# Patient Record
Sex: MALE | Race: BLACK OR AFRICAN AMERICAN | Employment: OTHER | URBAN - METROPOLITAN AREA
[De-identification: names, ages, dates, MRNs, and addresses within clinical notes are randomized per-mention and may not be internally consistent; named-entity substitution may affect disease eponyms.]

---

## 2017-01-25 ENCOUNTER — LAB ONLY (OUTPATIENT)
Dept: INTERNAL MEDICINE CLINIC | Age: 57
End: 2017-01-25

## 2017-01-25 DIAGNOSIS — R73.03 PREDIABETES: ICD-10-CM

## 2017-01-25 DIAGNOSIS — E78.5 DYSLIPIDEMIA: ICD-10-CM

## 2017-01-26 LAB
ALBUMIN SERPL-MCNC: 4.3 G/DL (ref 3.5–5.5)
ALBUMIN/GLOB SERPL: 1.7 {RATIO} (ref 1.1–2.5)
ALP SERPL-CCNC: 67 IU/L (ref 39–117)
ALT SERPL-CCNC: 22 IU/L (ref 0–44)
AST SERPL-CCNC: 20 IU/L (ref 0–40)
BILIRUB SERPL-MCNC: 0.3 MG/DL (ref 0–1.2)
BUN SERPL-MCNC: 17 MG/DL (ref 6–24)
BUN/CREAT SERPL: 16 (ref 9–20)
CALCIUM SERPL-MCNC: 9.4 MG/DL (ref 8.7–10.2)
CHLORIDE SERPL-SCNC: 102 MMOL/L (ref 96–106)
CHOLEST SERPL-MCNC: 241 MG/DL (ref 100–199)
CO2 SERPL-SCNC: 23 MMOL/L (ref 18–29)
CREAT SERPL-MCNC: 1.07 MG/DL (ref 0.76–1.27)
GLOBULIN SER CALC-MCNC: 2.6 G/DL (ref 1.5–4.5)
GLUCOSE SERPL-MCNC: 109 MG/DL (ref 65–99)
HDLC SERPL-MCNC: 55 MG/DL
LDLC SERPL CALC-MCNC: 169 MG/DL (ref 0–99)
POTASSIUM SERPL-SCNC: 5.2 MMOL/L (ref 3.5–5.2)
PROT SERPL-MCNC: 6.9 G/DL (ref 6–8.5)
SODIUM SERPL-SCNC: 139 MMOL/L (ref 134–144)
TRIGL SERPL-MCNC: 84 MG/DL (ref 0–149)
VLDLC SERPL CALC-MCNC: 17 MG/DL (ref 5–40)

## 2017-01-27 ENCOUNTER — OFFICE VISIT (OUTPATIENT)
Dept: INTERNAL MEDICINE CLINIC | Age: 57
End: 2017-01-27

## 2017-01-27 VITALS
DIASTOLIC BLOOD PRESSURE: 67 MMHG | HEIGHT: 69 IN | OXYGEN SATURATION: 96 % | HEART RATE: 84 BPM | BODY MASS INDEX: 22.87 KG/M2 | TEMPERATURE: 97.7 F | SYSTOLIC BLOOD PRESSURE: 113 MMHG | WEIGHT: 154.4 LBS

## 2017-01-27 DIAGNOSIS — Z00.00 PHYSICAL EXAM, ANNUAL: Primary | ICD-10-CM

## 2017-01-27 DIAGNOSIS — G89.29 CHRONIC LEFT SHOULDER PAIN: ICD-10-CM

## 2017-01-27 DIAGNOSIS — F41.9 ANXIETY: ICD-10-CM

## 2017-01-27 DIAGNOSIS — M25.512 CHRONIC LEFT SHOULDER PAIN: ICD-10-CM

## 2017-01-27 DIAGNOSIS — I10 ESSENTIAL HYPERTENSION: ICD-10-CM

## 2017-01-27 DIAGNOSIS — E78.00 PURE HYPERCHOLESTEROLEMIA: ICD-10-CM

## 2017-01-27 DIAGNOSIS — R73.01 IFG (IMPAIRED FASTING GLUCOSE): ICD-10-CM

## 2017-01-27 DIAGNOSIS — Z23 ENCOUNTER FOR IMMUNIZATION: ICD-10-CM

## 2017-01-27 RX ORDER — ALBUTEROL SULFATE 90 UG/1
AEROSOL, METERED RESPIRATORY (INHALATION)
Qty: 1 INHALER | Refills: 11 | Status: SHIPPED | OUTPATIENT
Start: 2017-01-27 | End: 2017-08-04 | Stop reason: SDUPTHER

## 2017-01-27 RX ORDER — LISINOPRIL 10 MG/1
10 TABLET ORAL DAILY
Qty: 30 TAB | Refills: 6 | Status: SHIPPED | OUTPATIENT
Start: 2017-01-27 | End: 2017-08-09 | Stop reason: SDUPTHER

## 2017-01-27 RX ORDER — CYCLOBENZAPRINE HCL 10 MG
10 TABLET ORAL
Qty: 20 TAB | Refills: 0 | Status: SHIPPED | OUTPATIENT
Start: 2017-01-27 | End: 2017-07-17

## 2017-01-27 RX ORDER — PRAVASTATIN SODIUM 20 MG/1
20 TABLET ORAL
Qty: 30 TAB | Refills: 6 | Status: SHIPPED | OUTPATIENT
Start: 2017-01-27 | End: 2017-07-17

## 2017-01-27 NOTE — MR AVS SNAPSHOT
Visit Information Date & Time Provider Department Dept. Phone Encounter #  
 1/27/2017  2:45 PM Marlene Tsai, [address] [place] [address],[address] [phone] [id number] Follow-up Instructions Return in about 6 months (around 7/27/2017). Upcoming Health Maintenance Date Due Pneumococcal 19-64 Highest Risk (1 of 3 - PCV13) 4/13/1978 DTaP/Tdap/Td series (1 - Tdap) 7/10/2010 INFLUENZA AGE 9 TO ADULT 8/1/2016 COLONOSCOPY 10/1/2022 Allergies as of 1/27/2017  Review Complete On: 1/27/2017 By: Marlene Tsai MD  
 No Known Allergies Current Immunizations  Reviewed on 7/27/2016 Name Date Influenza Vaccine PF 10/29/2014 TD Vaccine 7/9/2010  4:00 PM, 7/9/2003 Not reviewed this visit You Were Diagnosed With   
  
 Codes Comments Physical exam, annual    -  Primary ICD-10-CM: Z00.00 ICD-9-CM: V70.0 Essential hypertension     ICD-10-CM: I10 
ICD-9-CM: 401.9 Anxiety     ICD-10-CM: F41.9 ICD-9-CM: 300.00 Pure hypercholesterolemia     ICD-10-CM: E78.00 ICD-9-CM: 272.0 Chronic left shoulder pain     ICD-10-CM: M25.512, G89.29 ICD-9-CM: 719.41, 338.29 IFG (impaired fasting glucose)     ICD-10-CM: R73.01 
ICD-9-CM: 790.21 Vitals BP Pulse Temp Height(growth percentile) Weight(growth percentile) SpO2  
 113/67 (BP 1 Location: Left arm, BP Patient Position: Sitting) 84 97.7 °F (36.5 °C) (Oral) 5' 8.5\" (1.74 m) 154 lb 6.4 oz (70 kg) 96% BMI Smoking Status 23.13 kg/m2 Former Smoker BMI and BSA Data Body Mass Index Body Surface Area  
 23.13 kg/m 2 1.84 m 2 Preferred Pharmacy Pharmacy Name Phone [phone] [id number] [phone] Your Updated Medication List  
  
   
This list is accurate as of: 1/27/17  3:23 PM.  Always use your most recent med list.  
  
  
  
  
 albuterol 90 mcg/actuation inhaler Commonly known as:  VENTOLIN HFA INHALE 1 PUFF EVERY 6 HOURS AS NEEDED FOR WHEEZING  
  
 citalopram 20 mg tablet Commonly known as:  Lajuanda Gearing Take 1 Tab by mouth daily. cyclobenzaprine 10 mg tablet Commonly known as:  FLEXERIL Take 1 Tab by mouth nightly. fluticasone 50 mcg/actuation nasal spray Commonly known as:  [place] Barb 2 Sprays by Both Nostrils route daily. HYDROcodone-acetaminophen 7.5-325 mg per tablet Commonly known as:  NORCO  
1-2 tabs PO H5veocg PRN Pain  
  
 ibuprofen 800 mg tablet Commonly known as:  MOTRIN Take 1 Tab by mouth every eight (8) hours as needed for Pain. lisinopril 10 mg tablet Commonly known as:  Mandie Yaima Take 1 Tab by mouth daily. pravastatin 20 mg tablet Commonly known as:  PRAVACHOL Take 1 Tab by mouth nightly. Prescriptions Sent to Pharmacy Refills  
 pravastatin (PRAVACHOL) 20 mg tablet 6 Sig: Take 1 Tab by mouth nightly. Class: Normal  
 Pharmacy: [facility] [address] Ph #: [phone] Route: Oral  
 lisinopril (PRINIVIL, ZESTRIL) 10 mg tablet 6 Sig: Take 1 Tab by mouth daily. Class: Normal  
 Pharmacy: [facility] [address] Ph #: [phone] Route: Oral  
 albuterol (VENTOLIN HFA) 90 mcg/actuation inhaler 11 Sig: INHALE 1 PUFF EVERY 6 HOURS AS NEEDED FOR WHEEZING Class: Normal  
 Pharmacy: [facility] [address] Ph #: [phone]  
 cyclobenzaprine (FLEXERIL) 10 mg tablet 0 Sig: Take 1 Tab by mouth nightly. Class: Normal  
 Pharmacy: [facility] [address] Ph #: [phone] Route: Oral  
  
We Performed the Following REFERRAL TO ORTHOPEDICS [GVG016 Custom] Comments: Please evaluate patient for shoulder pain Follow-up Instructions Return in about 6 months (around 7/27/2017). To-Do List   
 01/30/2017 Lab:  HEMOGLOBIN A1C WITH EAG   
  
 01/30/2017 Lab:  LIPID PANEL Referral Information Referral ID Referred By Referred To  
  
 [id number] DENNIS, [address] [facility] [address] Phone: [phone] Visits Status Start Date End Date 1 New Request 1/27/17 1/27/18 If your referral has a status of pending review or denied, additional information will be sent to support the outcome of this decision. Patient Instructions Lab fasting in 6 weeks Schedule eye exam 
 
See dr Vince Crigler Introducing [facility]! Allyssa Urbina introduces [employer] patient portal. Now you can access parts of your medical record, email your doctor's office, and request medication refills online. 1. In your internet browser, go to [email or web address] 2. Click on the First Time User? Click Here link in the Sign In box. You will see the New Member Sign Up page. 3. Enter your [employer] Access Code exactly as it appears below. You will not need to use this code after youve completed the sign-up process. If you do not sign up before the expiration date, you must request a new code. · [employer] Access Code: [email or web address] Expires: 4/27/2017  3:22 PM 
 
4. Enter the last four digits of your Social Security Number (xxxx) and Date of Birth (mm/dd/yyyy) as indicated and click Submit. You will be taken to the next sign-up page. 5. Create a Evinot ID. This will be your [employer] login ID and cannot be changed, so think of one that is secure and easy to remember. 6. Create a [employer] password. You can change your password at any time. 7. Enter your Password Reset Question and Answer. This can be used at a later time if you forget your password. 8. Enter your e-mail address. You will receive e-mail notification when new information is available in [address] 9. Click Sign Up. You can now view and download portions of your medical record. 10. Click the Download Summary menu link to download a portable copy of your medical information. If you have questions, please visit the Frequently Asked Questions section of the [employer] website. Remember, [employer] is NOT to be used for urgent needs. For medical emergencies, dial 911. Now available from your iPhone and Android! Please provide this summary of care documentation to your next provider. Your primary care clinician is listed as Itzel Hart. If you have any questions after today's visit, please call [phone].

## 2017-01-27 NOTE — PROGRESS NOTES
[place] per Dr. Anjali Corcoran, flu vaccine given after obtaining the consent form. 0.5ml injected in the R deltoid muscle intramuscularly. Administered by Corine Hobson LPN. VIS given. 1/27/17.
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
## 2017-02-22 ENCOUNTER — LAB ONLY (OUTPATIENT)
Dept: INTERNAL MEDICINE CLINIC | Age: 57
End: 2017-02-22

## 2017-02-22 DIAGNOSIS — E78.00 PURE HYPERCHOLESTEROLEMIA: ICD-10-CM

## 2017-02-22 DIAGNOSIS — R73.01 IFG (IMPAIRED FASTING GLUCOSE): ICD-10-CM

## 2017-02-22 RX ORDER — CITALOPRAM 20 MG/1
20 TABLET, FILM COATED ORAL DAILY
Qty: 30 TAB | Refills: 6 | Status: SHIPPED | OUTPATIENT
Start: 2017-02-22 | End: 2017-08-24 | Stop reason: SDUPTHER

## 2017-02-23 LAB
CHOLEST SERPL-MCNC: 213 MG/DL (ref 100–199)
EST. AVERAGE GLUCOSE BLD GHB EST-MCNC: 117 MG/DL
HBA1C MFR BLD: 5.7 % (ref 4.8–5.6)
HDLC SERPL-MCNC: 67 MG/DL
LDLC SERPL CALC-MCNC: 135 MG/DL (ref 0–99)
TRIGL SERPL-MCNC: 55 MG/DL (ref 0–149)
VLDLC SERPL CALC-MCNC: 11 MG/DL (ref 5–40)

## 2017-02-23 RX ORDER — PRAVASTATIN SODIUM 40 MG/1
40 TABLET ORAL
Qty: 30 TAB | Refills: 5 | Status: SHIPPED | OUTPATIENT
Start: 2017-02-23 | End: 2017-07-28 | Stop reason: SDUPTHER

## 2017-02-23 NOTE — PROGRESS NOTES
Spoke to [employer] ([employer]). Angelica informed per Dr. Aury Hart to have pt increase pravastatin to 40mg from 20mg as lipids above goal-ordered. Angelica informed to have pt have fasting labs prior to NOV-ordered and mailed. Angelica verbalized understanding of information discussed w/ no further questions at this time.

## 2017-07-17 ENCOUNTER — HOSPITAL ENCOUNTER (EMERGENCY)
Age: 57
Discharge: HOME | End: 2017-07-17
Attending: EMERGENCY MEDICINE
Payer: COMMERCIAL

## 2017-07-17 ENCOUNTER — APPOINTMENT (OUTPATIENT)
Dept: GENERAL RADIOLOGY | Age: 57
End: 2017-07-17
Attending: EMERGENCY MEDICINE
Payer: COMMERCIAL

## 2017-07-17 VITALS
HEIGHT: 68 IN | TEMPERATURE: 97.6 F | HEART RATE: 97 BPM | SYSTOLIC BLOOD PRESSURE: 153 MMHG | DIASTOLIC BLOOD PRESSURE: 57 MMHG | WEIGHT: 153.44 LBS | OXYGEN SATURATION: 95 % | RESPIRATION RATE: 18 BRPM | BODY MASS INDEX: 23.26 KG/M2

## 2017-07-17 DIAGNOSIS — J45.21 MILD INTERMITTENT ASTHMA WITH ACUTE EXACERBATION: Primary | ICD-10-CM

## 2017-07-17 PROCEDURE — 99284 EMERGENCY DEPT VISIT MOD MDM: CPT

## 2017-07-17 PROCEDURE — 74011000250 HC RX REV CODE- 250

## 2017-07-17 PROCEDURE — 74011636637 HC RX REV CODE- 636/637: Performed by: EMERGENCY MEDICINE

## 2017-07-17 PROCEDURE — 71020 XR CHEST PA LAT: CPT

## 2017-07-17 PROCEDURE — 94640 AIRWAY INHALATION TREATMENT: CPT

## 2017-07-17 PROCEDURE — 74011000250 HC RX REV CODE- 250: Performed by: EMERGENCY MEDICINE

## 2017-07-17 PROCEDURE — 77030029684 HC NEB SM VOL KT MONA -A

## 2017-07-17 RX ORDER — PREDNISONE 20 MG/1
60 TABLET ORAL
Status: DISCONTINUED | OUTPATIENT
Start: 2017-07-17 | End: 2017-07-17

## 2017-07-17 RX ORDER — IPRATROPIUM BROMIDE AND ALBUTEROL SULFATE 2.5; .5 MG/3ML; MG/3ML
3 SOLUTION RESPIRATORY (INHALATION)
Status: COMPLETED | OUTPATIENT
Start: 2017-07-17 | End: 2017-07-17

## 2017-07-17 RX ORDER — IPRATROPIUM BROMIDE AND ALBUTEROL SULFATE 2.5; .5 MG/3ML; MG/3ML
SOLUTION RESPIRATORY (INHALATION)
Status: COMPLETED
Start: 2017-07-17 | End: 2017-07-17

## 2017-07-17 RX ORDER — PREDNISONE 20 MG/1
60 TABLET ORAL
Status: COMPLETED | OUTPATIENT
Start: 2017-07-17 | End: 2017-07-17

## 2017-07-17 RX ORDER — PREDNISONE 20 MG/1
TABLET ORAL
Status: DISCONTINUED
Start: 2017-07-17 | End: 2017-07-17 | Stop reason: HOSPADM

## 2017-07-17 RX ORDER — PREDNISONE 20 MG/1
40 TABLET ORAL DAILY
Qty: 8 TAB | Refills: 0 | Status: SHIPPED | OUTPATIENT
Start: 2017-07-17 | End: 2017-07-21

## 2017-07-17 RX ADMIN — IPRATROPIUM BROMIDE AND ALBUTEROL SULFATE 3 ML: 2.5; .5 SOLUTION RESPIRATORY (INHALATION) at 01:43

## 2017-07-17 RX ADMIN — IPRATROPIUM BROMIDE AND ALBUTEROL SULFATE 3 ML: .5; 3 SOLUTION RESPIRATORY (INHALATION) at 01:13

## 2017-07-17 RX ADMIN — PREDNISONE 60 MG: 20 TABLET ORAL at 01:12

## 2017-07-17 RX ADMIN — IPRATROPIUM BROMIDE AND ALBUTEROL SULFATE 3 ML: .5; 3 SOLUTION RESPIRATORY (INHALATION) at 01:43

## 2017-07-17 RX ADMIN — IPRATROPIUM BROMIDE AND ALBUTEROL SULFATE 3 ML: .5; 3 SOLUTION RESPIRATORY (INHALATION) at 01:28

## 2017-07-17 NOTE — LETTER
[facility] EMERGENCY DEPT 
[address] [place] [address] [address] [id number] [phone] Work/School Note Date: 7/17/2017 To Whom It May concern: 
 
Jenny Fisher was seen and treated today in the emergency room by the following provider(s): 
Attending Provider: Kofi Kowalski MD 
Resident: Jaja Rasmussen MD. Jenny Fisher may return to work on 7/18/2017. Sincerely, Jaja Rasmussen MD
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
## 2017-07-17 NOTE — DISCHARGE INSTRUCTIONS

## 2017-07-26 ENCOUNTER — LAB ONLY (OUTPATIENT)
Dept: INTERNAL MEDICINE CLINIC | Age: 57
End: 2017-07-26

## 2017-07-26 DIAGNOSIS — R73.01 IFG (IMPAIRED FASTING GLUCOSE): ICD-10-CM

## 2017-07-26 DIAGNOSIS — E78.00 PURE HYPERCHOLESTEROLEMIA: ICD-10-CM

## 2017-07-27 LAB
ALBUMIN SERPL-MCNC: 4.1 G/DL (ref 3.5–5.5)
ALBUMIN/GLOB SERPL: 1.6 {RATIO} (ref 1.2–2.2)
ALP SERPL-CCNC: 62 IU/L (ref 39–117)
ALT SERPL-CCNC: 33 IU/L (ref 0–44)
AST SERPL-CCNC: 31 IU/L (ref 0–40)
BILIRUB SERPL-MCNC: 0.3 MG/DL (ref 0–1.2)
BUN SERPL-MCNC: 14 MG/DL (ref 6–24)
BUN/CREAT SERPL: 14 (ref 9–20)
CALCIUM SERPL-MCNC: 9.4 MG/DL (ref 8.7–10.2)
CHLORIDE SERPL-SCNC: 98 MMOL/L (ref 96–106)
CHOLEST SERPL-MCNC: 220 MG/DL (ref 100–199)
CO2 SERPL-SCNC: 24 MMOL/L (ref 18–29)
CREAT SERPL-MCNC: 1.03 MG/DL (ref 0.76–1.27)
EST. AVERAGE GLUCOSE BLD GHB EST-MCNC: 114 MG/DL
GLOBULIN SER CALC-MCNC: 2.6 G/DL (ref 1.5–4.5)
GLUCOSE SERPL-MCNC: 103 MG/DL (ref 65–99)
HBA1C MFR BLD: 5.6 % (ref 4.8–5.6)
HDLC SERPL-MCNC: 58 MG/DL
LDLC SERPL CALC-MCNC: 143 MG/DL (ref 0–99)
POTASSIUM SERPL-SCNC: 5.2 MMOL/L (ref 3.5–5.2)
PROT SERPL-MCNC: 6.7 G/DL (ref 6–8.5)
SODIUM SERPL-SCNC: 136 MMOL/L (ref 134–144)
TRIGL SERPL-MCNC: 93 MG/DL (ref 0–149)
VLDLC SERPL CALC-MCNC: 19 MG/DL (ref 5–40)

## 2017-07-28 ENCOUNTER — OFFICE VISIT (OUTPATIENT)
Dept: INTERNAL MEDICINE CLINIC | Age: 57
End: 2017-07-28

## 2017-07-28 ENCOUNTER — HOSPITAL ENCOUNTER (OUTPATIENT)
Dept: ULTRASOUND IMAGING | Age: 57
Discharge: HOME | End: 2017-07-28
Attending: INTERNAL MEDICINE
Payer: COMMERCIAL

## 2017-07-28 VITALS
SYSTOLIC BLOOD PRESSURE: 113 MMHG | RESPIRATION RATE: 16 BRPM | BODY MASS INDEX: 23.49 KG/M2 | HEIGHT: 68 IN | DIASTOLIC BLOOD PRESSURE: 65 MMHG | HEART RATE: 82 BPM | WEIGHT: 155 LBS | OXYGEN SATURATION: 98 % | TEMPERATURE: 98 F

## 2017-07-28 DIAGNOSIS — E78.00 PURE HYPERCHOLESTEROLEMIA: ICD-10-CM

## 2017-07-28 DIAGNOSIS — I10 ESSENTIAL HYPERTENSION: Primary | ICD-10-CM

## 2017-07-28 DIAGNOSIS — J40 BRONCHITIS: ICD-10-CM

## 2017-07-28 DIAGNOSIS — F41.9 ANXIETY: ICD-10-CM

## 2017-07-28 DIAGNOSIS — R22.1 NECK FULLNESS: ICD-10-CM

## 2017-07-28 DIAGNOSIS — C61 PROSTATE CANCER (HCC): ICD-10-CM

## 2017-07-28 PROCEDURE — 76536 US EXAM OF HEAD AND NECK: CPT

## 2017-07-28 RX ORDER — PRAVASTATIN SODIUM 40 MG/1
40 TABLET ORAL
Qty: 30 TAB | Refills: 6 | Status: SHIPPED | OUTPATIENT
Start: 2017-07-28 | End: 2018-07-15 | Stop reason: ALTCHOICE

## 2017-07-28 RX ORDER — FLUTICASONE PROPIONATE 50 MCG
2 SPRAY, SUSPENSION (ML) NASAL AS NEEDED
COMMUNITY
End: 2020-05-26

## 2017-07-28 NOTE — PROGRESS NOTES
HISTORY OF PRESENT ILLNESS  Román Arcos is a 61 y.o. male. HPI   Last here 1/27/17.  Pt is here to f/u on chronic conditions    BP today is great- 113/65  BP at home running around   Continues lisinopril 10mg daily     Pt was in ED for asthma attack 7/17/17  Reviewed notes per ED 7/17/17  He was drinking juice and starting choking and getting sob, had wheezing and chest pain  He was given prednisone for treatment and breathing treatments per ED  Pt has albuterol at home but has not been using this recently  Pt uses this about once per month on average  No further sx or flares     Continues celexa 20mg daily for anxiety, works well, happy with dose     Reviewed last labs 7/17  Fasting BS elevated but a1c normal,  but improved  Ordered labs to complete prior to follow up     Wt is stable since last visit  His weight is within normal ranges    Continues pravachol 20mg for cholesterol- not taking daily however   Last visit, he had reported missing frequent doses and I had advised working on compliance  LDL still elevated at 143 on most recent labs in July, improved but still above goal  He is still not taking this compliantly however   He has been taking fish oil instead since he bought this and he didn't want to waste this   Discussed he can stop the fish oil and focus on taking his pravachol daily   Reordered this for him to today and advised to restart this  Recall could not tolerate lipitor     Reviewed last notes from Dr. Tonio Osullivan ([facility]) 3/06/17  ED following radical prostatectomy, will try NSAIDs before prescriptions, gave recommendations  He follows Dr. Kendrick Sam (uro) annually in January for the history of prostate cancer     Discussed US neck to ensure normal anatomical asymmetry cause of prominence on L neck    PREVENTIVE:    Colonoscopy: 8/17/11, Dr. Chrissy Kelly, repeat 10 years  PSA: 8/11, 10/14, 5/16 undetectable, urology follows  Tdap: 7/09/2010  Pneumovax: not yet needed  [id number]: not yet needed  Zostavax: not yet needed  Flu shot: 1/27/2017  A1c: 7/17 5.6-nl  Eye exam: due, will schedule, reminded him  Hep C screen: 7/14/15 negative   Lipids: 7/17       Patient Active Problem List    Diagnosis Date Noted    Prostate cancer ([facility] [address] 01/13/2015    GERD (gastroesophageal reflux disease) 09/07/2011    HTN (hypertension) 09/07/2011    Dizziness and giddiness 06/08/2011    Anxiety 03/29/2011     Current Outpatient Prescriptions   Medication Sig Dispense Refill    pravastatin (PRAVACHOL) 40 mg tablet Take 1 Tab by mouth nightly. 30 Tab 5    lisinopril (PRINIVIL, ZESTRIL) 10 mg tablet Take 1 Tab by mouth daily. 30 Tab 6    albuterol (VENTOLIN HFA) 90 mcg/actuation inhaler INHALE 1 PUFF EVERY 6 HOURS AS NEEDED FOR WHEEZING 1 Inhaler 11     Past Surgical History:   Procedure Laterality Date    ABDOMEN SURGERY PROC UNLISTED Right 1980    inguinal    HX PROSTATECTOMY  8/2015      Lab Results  Component Value Date/Time   WBC 6.0 07/27/2016 09:03 AM   HGB 12.7 07/27/2016 09:03 AM   HCT 38.0 07/27/2016 09:03 AM   PLATELET 478 30/64/0125 09:03 AM   MCV 89 07/27/2016 09:03 AM     Lab Results  Component Value Date/Time   Cholesterol, total 220 07/26/2017 08:06 AM   HDL Cholesterol 58 07/26/2017 08:06 AM   LDL, calculated 143 07/26/2017 08:06 AM   Triglyceride 93 07/26/2017 08:06 AM     Lab Results  Component Value Date/Time   GFR est non-AA 80 07/26/2017 08:06 AM   GFR est AA 92 07/26/2017 08:06 AM   Creatinine 1.03 07/26/2017 08:06 AM   BUN 14 07/26/2017 08:06 AM   Sodium 136 07/26/2017 08:06 AM   Potassium 5.2 07/26/2017 08:06 AM   Chloride 98 07/26/2017 08:06 AM   CO2 24 07/26/2017 08:06 AM          Review of Systems   Respiratory: Negative for shortness of breath. Cardiovascular: Negative for chest pain. Physical Exam   Constitutional: He is oriented to person, place, and time. He appears well-developed and well-nourished. No distress. HENT:   Head: Normocephalic and atraumatic. Mouth/Throat: Oropharynx is clear and moist. No oropharyngeal exudate. Eyes: Conjunctivae and EOM are normal. Right eye exhibits no discharge. Left eye exhibits no discharge. Neck: Normal range of motion. Neck supple. Cardiovascular: Normal rate, regular rhythm, normal heart sounds and intact distal pulses. Exam reveals no gallop and no friction rub. No murmur heard. Pulmonary/Chest: Effort normal and breath sounds normal. No respiratory distress. He has no wheezes. He has no rales. He exhibits no tenderness. Musculoskeletal: Normal range of motion. He exhibits no edema, tenderness or deformity. Mehdi Herrera, more prominent neck muscle on L side   Lymphadenopathy:     He has no cervical adenopathy. Neurological: He is alert and oriented to person, place, and time. He has normal reflexes. Coordination normal.   Skin: Skin is warm and dry. No rash noted. He is not diaphoretic. No erythema. No pallor. Psychiatric: He has a normal mood and affect. His behavior is normal.       ASSESSMENT and PLAN    ICD-10-CM ICD-9-CM    1. Essential hypertension    Well controlled on lisinopril, no change. I10 401.9    2. Pure hypercholesterolemia    Not taking his pravachol, counseled him on this, he will start. E78.00 272.0 pravastatin (PRAVACHOL) 40 mg tablet   3. Anxiety    Well controlled on celexa 20mg daily    F41.9 300.00    4. Prostate cancer ([facility] [address]    In remission, UTD with Dr. Lilia Parsons 185    5. Bronchitis    Resolved after prednisone, has albuterol for prn use. J40 490    6. Neck fullness    May just be sternocleidomastoid muscle, however, need to r/o any underlying adenopathy. US ordered. R22.1 784.2 US HEAD NECK SOFT TISSUE          Written by Cm Suazo, as dictated by Isaiah Almeida MD.    Current diagnosis and concerns discussed with pt at length.  Understands risks and benefits or current treatment plan and medications and accepts the treatment and medication with any possible risks.   Pt asks appropriate questions which were answered.   Pt instructed to call with any concerns or problems. This note will not be viewable in [address]

## 2017-07-28 NOTE — MR AVS SNAPSHOT
Visit Information Date & Time Provider Department Dept. Phone Encounter #  
 7/28/2017  2:45 PM Belkis Ko, [address] [place] [address],[address] [phone] [id number] Follow-up Instructions Return in about 6 months (around 1/28/2018). Upcoming Health Maintenance Date Due Pneumococcal 19-64 Highest Risk (1 of 3 - PCV13) 4/13/1978 DTaP/Tdap/Td series (1 - Tdap) 7/10/2010 INFLUENZA AGE 9 TO ADULT 8/1/2017 COLONOSCOPY 10/1/2022 Allergies as of 7/28/2017  Review Complete On: 7/28/2017 By: Christian Gallardo LPN No Known Allergies Current Immunizations  Reviewed on 7/27/2016 Name Date Influenza Vaccine (Quad) PF 1/27/2017 Influenza Vaccine PF 10/29/2014 TD Vaccine 7/9/2010  4:00 PM, 7/9/2003 Not reviewed this visit You Were Diagnosed With   
  
 Codes Comments Essential hypertension    -  Primary ICD-10-CM: I10 
ICD-9-CM: 401.9 Pure hypercholesterolemia     ICD-10-CM: E78.00 ICD-9-CM: 272.0 Anxiety     ICD-10-CM: F41.9 ICD-9-CM: 300.00 Prostate cancer [facility])     ICD-10-CM: N66 ICD-9-CM: 853 Bronchitis     ICD-10-CM: J40 ICD-9-CM: 668 Neck fullness     ICD-10-CM: R22.1 ICD-9-CM: 932. 2 Vitals BP Pulse Temp Resp Height(growth percentile) Weight(growth percentile) 113/65 (BP 1 Location: Left arm, BP Patient Position: Sitting) 82 98 °F (36.7 °C) (Oral) 16 5' 8\" (1.727 m) 155 lb (70.3 kg) SpO2 BMI Smoking Status 98% 23.57 kg/m2 Former Smoker Vitals History BMI and BSA Data Body Mass Index Body Surface Area  
 23.57 kg/m 2 1.84 m 2 Preferred Pharmacy Pharmacy Name Phone [facility] [phone] [id number] [phone] Your Updated Medication List  
  
   
This list is accurate as of: 7/28/17  3:08 PM.  Always use your most recent med list.  
  
  
  
  
 albuterol 90 mcg/actuation inhaler Commonly known as:  VENTOLIN HFA INHALE 1 PUFF EVERY 6 HOURS AS NEEDED FOR WHEEZING  
  
 FLONASE 50 mcg/actuation nasal spray Generic drug:  fluticasone 2 Sprays by Both Nostrils route daily. lisinopril 10 mg tablet Commonly known as:  Karn Desanctis Take 1 Tab by mouth daily. pravastatin 40 mg tablet Commonly known as:  PRAVACHOL Take 1 Tab by mouth nightly. Prescriptions Sent to Pharmacy Refills  
 pravastatin (PRAVACHOL) 40 mg tablet 6 Sig: Take 1 Tab by mouth nightly. Class: Normal  
 Pharmacy: [facility] [address] #: [phone] Route: Oral  
  
Follow-up Instructions Return in about 6 months (around 1/28/2018). To-Do List   
 07/28/2017 Imaging:  US HEAD NECK SOFT TISSUE Patient Instructions Restart pravachol every night Eye exam complete Introducing [facility]! Maria Luisa Bernard introduces [employer] patient portal. Now you can access parts of your medical record, email your doctor's office, and request medication refills online. 1. In your internet browser, go to [email or web address] 2. Click on the First Time User? Click Here link in the Sign In box. You will see the New Member Sign Up page. 3. Enter your [employer] Access Code exactly as it appears below. You will not need to use this code after youve completed the sign-up process. If you do not sign up before the expiration date, you must request a new code. · [employer] Access Code: [email or web address] Expires: 10/15/2017  1:09 AM 
 
4. Enter the last four digits of your Social Security Number (xxxx) and Date of Birth (mm/dd/yyyy) as indicated and click Submit. You will be taken to the next sign-up page. 5. Create a [employer] ID. This will be your [employer] login ID and cannot be changed, so think of one that is secure and easy to remember. 6. Create a obiwon password. You can change your password at any time. 7. Enter your Password Reset Question and Answer. This can be used at a later time if you forget your password. 8. Enter your e-mail address. You will receive e-mail notification when new information is available in [address] 9. Click Sign Up. You can now view and download portions of your medical record. 10. Click the Download Summary menu link to download a portable copy of your medical information. If you have questions, please visit the Frequently Asked Questions section of the obiwon website. Remember, obiwon is NOT to be used for urgent needs. For medical emergencies, dial 911. Now available from your iPhone and Android! Please provide this summary of care documentation to your next provider. Your primary care clinician is listed as Laney Escobedo. If you have any questions after today's visit, please call [phone].

## 2017-08-04 NOTE — TELEPHONE ENCOUNTER
Katie Garcia is at the pharmacy trying to refill the inhaler and it is too early. Pt is going out of town and will need this. Insurance won't fill early. The only way they can is if Dr. Miriam Landry rewrites the directions and then they would fill, but would need this yet today. This is on the Albuterol inhaler.   Please call pharmacy and Miss Katie Garcia  #[phone]

## 2017-08-07 RX ORDER — ALBUTEROL SULFATE 90 UG/1
2 AEROSOL, METERED RESPIRATORY (INHALATION)
Qty: 1 INHALER | Refills: 11 | Status: SHIPPED | OUTPATIENT
Start: 2017-08-07 | End: 2018-07-30 | Stop reason: SDUPTHER

## 2017-08-09 RX ORDER — LISINOPRIL 10 MG/1
TABLET ORAL
Qty: 30 TAB | Refills: 0 | Status: SHIPPED | OUTPATIENT
Start: 2017-08-09 | End: 2017-08-24 | Stop reason: SDUPTHER

## 2017-08-24 RX ORDER — CITALOPRAM 20 MG/1
TABLET, FILM COATED ORAL
Qty: 30 TAB | Refills: 0 | Status: SHIPPED | COMMUNITY
Start: 2017-08-24 | End: 2017-10-04 | Stop reason: SDUPTHER

## 2017-08-24 RX ORDER — LISINOPRIL 10 MG/1
TABLET ORAL
Qty: 30 TAB | Refills: 0 | Status: SHIPPED | COMMUNITY
Start: 2017-08-24 | End: 2017-10-04 | Stop reason: SDUPTHER

## 2017-09-25 ENCOUNTER — OFFICE VISIT (OUTPATIENT)
Dept: INTERNAL MEDICINE CLINIC | Age: 57
End: 2017-09-25

## 2017-09-25 VITALS
OXYGEN SATURATION: 96 % | HEART RATE: 72 BPM | DIASTOLIC BLOOD PRESSURE: 79 MMHG | BODY MASS INDEX: 23.34 KG/M2 | RESPIRATION RATE: 16 BRPM | SYSTOLIC BLOOD PRESSURE: 125 MMHG | TEMPERATURE: 98.2 F | HEIGHT: 68 IN | WEIGHT: 154 LBS

## 2017-09-25 DIAGNOSIS — M79.601 RIGHT ARM PAIN: ICD-10-CM

## 2017-09-25 DIAGNOSIS — Z23 ENCOUNTER FOR IMMUNIZATION: ICD-10-CM

## 2017-09-25 DIAGNOSIS — M54.12 CERVICAL RADICULAR PAIN: ICD-10-CM

## 2017-09-25 DIAGNOSIS — M43.6 TORTICOLLIS, ACUTE: Primary | ICD-10-CM

## 2017-09-25 DIAGNOSIS — I10 ESSENTIAL HYPERTENSION: ICD-10-CM

## 2017-09-25 RX ORDER — METHYLPREDNISOLONE 4 MG/1
TABLET ORAL
Qty: 1 DOSE PACK | Refills: 0 | Status: SHIPPED | OUTPATIENT
Start: 2017-09-25 | End: 2017-10-17

## 2017-09-25 RX ORDER — CYCLOBENZAPRINE HCL 10 MG
10 TABLET ORAL
Qty: 30 TAB | Refills: 0 | Status: SHIPPED | OUTPATIENT
Start: 2017-09-25 | End: 2017-09-27 | Stop reason: ALTCHOICE

## 2017-09-25 RX ORDER — IBUPROFEN 800 MG/1
800 TABLET ORAL
Qty: 30 TAB | Refills: 0 | Status: SHIPPED | OUTPATIENT
Start: 2017-09-25 | End: 2018-01-30

## 2017-09-25 NOTE — MR AVS SNAPSHOT
Visit Information Date & Time Provider Department Dept. Phone Encounter #  
 9/25/2017 11:00 AM Tiny Borjas, [address] [facility] [phone] [id number] Your Appointments 1/30/2018  2:45 PM  
ROUTINE CARE with Tiny Borjas, [facility]) Appt Note: 6 month follow up  
 [address] [address]  
[address] [place] [address] Upcoming Health Maintenance Date Due Pneumococcal 19-64 Highest Risk (1 of 3 - PCV13) 4/13/1978 DTaP/Tdap/Td series (1 - Tdap) 7/10/2010 INFLUENZA AGE 9 TO ADULT 8/1/2017 COLONOSCOPY 10/1/2022 Allergies as of 9/25/2017  Review Complete On: 7/28/2017 By: Tiny Borjas MD  
 No Known Allergies Current Immunizations  Reviewed on 7/27/2016 Name Date Influenza Vaccine (Quad) PF 1/27/2017 Influenza Vaccine PF 10/29/2014 TD Vaccine 7/9/2010  4:00 PM, 7/9/2003 Not reviewed this visit You Were Diagnosed With   
  
 Codes Comments Torticollis, acute    -  Primary ICD-10-CM: M43.6 ICD-9-CM: 723.5 Essential hypertension     ICD-10-CM: I10 
ICD-9-CM: 401.9 Right arm pain     ICD-10-CM: J62.594 ICD-9-CM: 729.5 Cervical radicular pain     ICD-10-CM: M54.12 
ICD-9-CM: 723.4 Vitals BP Pulse Temp Resp Height(growth percentile) Weight(growth percentile) 125/79 (BP 1 Location: Left arm, BP Patient Position: Sitting) 72 98.2 °F (36.8 °C) (Oral) 16 5' 8\" (1.727 m) 154 lb (69.9 kg) SpO2 BMI Smoking Status 96% 23.42 kg/m2 Former Smoker BMI and BSA Data Body Mass Index Body Surface Area  
 23.42 kg/m 2 1.83 m 2 Preferred Pharmacy Pharmacy Name Phone [facility] [phone] [facility] 892 [phone] Your Updated Medication List  
  
   
 This list is accurate as of: 9/25/17 11:55 AM.  Always use your most recent med list.  
  
  
  
  
 albuterol 90 mcg/actuation inhaler Commonly known as:  VENTOLIN HFA Take 2 Puffs by inhalation every six (6) hours as needed for Wheezing. citalopram 20 mg tablet Commonly known as:  CELEXA  
TAKE 1 TABLET BY MOUTH DAILY  
  
 cyclobenzaprine 10 mg tablet Commonly known as:  FLEXERIL Take 1 Tab by mouth three (3) times daily as needed for Muscle Spasm(s). FLONASE 50 mcg/actuation nasal spray Generic drug:  fluticasone 2 Sprays by Both Nostrils route daily. ibuprofen 800 mg tablet Commonly known as:  MOTRIN Take 1 Tab by mouth every eight (8) hours as needed for Pain. lisinopril 10 mg tablet Commonly known as:  PRINIVIL, ZESTRIL  
TAKE 1 TABLET BY MOUTH DAILY  
  
 methylPREDNISolone 4 mg tablet Commonly known as:  Camella Cunas Per pack  
  
 pravastatin 40 mg tablet Commonly known as:  PRAVACHOL Take 1 Tab by mouth nightly. Prescriptions Sent to Pharmacy Refills  
 cyclobenzaprine (FLEXERIL) 10 mg tablet 0 Sig: Take 1 Tab by mouth three (3) times daily as needed for Muscle Spasm(s). Class: Normal  
 Pharmacy: [facility] [address] [place] [address] Ph #: [phone] Route: Oral  
 methylPREDNISolone (MEDROL DOSEPACK) 4 mg tablet 0 Sig: Per pack Class: Normal  
 Pharmacy: [facility] [address] [place] [address] Ph #: [phone]  
 ibuprofen (MOTRIN) 800 mg tablet 0 Sig: Take 1 Tab by mouth every eight (8) hours as needed for Pain. Class: Normal  
 Pharmacy: [facility] [address] [place] [address] Ph #: [phone] Route: Oral  
  
Introducing [facility]!    
 Pablo Rogers introduces [employer] patient portal. Now you can access parts of your medical record, email your doctor's office, and request medication refills online. 1. In your internet browser, go to [email or web address] 2. Click on the First Time User? Click Here link in the Sign In box. You will see the New Member Sign Up page. 3. Enter your [employer] Access Code exactly as it appears below. You will not need to use this code after youve completed the sign-up process. If you do not sign up before the expiration date, you must request a new code. · [employer] Access Code: [email or web address] Expires: 10/15/2017  1:09 AM 
 
4. Enter the last four digits of your Social Security Number (xxxx) and Date of Birth (mm/dd/yyyy) as indicated and click Submit. You will be taken to the next sign-up page. 5. Create a [employer] ID. This will be your [employer] login ID and cannot be changed, so think of one that is secure and easy to remember. 6. Create a [employer] password. You can change your password at any time. 7. Enter your Password Reset Question and Answer. This can be used at a later time if you forget your password. 8. Enter your e-mail address. You will receive e-mail notification when new information is available in [address] 9. Click Sign Up. You can now view and download portions of your medical record. 10. Click the Download Summary menu link to download a portable copy of your medical information. If you have questions, please visit the Frequently Asked Questions section of the [employer] website. Remember, [employer] is NOT to be used for urgent needs. For medical emergencies, dial 911. Now available from your iPhone and Android! Please provide this summary of care documentation to your next provider. Your primary care clinician is listed as Jessica Rojas. If you have any questions after today's visit, please call [phone].

## 2017-09-25 NOTE — PROGRESS NOTES
[place] per Dr. Leonel Olmstead, flu vaccine given after obtaining the consent form. 0.5ml injected in the L deltoid muscle intramuscularly. Administered by Haley Singh LPN. VIS given.

## 2017-09-25 NOTE — LETTER
NOTIFICATION RETURN TO WORK / SCHOOL 
 
9/25/2017 11:52 AM 
 
Mr. Daisha Vazquez [facility] [address] [facility] [address] [address] To Whom It May Concern: 
 
Daisha Vazquez is currently under the care of [facility]. He will return to work/school on: 9/27/17 If there are questions or concerns please have the patient contact our office.  
 
 
 
Sincerely, 
 
 
Anne Alvarez MD

## 2017-09-25 NOTE — PROGRESS NOTES
HISTORY OF PRESENT ILLNESS  Regine Laurent is a 61 y.o. male. HPI   Last here 7/28/17. Pt is here for acute care. 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 Parris Givens, repeat 10 years  PSA: 8/11, 10/14, 5/16 undetectable, urology follows  Tdap: 7/09/2010  Pneumovax: not yet needed  Petra Riveras: not yet needed  Zostavax: not yet needed  Flu shot: 9/25/17  A1c: 7/17 5.6-nl  Eye exam: due, will schedule, reminded him  Hep C screen: 7/14/15 negative   Lipids: 7/17     Patient Active Problem List    Diagnosis Date Noted    Pure hypercholesterolemia 07/28/2017    Prostate cancer ([facility] [address] 01/13/2015    GERD (gastroesophageal reflux disease) 09/07/2011    HTN (hypertension) 09/07/2011    Dizziness and giddiness 06/08/2011    Anxiety 03/29/2011     Current Outpatient Prescriptions   Medication Sig Dispense Refill    lisinopril (PRINIVIL, ZESTRIL) 10 mg tablet TAKE 1 TABLET BY MOUTH DAILY 30 Tab 0    citalopram (CELEXA) 20 mg tablet TAKE 1 TABLET BY MOUTH DAILY 30 Tab 0    albuterol (VENTOLIN HFA) 90 mcg/actuation inhaler Take 2 Puffs by inhalation every six (6) hours as needed for Wheezing.  1 Inhaler 11    fluticasone (FLONASE) 50 mcg/actuation nasal spray 2 Sprays by Both Nostrils route daily.  pravastatin (PRAVACHOL) 40 mg tablet Take 1 Tab by mouth nightly. 30 Tab 6     Past Surgical History:   Procedure Laterality Date    ABDOMEN SURGERY PROC UNLISTED Right 1980    inguinal    HX PROSTATECTOMY  8/2015      Lab Results  Component Value Date/Time   WBC 6.0 07/27/2016 09:03 AM   HGB 12.7 07/27/2016 09:03 AM   HCT 38.0 07/27/2016 09:03 AM   PLATELET 515 94/55/9019 09:03 AM   MCV 89 07/27/2016 09:03 AM     Lab Results  Component Value Date/Time   Cholesterol, total 220 07/26/2017 08:06 AM   HDL Cholesterol 58 07/26/2017 08:06 AM   LDL, calculated 143 07/26/2017 08:06 AM   Triglyceride 93 07/26/2017 08:06 AM     Lab Results  Component Value Date/Time   GFR est non-AA 80 07/26/2017 08:06 AM   GFR est AA 92 07/26/2017 08:06 AM   Creatinine 1.03 07/26/2017 08:06 AM   BUN 14 07/26/2017 08:06 AM   Sodium 136 07/26/2017 08:06 AM   Potassium 5.2 07/26/2017 08:06 AM   Chloride 98 07/26/2017 08:06 AM   CO2 24 07/26/2017 08:06 AM          Review of Systems   Respiratory: Negative for shortness of breath. Cardiovascular: Negative for chest pain. Musculoskeletal: Positive for myalgias and neck pain. Physical Exam   Constitutional: He is oriented to person, place, and time. He appears well-developed and well-nourished. No distress. HENT:   Head: Normocephalic and atraumatic. Eyes: Conjunctivae and EOM are normal. Right eye exhibits no discharge. Left eye exhibits no discharge. Neck: Normal range of motion. Neck supple. No thyromegaly present. Cardiovascular: Normal rate, regular rhythm and normal heart sounds. Exam reveals no gallop and no friction rub. No murmur heard. Pulmonary/Chest: Effort normal and breath sounds normal. No respiratory distress. He has no wheezes. He has no rales. He exhibits no tenderness. Musculoskeletal: Normal range of motion. He exhibits no edema, tenderness or deformity.    Full ROM BLUE  5/5 strength BLUE Lymphadenopathy:     He has no cervical adenopathy. Neurological: He is alert and oriented to person, place, and time. He has normal reflexes. He exhibits normal muscle tone. Coordination normal.   Skin: Skin is warm and dry. No rash noted. He is not diaphoretic. No erythema. No pallor. Psychiatric: He has a normal mood and affect. His behavior is normal.       ASSESSMENT and PLAN    ICD-10-CM ICD-9-CM    1. Torticollis, acute    Will tx with medrol dosepak, flexeril and motrin prn, provided exercises to complete at home, no exercises needed for now   M43.6 723.5    2. Essential hypertension      Controlled on lisinopril   I10 401.9    3. Right arm pain    See above   M79.601 729.5    4. Cervical radicular pain    See above M54.12 723.4         Scribed by Madhuri Farr, as dictated by Dr. Jessica Rojas. Current diagnosis and concerns discussed with pt at length. Pt understands risks and benefits or current treatment plan and medications, and accepts the treatment and medication with any possible risks. Pt asks appropriate questions, which were answered. Pt was instructed to call with any concerns or problems. This note will not be viewable in [address]

## 2017-09-27 DIAGNOSIS — M25.511 RIGHT SHOULDER PAIN, UNSPECIFIED CHRONICITY: Primary | ICD-10-CM

## 2017-09-27 DIAGNOSIS — M25.511 ACUTE PAIN OF RIGHT SHOULDER: Primary | ICD-10-CM

## 2017-09-27 RX ORDER — TIZANIDINE 2 MG/1
2 TABLET ORAL 3 TIMES DAILY
Qty: 30 TAB | Refills: 0 | Status: SHIPPED | OUTPATIENT
Start: 2017-09-27 | End: 2018-01-30

## 2017-09-28 ENCOUNTER — TELEPHONE (OUTPATIENT)
Dept: INTERNAL MEDICINE CLINIC | Age: 57
End: 2017-09-28

## 2017-09-28 NOTE — TELEPHONE ENCOUNTER
Spoke to [address] (HIPAA). Ata Borges informed pt has arthritis in neck/shoulder. Ata Borges informed that Dr. Stephanie Kaur thinks it is more muscular related. Ata Borges given info to Ortho for pt in case he needs for further eval  Ata Borges verbalized understanding of information discussed w/ no further questions at this time.

## 2017-10-05 RX ORDER — CITALOPRAM 20 MG/1
TABLET, FILM COATED ORAL
Qty: 30 TAB | Refills: 3 | Status: SHIPPED | OUTPATIENT
Start: 2017-10-05 | End: 2018-01-30 | Stop reason: SDUPTHER

## 2017-10-05 RX ORDER — LISINOPRIL 10 MG/1
TABLET ORAL
Qty: 30 TAB | Refills: 3 | Status: SHIPPED | OUTPATIENT
Start: 2017-10-05 | End: 2018-01-30 | Stop reason: SDUPTHER

## 2017-10-16 ENCOUNTER — TELEPHONE (OUTPATIENT)
Dept: INTERNAL MEDICINE CLINIC | Age: 57
End: 2017-10-16

## 2017-10-16 NOTE — TELEPHONE ENCOUNTER
Called, spoke to pt. Two pt identifiers confirmed. Pt informed per Dr. Jan Heller preop visit needed. surgery 10/30/17. Pt offered and accepted appt for 10/17/17 0900. Pt verbalized understanding of information discussed w/ no further questions at this time.

## 2017-10-17 ENCOUNTER — OFFICE VISIT (OUTPATIENT)
Dept: INTERNAL MEDICINE CLINIC | Age: 57
End: 2017-10-17

## 2017-10-17 VITALS
OXYGEN SATURATION: 95 % | TEMPERATURE: 97.8 F | HEART RATE: 93 BPM | DIASTOLIC BLOOD PRESSURE: 87 MMHG | HEIGHT: 68 IN | SYSTOLIC BLOOD PRESSURE: 128 MMHG | RESPIRATION RATE: 16 BRPM

## 2017-10-17 DIAGNOSIS — Z01.818 PREOP EXAM FOR INTERNAL MEDICINE: Primary | ICD-10-CM

## 2017-10-17 DIAGNOSIS — M54.12 CERVICAL RADICULOPATHY: ICD-10-CM

## 2017-10-17 DIAGNOSIS — I10 ESSENTIAL HYPERTENSION: ICD-10-CM

## 2017-10-17 DIAGNOSIS — F41.9 ANXIETY: ICD-10-CM

## 2017-10-17 RX ORDER — OXYCODONE AND ACETAMINOPHEN 5; 325 MG/1; MG/1
1 TABLET ORAL
Qty: 20 TAB | Refills: 0 | Status: SHIPPED | OUTPATIENT
Start: 2017-10-17 | End: 2017-10-31

## 2017-10-17 RX ORDER — GABAPENTIN 300 MG/1
300 CAPSULE ORAL 3 TIMES DAILY
COMMUNITY
End: 2018-01-30

## 2017-10-17 NOTE — MR AVS SNAPSHOT
Visit Information Date & Time Provider Department Dept. Phone Encounter #  
 10/17/2017  9:00 AM Marcos Shipley, [address] [phone] [id number] Follow-up Instructions Return if symptoms worsen or fail to improve, for as scheduled in january . Your Appointments 1/30/2018  2:45 PM  
ROUTINE CARE with Marcos Shipley, [address] [facility] Appt Note: 6 month follow up  
 [facility] [address] [address]  
[address] [facility] [address] [place] [address] Upcoming Health Maintenance Date Due Pneumococcal 19-64 Highest Risk (1 of 3 - PCV13) 4/13/1978 DTaP/Tdap/Td series (1 - Tdap) 7/10/2010 COLONOSCOPY 10/1/2022 Allergies as of 10/17/2017  Review Complete On: 10/17/2017 By: Marcos Shipley MD  
 No Known Allergies Current Immunizations  Reviewed on 7/27/2016 Name Date Influenza Vaccine (Quad) PF 9/25/2017, 1/27/2017 Influenza Vaccine PF 10/29/2014 TD Vaccine 7/9/2010  4:00 PM, 7/9/2003 Not reviewed this visit You Were Diagnosed With   
  
 Codes Comments Preop exam for internal medicine    -  Primary ICD-10-CM: F13.158 ICD-9-CM: V72.83 Essential hypertension     ICD-10-CM: I10 
ICD-9-CM: 401.9 Cervical radiculopathy     ICD-10-CM: M54.12 
ICD-9-CM: 723.4 Anxiety     ICD-10-CM: F41.9 ICD-9-CM: 300.00 Vitals Smoking Status Former Smoker Preferred Pharmacy Pharmacy Name Phone [facility] [address] [place] [id number] [phone] Your Updated Medication List  
  
   
This list is accurate as of: 10/17/17  9:15 AM.  Always use your most recent med list.  
  
  
  
  
 albuterol 90 mcg/actuation inhaler Commonly known as:  VENTOLIN HFA Take 2 Puffs by inhalation every six (6) hours as needed for Wheezing. citalopram 20 mg tablet Commonly known as:  CELEXA  
TAKE 1 TABLET BY MOUTH DAILY  
  
 FLONASE 50 mcg/actuation nasal spray Generic drug:  fluticasone 2 Sprays by Both Nostrils route daily. gabapentin 300 mg capsule Commonly known as:  NEURONTIN Take 300 mg by mouth three (3) times daily. ibuprofen 800 mg tablet Commonly known as:  MOTRIN Take 1 Tab by mouth every eight (8) hours as needed for Pain. lisinopril 10 mg tablet Commonly known as:  PRINIVIL, ZESTRIL  
TAKE 1 TABLET BY MOUTH DAILY  
  
 oxyCODONE-acetaminophen 5-325 mg per tablet Commonly known as:  PERCOCET Take 1 Tab by mouth every eight (8) hours as needed for Pain. Max Daily Amount: 3 Tabs. pravastatin 40 mg tablet Commonly known as:  PRAVACHOL Take 1 Tab by mouth nightly. tiZANidine 2 mg tablet Commonly known as:  Joseph Ford Take 1 Tab by mouth three (3) times daily. Prescriptions Printed Refills  
 oxyCODONE-acetaminophen (PERCOCET) 5-325 mg per tablet 0 Sig: Take 1 Tab by mouth every eight (8) hours as needed for Pain. Max Daily Amount: 3 Tabs. Class: Print Route: Oral  
  
We Performed the Following AMB POC EKG ROUTINE W/ 12 LEADS, INTER & REP [86698 CPT(R)] CBC W/O DIFF [42138 CPT(R)] METABOLIC PANEL, BASIC [35400 CPT(R)] PROTHROMBIN TIME + INR [02881 CPT(R)] Follow-up Instructions Return if symptoms worsen or fail to improve, for as scheduled in january . To-Do List   
 10/18/2017 2:00 PM  
  Appointment with [facility] PAT ROOM P[address] at [place] [address] ([phone]) hospitals & HEALTH SERVICES! [facility] introduces [employer] patient portal. Now you can access parts of your medical record, email your doctor's office, and request medication refills online. 1. In your internet browser, go to [email or web address] 2. Click on the First Time User? Click Here link in the Sign In box.  You will see the New Member Sign Up page. 3. Enter your [employer] Access Code exactly as it appears below. You will not need to use this code after youve completed the sign-up process. If you do not sign up before the expiration date, you must request a new code. · [employer] Access Code: [email or web address] Expires: 1/15/2018  9:15 AM 
 
4. Enter the last four digits of your Social Security Number (xxxx) and Date of Birth (mm/dd/yyyy) as indicated and click Submit. You will be taken to the next sign-up page. 5. Create a [employer] ID. This will be your [employer] login ID and cannot be changed, so think of one that is secure and easy to remember. 6. Create a [employer] password. You can change your password at any time. 7. Enter your Password Reset Question and Answer. This can be used at a later time if you forget your password. 8. Enter your e-mail address. You will receive e-mail notification when new information is available in [address] 9. Click Sign Up. You can now view and download portions of your medical record. 10. Click the Download Summary menu link to download a portable copy of your medical information. If you have questions, please visit the Frequently Asked Questions section of the [employer] website. Remember, [employer] is NOT to be used for urgent needs. For medical emergencies, dial 911. Now available from your iPhone and Android! Please provide this summary of care documentation to your next provider. Your primary care clinician is listed as Thomas Luna. If you have any questions after today's visit, please call [phone].

## 2017-10-17 NOTE — PROGRESS NOTES
HISTORY OF PRESENT ILLNESS  Angie Green is a 61 y.o. male. HPI   Last here 9/25/17. Pt is here for pre-op care. Last visit, pt c/o R neck, shoulder and arm pain  Pt has had a pinched nerve in his L shoulder in the past  I provided him with a medrol dosepack, flexeril and motrin with no improvement to sx  Reviewed C spine XR R shoulder 9/27/17: Degenerative findings. Reversal of cervical lordosis centered at C5-6.   However, his pain was not improving  Pt went to Dr. Estephania Mendoza (ortho) for further evaluation    Pt was provided with gabapentin 300mg once daily-TID with no improvement to sx  Pt has a C5-7 anterior cervical discectomy with fusion with Dr. Estephania Mendoza (ortho) pending for 10/30/17  Per pt, he has a pinched nerve and no complications with anesthesia  Pt states that he still cannot sleep at night d/t the pain  Pt would like some oxycodone  Provided him with a limited, non-refillable supply of percocet today  Of note, pt is on short-term disability   Pt has been off from work since 9/29/17  Pt would like me to fax notes to his work  Will fax my notes to his work    Pt denies cp, sob, palpitations, claudication, PND, and new swelling in legs  Pt has orthopnea (he sleeps propped up d/t neck pain)  Pt can sleep laying flat  Pt can walk up a set of stairs  Pt can walk around the mall   Pt can vacuum and do laundry    Functional mets >>4  Will get EKG today  Ordered stress test    BP is controlled today  Continues on lisinopril 10mg daily     Wt is stable since last visit   His weight is within normal ranges  Discussed diet and weight loss    Ordered labs to complete prior to next visit     Continues celexa 20mg daily for anxiety, works well, happy with dose      Pt follows with Dr. Osmani Dave (uro) for h/o prostate cancer, annually  Last visit was 1/17     Continues on pravachol 20mg daily for cholesterol   Recall could not tolerate lipitor      Pt has albuterol to use prn for wheezing/breathing  He primarily needs to use this in the winter time   Pt has not needed this recently      PREVENTIVE:    Colonoscopy: 8/17/11, Dr. Lindsey Acevedo, repeat 10 years  PSA: 8/11, 10/14, 5/16 undetectable, urology follows  Tdap: 7/09/2010  Pneumovax: not yet needed  Harmeet Nelly: not yet needed  Zostavax: not yet needed  Flu shot: 9/25/17  A1c: 7/17 5.6  Eye exam: due, will schedule, reminded him  Hep C screen: 7/14/15 negative   Lipids: 7/17   EKG: 10/17/17 new ST flattenings     Patient Active Problem List    Diagnosis Date Noted    Pure hypercholesterolemia 07/28/2017    Prostate cancer ([facility] [address] 01/13/2015    GERD (gastroesophageal reflux disease) 09/07/2011    HTN (hypertension) 09/07/2011    Dizziness and giddiness 06/08/2011    Anxiety 03/29/2011     Current Outpatient Prescriptions   Medication Sig Dispense Refill    lisinopril (PRINIVIL, ZESTRIL) 10 mg tablet TAKE 1 TABLET BY MOUTH DAILY 30 Tab 3    citalopram (CELEXA) 20 mg tablet TAKE 1 TABLET BY MOUTH DAILY 30 Tab 3    tiZANidine (ZANAFLEX) 2 mg tablet Take 1 Tab by mouth three (3) times daily. 30 Tab 0    methylPREDNISolone (MEDROL DOSEPACK) 4 mg tablet Per pack 1 Dose Pack 0    ibuprofen (MOTRIN) 800 mg tablet Take 1 Tab by mouth every eight (8) hours as needed for Pain. 30 Tab 0    albuterol (VENTOLIN HFA) 90 mcg/actuation inhaler Take 2 Puffs by inhalation every six (6) hours as needed for Wheezing. 1 Inhaler 11    fluticasone (FLONASE) 50 mcg/actuation nasal spray 2 Sprays by Both Nostrils route daily.  pravastatin (PRAVACHOL) 40 mg tablet Take 1 Tab by mouth nightly.  30 Tab 6     Past Surgical History:   Procedure Laterality Date    ABDOMEN SURGERY PROC UNLISTED Right 1980    inguinal    HX PROSTATECTOMY  8/2015      Lab Results  Component Value Date/Time   WBC 6.0 07/27/2016 09:03 AM   HGB 12.7 07/27/2016 09:03 AM   HCT 38.0 07/27/2016 09:03 AM   PLATELET 093 33/06/8552 09:03 AM   MCV 89 07/27/2016 09:03 AM     Lab Results  Component Value Date/Time   Cholesterol, total 220 07/26/2017 08:06 AM   HDL Cholesterol 58 07/26/2017 08:06 AM   LDL, calculated 143 07/26/2017 08:06 AM   Triglyceride 93 07/26/2017 08:06 AM     Lab Results  Component Value Date/Time   GFR est non-AA 80 07/26/2017 08:06 AM   GFR est AA 92 07/26/2017 08:06 AM   Creatinine 1.03 07/26/2017 08:06 AM   BUN 14 07/26/2017 08:06 AM   Sodium 136 07/26/2017 08:06 AM   Potassium 5.2 07/26/2017 08:06 AM   Chloride 98 07/26/2017 08:06 AM   CO2 24 07/26/2017 08:06 AM          Review of Systems   Respiratory: Negative for shortness of breath and wheezing. Cardiovascular: Negative for chest pain, palpitations, orthopnea, claudication, leg swelling and PND. Musculoskeletal: Positive for joint pain and neck pain. Physical Exam   Constitutional: He is oriented to person, place, and time. He appears well-developed and well-nourished. No distress. HENT:   Head: Normocephalic and atraumatic. Right Ear: External ear normal.   Left Ear: External ear normal.   Mouth/Throat: Oropharynx is clear and moist. No oropharyngeal exudate. Eyes: Conjunctivae and EOM are normal. Pupils are equal, round, and reactive to light. Right eye exhibits no discharge. Left eye exhibits no discharge. No scleral icterus. Neck: Normal range of motion. Neck supple. No thyromegaly present. Cardiovascular: Normal rate, regular rhythm, normal heart sounds and intact distal pulses. Exam reveals no gallop and no friction rub. No murmur heard. Pulmonary/Chest: Effort normal and breath sounds normal. No respiratory distress. He has no wheezes. He has no rales. He exhibits no tenderness. Abdominal: Soft. He exhibits no distension and no mass. There is no tenderness. There is no rebound and no guarding. Musculoskeletal: Normal range of motion. He exhibits no edema, tenderness (No TTP over R arm) or deformity.    Limited ROM of the RUE  5/5 strength BLUE, minimal decrease with RUE d/t pain   Lymphadenopathy:     He has no cervical adenopathy. Neurological: He is alert and oriented to person, place, and time. He has normal reflexes. He exhibits normal muscle tone. Coordination normal.   Skin: Skin is warm and dry. No rash noted. He is not diaphoretic. No erythema. No pallor. Psychiatric: He has a normal mood and affect. His behavior is normal.       ASSESSMENT and PLAN    ICD-10-CM ICD-9-CM    1. Preop exam for internal medicine    Pt is low risk for an intermediate risk surgery with good functional mets, however his EKG has new ST flattenings that were not present in the old EKG, thus he will need a stress test prior to having his surgery, stress test has been ordered Z01.818 V72.83 AMB POC EKG ROUTINE W/ 12 LEADS, INTER & REP      CBC W/O DIFF      METABOLIC PANEL, BASIC      PROTHROMBIN TIME + INR   2. Essential hypertension    BP controlled on lisinopril 10mg daily   I10 401.9    3. Cervical radiculopathy    Pt with severe pain, has surgery planned for the end of the month, pain has not improved with muscle relaxers or steroids, will give a one time 7 day non refillable supply of percocet to use prn, pt expresses his undersnatding   M54.12 723.4    4. Anxiety    Improved on celexa, continue  F41.9 300.00         Scribed by [address], as dictated by Dr. Jazmine West. Current diagnosis and concerns discussed with pt at length. Pt understands risks and benefits or current treatment plan and medications, and accepts the treatment and medication with any possible risks. Pt asks appropriate questions, which were answered. Pt was instructed to call with any concerns or problems. This note will not be viewable in [address]

## 2017-10-18 ENCOUNTER — TELEPHONE (OUTPATIENT)
Dept: INTERNAL MEDICINE CLINIC | Age: 57
End: 2017-10-18

## 2017-10-18 ENCOUNTER — HOSPITAL ENCOUNTER (OUTPATIENT)
Dept: PREADMISSION TESTING | Age: 57
Discharge: HOME | End: 2017-10-18
Payer: COMMERCIAL

## 2017-10-18 VITALS
DIASTOLIC BLOOD PRESSURE: 60 MMHG | SYSTOLIC BLOOD PRESSURE: 100 MMHG | HEIGHT: 69 IN | RESPIRATION RATE: 20 BRPM | WEIGHT: 159.61 LBS | OXYGEN SATURATION: 97 % | BODY MASS INDEX: 23.64 KG/M2 | HEART RATE: 91 BPM | TEMPERATURE: 97.6 F

## 2017-10-18 DIAGNOSIS — G47.30 SLEEP APNEA, UNSPECIFIED TYPE: Primary | ICD-10-CM

## 2017-10-18 LAB
25(OH)D3 SERPL-MCNC: 32.4 NG/ML (ref 30–100)
ABO + RH BLD: NORMAL
ALBUMIN SERPL-MCNC: 3.7 G/DL (ref 3.5–5)
ALBUMIN/GLOB SERPL: 0.9 {RATIO} (ref 1.1–2.2)
ALP SERPL-CCNC: 99 U/L (ref 45–117)
ALT SERPL-CCNC: 75 U/L (ref 12–78)
ANION GAP SERPL CALC-SCNC: 6 MMOL/L (ref 5–15)
APPEARANCE UR: CLEAR
AST SERPL-CCNC: 21 U/L (ref 15–37)
BACTERIA URNS QL MICRO: NEGATIVE /HPF
BILIRUB SERPL-MCNC: 0.5 MG/DL (ref 0.2–1)
BILIRUB UR QL: NEGATIVE
BLOOD GROUP ANTIBODIES SERPL: NORMAL
BUN SERPL-MCNC: 13 MG/DL (ref 6–24)
BUN SERPL-MCNC: 17 MG/DL (ref 6–20)
BUN/CREAT SERPL: 13 (ref 9–20)
BUN/CREAT SERPL: 16 (ref 12–20)
CALCIUM SERPL-MCNC: 8.9 MG/DL (ref 8.5–10.1)
CALCIUM SERPL-MCNC: 9.7 MG/DL (ref 8.7–10.2)
CHLORIDE SERPL-SCNC: 100 MMOL/L (ref 97–108)
CHLORIDE SERPL-SCNC: 97 MMOL/L (ref 96–106)
CO2 SERPL-SCNC: 25 MMOL/L (ref 21–32)
CO2 SERPL-SCNC: 27 MMOL/L (ref 18–29)
COLOR UR: NORMAL
CREAT SERPL-MCNC: 1.04 MG/DL (ref 0.76–1.27)
CREAT SERPL-MCNC: 1.04 MG/DL (ref 0.7–1.3)
EPITH CASTS URNS QL MICRO: NORMAL /LPF
ERYTHROCYTE [DISTWIDTH] IN BLOOD BY AUTOMATED COUNT: 13.2 % (ref 12.3–15.4)
EST. AVERAGE GLUCOSE BLD GHB EST-MCNC: 120 MG/DL
GLOBULIN SER CALC-MCNC: 4 G/DL (ref 2–4)
GLUCOSE SERPL-MCNC: 102 MG/DL (ref 65–99)
GLUCOSE SERPL-MCNC: 92 MG/DL (ref 65–100)
GLUCOSE UR STRIP.AUTO-MCNC: NEGATIVE MG/DL
HBA1C MFR BLD: 5.8 % (ref 4.2–6.3)
HCT VFR BLD AUTO: 41 % (ref 37.5–51)
HGB BLD-MCNC: 13.4 G/DL (ref 12.6–17.7)
HGB UR QL STRIP: NEGATIVE
HYALINE CASTS URNS QL MICRO: NORMAL /LPF (ref 0–5)
INR PPP: 0.9 (ref 0.8–1.2)
INR PPP: 1 (ref 0.9–1.1)
KETONES UR QL STRIP.AUTO: NEGATIVE MG/DL
LEUKOCYTE ESTERASE UR QL STRIP.AUTO: NEGATIVE
MCH RBC QN AUTO: 29.5 PG (ref 26.6–33)
MCHC RBC AUTO-ENTMCNC: 32.7 G/DL (ref 31.5–35.7)
MCV RBC AUTO: 90 FL (ref 79–97)
NITRITE UR QL STRIP.AUTO: NEGATIVE
PH UR STRIP: 6.5 [PH] (ref 5–8)
PLATELET # BLD AUTO: 436 X10E3/UL (ref 150–379)
POTASSIUM SERPL-SCNC: 4.4 MMOL/L (ref 3.5–5.1)
POTASSIUM SERPL-SCNC: 5.1 MMOL/L (ref 3.5–5.2)
PREALB SERPL-MCNC: 38.2 MG/DL (ref 20–40)
PROT SERPL-MCNC: 7.7 G/DL (ref 6.4–8.2)
PROT UR STRIP-MCNC: NEGATIVE MG/DL
PROTHROMBIN TIME: 10.2 SEC (ref 9–11.1)
PROTHROMBIN TIME: 9.7 SEC (ref 9.1–12)
RBC # BLD AUTO: 4.54 X10E6/UL (ref 4.14–5.8)
RBC #/AREA URNS HPF: NORMAL /HPF (ref 0–5)
SODIUM SERPL-SCNC: 131 MMOL/L (ref 136–145)
SODIUM SERPL-SCNC: 138 MMOL/L (ref 134–144)
SP GR UR REFRACTOMETRY: 1.01 (ref 1–1.03)
SPECIMEN EXP DATE BLD: NORMAL
UA: UC IF INDICATED,UAUC: NORMAL
UROBILINOGEN UR QL STRIP.AUTO: 0.2 EU/DL (ref 0.2–1)
WBC # BLD AUTO: 10.1 X10E3/UL (ref 3.4–10.8)
WBC URNS QL MICRO: NORMAL /HPF (ref 0–4)

## 2017-10-18 PROCEDURE — 83036 HEMOGLOBIN GLYCOSYLATED A1C: CPT | Performed by: ORTHOPAEDIC SURGERY

## 2017-10-18 PROCEDURE — 84134 ASSAY OF PREALBUMIN: CPT | Performed by: ORTHOPAEDIC SURGERY

## 2017-10-18 PROCEDURE — 85610 PROTHROMBIN TIME: CPT | Performed by: ORTHOPAEDIC SURGERY

## 2017-10-18 PROCEDURE — 80053 COMPREHEN METABOLIC PANEL: CPT | Performed by: ORTHOPAEDIC SURGERY

## 2017-10-18 PROCEDURE — 82306 VITAMIN D 25 HYDROXY: CPT | Performed by: ORTHOPAEDIC SURGERY

## 2017-10-18 PROCEDURE — 36415 COLL VENOUS BLD VENIPUNCTURE: CPT | Performed by: ORTHOPAEDIC SURGERY

## 2017-10-18 PROCEDURE — 86900 BLOOD TYPING SEROLOGIC ABO: CPT | Performed by: ORTHOPAEDIC SURGERY

## 2017-10-18 PROCEDURE — 81001 URINALYSIS AUTO W/SCOPE: CPT | Performed by: ORTHOPAEDIC SURGERY

## 2017-10-18 RX ORDER — CEFAZOLIN SODIUM 1 G/3ML
2 INJECTION, POWDER, FOR SOLUTION INTRAMUSCULAR; INTRAVENOUS ONCE
Status: CANCELLED | OUTPATIENT
Start: 2017-10-30 | End: 2017-10-30

## 2017-10-18 NOTE — TELEPHONE ENCOUNTER
Mitchell Castellon NP [facility] pre adm states pt scored a 5 on sleep apnea and after pt is all done with surgery and recovery she suggest that he get a sleep apnea eval.  Thanks.

## 2017-10-18 NOTE — ADVANCED PRACTICE NURSE
JOSE score of 5 in PAT assessment. Pt admits to snoring but denies ever having been advised that he has pauses in breathing while sleeping. He denies ever having been referred for or recommended for a sleep apnea study. He denies prior complications from anesthesia. Has decreased cervical ROM. Denies partials, dentures or loose teeth. Pt was evaluated by his PCP,Alisia Weaver 10/17/17 and determined to need nuclear stress test which is scheduled for 10/24/17. PC to Shane Mccabe at Dr. Tam Ovalles office who will advise Dr. Jaja Weaver of recommendation for sleep apnea evaluation.

## 2017-10-18 NOTE — PERIOP NOTES
[facility]  Preoperative Instructions        Surgery Date 10/30/17          Time of Arrival 1pm    1. On the day of your surgery, please report to the Surgical Services Registration Desk and sign in at your designated time. The Surgery Center is located to the right of the Emergency Room. 2. You must have someone with you to drive you home. You should not drive a car for 24 hours following surgery. Please make arrangements for a friend or family member to stay with you for the first 24 hours after your surgery. 3. Do not have anything to eat or drink (including water, gum, mints, coffee, juice) after midnight 10/29/17?? Russ Barnett ? This may not apply to medications prescribed by your physician. ?(Please note below the special instructions with medications to take the morning of your procedure.)    4. We recommend you do not drink any alcoholic beverages for 24 hours before and after your surgery. 5. Contact your surgeons office for instructions on the following medications: non-steroidal anti-inflammatory drugs (i.e. Advil, Aleve), vitamins, and supplements. (Some surgeons will want you to stop these medications prior to surgery and others may allow you to take them)  **If you are currently taking Plavix, Coumadin, Aspirin and/or other blood-thinning agents, contact your surgeon for instructions. ** Your surgeon will partner with the physician prescribing these medications to determine if it is safe to stop or if you need to continue taking. Please do not stop taking these medications without instructions from your surgeon    6. Wear comfortable clothes. Wear glasses instead of contacts. Do not bring any money or jewelry. Please bring picture ID, insurance card, and any prearranged co-payment or hospital payment. Do not wear make-up, particularly mascara the morning of your surgery. Do not wear nail polish, particularly if you are having foot /hand surgery.   Wear your hair loose or down, no ponytails, buns, bimal pins or clips. All body piercings must be removed. Please shower with antibacterial soap for three consecutive days before and on the morning of surgery, but do not apply any lotions, powders or deodorants after the shower on the day of surgery. Please use a fresh towels after each shower. Please sleep in clean clothes and change bed linens the night before surgery. Please do not shave for 48 hours prior to surgery. Shaving of the face is acceptable. 7. You should understand that if you do not follow these instructions your surgery may be cancelled. If your physical condition changes (I.e. fever, cold or flu) please contact your surgeon as soon as possible. 8. It is important that you be on time. If a situation occurs where you may be late, please call [phone] (OR Holding Area). 9. If you have any questions and or problems, please call [phone] (Pre-admission Testing). 10. Your surgery time may be subject to change. You will receive a phone call the evening prior if your time changes. 11.  If having outpatient surgery, you must have someone to drive you here, stay with you during the duration of your stay, and to drive you home at time of discharge. 12.   In an effort to improve the efficiency, privacy, and safety for all of our Pre-op patients visitors are not allowed in the Holding area. Once you arrive and are registered your family/visitors will be asked to remain in the waiting room. The Pre-op staff will get you from the Surgical Waiting Area and will explain to you and your family/visitors that the Pre-op phase is beginning. The staff will answer any questions and provide instructions for tracking of the patient, by use of the existing tracking number and color-coded status board in the waiting room.   At this time the staff will also ask for your designated spokesperson information in the event that the physician or staff need to provide an update or obtain any pertinent information. The designated spokesperson will be notified if the physician needs to speak to family during the pre-operative phase. If at any time your family/visitors has questions or concerns they may approach the volunteer desk in the waiting area for assistance. Special Instructions:Bring and use albuterol inhaler morning of surgery. May use flonase nasal spray morning of surgery. MEDICATIONS TO TAKE THE MORNING OF SURGERY WITH A SIP OF WATER:celexa,gabapentin,zanaflex,percocet if needed. I understand a pre-operative phone call will be made to verify my surgery time. In the event that I am not available, I give permission for a message to be left on my answering service and/or with another person? {yes [phone]         ___________________      __________   _________    (Signature of Patient)             (Witness)                (Date and Time)Preventing Infections Before - and After - Your Surgery  IMPORTANT INSTRUCTIONS    Please read and follow these instructions carefully. Every Night for Three (3) nights before your surgery:  1. Shower with an antibacterial soap, such as Dial, or the soap provided at your preassessment appointment. A shower is better than a bath for cleaning your skin. 2. If needed, ask someone to help you reach all areas of your body. Dont forget to clean your belly button with every shower. The night before your surgery:  1. On the night before your surgery, shower with an antibacterial soap, such as Dial, or the soap provided at your preassessment appointment. 2. With one packet of Hibiclens in hand, turn water off.  3. Apply Hibiclens antiseptic skin cleanser with a clean, freshly washed washcloth. ? Gently apply to your body from chin to toes (except the genital area) and especially the area(s) where your incision(s) will be. ? Leave Hibiclens on your skin for at least 20 seconds.     CAUTION: If needed, Hibiclens may be used to clean the folds of skin of the legs (such as in the area of the groin) and on your buttocks and hips. However, do not use Hibiclens above the neck or in the genital area (your bottom) or put inside any area of your body. 4. Turn the water back on and rinse. 5. Dry gently with a clean, freshly washed towel. 6. After your shower, do not use any powder, deodorant, perfumes or lotion. 7. Use clean, freshly washed towels and washcloths every time you shower. 8. Wear clean, freshly washed pajamas to bed the night before surgery. 9. Sleep on clean, freshly washed sheets. 10. Do not allow pets to sleep in your bed with you. The Morning of your surgery:  1. Shower again thoroughly with an antibacterial soap, such as Dial or the soap provided at your preassessment appointment. If needed, ask someone for help to reach all areas of your body. Dont forget to clean your belly button! Rinse. 2. Dry gently with a clean, freshly washed towel. 3. After your shower, do not use any powder, deodorant, perfumes or lotion prior to surgery. 4. Put on clean, freshly washed clothing. Tips to help prevent infections after your surgery:  1. Protect your surgical wound from germs:  ? Hand washing is the most important thing you and your caregivers can do to prevent infections. ? Keep your bandage clean and dry! ? Do not touch your surgical wound. 2. Use clean, freshly washed towels and washcloths every time you shower; do not share bath linens with others. 3. Until your surgical wound is healed, wear clothing and sleep on bed linens each day that are clean and freshly washed. 4. Do not allow pets to sleep in your bed with you or touch your surgical wound. 5. Do not smoke - smoking delays wound healing. This may be a good time to stop smoking. 6. If you have diabetes, it is important for you to manage your blood sugar levels properly before your surgery as well as after your surgery.  Poorly managed blood sugar levels slow down wound healing and prevent you from healing completely.

## 2017-10-19 LAB
BACTERIA SPEC CULT: NORMAL
BACTERIA SPEC CULT: NORMAL
SERVICE CMNT-IMP: NORMAL

## 2017-10-20 ENCOUNTER — TELEPHONE (OUTPATIENT)
Dept: INTERNAL MEDICINE CLINIC | Age: 57
End: 2017-10-20

## 2017-10-20 NOTE — TELEPHONE ENCOUNTER
Left vm for [employer] that per Dr. Bharati Mejia, sodium does not need to be repeated at this moment. Callback prn.

## 2017-10-20 NOTE — TELEPHONE ENCOUNTER
[address] [facility] NP ED [facility] pre admission states that on 10-18-17 pt's sodium was low 131 but when he was here on 10-17 it was 138. She needs to know if you want to draw again prior to surgery?   Please call Shai Carrillo

## 2017-10-20 NOTE — ADVANCED PRACTICE NURSE
PC from [place], Dr. Tasneem Perez nurse who states Dr. Lenny Nash is aware of pt's sodium level. No need for additional intervention at this time. Pt advised to drink electrolyte solution (gatorade/powerade) and will recheck the DOS. Pt agreeable.

## 2017-10-23 ENCOUNTER — TELEPHONE (OUTPATIENT)
Dept: INTERNAL MEDICINE CLINIC | Age: 57
End: 2017-10-23

## 2017-10-23 NOTE — TELEPHONE ENCOUNTER
#[phone] Matthew Kat states there needs to be a peer/peer done for order for tomorrow    #[phone]   Case #[id number]    Call Salvador Dasilva when this has been done. Thanks.

## 2017-10-23 NOTE — TELEPHONE ENCOUNTER
Griselda Simmer, MD Fredirick Solon, LPN        Caller: Unspecified (Today, 10:16 AM)                     New ST depression V4-6

## 2017-10-23 NOTE — TELEPHONE ENCOUNTER
Riccardo Torres MD   You 4 hours ago (11:28 AM)                   preop for surgery     New EKG changes     Needs stress test prior to surgery (Routing comment)

## 2017-10-27 ENCOUNTER — HOSPITAL ENCOUNTER (OUTPATIENT)
Dept: NUCLEAR MEDICINE | Age: 57
Discharge: HOME | End: 2017-10-27
Attending: INTERNAL MEDICINE
Payer: COMMERCIAL

## 2017-10-27 ENCOUNTER — HOSPITAL ENCOUNTER (OUTPATIENT)
Dept: NON INVASIVE DIAGNOSTICS | Age: 57
Discharge: HOME | End: 2017-10-27
Attending: INTERNAL MEDICINE
Payer: COMMERCIAL

## 2017-10-27 DIAGNOSIS — Z01.818 PREOP EXAM FOR INTERNAL MEDICINE: ICD-10-CM

## 2017-10-27 LAB
ATTENDING PHYSICIAN, CST07: NORMAL
DIAGNOSIS, 93000: NORMAL
DUKE TM SCORE RESULT, CST14: NORMAL
DUKE TREADMILL SCORE, CST13: 5456
ECG INTERP BEFORE EX, CST11: NORMAL
ECG INTERP DURING EX, CST12: NORMAL
FUNCTIONAL CAPACITY, CST17: NORMAL
KNOWN CARDIAC CONDITION, CST08: NORMAL
MAX. DIASTOLIC BP, CST04: 80 MMHG
MAX. HEART RATE, CST05: 151 BPM
MAX. SYSTOLIC BP, CST03: 125 MMHG
OVERALL BP RESPONSE TO EXERCISE, CST16: NORMAL
OVERALL HR RESPONSE TO EXERCISE, CST15: NORMAL
PEAK EX METS, CST10: 9.3 METS
PROTOCOL NAME, CST01: NORMAL
TEST INDICATION, CST09: NORMAL

## 2017-10-27 PROCEDURE — 78452 HT MUSCLE IMAGE SPECT MULT: CPT

## 2017-10-27 PROCEDURE — 93017 CV STRESS TEST TRACING ONLY: CPT

## 2017-10-30 ENCOUNTER — APPOINTMENT (OUTPATIENT)
Dept: GENERAL RADIOLOGY | Age: 57
End: 2017-10-30
Attending: ORTHOPAEDIC SURGERY
Payer: COMMERCIAL

## 2017-10-30 ENCOUNTER — ANESTHESIA (OUTPATIENT)
Dept: SURGERY | Age: 57
End: 2017-10-30
Payer: COMMERCIAL

## 2017-10-30 ENCOUNTER — ANESTHESIA EVENT (OUTPATIENT)
Dept: SURGERY | Age: 57
End: 2017-10-30
Payer: COMMERCIAL

## 2017-10-30 ENCOUNTER — HOSPITAL ENCOUNTER (OUTPATIENT)
Age: 57
Setting detail: OBSERVATION
Discharge: HOME HEALTH | End: 2017-10-31
Attending: ORTHOPAEDIC SURGERY | Admitting: ORTHOPAEDIC SURGERY
Payer: COMMERCIAL

## 2017-10-30 PROBLEM — M48.02 CERVICAL STENOSIS OF SPINAL CANAL: Status: ACTIVE | Noted: 2017-10-30

## 2017-10-30 LAB
ANION GAP BLD CALC-SCNC: 16 MMOL/L (ref 5–15)
BUN BLD-MCNC: 19 MG/DL (ref 9–20)
CA-I BLD-MCNC: 1.3 MMOL/L (ref 1.12–1.32)
CHLORIDE BLD-SCNC: 103 MMOL/L (ref 98–107)
CO2 BLD-SCNC: 23 MMOL/L (ref 21–32)
CREAT BLD-MCNC: 1.1 MG/DL (ref 0.6–1.3)
GLUCOSE BLD-MCNC: 94 MG/DL (ref 65–100)
HCT VFR BLD CALC: 44 % (ref 36.6–50.3)
HGB BLD-MCNC: 15 GM/DL (ref 12.1–17)
POTASSIUM BLD-SCNC: 4.4 MMOL/L (ref 3.5–5.1)
SERVICE CMNT-IMP: ABNORMAL
SODIUM BLD-SCNC: 137 MMOL/L (ref 136–145)

## 2017-10-30 PROCEDURE — 77030034479 HC ADH SKN CLSR PRINEO J&J -B: Performed by: ORTHOPAEDIC SURGERY

## 2017-10-30 PROCEDURE — 76010000162 HC OR TIME 1.5 TO 2 HR INTENSV-TIER 1: Performed by: ORTHOPAEDIC SURGERY

## 2017-10-30 PROCEDURE — 99218 HC RM OBSERVATION: CPT

## 2017-10-30 PROCEDURE — 74011000250 HC RX REV CODE- 250: Performed by: ORTHOPAEDIC SURGERY

## 2017-10-30 PROCEDURE — 74011250636 HC RX REV CODE- 250/636: Performed by: ANESTHESIOLOGY

## 2017-10-30 PROCEDURE — 76001 XR FLUOROSCOPY OVER 60 MINUTES: CPT

## 2017-10-30 PROCEDURE — 77030013079 HC BLNKT BAIR HGGR 3M -A: Performed by: ANESTHESIOLOGY

## 2017-10-30 PROCEDURE — 72040 X-RAY EXAM NECK SPINE 2-3 VW: CPT

## 2017-10-30 PROCEDURE — 77030029099 HC BN WAX SSPC -A: Performed by: ORTHOPAEDIC SURGERY

## 2017-10-30 PROCEDURE — 74011250636 HC RX REV CODE- 250/636

## 2017-10-30 PROCEDURE — 77030033138 HC SUT PGA STRATFX J&J -B: Performed by: ORTHOPAEDIC SURGERY

## 2017-10-30 PROCEDURE — 77030032490 HC SLV COMPR SCD KNE COVD -B: Performed by: ORTHOPAEDIC SURGERY

## 2017-10-30 PROCEDURE — 77030018846 HC SOL IRR STRL H20 ICUM -A: Performed by: ORTHOPAEDIC SURGERY

## 2017-10-30 PROCEDURE — 77030026438 HC STYL ET INTUB CARD -A: Performed by: ANESTHESIOLOGY

## 2017-10-30 PROCEDURE — C1713 ANCHOR/SCREW BN/BN,TIS/BN: HCPCS | Performed by: ORTHOPAEDIC SURGERY

## 2017-10-30 PROCEDURE — 77030014650 HC SEAL MTRX FLOSEL BAXT -C: Performed by: ORTHOPAEDIC SURGERY

## 2017-10-30 PROCEDURE — 74011250636 HC RX REV CODE- 250/636: Performed by: ORTHOPAEDIC SURGERY

## 2017-10-30 PROCEDURE — 77030003028 HC SUT VCRL J&J -A: Performed by: ORTHOPAEDIC SURGERY

## 2017-10-30 PROCEDURE — 74011000272 HC RX REV CODE- 272: Performed by: ORTHOPAEDIC SURGERY

## 2017-10-30 PROCEDURE — 74011250637 HC RX REV CODE- 250/637: Performed by: ORTHOPAEDIC SURGERY

## 2017-10-30 PROCEDURE — 77030011267 HC ELECTRD BLD COVD -A: Performed by: ORTHOPAEDIC SURGERY

## 2017-10-30 PROCEDURE — 76210000006 HC OR PH I REC 0.5 TO 1 HR: Performed by: ORTHOPAEDIC SURGERY

## 2017-10-30 PROCEDURE — 77030004391 HC BUR FLUT MEDT -C: Performed by: ORTHOPAEDIC SURGERY

## 2017-10-30 PROCEDURE — 77030008467 HC STPLR SKN COVD -B: Performed by: ORTHOPAEDIC SURGERY

## 2017-10-30 PROCEDURE — 77030003029 HC SUT VCRL J&J -B: Performed by: ORTHOPAEDIC SURGERY

## 2017-10-30 PROCEDURE — 77030009868 HC PIN DISTR CASPR AESC -B: Performed by: ORTHOPAEDIC SURGERY

## 2017-10-30 PROCEDURE — 77030008684 HC TU ET CUF COVD -B: Performed by: ANESTHESIOLOGY

## 2017-10-30 PROCEDURE — 74011000250 HC RX REV CODE- 250

## 2017-10-30 PROCEDURE — 77030018836 HC SOL IRR NACL ICUM -A: Performed by: ORTHOPAEDIC SURGERY

## 2017-10-30 PROCEDURE — 77030020268 HC MISC GENERAL SUPPLY: Performed by: ORTHOPAEDIC SURGERY

## 2017-10-30 PROCEDURE — 77030002996 HC SUT SLK J&J -A: Performed by: ORTHOPAEDIC SURGERY

## 2017-10-30 PROCEDURE — 77030012414: Performed by: ORTHOPAEDIC SURGERY

## 2017-10-30 PROCEDURE — 77030014647 HC SEAL FBRN TISSL BAXT -D: Performed by: ORTHOPAEDIC SURGERY

## 2017-10-30 PROCEDURE — 76060000034 HC ANESTHESIA 1.5 TO 2 HR: Performed by: ORTHOPAEDIC SURGERY

## 2017-10-30 PROCEDURE — 80047 BASIC METABLC PNL IONIZED CA: CPT

## 2017-10-30 PROCEDURE — 77030012961 HC IRR KT CYSTO/TUR ICUM -A: Performed by: ORTHOPAEDIC SURGERY

## 2017-10-30 PROCEDURE — 77030018719 HC DRSG PTCH ANTIMIC J&J -A: Performed by: ORTHOPAEDIC SURGERY

## 2017-10-30 PROCEDURE — 77030034475 HC MISC IMPL SPN: Performed by: ORTHOPAEDIC SURGERY

## 2017-10-30 PROCEDURE — 77010033678 HC OXYGEN DAILY

## 2017-10-30 DEVICE — IMPLANTABLE DEVICE: Type: IMPLANTABLE DEVICE | Site: SPINE CERVICAL | Status: FUNCTIONAL

## 2017-10-30 DEVICE — GRAFT BNE SUB SM CANC FRZN MORSELIZED W/ VIABLE CELL: Type: IMPLANTABLE DEVICE | Site: SPINE CERVICAL | Status: FUNCTIONAL

## 2017-10-30 DEVICE — SCREW SPNL L16MM DIA4MM ANT CERV TI ST CONSTRN SKYLINE: Type: IMPLANTABLE DEVICE | Site: SPINE CERVICAL | Status: FUNCTIONAL

## 2017-10-30 RX ORDER — TRAMADOL HYDROCHLORIDE 50 MG/1
50-100 TABLET ORAL
Status: DISCONTINUED | OUTPATIENT
Start: 2017-10-30 | End: 2017-10-31 | Stop reason: HOSPADM

## 2017-10-30 RX ORDER — ONDANSETRON 2 MG/ML
4 INJECTION INTRAMUSCULAR; INTRAVENOUS
Status: DISCONTINUED | OUTPATIENT
Start: 2017-10-30 | End: 2017-10-31 | Stop reason: HOSPADM

## 2017-10-30 RX ORDER — AMOXICILLIN 250 MG
1 CAPSULE ORAL 2 TIMES DAILY
Status: DISCONTINUED | OUTPATIENT
Start: 2017-10-30 | End: 2017-10-31 | Stop reason: HOSPADM

## 2017-10-30 RX ORDER — CITALOPRAM 20 MG/1
20 TABLET, FILM COATED ORAL DAILY
Status: DISCONTINUED | OUTPATIENT
Start: 2017-10-31 | End: 2017-10-31 | Stop reason: HOSPADM

## 2017-10-30 RX ORDER — NEOSTIGMINE METHYLSULFATE 1 MG/ML
INJECTION INTRAVENOUS AS NEEDED
Status: DISCONTINUED | OUTPATIENT
Start: 2017-10-30 | End: 2017-10-30 | Stop reason: HOSPADM

## 2017-10-30 RX ORDER — ALBUTEROL SULFATE 90 UG/1
2 AEROSOL, METERED RESPIRATORY (INHALATION)
Status: DISCONTINUED | OUTPATIENT
Start: 2017-10-30 | End: 2017-10-31 | Stop reason: HOSPADM

## 2017-10-30 RX ORDER — FENTANYL CITRATE 50 UG/ML
25 INJECTION, SOLUTION INTRAMUSCULAR; INTRAVENOUS
Status: DISCONTINUED | OUTPATIENT
Start: 2017-10-30 | End: 2017-10-30 | Stop reason: HOSPADM

## 2017-10-30 RX ORDER — GABAPENTIN 300 MG/1
300 CAPSULE ORAL 3 TIMES DAILY
Status: DISCONTINUED | OUTPATIENT
Start: 2017-10-30 | End: 2017-10-31 | Stop reason: HOSPADM

## 2017-10-30 RX ORDER — SODIUM CHLORIDE, SODIUM LACTATE, POTASSIUM CHLORIDE, CALCIUM CHLORIDE 600; 310; 30; 20 MG/100ML; MG/100ML; MG/100ML; MG/100ML
25 INJECTION, SOLUTION INTRAVENOUS CONTINUOUS
Status: DISCONTINUED | OUTPATIENT
Start: 2017-10-30 | End: 2017-10-30 | Stop reason: HOSPADM

## 2017-10-30 RX ORDER — HYDROMORPHONE HYDROCHLORIDE 1 MG/ML
1 INJECTION, SOLUTION INTRAMUSCULAR; INTRAVENOUS; SUBCUTANEOUS
Status: DISCONTINUED | OUTPATIENT
Start: 2017-10-30 | End: 2017-10-31 | Stop reason: HOSPADM

## 2017-10-30 RX ORDER — HYDROMORPHONE HYDROCHLORIDE 1 MG/ML
.2-.5 INJECTION, SOLUTION INTRAMUSCULAR; INTRAVENOUS; SUBCUTANEOUS
Status: DISCONTINUED | OUTPATIENT
Start: 2017-10-30 | End: 2017-10-30 | Stop reason: HOSPADM

## 2017-10-30 RX ORDER — SUCCINYLCHOLINE CHLORIDE 20 MG/ML
INJECTION INTRAMUSCULAR; INTRAVENOUS AS NEEDED
Status: DISCONTINUED | OUTPATIENT
Start: 2017-10-30 | End: 2017-10-30 | Stop reason: HOSPADM

## 2017-10-30 RX ORDER — MIDAZOLAM HYDROCHLORIDE 1 MG/ML
INJECTION, SOLUTION INTRAMUSCULAR; INTRAVENOUS AS NEEDED
Status: DISCONTINUED | OUTPATIENT
Start: 2017-10-30 | End: 2017-10-30 | Stop reason: HOSPADM

## 2017-10-30 RX ORDER — DIPHENHYDRAMINE HYDROCHLORIDE 50 MG/ML
12.5 INJECTION, SOLUTION INTRAMUSCULAR; INTRAVENOUS AS NEEDED
Status: DISCONTINUED | OUTPATIENT
Start: 2017-10-30 | End: 2017-10-30 | Stop reason: HOSPADM

## 2017-10-30 RX ORDER — DIAZEPAM 5 MG/1
5 TABLET ORAL
Status: DISCONTINUED | OUTPATIENT
Start: 2017-10-30 | End: 2017-10-31 | Stop reason: HOSPADM

## 2017-10-30 RX ORDER — ONDANSETRON 2 MG/ML
INJECTION INTRAMUSCULAR; INTRAVENOUS AS NEEDED
Status: DISCONTINUED | OUTPATIENT
Start: 2017-10-30 | End: 2017-10-30 | Stop reason: HOSPADM

## 2017-10-30 RX ORDER — DEXAMETHASONE SODIUM PHOSPHATE 4 MG/ML
10 INJECTION, SOLUTION INTRA-ARTICULAR; INTRALESIONAL; INTRAMUSCULAR; INTRAVENOUS; SOFT TISSUE ONCE
Status: DISCONTINUED | OUTPATIENT
Start: 2017-10-31 | End: 2017-10-31 | Stop reason: HOSPADM

## 2017-10-30 RX ORDER — SODIUM CHLORIDE 0.9 % (FLUSH) 0.9 %
5-10 SYRINGE (ML) INJECTION AS NEEDED
Status: DISCONTINUED | OUTPATIENT
Start: 2017-10-30 | End: 2017-10-31 | Stop reason: HOSPADM

## 2017-10-30 RX ORDER — GLYCOPYRROLATE 0.2 MG/ML
INJECTION INTRAMUSCULAR; INTRAVENOUS AS NEEDED
Status: DISCONTINUED | OUTPATIENT
Start: 2017-10-30 | End: 2017-10-30 | Stop reason: HOSPADM

## 2017-10-30 RX ORDER — HYDROMORPHONE HYDROCHLORIDE 2 MG/ML
INJECTION, SOLUTION INTRAMUSCULAR; INTRAVENOUS; SUBCUTANEOUS AS NEEDED
Status: DISCONTINUED | OUTPATIENT
Start: 2017-10-30 | End: 2017-10-30 | Stop reason: HOSPADM

## 2017-10-30 RX ORDER — GABAPENTIN 100 MG/1
100 CAPSULE ORAL 3 TIMES DAILY
Status: DISCONTINUED | OUTPATIENT
Start: 2017-10-30 | End: 2017-10-30

## 2017-10-30 RX ORDER — CEFAZOLIN SODIUM IN 0.9 % NACL 2 G/100 ML
2 PLASTIC BAG, INJECTION (ML) INTRAVENOUS
Status: DISCONTINUED | OUTPATIENT
Start: 2017-10-30 | End: 2017-10-30 | Stop reason: HOSPADM

## 2017-10-30 RX ORDER — SODIUM CHLORIDE 0.9 % (FLUSH) 0.9 %
5-10 SYRINGE (ML) INJECTION EVERY 8 HOURS
Status: DISCONTINUED | OUTPATIENT
Start: 2017-10-31 | End: 2017-10-31 | Stop reason: HOSPADM

## 2017-10-30 RX ORDER — PHENYLEPHRINE HCL IN 0.9% NACL 0.4MG/10ML
SYRINGE (ML) INTRAVENOUS AS NEEDED
Status: DISCONTINUED | OUTPATIENT
Start: 2017-10-30 | End: 2017-10-30 | Stop reason: HOSPADM

## 2017-10-30 RX ORDER — CEFAZOLIN SODIUM IN 0.9 % NACL 2 G/100 ML
PLASTIC BAG, INJECTION (ML) INTRAVENOUS AS NEEDED
Status: DISCONTINUED | OUTPATIENT
Start: 2017-10-30 | End: 2017-10-30 | Stop reason: HOSPADM

## 2017-10-30 RX ORDER — LIDOCAINE HYDROCHLORIDE 20 MG/ML
INJECTION, SOLUTION EPIDURAL; INFILTRATION; INTRACAUDAL; PERINEURAL AS NEEDED
Status: DISCONTINUED | OUTPATIENT
Start: 2017-10-30 | End: 2017-10-30 | Stop reason: HOSPADM

## 2017-10-30 RX ORDER — LISINOPRIL 5 MG/1
10 TABLET ORAL DAILY
Status: DISCONTINUED | OUTPATIENT
Start: 2017-10-31 | End: 2017-10-31 | Stop reason: HOSPADM

## 2017-10-30 RX ORDER — OXYCODONE HYDROCHLORIDE 5 MG/1
10 TABLET ORAL
Status: DISCONTINUED | OUTPATIENT
Start: 2017-10-30 | End: 2017-10-31 | Stop reason: HOSPADM

## 2017-10-30 RX ORDER — FENTANYL CITRATE 50 UG/ML
50 INJECTION, SOLUTION INTRAMUSCULAR; INTRAVENOUS AS NEEDED
Status: DISCONTINUED | OUTPATIENT
Start: 2017-10-30 | End: 2017-10-30 | Stop reason: HOSPADM

## 2017-10-30 RX ORDER — ACETAMINOPHEN 500 MG
1000 TABLET ORAL ONCE
Status: COMPLETED | OUTPATIENT
Start: 2017-10-30 | End: 2017-10-30

## 2017-10-30 RX ORDER — FENTANYL CITRATE 50 UG/ML
INJECTION, SOLUTION INTRAMUSCULAR; INTRAVENOUS AS NEEDED
Status: DISCONTINUED | OUTPATIENT
Start: 2017-10-30 | End: 2017-10-30 | Stop reason: HOSPADM

## 2017-10-30 RX ORDER — NALOXONE HYDROCHLORIDE 0.4 MG/ML
0.4 INJECTION, SOLUTION INTRAMUSCULAR; INTRAVENOUS; SUBCUTANEOUS AS NEEDED
Status: DISCONTINUED | OUTPATIENT
Start: 2017-10-30 | End: 2017-10-31 | Stop reason: HOSPADM

## 2017-10-30 RX ORDER — ONDANSETRON 2 MG/ML
4 INJECTION INTRAMUSCULAR; INTRAVENOUS AS NEEDED
Status: DISCONTINUED | OUTPATIENT
Start: 2017-10-30 | End: 2017-10-30 | Stop reason: HOSPADM

## 2017-10-30 RX ORDER — MIDAZOLAM HYDROCHLORIDE 1 MG/ML
1 INJECTION, SOLUTION INTRAMUSCULAR; INTRAVENOUS AS NEEDED
Status: DISCONTINUED | OUTPATIENT
Start: 2017-10-30 | End: 2017-10-30 | Stop reason: HOSPADM

## 2017-10-30 RX ORDER — SODIUM CHLORIDE 9 MG/ML
125 INJECTION, SOLUTION INTRAVENOUS CONTINUOUS
Status: DISCONTINUED | OUTPATIENT
Start: 2017-10-30 | End: 2017-10-31 | Stop reason: HOSPADM

## 2017-10-30 RX ORDER — CEFAZOLIN SODIUM IN 0.9 % NACL 2 G/100 ML
2 PLASTIC BAG, INJECTION (ML) INTRAVENOUS EVERY 8 HOURS
Status: COMPLETED | OUTPATIENT
Start: 2017-10-30 | End: 2017-10-31

## 2017-10-30 RX ORDER — FLUTICASONE PROPIONATE 50 MCG
2 SPRAY, SUSPENSION (ML) NASAL DAILY
Status: DISCONTINUED | OUTPATIENT
Start: 2017-10-31 | End: 2017-10-31 | Stop reason: HOSPADM

## 2017-10-30 RX ORDER — ROCURONIUM BROMIDE 10 MG/ML
INJECTION, SOLUTION INTRAVENOUS AS NEEDED
Status: DISCONTINUED | OUTPATIENT
Start: 2017-10-30 | End: 2017-10-30 | Stop reason: HOSPADM

## 2017-10-30 RX ORDER — PROPOFOL 10 MG/ML
INJECTION, EMULSION INTRAVENOUS AS NEEDED
Status: DISCONTINUED | OUTPATIENT
Start: 2017-10-30 | End: 2017-10-30 | Stop reason: HOSPADM

## 2017-10-30 RX ORDER — FAMOTIDINE 20 MG/1
20 TABLET, FILM COATED ORAL 2 TIMES DAILY
Status: DISCONTINUED | OUTPATIENT
Start: 2017-10-30 | End: 2017-10-31 | Stop reason: HOSPADM

## 2017-10-30 RX ORDER — LIDOCAINE HYDROCHLORIDE 10 MG/ML
0.1 INJECTION, SOLUTION EPIDURAL; INFILTRATION; INTRACAUDAL; PERINEURAL AS NEEDED
Status: DISCONTINUED | OUTPATIENT
Start: 2017-10-30 | End: 2017-10-30 | Stop reason: HOSPADM

## 2017-10-30 RX ORDER — PREGABALIN 75 MG/1
150 CAPSULE ORAL ONCE
Status: COMPLETED | OUTPATIENT
Start: 2017-10-30 | End: 2017-10-30

## 2017-10-30 RX ORDER — POLYETHYLENE GLYCOL 3350 17 G/17G
17 POWDER, FOR SOLUTION ORAL DAILY
Status: DISCONTINUED | OUTPATIENT
Start: 2017-10-31 | End: 2017-10-31 | Stop reason: HOSPADM

## 2017-10-30 RX ORDER — HYDROXYZINE HYDROCHLORIDE 10 MG/1
10 TABLET, FILM COATED ORAL
Status: DISCONTINUED | OUTPATIENT
Start: 2017-10-30 | End: 2017-10-31 | Stop reason: HOSPADM

## 2017-10-30 RX ORDER — FACIAL-BODY WIPES
10 EACH TOPICAL DAILY PRN
Status: DISCONTINUED | OUTPATIENT
Start: 2017-11-01 | End: 2017-10-31 | Stop reason: HOSPADM

## 2017-10-30 RX ORDER — TIZANIDINE 2 MG/1
2 TABLET ORAL 3 TIMES DAILY
Status: DISCONTINUED | OUTPATIENT
Start: 2017-10-30 | End: 2017-10-31 | Stop reason: HOSPADM

## 2017-10-30 RX ORDER — DEXAMETHASONE SODIUM PHOSPHATE 4 MG/ML
INJECTION, SOLUTION INTRA-ARTICULAR; INTRALESIONAL; INTRAMUSCULAR; INTRAVENOUS; SOFT TISSUE AS NEEDED
Status: DISCONTINUED | OUTPATIENT
Start: 2017-10-30 | End: 2017-10-30 | Stop reason: HOSPADM

## 2017-10-30 RX ORDER — PRAVASTATIN SODIUM 40 MG/1
40 TABLET ORAL
Status: DISCONTINUED | OUTPATIENT
Start: 2017-10-30 | End: 2017-10-31 | Stop reason: HOSPADM

## 2017-10-30 RX ORDER — ACETAMINOPHEN 500 MG
1000 TABLET ORAL EVERY 6 HOURS
Status: DISCONTINUED | OUTPATIENT
Start: 2017-10-30 | End: 2017-10-31 | Stop reason: HOSPADM

## 2017-10-30 RX ORDER — OXYCODONE HYDROCHLORIDE 5 MG/1
5 TABLET ORAL
Status: DISCONTINUED | OUTPATIENT
Start: 2017-10-30 | End: 2017-10-31 | Stop reason: HOSPADM

## 2017-10-30 RX ADMIN — Medication 80 MCG: at 16:08

## 2017-10-30 RX ADMIN — PROPOFOL 200 MG: 10 INJECTION, EMULSION INTRAVENOUS at 15:28

## 2017-10-30 RX ADMIN — Medication 40 MCG: at 15:58

## 2017-10-30 RX ADMIN — Medication 80 MCG: at 16:15

## 2017-10-30 RX ADMIN — ACETAMINOPHEN 1000 MG: 500 TABLET ORAL at 13:54

## 2017-10-30 RX ADMIN — Medication 40 MCG: at 15:47

## 2017-10-30 RX ADMIN — LIDOCAINE HYDROCHLORIDE 60 MG: 20 INJECTION, SOLUTION EPIDURAL; INFILTRATION; INTRACAUDAL; PERINEURAL at 15:28

## 2017-10-30 RX ADMIN — NEOSTIGMINE METHYLSULFATE 2 MG: 1 INJECTION INTRAVENOUS at 17:09

## 2017-10-30 RX ADMIN — FENTANYL CITRATE 25 MCG: 50 INJECTION, SOLUTION INTRAMUSCULAR; INTRAVENOUS at 17:49

## 2017-10-30 RX ADMIN — SODIUM CHLORIDE, SODIUM LACTATE, POTASSIUM CHLORIDE, AND CALCIUM CHLORIDE: 600; 310; 30; 20 INJECTION, SOLUTION INTRAVENOUS at 15:04

## 2017-10-30 RX ADMIN — SODIUM CHLORIDE, SODIUM LACTATE, POTASSIUM CHLORIDE, AND CALCIUM CHLORIDE 25 ML/HR: 600; 310; 30; 20 INJECTION, SOLUTION INTRAVENOUS at 14:12

## 2017-10-30 RX ADMIN — Medication 80 MCG: at 16:01

## 2017-10-30 RX ADMIN — PRAVASTATIN SODIUM 40 MG: 40 TABLET ORAL at 21:49

## 2017-10-30 RX ADMIN — Medication 80 MCG: at 15:41

## 2017-10-30 RX ADMIN — DEXAMETHASONE SODIUM PHOSPHATE 4 MG: 4 INJECTION, SOLUTION INTRA-ARTICULAR; INTRALESIONAL; INTRAMUSCULAR; INTRAVENOUS; SOFT TISSUE at 15:47

## 2017-10-30 RX ADMIN — Medication 2 G: at 15:32

## 2017-10-30 RX ADMIN — SUCCINYLCHOLINE CHLORIDE 140 MG: 20 INJECTION INTRAMUSCULAR; INTRAVENOUS at 15:28

## 2017-10-30 RX ADMIN — FAMOTIDINE 20 MG: 20 TABLET, FILM COATED ORAL at 20:23

## 2017-10-30 RX ADMIN — Medication 80 MCG: at 15:37

## 2017-10-30 RX ADMIN — HYDROMORPHONE HYDROCHLORIDE 0.4 MG: 2 INJECTION, SOLUTION INTRAMUSCULAR; INTRAVENOUS; SUBCUTANEOUS at 17:26

## 2017-10-30 RX ADMIN — Medication 120 MCG: at 16:27

## 2017-10-30 RX ADMIN — DOCUSATE SODIUM AND SENNOSIDES 1 TABLET: 8.6; 5 TABLET, FILM COATED ORAL at 20:23

## 2017-10-30 RX ADMIN — ROCURONIUM BROMIDE 5 MG: 10 INJECTION, SOLUTION INTRAVENOUS at 15:28

## 2017-10-30 RX ADMIN — ROCURONIUM BROMIDE 45 MG: 10 INJECTION, SOLUTION INTRAVENOUS at 15:38

## 2017-10-30 RX ADMIN — OXYCODONE HYDROCHLORIDE 10 MG: 5 TABLET ORAL at 23:14

## 2017-10-30 RX ADMIN — FENTANYL CITRATE 25 MCG: 50 INJECTION, SOLUTION INTRAMUSCULAR; INTRAVENOUS at 17:44

## 2017-10-30 RX ADMIN — SODIUM CHLORIDE, SODIUM LACTATE, POTASSIUM CHLORIDE, AND CALCIUM CHLORIDE: 600; 310; 30; 20 INJECTION, SOLUTION INTRAVENOUS at 17:06

## 2017-10-30 RX ADMIN — TIZANIDINE 2 MG: 2 TABLET ORAL at 23:14

## 2017-10-30 RX ADMIN — CEFAZOLIN 2 G: 10 INJECTION, POWDER, FOR SOLUTION INTRAVENOUS; PARENTERAL at 22:54

## 2017-10-30 RX ADMIN — FENTANYL CITRATE 100 MCG: 50 INJECTION, SOLUTION INTRAMUSCULAR; INTRAVENOUS at 15:28

## 2017-10-30 RX ADMIN — ONDANSETRON 4 MG: 2 INJECTION INTRAMUSCULAR; INTRAVENOUS at 16:58

## 2017-10-30 RX ADMIN — GLYCOPYRROLATE 0.2 MG: 0.2 INJECTION INTRAMUSCULAR; INTRAVENOUS at 17:09

## 2017-10-30 RX ADMIN — Medication 80 MCG: at 15:50

## 2017-10-30 RX ADMIN — SODIUM CHLORIDE 125 ML/HR: 900 INJECTION, SOLUTION INTRAVENOUS at 17:39

## 2017-10-30 RX ADMIN — MIDAZOLAM HYDROCHLORIDE 2 MG: 1 INJECTION, SOLUTION INTRAMUSCULAR; INTRAVENOUS at 15:18

## 2017-10-30 RX ADMIN — ACETAMINOPHEN 1000 MG: 500 TABLET ORAL at 23:58

## 2017-10-30 RX ADMIN — PREGABALIN 150 MG: 75 CAPSULE ORAL at 13:54

## 2017-10-30 RX ADMIN — OXYCODONE HYDROCHLORIDE 5 MG: 5 TABLET ORAL at 20:23

## 2017-10-30 RX ADMIN — Medication 80 MCG: at 16:11

## 2017-10-30 RX ADMIN — Medication 80 MCG: at 15:33

## 2017-10-30 NOTE — PERIOP NOTES
TRANSFER - OUT REPORT:    Verbal report given to Ezio Amado RN (name) on Kenzie Hermosillo  being transferred to [facility] (unit) for routine post - op       Report consisted of patients Situation, Background, Assessment and   Recommendations(SBAR). Information from the following report(s) SBAR, Kardex, OR Summary, Procedure Summary, Intake/Output and MAR was reviewed with the receiving nurse. Lines:   Peripheral IV 10/30/17 Left Forearm (Active)   Site Assessment Clean, dry, & intact 10/30/2017  5:22 PM   Phlebitis Assessment 0 10/30/2017  5:22 PM   Infiltration Assessment 0 10/30/2017  5:22 PM   Dressing Status Clean, dry, & intact 10/30/2017  5:22 PM   Dressing Type Tape;Transparent 10/30/2017  5:22 PM   Hub Color/Line Status Pink; Infusing 10/30/2017  5:22 PM   Action Taken Blood drawn 10/30/2017  2:02 PM   Alcohol Cap Used Yes 10/30/2017  2:02 PM       Peripheral IV 10/30/17 Right Hand (Active)   Site Assessment Clean, dry, & intact 10/30/2017  5:22 PM   Phlebitis Assessment 0 10/30/2017  5:22 PM   Infiltration Assessment 0 10/30/2017  5:22 PM   Dressing Status Clean, dry, & intact 10/30/2017  5:22 PM   Dressing Type Tape;Transparent 10/30/2017  5:22 PM   Hub Color/Line Status Pink; Infusing 10/30/2017  5:22 PM        Opportunity for questions and clarification was provided.       Patient transported with:   O2 @ 2 liters  Tech

## 2017-10-30 NOTE — H&P
Progress Notes      []Hide copied text  []Eladio for attribution information   Subjective:      Patient ID: Dacia Goodrich is a 61 y.o. male.     Chief Complaint: Pain of the Neck        HPI:  Dacia Goodrich is a 61 y.o. male with complaints of neck pain radiating down the right upper extremity. He is following up after having a new cervical spine MRI. It is rated 9 out of 10 on the VAS. He has had constant pain and he can't sleep at night time. He has been holding his right arm up with his left hand because letting it dangle down hurts. Pain radiates down to the right index, middle and ring fingers and the pain is worse in the ring finger. He has been taking flexeril and gabapentin and he also has been taking diclofenac since he finished his medrol dosepak.             Patient Active Problem List     Diagnosis Date Noted    Kyphosis of cervical region 10/11/2017    Foraminal stenosis of cervical region 10/11/2017    Spinal stenosis in cervical region 10/11/2017    Displacement of cervical intervertebral disc without myelopathy 10/04/2017    Cervical radiculopathy 10/04/2017    Cervical spondylosis without myelopathy 10/04/2017    Cervicalgia 10/04/2017            Current Outpatient Prescriptions:     amitriptyline (ELAVIL) 25 MG tablet, Take 1 tablet (25 mg total) by mouth nightly., Disp: 30 tablet, Rfl: 11    citalopram (CeleXA) 20 MG tablet, TK 1 T PO D, Disp: , Rfl: 3    cyclobenzaprine (FLEXERIL) 10 MG tablet, Take 1 tablet (10 mg total) by mouth 3 (three) times a day as needed for muscle spasms. , Disp: 90 tablet, Rfl: 11    dexamethasone (DECADRON) 4 MG tablet, Take 1 tablet (4 mg total) by mouth 2 (two) times a day with meals. , Disp: 14 tablet, Rfl: 0    diclofenac (VOLTAREN) 75 MG EC tablet, Take 1 tablet (75 mg total) by mouth 2 (two) times a day., Disp: 60 tablet, Rfl: 2    DULoxetine (CYMBALTA) 30 MG capsule, Take 1 capsule (30 mg total) by mouth daily. , Disp: 30 capsule, Rfl: 11   gabapentin (NEURONTIN) 300 MG capsule, Take 2 capsules (600 mg total) by mouth 3 (three) times a day., Disp: 180 capsule, Rfl: 11    lisinopril (PRINIVIL,ZESTRIL) 10 MG tablet, TK 1 T PO D, Disp: , Rfl: 0    pravastatin (PRAVACHOL) 40 MG tablet, TK 1 T PO Q NIGHT, Disp: , Rfl: 6    VENTOLIN  (90 BASE) MCG/ACT inhaler, INL 2 PFS PO Q 6 H PRF WHZ, Disp: , Rfl: 11     No Known Allergies     ROS:   No new bowel or bladder incontinence. No fever. No saddle anesthesia.          Objective: There were no vitals filed for this visit.     Body mass index is 24.27 kg/(m^2). , a BMI over 30 is considered obese and a BMI over 40 has been associated with a higher risk of surgical complications.     Constitutional: No acute distress. Well nourished. HEENT: Normocephalic. Respiratory:  No labored breathing. Cardiovascular:  No marked cyanosis. Skin:  No marked skin ulcers/lesions on bilateral upper or lower extremities. Psychiatric: Alert and oriented x3. Inspection: No gross deformity of bilateral upper or lower extremities. Musculoskeletal/Neurological:   Gait/Balance:  - Normal.  No instability tandem walking.   Neck:  - No tenderness to palpation   - Full range of motion  Right upper extremity:  - No tenderness to palpation  - Full range of motion  - Strength:  - 5 out of 5 to deltoid  - 5 out of 5 to biceps  - 5 out of 5 to wrist extensors  - 3 out of 5 to triceps  - 5 out of 5 to finger flexors  - 5 out of 5 to intrinsics  Left upper extremity:  - No tenderness to palpation  - Full range of motion  - Strength:  - 5 out of 5 to deltoid  - 5 out of 5 to biceps  - 5 out of 5 to wrist extensors  - 5 out of 5 to triceps  - 5 out of 5 to finger flexors  - 5 out of 5 to intrinsics  Sensation:  - Intact to light touch  Reflexes:  - +2 biceps   - +2 brachioradialis  - +2 triceps  Negative Miguel A's bilaterally.             Radiographs:        X-ray Cervical Spine 2 Or 3 Views (15994)     Result Date: 10/4/2017  Flexion, Extension. Notes  Indication(s): Pain. Impression: Degenerative changes worse at C5-C6 with kyphotic alignment centered at that level. No fracture, dislocation or instability.      Mri Cervical Spine Without Contrast (33099)     Result Date: 10/10/2017  INDICATION:  NECK PAIN, RT SHOULDER PAIN, SPONDYLOSIS. COMPARISON: None TECHNIQUE: MR imaging of the cervical spine was performed using the following sequences: sagittal T1, T2, STIR;  axial T1, T2. CONTRAST:  None. FINDINGS: There is normal alignment of the cervical spine. There is cervical kyphosis in the region between C4 and C7. Vertebral body heights are maintained. Marrow signal is normal.    The craniocervical junction is intact. The course, caliber, and signal intensity of the spinal cord are normal.  The paraspinal soft tissues are within normal limits. C2-C3:  No herniation or stenosis. C3-C4:  Mild central disc protrusion with loss of the anterior CSF space and minimal indentation of the ventral cord. No foraminal stenosis. Widely patent dorsal CSF space. C4-C5:  No herniation or stenosis. Mild bulging disc. C5-C6:  Mild posterior spurring and mild bilateral uncovertebral joint hypertrophy. Loss of the anterior CSF space related to the kyphosis, but no stenosis. Mild bilateral foraminal narrowing. C6-C7:  Mild posterior disc bulge. Mild bilateral uncovertebral joint hypertrophy, right greater than left. No central spinal stenosis. Moderate right and mild left foraminal stenosis. C7-T1:  No herniation or stenosis. IMPRESSION: Mild central C3-4 disc herniation. Mild bulging disc at C4-5. Degenerative changes with mild bilateral foraminal narrowing at C5-6 and with moderate right and mild left foraminal narrowing at C6-7. Ghazala Perry I independently reviewed the above study(ies) and agree with the findings and additionally note a kyphotic alignment of the cervical spine not mentioned in the report. Assessment:      1. Cervicalgia    2. Cervical spondylosis without myelopathy    3. Cervical radiculopathy    4. Displacement of cervical intervertebral disc without myelopathy    5. Kyphosis of cervical region, unspecified kyphosis type    6. Foraminal stenosis of cervical region    7. Spinal stenosis in cervical region            Plan:      I reviewed MRI findings with Mr. Riley Moreland today and offered to schedule him for right C7 epidural steroid injection. I also offered a C5-C7 ACDF. He wants to go ahead and schedule surgery. In the mean time we set him up for ESIs with Dr. Nupur Vilchis to make him more comfortable. Follow up with me two weeks after the NUBIA. If he is still having triceps weakness and severe pain he will move forward with surgery, but he improves we can cancel surgery.   I prescribed Cymbalta and amitriptyline and he will continue taking gabapentin and diclofenac.      I have discussed the procedure in detail with the patient and mentioned complications, including but not limited to: death, permanent disability, heart attack, stroke, lung injury or infection, blindness, ileus, bladder or bowel problems, ureter injury, bleeding, nerve injury (including numbness, pain and weakness), paralysis (which may be permanent), failure to heal, failure to fuse bone together in fusion procedures, failure to relief symptoms, failure to relief pain, increased pain, need for further surgeries, failure or breakage or hardware, malpositioning of hardware, need to fuse or operate on additional levels determined either during or after surgery, destabilization of the spine (which may require fusion or later surgery), infections (which may or may not require additional surgery), dural tears (tears of the sac holding in nerves and spinal fluid), meningitis, voice changes, vocal cord injury, hoarseness, blood clots, pulmonary embolus, Alfred syndrome, recurrent disc herniation, diaphragm paralysis, and anesthetic complications. Comorbidities such as obesity, smoking, rheumatoid arthritis, chronic steroid use and diabetes increase these risks. The patient understands and wants to proceed.                Orders Placed This Encounter    DULoxetine (CYMBALTA) 30 MG capsule    amitriptyline (ELAVIL) 25 MG tablet      Return for Follow up 2 weeks after injection.

## 2017-10-30 NOTE — BRIEF OP NOTE
BRIEF OPERATIVE NOTE    Date of Procedure: 10/30/2017   Preoperative Diagnosis: CERVACALGIA, RADICULOPATHY, STENOSIS  Postoperative Diagnosis: CERVACALGIA, RADICULOPATHY, STENOSIS    Procedure(s):  C5-7 ANTERIOR CERVICAL DISCECTOMY WITH FUSION  Surgeon(s) and Role:     * Maria Brito MD - Primary         Assistant Staff:  Physician Assistant: Carlos A Wright [place]    Surgical Staff:  Circ-1: Lexii Jackson RN  Circ-Intern: Leia Laboy  Physician Assistant: Carlos A Wright, [place]  Radiology Technician: Raoul Noble; RT Aisha  Scrub Tech-1: Saida Mishra  Event Time In   Incision Start 1606   Incision Close 1713     Anesthesia: General   Estimated Blood Loss: 25cc  Specimens: * No specimens in log *   Findings: stenosis   Complications: none  Implants:   Implant Name Type Inv.  Item Serial No.  Lot No. LRB No. Used Action   GRAFT BNE ELITE JULIO SM --  - [id number]  GRAFT BNE ELITE JULIO SM --  [id number] MUSCULOSKELETAL TRANS 1910 N/A 1 Implanted   GRAFT BNE ELITE JULIO SM --  - [id number]  GRAFT BNE ELITE JULIO SM --  [id number] MUSCULOSKELETAL TRANS [id number] N/A 1 Implanted   Flat cervical spacer   [id number]  [id number] N/A 1 Implanted   Flat cervical Spacer   [id number]  [id number] N/A 1 Implanted   PLATE CERV ANTR 2 LVL 28MM TI -- SKYLINE - SNA  PLATE CERV ANTR 2 LVL 28MM TI -- SKYLINE NA JNJ DEPUY SPINE NA N/A 1 Implanted   SCR CERV ANTR FA ST 16MM TI -- SKYLINE - SNA   SCR CERV ANTR FA ST 16MM TI -- SKYLINE NA JNJ DEPUY SPINE NA N/A 6 Implanted

## 2017-10-30 NOTE — IP AVS SNAPSHOT
[address] [facility]. 
[phone] Patient: Yolanda Seals MRN: [id number] MDC:[birth year] About your hospitalization You were admitted on:  October 30, 2017 You last received care in the:  [facility] ORTHOPEDICS You were discharged on:  October 31, 2017 Why you were hospitalized Your primary diagnosis was:  Not on File Your diagnoses also included:  Cervical Stenosis Of Spinal Canal  
  
Things You Need To Do (next 8 weeks) Follow up with Malika Serna MD in 2 week(s) Phone:  [phone] Where:  [address] [facility] Follow up with Naz Treviño MD  
  
Phone:  [phone] Where:  [address] [address] Discharge Orders None A check gonzalo indicates which time of day the medication should be taken. My Medications STOP taking these medications   
 oxyCODONE-acetaminophen 5-325 mg per tablet Commonly known as:  PERCOCET TAKE these medications as instructed Instructions Each Dose to Equal  
 Morning Noon Evening Bedtime  
 acetaminophen 500 mg tablet Commonly known as:  TYLENOL Your last dose was: Your next dose is: Take 2 Tabs by mouth every six (6) hours for 14 days. 1000 mg  
    
   
   
   
  
 albuterol 90 mcg/actuation inhaler Commonly known as:  VENTOLIN HFA Your last dose was: Your next dose is: Take 2 Puffs by inhalation every six (6) hours as needed for Wheezing. 2 Puff  
    
   
   
   
  
 citalopram 20 mg tablet Commonly known as:  Lajuanda Gearing Your last dose was: Your next dose is: TAKE 1 TABLET BY MOUTH DAILY  
     
   
   
   
  
 FLONASE 50 mcg/actuation nasal spray Generic drug:  fluticasone Your last dose was: Your next dose is: 2 Sprays by Both Nostrils route daily. 2 Donald gabapentin 300 mg capsule Commonly known as:  NEURONTIN Your last dose was: Your next dose is: Take 300 mg by mouth three (3) times daily. 300 mg  
    
   
   
   
  
 ibuprofen 800 mg tablet Commonly known as:  MOTRIN Your last dose was: Your next dose is: Take 1 Tab by mouth every eight (8) hours as needed for Pain. 800 mg  
    
   
   
   
  
 lisinopril 10 mg tablet Commonly known as:  Kathy Velez Your last dose was: Your next dose is: TAKE 1 TABLET BY MOUTH DAILY  
     
   
   
   
  
 oxyCODONE IR 5 mg immediate release tablet Commonly known as:  Antonino Hopkins Your last dose was: Your next dose is: Take 1-2 Tabs by mouth every three (3) hours as needed. Max Daily Amount: 80 mg.  
 5-10 mg  
    
   
   
   
  
 polyethylene glycol 17 gram packet Commonly known as:  Leona Mueller Your last dose was: Your next dose is: Take 1 Packet by mouth daily as needed (constipation) for up to 15 days. 17 g  
    
   
   
   
  
 pravastatin 40 mg tablet Commonly known as:  PRAVACHOL Your last dose was: Your next dose is: Take 1 Tab by mouth nightly. 40 mg  
    
   
   
   
  
 senna-docusate 8.6-50 mg per tablet Commonly known as:  Dg Egan Your last dose was: Your next dose is: Take 1 Tab by mouth daily. 1 Tab  
    
   
   
   
  
 tiZANidine 2 mg tablet Commonly known as:  Erlinda Crews Your last dose was: Your next dose is: Take 1 Tab by mouth three (3) times daily. 2 mg Where to Get Your Medications Information on where to get these meds will be given to you by the nurse or doctor. ! Ask your nurse or doctor about these medications  
  acetaminophen 500 mg tablet  
 oxyCODONE IR 5 mg immediate release tablet polyethylene glycol 17 gram packet  
 senna-docusate 8.6-50 mg per tablet Discharge Instructions [address] [facility] Inessa Patel Discharge Instruction Sheet: Anterior Cervical Fusion Catracho IsrraelAntonia Reddy Pain control: 
Typically, we will prescribe a narcotic usually 1-2 tabs every four hours is sufficient for the pain. Most patients need this only for the first few weeks. You should discontinue this as the pain decreases. You should not drive while taking any narcotic pain medications. Constipation Pain medicines and anesthesia can be constipating-this can be prevented by gentle physical activity and drinking plenty of fluid. It should be treated with over-the-counter medications such as Miralax or suppositories, and/or Fleets enema. You should have a bowel movement at least every other day following surgery. Incision care Keep this area clean and dry. Do not remove the dressing. DO NOT take a tub bath or go swimming until cleared by your doctor. DO NOT apply lotions, oils, or creams to incision. Cover the wound with an impermiable dressing to shower for then next 5 days, then no cover is needed. Wear cervical collar for comfort. If staples are in place, they should be removed about 14-20 days after surgery. To increase and promote healing: 
? Stop Smoking (or at least cut back on smoking). ? Eat a well-balanced diet (high in protein and vitamin C) ? If your appetite is poor, consider nutritional supplements like Ensure, Glucerna, or [place] Instant Breakfast. 
? If you are diabetic, controlling you blood sugars is very important to prevent infection and promote wound healing. Nutrition: ? If you were on a supplement such as Ensure or Glucerna) while in the hospital, please continue using them with each meal for the next 30 days.  
? Eat a well-balanced diet - High in protein, high in vitamins and minerals, especially vitamin C and zinc.  
 
Restrictions: 
Limited bending at waist 
Lift no more than 10 pounds Warning signs :  
Please call your physician IMMEDIATELY at [phone] if you have: ? If you have throat soreness that worsens ? If you have difficulty swallowing. ? If you have any difficulty breathing ? Bleeding from incision that is constant. ? Change in mental status (unusual behavior or confusion) ? If your incision develops redness or swelling 
? Change in wound drainage (increase in amount, color, or foul odor) ? [place] over 101.5 degrees Fahrenheit  
? Headache that is not relieved with pain medication ? Tenderness or redness in the calf of your leg Emergency: CALL 911 if you have: ? Any Difficulty Breathing or Shortness of breath ? Difficulty Swallowing ? Chest pain ? Localized chest pain when coughing or taking a deep breath [place] Follow-up Please call Dr. Anupama Duffy office for a follow up appointment in 2 weeks at [phone]. You can return to work when cleared by a physician. During normal business hours you may reach Dr. Serina Wing' team directly at [phone] if you have concerns or questions. [employer] Announcement We are excited to announce that we are making your provider's discharge notes available to you in [employer]. You will see these notes when they are completed and signed by the physician that discharged you from your recent hospital stay. If you have any questions or concerns about any information you see in [employer], please call the Health Information Department where you were seen or reach out to your Primary Care Provider for more information about your plan of care. Introducing [facility]! [employer] introduces [employer] patient portal. Now you can access parts of your medical record, email your doctor's office, and request medication refills online.    
 
1. In your internet browser, go to [email or web address] 2. Click on the First Time User? Click Here link in the Sign In box. You will see the New Member Sign Up page. 3. Enter your Zuffle Access Code exactly as it appears below. You will not need to use this code after youve completed the sign-up process. If you do not sign up before the expiration date, you must request a new code. · Zuffle Access Code: [email or web address] Expires: 1/15/2018  9:15 AM 
 
4. Enter the last four digits of your Social Security Number (xxxx) and Date of Birth (mm/dd/yyyy) as indicated and click Submit. You will be taken to the next sign-up page. 5. Create a [employer] ID. This will be your Zuffle login ID and cannot be changed, so think of one that is secure and easy to remember. 6. Create a Zuffle password. You can change your password at any time. 7. Enter your Password Reset Question and Answer. This can be used at a later time if you forget your password. 8. Enter your e-mail address. You will receive e-mail notification when new information is available in [address] 9. Click Sign Up. You can now view and download portions of your medical record. 10. Click the Download Summary menu link to download a portable copy of your medical information. If you have questions, please visit the Frequently Asked Questions section of the [employer] website. Remember, [employer] is NOT to be used for urgent needs. For medical emergencies, dial 911. Now available from your iPhone and Android! Providers Seen During Your Hospitalization Provider Specialty Primary office phone Molly Morris MD Orthopedic Surgery [phone] Your Primary Care Physician (PCP) Primary Care Physician Office Phone Office Fax Geovani Mathew [phone] You are allergic to the following No active allergies Recent Documentation Height Weight BMI Smoking Status 1.74 m 69.4 kg 22.93 kg/m2 Former Smoker Emergency Contacts Name Discharge Info Relation Home Work Mobile [address] DISCHARGE CAREGIVER [3] Spouse [3]   [phone] Patient Belongings The following personal items are in your possession at time of discharge: 
  Dental Appliances: None  Visual Aid: Glasses, With patient      Home Medications: None   Jewelry: None  Clothing: Other (comment) (street clothes and hsoes)    Other Valuables: None  Personal Items Sent to Safe: declined Please provide this summary of care documentation to your next provider. Signatures-by signing, you are acknowledging that this After Visit Summary has been reviewed with you and you have received a copy. Patient Signature:  ____________________________________________________________ Date:  ____________________________________________________________  
  
[facility] Provider Signature:  ____________________________________________________________ Date:  ____________________________________________________________

## 2017-10-30 NOTE — PERIOP NOTES
Handoff Report from Operating Room to PACU    Report received from Yaa Gill RN and Jung Smith CRNA regarding Denice Kos. Surgeon(s):  Guanako Baca MD  And Procedure(s) (LRB):  C5-7 ANTERIOR CERVICAL DISCECTOMY WITH FUSION (N/A)  confirmed   with allergies, drains and dressings discussed. Anesthesia type, drugs, patient history, complications, estimated blood loss, vital signs, intake and output, and last pain medication, lines, reversal medications and temperature were reviewed.

## 2017-10-30 NOTE — ANESTHESIA POSTPROCEDURE EVALUATION
Post-Anesthesia Evaluation and Assessment    Patient: Denice Milton MRN: [id number]  SSN: [id number]    YOB: 1960  Age: 61 y.o. Sex: male       Cardiovascular Function/Vital Signs  Visit Vitals    /70 (BP 1 Location: Right arm, BP Patient Position: At rest)    Pulse 74    Temp 36.4 °C (97.5 °F)    Resp 17    Ht 5' 8.5\" (1.74 m)    Wt 69.4 kg (153 lb)    SpO2 100%    BMI 22.93 kg/m2       Patient is status post general anesthesia for Procedure(s):  C5-7 ANTERIOR CERVICAL DISCECTOMY WITH FUSION. Nausea/Vomiting: None    Postoperative hydration reviewed and adequate. Pain:  Pain Scale 1: Numeric (0 - 10) (10/30/17 1800)  Pain Intensity 1: 4 (10/30/17 1800)   Managed    Neurological Status:   Neuro (WDL): Exceptions to WDL (10/30/17 1722)  Neuro  Neurologic State: Alert;Drowsy; Eyes open spontaneously (10/30/17 1722)  Orientation Level: Oriented to person;Oriented to place;Oriented to situation (10/30/17 1722)  Cognition: Follows commands (10/30/17 1722)  Speech: Clear (10/30/17 1722)  LUE Motor Response: Purposeful;Spontaneous  (10/30/17 1722)  LLE Motor Response: Purposeful;Spontaneous  (10/30/17 1722)  RUE Motor Response: Purposeful;Spontaneous  (10/30/17 1722)  RLE Motor Response: Purposeful;Spontaneous  (10/30/17 1722)   At baseline    Mental Status and Level of Consciousness: Arousable    Pulmonary Status:   O2 Device: Nasal cannula (10/30/17 1800)   Adequate oxygenation and airway patent    Complications related to anesthesia: None    Post-anesthesia assessment completed.  No concerns    Signed By: Janell Robertson MD     October 30, 2017

## 2017-10-30 NOTE — PROGRESS NOTES
TRANSFER - IN REPORT:    Verbal report received from Eddie RN(name) on Verle Grammes  being received from [facility]) for routine post - op      Report consisted of patients Situation, Background, Assessment and   Recommendations(SBAR). Information from the following report(s) OR Summary, Procedure Summary, Intake/Output and MAR was reviewed with the receiving nurse. Opportunity for questions and clarification was provided. Assessment completed upon patients arrival to unit and care assumed.

## 2017-10-30 NOTE — ANESTHESIA PREPROCEDURE EVALUATION
Anesthetic History   No history of anesthetic complications            Review of Systems / Medical History  Patient summary reviewed, nursing notes reviewed and pertinent labs reviewed    Pulmonary            Asthma : well controlled       Neuro/Psych         Psychiatric history     Cardiovascular    Hypertension              Exercise tolerance: >4 METS     GI/Hepatic/Renal     GERD: well controlled           Endo/Other        Arthritis     Other Findings            Physical Exam    Airway  Mallampati: II  TM Distance: 4 - 6 cm  Neck ROM: normal range of motion   Mouth opening: Normal     Cardiovascular  Regular rate and rhythm,  S1 and S2 normal,  no murmur, click, rub, or gallop             Dental  No notable dental hx       Pulmonary  Breath sounds clear to auscultation               Abdominal  GI exam deferred       Other Findings            Anesthetic Plan    ASA: 2  Anesthesia type: general    Monitoring Plan: BIS      Induction: Intravenous  Anesthetic plan and risks discussed with: Patient

## 2017-10-31 ENCOUNTER — APPOINTMENT (OUTPATIENT)
Dept: GENERAL RADIOLOGY | Age: 57
End: 2017-10-31
Attending: ORTHOPAEDIC SURGERY
Payer: COMMERCIAL

## 2017-10-31 VITALS
SYSTOLIC BLOOD PRESSURE: 127 MMHG | HEIGHT: 69 IN | DIASTOLIC BLOOD PRESSURE: 71 MMHG | RESPIRATION RATE: 16 BRPM | HEART RATE: 85 BPM | WEIGHT: 153 LBS | BODY MASS INDEX: 22.66 KG/M2 | TEMPERATURE: 97.5 F | OXYGEN SATURATION: 95 %

## 2017-10-31 LAB
GLUCOSE BLD STRIP.AUTO-MCNC: 111 MG/DL (ref 65–100)
SERVICE CMNT-IMP: ABNORMAL

## 2017-10-31 PROCEDURE — G8980 MOBILITY D/C STATUS: HCPCS | Performed by: PHYSICAL THERAPIST

## 2017-10-31 PROCEDURE — 72040 X-RAY EXAM NECK SPINE 2-3 VW: CPT

## 2017-10-31 PROCEDURE — 97535 SELF CARE MNGMENT TRAINING: CPT | Performed by: OCCUPATIONAL THERAPIST

## 2017-10-31 PROCEDURE — G8978 MOBILITY CURRENT STATUS: HCPCS | Performed by: PHYSICAL THERAPIST

## 2017-10-31 PROCEDURE — 97116 GAIT TRAINING THERAPY: CPT | Performed by: PHYSICAL THERAPIST

## 2017-10-31 PROCEDURE — 97165 OT EVAL LOW COMPLEX 30 MIN: CPT | Performed by: OCCUPATIONAL THERAPIST

## 2017-10-31 PROCEDURE — 77010033678 HC OXYGEN DAILY

## 2017-10-31 PROCEDURE — 74011250637 HC RX REV CODE- 250/637: Performed by: ORTHOPAEDIC SURGERY

## 2017-10-31 PROCEDURE — 74011250636 HC RX REV CODE- 250/636: Performed by: ORTHOPAEDIC SURGERY

## 2017-10-31 PROCEDURE — 97161 PT EVAL LOW COMPLEX 20 MIN: CPT | Performed by: PHYSICAL THERAPIST

## 2017-10-31 PROCEDURE — G8979 MOBILITY GOAL STATUS: HCPCS | Performed by: PHYSICAL THERAPIST

## 2017-10-31 PROCEDURE — 99218 HC RM OBSERVATION: CPT

## 2017-10-31 PROCEDURE — 82962 GLUCOSE BLOOD TEST: CPT

## 2017-10-31 RX ORDER — POLYETHYLENE GLYCOL 3350 17 G/17G
17 POWDER, FOR SOLUTION ORAL
Qty: 15 PACKET | Refills: 0 | Status: SHIPPED | OUTPATIENT
Start: 2017-10-31 | End: 2017-11-15

## 2017-10-31 RX ORDER — AMOXICILLIN 250 MG
1 CAPSULE ORAL DAILY
Qty: 30 TAB | Refills: 0 | Status: SHIPPED | OUTPATIENT
Start: 2017-10-31 | End: 2019-01-28

## 2017-10-31 RX ORDER — OXYCODONE HYDROCHLORIDE 5 MG/1
5-10 TABLET ORAL
Qty: 80 TAB | Refills: 0 | Status: SHIPPED | OUTPATIENT
Start: 2017-10-31 | End: 2018-01-30

## 2017-10-31 RX ORDER — ACETAMINOPHEN 500 MG
1000 TABLET ORAL EVERY 6 HOURS
Qty: 112 TAB | Refills: 0 | Status: SHIPPED | OUTPATIENT
Start: 2017-10-31 | End: 2017-11-14

## 2017-10-31 RX ADMIN — POLYETHYLENE GLYCOL 3350 17 G: 17 POWDER, FOR SOLUTION ORAL at 09:07

## 2017-10-31 RX ADMIN — FAMOTIDINE 20 MG: 20 TABLET, FILM COATED ORAL at 09:08

## 2017-10-31 RX ADMIN — ACETAMINOPHEN 1000 MG: 500 TABLET ORAL at 05:12

## 2017-10-31 RX ADMIN — GABAPENTIN 300 MG: 300 CAPSULE ORAL at 09:08

## 2017-10-31 RX ADMIN — OXYCODONE HYDROCHLORIDE 10 MG: 5 TABLET ORAL at 02:15

## 2017-10-31 RX ADMIN — SODIUM CHLORIDE 125 ML/HR: 900 INJECTION, SOLUTION INTRAVENOUS at 00:03

## 2017-10-31 RX ADMIN — Medication 10 ML: at 05:14

## 2017-10-31 RX ADMIN — LISINOPRIL 10 MG: 5 TABLET ORAL at 09:08

## 2017-10-31 RX ADMIN — ACETAMINOPHEN 1000 MG: 500 TABLET ORAL at 12:26

## 2017-10-31 RX ADMIN — FLUTICASONE PROPIONATE 2 SPRAY: 50 SPRAY, METERED NASAL at 09:10

## 2017-10-31 RX ADMIN — OXYCODONE HYDROCHLORIDE 10 MG: 5 TABLET ORAL at 05:12

## 2017-10-31 RX ADMIN — Medication 1 LOZENGE: at 00:03

## 2017-10-31 RX ADMIN — OXYCODONE HYDROCHLORIDE 10 MG: 5 TABLET ORAL at 09:08

## 2017-10-31 RX ADMIN — CEFAZOLIN 2 G: 10 INJECTION, POWDER, FOR SOLUTION INTRAVENOUS; PARENTERAL at 06:17

## 2017-10-31 RX ADMIN — TIZANIDINE 2 MG: 2 TABLET ORAL at 09:09

## 2017-10-31 RX ADMIN — CITALOPRAM HYDROBROMIDE 20 MG: 20 TABLET ORAL at 09:08

## 2017-10-31 RX ADMIN — DOCUSATE SODIUM AND SENNOSIDES 1 TABLET: 8.6; 5 TABLET, FILM COATED ORAL at 09:08

## 2017-10-31 RX ADMIN — OXYCODONE HYDROCHLORIDE 10 MG: 5 TABLET ORAL at 12:26

## 2017-10-31 NOTE — PROGRESS NOTES
Bedside and Verbal shift change report given to ERIC Mendez (oncoming nurse) by Jacqlyn Boas (offgoing nurse). Report included the following information SBAR, Kardex, OR Summary, Procedure Summary, Intake/Output, MAR, Recent Results and Med Rec Status.

## 2017-10-31 NOTE — PROGRESS NOTES
Occupational Therapy  Orders received and medical record reviewed. Pt participated in ADL evaluation. Education provided on care of neck brace and don/doff in front of a mirror, neck precautions during self care/adls, fall prevention and home safety. Pt's wife was present--they both verbalized understanding of all education this date. Full OT note to follow. Pt is ready for discharge. Thank you for the consult.

## 2017-10-31 NOTE — DISCHARGE INSTRUCTIONS
[address] [facility]    Discharge Instruction Sheet: Anterior Cervical Fusion    DR. Nigel Newman    Pain control:  Typically, we will prescribe a narcotic usually 1-2 tabs every four hours is sufficient for the pain. Most patients need this only for the first few weeks. You should discontinue this as the pain decreases. You should not drive while taking any narcotic pain medications. Constipation  Pain medicines and anesthesia can be constipating-this can be prevented by gentle physical activity and drinking plenty of fluid. It should be treated with over-the-counter medications such as Miralax or suppositories, and/or Fleets enema. You should have a bowel movement at least every other day following surgery. Incision care   Keep this area clean and dry. Do not remove the dressing. DO NOT take a tub bath or go swimming until cleared by your doctor. DO NOT apply lotions, oils, or creams to incision. Cover the wound with an impermiable dressing to shower for then next 5 days, then no cover is needed. Wear cervical collar for comfort. If staples are in place, they should be removed about 14-20 days after surgery. To increase and promote healing:   Stop Smoking (or at least cut back on smoking).  Eat a well-balanced diet (high in protein and vitamin C)   If your appetite is poor, consider nutritional supplements like Ensure, Glucerna, or Alto Instant Breakfast.   If you are diabetic, controlling you blood sugars is very important to prevent infection and promote wound healing. Nutrition:   If you were on a supplement such as Ensure or Glucerna) while in the hospital, please continue using them with each meal for the next 30 days.    Eat a well-balanced diet - High in protein, high in vitamins and minerals, especially vitamin C and zinc.     Restrictions:  Limited bending at waist  Lift no more than 10 pounds    Warning signs :   Please call your physician IMMEDIATELY at [phone] if you have:   If you have throat soreness that worsens   If you have difficulty swallowing.  If you have any difficulty breathing   Bleeding from incision that is constant.  Change in mental status (unusual behavior or confusion)   If your incision develops redness or swelling   Change in wound drainage (increase in amount, color, or foul odor)   Perry over 101.5 degrees Fahrenheit    Headache that is not relieved with pain medication   Tenderness or redness in the calf of your leg    Emergency: CALL 911 if you have:   Any Difficulty Breathing or Shortness of breath   Difficulty Swallowing   Chest pain   Localized chest pain when coughing or taking a deep breath    Liz     Follow-up  Please call Dr. Lee Ann Mills office for a follow up appointment in 2 weeks at [phone]. You can return to work when cleared by a physician. During normal business hours you may reach Dr. Daryl Vila' team directly at [phone] if you have concerns or questions.

## 2017-10-31 NOTE — PROGRESS NOTES
physical Therapy EVALUATION/DISCHARGE  Patient: Rosaline Smith (20 y.o. male)  Date: 10/31/2017  Primary Diagnosis: CERVACALGIA, RADICULOPATHY, STENOSIS  Cervical stenosis of spinal canal  Procedure(s) (LRB):  C5-7 ANTERIOR CERVICAL DISCECTOMY WITH FUSION (N/A) 1 Day Post-Op   Precautions: spinal     ASSESSMENT :  Based on the objective data described below, the patient presents with overall independence with functional mobility. Gait is steady without use of any AD and patient is safe on the stairs. Patient educated on spinal precautions and indicates good understanding. No further PT needs identified at this time. Patient is ready for discharge from PT standpoint. PLAN :  Discharge Recommendations: None  Further Equipment Recommendations for Discharge: none     SUBJECTIVE:   Patient stated Thanks for all your help. I will remember those precautions.     OBJECTIVE DATA SUMMARY:   HISTORY:    Past Medical History:   Diagnosis Date    Anxiety disorder     Arthritis     Asthma     allergies environmental    Cancer ([facility] [address]     prostate CA    HTN (hypertension) 9/7/2011    Other and unspecified symptoms and signs involving general sensations and perceptions     construction accident 2014    Other ill-defined conditions(799.89)     anxiety    Other ill-defined conditions(799.89)     seasonal allergies    Psychiatric disorder     anxiety     Past Surgical History:   Procedure Laterality Date    ABDOMEN SURGERY PROC UNLISTED Right 1980    inguinal    HX PROSTATECTOMY  8/2015     Prior Level of Function/Home Situation: patient reports complete independence with all mobility and ADLs; works full-time as a cement trunk . He recently has had trouble with the stick shift secondary to R UE weakness.       Home Situation  Home Environment: Private residence  # Steps to Enter: 8  Rails to Enter: Yes  Hand Rails : Bilateral  One/Two Story Residence: One story  Living Alone: No  Support Systems: Spouse/Significant Other/Partner  Patient Expects to be Discharged to[de-identified] Private residence  Current DME Used/Available at Home: Chrissie Duet, straight  Tub or Shower Type: Shower    EXAMINATION/PRESENTATION/DECISION MAKING:   Critical Behavior:  Neurologic State: Alert  Orientation Level: Oriented X4  Cognition: Follows commands     Hearing: Auditory  Auditory Impairment: None    Range Of Motion:  AROM: Within functional limits (except R UE is generally decreased)                       Strength:    Strength: Within functional limits                    Tone & Sensation:   Tone: Normal              Sensation: Intact               Coordination:  Coordination: Within functional limits  Vision:      Functional Mobility:  Bed Mobility:  Rolling: Supervision (VC for log rolling)  Supine to Sit: Supervision     Scooting: Independent  Transfers:  Sit to Stand: Independent  Stand to Sit: Independent                       Balance:   Sitting: Intact  Standing: Intact  Ambulation/Gait Training:  Distance (ft): 300 Feet (ft)  Assistive Device: Gait belt  Ambulation - Level of Assistance: Independent        Gait Abnormalities: Altered arm swing (mildly guarded)              Speed/Mariela: Pace decreased (<100 feet/min)      Gait is slow but steady with no LOB noted but mildly decreased arm swing and mildly guarded        Stairs:  Number of Stairs Trained: 8  Stairs - Level of Assistance: Supervision   Rail Use: Both         Functional Measure:    Elder Mobility Scale    18/20         EMS and G-code impairment scale:  Percentage of impairment CH  0% CI  1-19% CJ  20-39% CK  40-59% CL  60-79% CM  80-99% CN  100%   EMS Score 0-20 20 17-19 13-16 9-12 5-8 1-4 0      Scores under 10 - generally these patients are dependent in mobility maneuvers; require help with  basic ADL, such as transfers, toileting and dressing.     Scores between 10 - 13 - generally these patients are borderline in terms of safe mobility and  independence in ADL i.e. they require some help with some mobility maneuvers. Scores over 14 - Generally these patients are able to perform mobility maneuvers alone and safely  and are independent in basic ADL. G codes: In compliance with CMSs Claims Based Outcome Reporting, the following G-code set was chosen for this patient based on their primary functional limitation being treated: The outcome measure chosen to determine the severity of the functional limitation was the Elderly Mobility scale with a score of 18/20 which was correlated with the impairment scale. ? Mobility - Walking and Moving Around:     - CURRENT STATUS: CI - 1%-19% impaired, limited or restricted    - GOAL STATUS: CI - 1%-19% impaired, limited or restricted    - D/C STATUS:  CI - 1%-19% impaired, limited or restricted              Pain:Pain Scale 1: Numeric (0 - 10)  Pain Intensity 1: 5  Pain Location 1: Neck  Activity Tolerance:   VSS  Please refer to the flowsheet for vital signs taken during this treatment. After treatment:   [x]   Patient left in no apparent distress sitting up in chair  []   Patient left in no apparent distress in bed  [x]   Call bell left within reach  [x]   Nursing notified  []   Caregiver present  []   Bed alarm activated    COMMUNICATION/EDUCATION:   Communication/Collaboration:  [x]   Fall prevention education was provided and the patient/caregiver indicated understanding. [x]   Patient/family have participated as able and agree with findings and recommendations. []   Patient is unable to participate in plan of care at this time.   Findings and recommendations were discussed with: Occupational Therapist, Registered Nurse and     Thank you for this referral.  April Thomas, PT   Time Calculation: 30 mins

## 2017-10-31 NOTE — PROGRESS NOTES
[facility]TH[facility] POST OP SPINE PROGRESS NOTE    2017  Admit Date: 10/30/2017  Admit Diagnosis: CERVACALGIA, RADICULOPATHY, STENOSIS  Cervical stenosis of spinal canal  Procedure: Procedure(s):  C5-7 ANTERIOR CERVICAL DISCECTOMY WITH FUSION  Post Op day: 1 Day Post-Op    Subjective:     Rojas Porras is a patient who is s/p C5-7 ACDF, RUE symptoms improved. tolerating po and able to void. .     Review of Systems: Pertinent items are noted in HPI. Objective:     PT/OT:   Distance Ambulated:           Time Ambulated (min):        Ambulation Response:        Assistive Device:              Assistive Device: Fall prevention device, Walker (comment)    Vital Signs:    Blood pressure 129/80, pulse 82, temperature 97.5 °F (36.4 °C), resp. rate 16, height 5' 8.5\" (1.74 m), weight 69.4 kg (153 lb), SpO2 98 %. Temp (24hrs), Av.8 °F (36.6 °C), Min:97.4 °F (36.3 °C), Max:98.3 °F (36.8 °C)         10/29 1901 - 10/31 0700  In: 2757.5 [P.O.:720; I.V.:7.5]  Out:  [Urine:2000; Drains:10]    LAB:    No results for input(s): HGB, HGBEXT, WBC, PLT, PLTEXT, HGBEXT, PLTEXT in the last 72 hours. Wound/Drain Assessment:  Drain:      Dressing:     Physical Exam:  Neurological: no deficit  Incision clean, dry, and intact  5/5 BUE    Assessment:      Patient Active Problem List   Diagnosis Code    Anxiety F41.9    Dizziness and giddiness R42    GERD (gastroesophageal reflux disease) K21.9    HTN (hypertension) I10    Prostate cancer ([facility] [address] C61    Pure hypercholesterolemia E78.00    Cervical stenosis of spinal canal M48.02       Plan:     Continue PT/OT/Rehab  Discontinue: IV  Consult: PT  and OT    Discharge To: home.  today       Signed By: LLOYD Reza

## 2017-10-31 NOTE — OP NOTES
[facility] [address]   OP NOTE       Name:  Joseph Dinero   MR#:  [id number]   :  1960   Account #:  [de-identified]    Surgery Date:  10/30/2017   Date of Adm:  10/30/2017       DATE OF OPERATION: 10/30/2017. PREOPERATIVE DIAGNOSES:   1. Cervical spinal stenosis, C5 through C7.   2. Cervical radiculopathy. 3. Cervicalgia. POSTOPERATIVE DIAGNOSES:   1. Cervical spinal stenosis, C5 through C7.   2. Cervical radiculopathy. 3. Cervicalgia. PROCEDURES PERFORMED:   1. C5-C6 anterior cervical diskectomy and fusion. 2. C6-C7 anterior cervical diskectomy and fusion. 3. N4-J1 application of interbody biomechanical device. 4. G8-C6 application of interbody biomechanical device. 5. Use of operative microscope. 6. Use of allograft bone for spine fusion. 7. C5 through C7 anterior instrumentation. SURGEON: Jose Francisco Biggs MD.    FIRST ASSISTANT: Juanis Lockwood PA-C.     ESTIMATED BLOOD LOSS: 25 mL. COMPLICATIONS: None. SPECIMENS TO PATHOLOGY: None. ANESTHESIA: General.    DRAINS: X1.    IMPLANTS: The [employer] [place] plate and the [employer]   FortiCore interbody biomechanical device. INDICATIONS FOR THE PROCEDURE: This patient is a very   pleasant 71-year-old gentleman with cervical spinal stenosis resulting   in cervical radiculopathy and cervicalgia that has failed to improve with   nonoperative treatment. At this point, he would like to proceed with   surgical intervention. INFORMED CONSENT: I have given him warnings about the possible   complications, including but not limited to, pain, scar, bleeding,   infection, nonunion, damage to surrounding structures, death,   paralysis, blindness, and stroke. He understands and wants to   proceed.     DESCRIPTION OF PROCEDURE: After informed consent was   obtained and the operative site was properly marked, the patient was   moved back to the operating room and underwent general   endotracheal anesthesia. He was positioned supine on the operating   table using the Frances Haver table flat top. His arms were well-padded and   tucked at the side. The knees were gently bent with pillows. Fluoroscopy was used to gonzalo the level of the incision. I then   proceeded to prep and drape in the usual manner. Timeout was   obtained verifying that this was the correct patient, the correct surgery,   and the correct site, as well as that he had received IV antibiotics   within 30 minutes of the incision. I then proceeded to perform a standard anterior approach to the   cervical spine, exposing the area between C5 and C7. Once that area   was exposed and hemostasis was obtained, we then proceeded to   elevate the longus colli muscle on both right and left sides, protecting   the sympathetic chain and exposing the uncinate processes. Once that   was completed, I was able to place the [address] retractors in   position and I was able to place [place] pins, 1 at C5 and 1 at C6. Gentle axial distraction was applied and I was able then to bring in the   operative microscope. A 15 blade was used to perform a box annulotomy and the annulus   was removed with a pituitary. The disk was then removed with a   pituitary and a curette until we reached the PLL. At this point, I used   the [place] Tim to remove the anterior osteophytes, posterior   osteophytes and make the end plates coplaner. Once that was   completed, I was able to size the interbody space for a spacer. With   the appropriate spacer selected. While the patient was being packed   with [place] allograft bone, I was able to remove the PLL and any   herniated disk material as well as remove the foraminal stenosis by   removing the osteophytes. Once that area was fully decompressed the spacer was inserted in the   appropriate location.  With the appropriate spacer selected and in   position, I was able then to remove the [place] pins, moving down to   the next level and repeated the procedure in the exact same manner at   C6-C7. With both levels fully decompressed and fused, I then size for   my plate. With the appropriate plate selected, I drilled 14 mm screws   bilaterally at C5, C6 and C7 and I was able to final tighten them and   lock them with the final locking device. The wound was then irrigated with 3 L of normal saline. Once that was   in place, I proceeded to place a deep drain, close the platysma with 2-  0 Vicryl, the subcutaneous with 3-0 Vicryl, and the skin with a 3-0   running Monocryl and Dermabond. Sterile dressing was applied. The   patient was then awakened and transferred to the PACU in stable   condition. POSTOPERATIVE PLAN: The patient is going to remain here   overnight. We are going to give him SCD's and ZACH hose for DVT   prophylaxis and Ancef for infection prophylaxis.         MD VINH Levin / Angelica Dominguez   D:  10/30/2017   17:32   T:  10/31/2017   06:40   Job #:  [id number]

## 2017-10-31 NOTE — PROGRESS NOTES
Problem: Self Care Deficits Care Plan (Adult)  Goal: *Acute Goals and Plan of Care (Insert Text)  Occupational Therapy EVALUATION/discharge  Patient: Patti Aburto (52 y.o. male)  Date: 10/31/2017  Primary Diagnosis: CERVACALGIA, RADICULOPATHY, STENOSIS  Cervical stenosis of spinal canal  Procedure(s) (LRB):  C5-7 ANTERIOR CERVICAL DISCECTOMY WITH FUSION (N/A) 1 Day Post-Op   Precautions: fall, neck precautions       ASSESSMENT:   Based on the objective data described below, the patient presents with neck precautions and pain following C5-C7 ACDF POD 1, and improved RUE functioning and sensation. Pt was educated in wearing and caring for his neck brace, neck precautions during adls, home safety and fall prevention. Pt 's wife was present and verbalized understanding. She will be home with pt while he is recovering. Pt is anticipating discharge home this date; no OT is needed at this time. Further skilled acute occupational therapy is not indicated at this time. Discharge Recommendations: None  Further Equipment Recommendations for Discharge: reacher was suggested      SUBJECTIVE:   Patient stated Joyce oliveira for that.   (neck precautions during adls)    OBJECTIVE DATA SUMMARY:   HISTORY:   Past Medical History:   Diagnosis Date    Anxiety disorder     Arthritis     Asthma     allergies environmental    Cancer ([facility] [address]     prostate CA    HTN (hypertension) 9/7/2011    Other and unspecified symptoms and signs involving general sensations and perceptions     construction accident 2014    Other ill-defined conditions(799.89)     anxiety    Other ill-defined conditions(799.89)     seasonal allergies    Psychiatric disorder     anxiety     Past Surgical History:   Procedure Laterality Date    ABDOMEN SURGERY PROC UNLISTED Right 1980    inguinal    HX PROSTATECTOMY  8/2015       Prior Level of Function/Home Situation: Pt reports independence in adls and mobility.   He lives with his wife who will be home with him during the day. Pt works as a . Pt has been having trouble with his RUE prior to his surgery. Expanded or extensive additional review of patient history: arthritis anxiety prostate CA  Home Situation  Home Environment: Private residence  # Steps to Enter: 8  Rails to Enter: Yes  Hand Rails : Bilateral  One/Two Story Residence: One story  Living Alone: No  Support Systems: Spouse/Significant Other/Partner  Patient Expects to be Discharged to[de-identified] Private residence  Current DME Used/Available at Home: Cane, straight  Tub or Shower Type: Shower  []  Right hand dominant   [x]  Left hand dominant    EXAMINATION OF PERFORMANCE DEFICITS:  Cognitive/Behavioral Status:  Neurologic State: Alert  Orientation Level: Oriented X4  Cognition: Follows commands             Skin: generally intact, incision site intact    Edema: none observed    Hearing: Auditory  Auditory Impairment: None    Vision/Perceptual:       Within functional limits                              Range of Motion:  BUEs:    AROM: Within functional limits (except R UE is generally decreased)                         Strength:  BUEs: decreased overall, RUE> LUE, not formally mm tested due to neck surgery. Strength: Within functional limits                Coordination:  Coordination: Within functional limits            Tone & Sensation:    Tone: Normal  Sensation: Intact                      Balance:  Sitting: Intact  Standing: Intact    Functional Mobility and Transfers for ADLs:  Bed Mobility:  Rolling: Supervision (VC for log rolling)  Supine to Sit: Supervision  Scooting: Independent    Transfers:  Sit to Stand: Independent  Stand to Sit: Independent    ADL Assessment:      grooming: minimal assistance due to use of brace and precautions  Bathing: encouraged seated crossed leg technique  Dressing:  Upper body: set up.  Encouraged t shirt under neck brace to protect skin  Lower body:  Encouraged sitting and crossed leg technique  Toilet transfer: independent  Toileting : independent                                      ADL Intervention and task modifications:     Pt educated on fall prevention, home safety, adaptive techniques for adls and encouraged using a reacher during adls to avoid bending. Educated on use of neck brace and care instructions - pt able to doff brace with supervision. Educated on neck precautions during pt's adl routine. Therapeutic Exercise:  Encouraged gentle ROM and no lifting during adls. Functional Measure:  Barthel Index:  Barthel Index:    Bathin  Bladder: 10  Bowels: 10  Groomin  Dressin  Feeding: 10  Mobility: 10  Stairs: 0  Toilet Use: 10  Transfer (Bed to Chair and Back): 15  Total: 70       Barthel and G-code impairment scale:  Percentage of impairment CH  0% CI  1-19% CJ  20-39% CK  40-59% CL  60-79% CM  80-99% CN  100%   Barthel Score 0-100 100 99-80 79-60 59-40 20-39 1-19   0   Barthel Score 0-20 20 17-19 13-16 9-12 5-8 1-4 0      The Barthel ADL Index: Guidelines  1. The index should be used as a record of what a patient does, not as a record of what a patient could do. 2. The main aim is to establish degree of independence from any help, physical or verbal, however minor and for whatever reason. 3. The need for supervision renders the patient not independent. 4. A patient's performance should be established using the best available evidence. Asking the patient, friends/relatives and nurses are the usual sources, but direct observation and common sense are also important. However direct testing is not needed. 5. Usually the patient's performance over the preceding 24-48 hours is important, but occasionally longer periods will be relevant. 6. Middle categories imply that the patient supplies over 50 per cent of the effort. 7. Use of aids to be independent is allowed. Mounika Arias., Barthel, D.W. (5155). Functional evaluation: the Barthel Index.  [place] [facility] Med J ([id number]. Lianna Ace morenita [place], J.J.M.F, Jonathan Corral., Alfreda Berry., Thor, [address] (1999). Measuring the change indisability after inpatient rehabilitation; comparison of the responsiveness of the Barthel Index and Functional [place] Measure. Journal of Neurology, Neurosurgery, and Psychiatry, 66(4), 410-143. TRENA Mae, MICHAEL Roper, & Be Solorio M.A. (2004.) Assessment of post-stroke quality of life in cost-effectiveness studies: The usefulness of the Barthel Index and the EuroQoL-5D. Quality of Life Research, 13, 569-56         G codes: In compliance with CMSs Claims Based Outcome Reporting, the following G-code set was chosen for this patient based on their primary functional limitation being treated: The outcome measure chosen to determine the severity of the functional limitation was the Barthel Index with a score of 70/100 which was correlated with the impairment scale. ? Self Care:     - CURRENT STATUS: CJ - 20%-39% impaired, limited or restricted    - GOAL STATUS: CJ - 20%-39% impaired, limited or restricted    - D/C STATUS:  CJ - 20%-39% impaired, limited or restricted     Occupational Therapy Evaluation Charge Determination   History Examination Decision-Making   LOW Complexity : Brief history review  MEDIUM Complexity : 3-5 performance deficits relating to physical, cognitive , or psychosocial skils that result in activity limitations and / or participation restrictions MEDIUM Complexity : Patient may present with comorbidities that affect occupational performnce. Miniml to moderate modification of tasks or assistance (eg, physical or verbal ) with assesment(s) is necessary to enable patient to complete evaluation       Based on the above components, the patient evaluation is determined to be of the following complexity level: LOW   Pain:  Pain Scale 1: Numeric (0 - 10)  Pain Intensity 1: Pt did not rate pain during tx session.   Pain Location 1: Neck  Pain Orientation 1: Anterior  Pain Description 1: Aching  Pain Intervention(s) 1: Medication (see MAR)  Activity Tolerance:   Good-no complaints  After treatment:   [x]  Patient left in no apparent distress sitting up in chair  []  Patient left in no apparent distress in bed  [x]  Call bell left within reach  [x]  Nursing notified  [x]  Caregiver present  []  Bed alarm activated    COMMUNICATION/EDUCATION:   Communication/Collaboration:  [x]      Home safety education was provided and the patient/caregiver indicated understanding. [x]      Patient/family have participated as able and agree with findings and recommendations. []      Patient is unable to participate in plan of care at this time.   Findings and recommendations were discussed with: Physical Therapist and Registered Nurse    Hao Lobato OTR/L  Time Calculation: 23 mins

## 2017-10-31 NOTE — PROGRESS NOTES
Bedside and Verbal shift change report given to [address]yler Steward (oncoming nurse) by Patricia Gandhi RN (offgoing nurse). Report included the following information SBAR, Kardex, OR Summary, Procedure Summary, Intake/Output and MAR.

## 2017-10-31 NOTE — PROGRESS NOTES
Problem: Falls - Risk of  Goal: *Absence of Falls  Document Juan Fall Risk and appropriate interventions in the flowsheet.    Outcome: Progressing Towards Goal  Fall Risk Interventions:  Mobility Interventions: OT consult for ADLs, Patient to call before getting OOB, PT Consult for mobility concerns         Medication Interventions: Patient to call before getting OOB    Elimination Interventions: Call light in reach, Patient to call for help with toileting needs

## 2017-10-31 NOTE — DISCHARGE SUMMARY
Ortho Discharge Summary    Patient ID:  Yolanda Seals  [id number]  male  61 y.o.  1960    Admit date: 10/30/2017    Discharge date: 10/31/2017    Admitting Physician: Malika Serna MD     Consulting Physician(s):   Treatment Team: Attending Provider: Malika Serna MD; Utilization Review: Eileen Ayers RN    Date of Surgery:   10/30/2017     Preoperative Diagnosis:  CERVACALGIA, RADICULOPATHY, STENOSIS    Postoperative Diagnosis:   CERVACALGIA, RADICULOPATHY, STENOSIS    Procedure(s):     C5-7 ANTERIOR CERVICAL DISCECTOMY WITH FUSION     Anesthesia Type:   General     Surgeon: Malika Serna MD                            HPI:  Pt is a 61 y.o. male who has a history of CERVACALGIA, RADICULOPATHY, STENOSIS  with pain and limitations of activities of daily living who presents at this time for a C5-6 ACDF following the failure of conservative management. PMH:   Past Medical History:   Diagnosis Date    Anxiety disorder     Arthritis     Asthma     allergies environmental    Cancer ([facility] [address]     prostate CA    HTN (hypertension) 2011    Other and unspecified symptoms and signs involving general sensations and perceptions     construction accident     Other ill-defined conditions(799.89)     anxiety    Other ill-defined conditions(799.89)     seasonal allergies    Psychiatric disorder     anxiety       Body mass index is 22.93 kg/(m^2). : A BMI > 30 is classified as obesity and > 40 is classified as morbid obesity. Medications upon admission :   Prior to Admission Medications   Prescriptions Last Dose Informant Patient Reported? Taking? albuterol (VENTOLIN HFA) 90 mcg/actuation inhaler 10/30/2017 at 1100  No Yes   Sig: Take 2 Puffs by inhalation every six (6) hours as needed for Wheezing.    citalopram (CELEXA) 20 mg tablet 10/30/2017 at 0700  No Yes   Sig: TAKE 1 TABLET BY MOUTH DAILY   fluticasone (FLONASE) 50 mcg/actuation nasal spray 10/30/2017 at 0700  Yes Yes   Si Sprays by Both Nostrils route daily. gabapentin (NEURONTIN) 300 mg capsule Not Taking at Unknown time  Yes No   Sig: Take 300 mg by mouth three (3) times daily. ibuprofen (MOTRIN) 800 mg tablet Unknown at Unknown time  No No   Sig: Take 1 Tab by mouth every eight (8) hours as needed for Pain. lisinopril (PRINIVIL, ZESTRIL) 10 mg tablet 10/30/2017 at 0700  No Yes   Sig: TAKE 1 TABLET BY MOUTH DAILY   oxyCODONE-acetaminophen (PERCOCET) 5-325 mg per tablet Not Taking at Unknown time  No No   Sig: Take 1 Tab by mouth every eight (8) hours as needed for Pain. Max Daily Amount: 3 Tabs. pravastatin (PRAVACHOL) 40 mg tablet Not Taking at Unknown time  No No   Sig: Take 1 Tab by mouth nightly. tiZANidine (ZANAFLEX) 2 mg tablet Not Taking at Unknown time  No No   Sig: Take 1 Tab by mouth three (3) times daily. Facility-Administered Medications: None        Allergies:  No Known Allergies     Hospital Course: The patient underwent surgery. Complications:  None; patient tolerated the procedure well. Was taken to the PACU in stable condition and then transferred to the ortho floor. Perioperative Antibiotics:  Ancef     Postoperative Pain Management:  Oxycodone      Postoperative transfusions:    Number of units banked PRBCs =   none     Post Op complications: none    Hemoglobin at discharge:    Lab Results   Component Value Date/Time    HGB 13.4 10/17/2017 09:49 AM    INR 1.0 10/18/2017 02:22 PM       Dressing remained clean, dry and intact. No significant erythema or swelling. Neurovascular exam found to be within normal limits. Wound appears to be healing without any evidence of infection. Pt had a HVAC drain that was removed on POD# 1. Physical Therapy started on the day following surgery and participated in bed mobility, transfers and ambulation. Discharged to: Home.     Condition on Discharge:   stable    Discharge instructions:  - Take pain medications as prescribed  - Resume pre hospital diet      - Discharge activity: activity as tolerated  - Ambulate with Walker;    - Wound Care Keep wound clean and dry. See discharge instruction sheet.  - Staples to be removed 10 days after surgery            -DISCHARGE MEDICATION LIST     Current Discharge Medication List      START taking these medications    Details   acetaminophen (TYLENOL) 500 mg tablet Take 2 Tabs by mouth every six (6) hours for 14 days. Qty: 112 Tab, Refills: 0      oxyCODONE IR (ROXICODONE) 5 mg immediate release tablet Take 1-2 Tabs by mouth every three (3) hours as needed. Max Daily Amount: 80 mg.  Qty: 80 Tab, Refills: 0      polyethylene glycol (MIRALAX) 17 gram packet Take 1 Packet by mouth daily as needed (constipation) for up to 15 days. Qty: 15 Packet, Refills: 0      senna-docusate (PERICOLACE) 8.6-50 mg per tablet Take 1 Tab by mouth daily. Qty: 30 Tab, Refills: 0         CONTINUE these medications which have NOT CHANGED    Details   lisinopril (PRINIVIL, ZESTRIL) 10 mg tablet TAKE 1 TABLET BY MOUTH DAILY  Qty: 30 Tab, Refills: 3      citalopram (CELEXA) 20 mg tablet TAKE 1 TABLET BY MOUTH DAILY  Qty: 30 Tab, Refills: 3      albuterol (VENTOLIN HFA) 90 mcg/actuation inhaler Take 2 Puffs by inhalation every six (6) hours as needed for Wheezing. Qty: 1 Inhaler, Refills: 11      fluticasone (FLONASE) 50 mcg/actuation nasal spray 2 Sprays by Both Nostrils route daily. gabapentin (NEURONTIN) 300 mg capsule Take 300 mg by mouth three (3) times daily. tiZANidine (ZANAFLEX) 2 mg tablet Take 1 Tab by mouth three (3) times daily. Qty: 30 Tab, Refills: 0      ibuprofen (MOTRIN) 800 mg tablet Take 1 Tab by mouth every eight (8) hours as needed for Pain. Qty: 30 Tab, Refills: 0      pravastatin (PRAVACHOL) 40 mg tablet Take 1 Tab by mouth nightly.   Qty: 30 Tab, Refills: 6    Associated Diagnoses: Pure hypercholesterolemia         STOP taking these medications       oxyCODONE-acetaminophen (PERCOCET) 5-325 mg per tablet Comments: Reason for Stopping:            per medical continuation form      -Follow up in office in 2 weeks      Signed:  Corby Chamberlain.  Rhoad Mosqueda, MSN, ACNP, ONP-C  Orthopaedic Nurse Practitioner    10/31/2017  10:02 AM

## 2017-11-08 ENCOUNTER — DOCUMENTATION ONLY (OUTPATIENT)
Dept: INTERNAL MEDICINE CLINIC | Age: 57
End: 2017-11-08

## 2017-11-08 NOTE — PROGRESS NOTES
Office notes and imaging studies faxed to [employer] for pt's short term disability at [phone] w/ confirmation received. Correspondence forwarded to [facility] states to fax any further correspondence to Dr. Marcelo Montero.

## 2017-12-22 ENCOUNTER — TELEPHONE (OUTPATIENT)
Dept: INTERNAL MEDICINE CLINIC | Age: 57
End: 2017-12-22

## 2017-12-22 NOTE — TELEPHONE ENCOUNTER
Pt is returning call from [place] and would like to provide his telephone.      Best contact #: [phone]       Message received & copied from [facility]

## 2017-12-26 NOTE — TELEPHONE ENCOUNTER
Called, spoke to pt. Two pt identifiers confirmed. Pt informed fax received from insurance company regarding short term disability. Pt states Ortho is completing the disability for pt. No further assistance required.

## 2017-12-26 NOTE — TELEPHONE ENCOUNTER
Pt stated he keeps missing the call from [facility](reason is unknown).  Best contact number [phone]       Message received & copied from [facility] after closing on 12/22/17

## 2018-01-30 ENCOUNTER — OFFICE VISIT (OUTPATIENT)
Dept: INTERNAL MEDICINE CLINIC | Age: 58
End: 2018-01-30

## 2018-01-30 VITALS
RESPIRATION RATE: 16 BRPM | BODY MASS INDEX: 23.7 KG/M2 | OXYGEN SATURATION: 97 % | HEIGHT: 69 IN | WEIGHT: 160 LBS | HEART RATE: 81 BPM | TEMPERATURE: 97.9 F | DIASTOLIC BLOOD PRESSURE: 68 MMHG | SYSTOLIC BLOOD PRESSURE: 110 MMHG

## 2018-01-30 DIAGNOSIS — Z00.00 PHYSICAL EXAM, ANNUAL: Primary | ICD-10-CM

## 2018-01-30 DIAGNOSIS — K21.9 GASTROESOPHAGEAL REFLUX DISEASE WITHOUT ESOPHAGITIS: ICD-10-CM

## 2018-01-30 DIAGNOSIS — C61 PROSTATE CANCER (HCC): ICD-10-CM

## 2018-01-30 DIAGNOSIS — I10 ESSENTIAL HYPERTENSION: ICD-10-CM

## 2018-01-30 DIAGNOSIS — E78.00 PURE HYPERCHOLESTEROLEMIA: ICD-10-CM

## 2018-01-30 DIAGNOSIS — F41.9 ANXIETY: ICD-10-CM

## 2018-01-30 RX ORDER — LISINOPRIL 10 MG/1
TABLET ORAL
Qty: 30 TAB | Refills: 3 | Status: SHIPPED | OUTPATIENT
Start: 2018-01-30 | End: 2018-01-30 | Stop reason: SDUPTHER

## 2018-01-30 RX ORDER — LISINOPRIL 10 MG/1
TABLET ORAL
Qty: 30 TAB | Refills: 6 | Status: SHIPPED | OUTPATIENT
Start: 2018-01-30 | End: 2018-07-30 | Stop reason: SDUPTHER

## 2018-01-30 RX ORDER — CITALOPRAM 20 MG/1
TABLET, FILM COATED ORAL
Qty: 30 TAB | Refills: 3 | Status: SHIPPED | OUTPATIENT
Start: 2018-01-30 | End: 2018-01-30 | Stop reason: SDUPTHER

## 2018-01-30 RX ORDER — RANITIDINE 150 MG/1
150 TABLET, FILM COATED ORAL
Qty: 30 TAB | Refills: 6 | Status: SHIPPED | OUTPATIENT
Start: 2018-01-30 | End: 2018-07-30 | Stop reason: ALTCHOICE

## 2018-01-30 RX ORDER — CITALOPRAM 20 MG/1
TABLET, FILM COATED ORAL
Qty: 30 TAB | Refills: 6 | Status: SHIPPED | OUTPATIENT
Start: 2018-01-30 | End: 2018-07-30 | Stop reason: SDUPTHER

## 2018-01-30 NOTE — MR AVS SNAPSHOT
[address] [facility] 
[phone] Patient: Jessica Araya MRN:  [id number] Visit Information Date & Time Provider Department Dept. Phone Encounter #  
 1/30/2018  2:45 PM Paxton Cabral, [facility] [id number] Follow-up Instructions Return in about 6 months (around 7/30/2018). Your Appointments 1/30/2018  2:45 PM  
ROUTINE CARE with Paxton Cabral, [address]) Appt Note: 6 month follow up  
 [facility] [address] [id number]  
[address] [place] [address] Upcoming Health Maintenance Date Due Pneumococcal 19-64 Highest Risk (1 of 3 - PCV13) 4/13/1978 DTaP/Tdap/Td series (1 - Tdap) 7/10/2010 COLONOSCOPY 10/1/2022 Allergies as of 1/30/2018  Review Complete On: 1/30/2018 By: Paxton Cabral MD  
 No Known Allergies Current Immunizations  Reviewed on 7/27/2016 Name Date Influenza Vaccine (Quad) PF 9/25/2017, 1/27/2017 Influenza Vaccine PF 10/29/2014 TD Vaccine 7/9/2010  4:00 PM, 7/9/2003 Not reviewed this visit You Were Diagnosed With   
  
 Codes Comments Physical exam, annual    -  Primary ICD-10-CM: Z00.00 ICD-9-CM: V70.0 Essential hypertension     ICD-10-CM: I10 
ICD-9-CM: 401.9 Anxiety     ICD-10-CM: F41.9 ICD-9-CM: 300.00 Pure hypercholesterolemia     ICD-10-CM: E78.00 ICD-9-CM: 272.0 Prostate cancer [facility])     ICD-10-CM: I60 ICD-9-CM: 323 Gastroesophageal reflux disease without esophagitis     ICD-10-CM: K21.9 ICD-9-CM: 530.81 Vitals BP Pulse Temp Resp Height(growth percentile) Weight(growth percentile) 110/68 (BP 1 Location: Left arm, BP Patient Position: Sitting) 81 97.9 °F (36.6 °C) (Oral) 16 5' 8.5\" (1.74 m) 160 lb (72.6 kg) SpO2 BMI Smoking Status 97% 23.97 kg/m2 Former Smoker Vitals History BMI and BSA Data Body Mass Index Body Surface Area  
 23.97 kg/m 2 1.87 m 2 Preferred Pharmacy Pharmacy Name Phone [facility] [phone] Your Updated Medication List  
  
   
This list is accurate as of: 1/30/18  9:34 AM.  Always use your most recent med list.  
  
  
  
  
 albuterol 90 mcg/actuation inhaler Commonly known as:  VENTOLIN HFA Take 2 Puffs by inhalation every six (6) hours as needed for Wheezing. citalopram 20 mg tablet Commonly known as:  CELEXA  
TAKE 1 TABLET BY MOUTH DAILY  
  
 FLONASE 50 mcg/actuation nasal spray Generic drug:  fluticasone 2 Sprays by Both Nostrils route daily. lisinopril 10 mg tablet Commonly known as:  PRINIVIL, ZESTRIL  
TAKE 1 TABLET BY MOUTH DAILY pravastatin 40 mg tablet Commonly known as:  PRAVACHOL Take 1 Tab by mouth nightly. raNITIdine 150 mg tablet Commonly known as:  ZANTAC Take 1 Tab by mouth nightly. senna-docusate 8.6-50 mg per tablet Commonly known as:  Quita Serbian Take 1 Tab by mouth daily. Prescriptions Sent to Pharmacy Refills  
 lisinopril (PRINIVIL, ZESTRIL) 10 mg tablet 6 Sig: TAKE 1 TABLET BY MOUTH DAILY Class: Normal  
 Pharmacy: [facility] [address] Ph #: [phone]  
 citalopram (CELEXA) 20 mg tablet 6 Sig: TAKE 1 TABLET BY MOUTH DAILY Class: Normal  
 Pharmacy: [facility] [address] Ph #: [phone]  
 raNITIdine (ZANTAC) 150 mg tablet 6 Sig: Take 1 Tab by mouth nightly. Class: Normal  
 Pharmacy: [facility] [place], [place] - [address] [place] [address] Ph #: [phone] Route: Oral  
  
We Performed the Following CBC W/O DIFF [18378 CPT(R)] HEMOGLOBIN A1C WITH EAG [44662 CPT(R)] LIPID PANEL [45342 CPT(R)] METABOLIC PANEL, COMPREHENSIVE [70205 CPT(R)] TSH 3RD GENERATION [48897 CPT(R)] Follow-up Instructions Return in about 6 months (around 7/30/2018). Introducing [facility] & HEALTH SERVICES! [facility] introduces [employer] patient portal. Now you can access parts of your medical record, email your doctor's office, and request medication refills online. 1. In your internet browser, go to [email or web address] 2. Click on the First Time User? Click Here link in the Sign In box. You will see the New Member Sign Up page. 3. Enter your [employer] Access Code exactly as it appears below. You will not need to use this code after youve completed the sign-up process. If you do not sign up before the expiration date, you must request a new code. · [employer] Access Code: [email or web address] Expires: 4/30/2018  9:34 AM 
 
4. Enter the last four digits of your Social Security Number (xxxx) and Date of Birth (mm/dd/yyyy) as indicated and click Submit. You will be taken to the next sign-up page. 5. Create a [employer] ID. This will be your [employer] login ID and cannot be changed, so think of one that is secure and easy to remember. 6. Create a [employer] password. You can change your password at any time. 7. Enter your Password Reset Question and Answer. This can be used at a later time if you forget your password. 8. Enter your e-mail address. You will receive e-mail notification when new information is available in [address] 9. Click Sign Up. You can now view and download portions of your medical record. 10. Click the Download Summary menu link to download a portable copy of your medical information. If you have questions, please visit the Frequently Asked Questions section of the [employer] website.  Remember, [employer] is NOT to be used for urgent needs. For medical emergencies, dial 911. Now available from your iPhone and Android! Please provide this summary of care documentation to your next provider. Your primary care clinician is listed as Sterling Siu. If you have any questions after today's visit, please call [phone].

## 2018-01-31 LAB
ALBUMIN SERPL-MCNC: 4.6 G/DL (ref 3.5–5.5)
ALBUMIN/GLOB SERPL: 1.6 {RATIO} (ref 1.2–2.2)
ALP SERPL-CCNC: 89 IU/L (ref 39–117)
ALT SERPL-CCNC: 18 IU/L (ref 0–44)
AST SERPL-CCNC: 17 IU/L (ref 0–40)
BILIRUB SERPL-MCNC: 0.3 MG/DL (ref 0–1.2)
BUN SERPL-MCNC: 17 MG/DL (ref 6–24)
BUN/CREAT SERPL: 14 (ref 9–20)
CALCIUM SERPL-MCNC: 10 MG/DL (ref 8.7–10.2)
CHLORIDE SERPL-SCNC: 99 MMOL/L (ref 96–106)
CHOLEST SERPL-MCNC: 250 MG/DL (ref 100–199)
CO2 SERPL-SCNC: 25 MMOL/L (ref 18–29)
CREAT SERPL-MCNC: 1.25 MG/DL (ref 0.76–1.27)
ERYTHROCYTE [DISTWIDTH] IN BLOOD BY AUTOMATED COUNT: 13.2 % (ref 12.3–15.4)
EST. AVERAGE GLUCOSE BLD GHB EST-MCNC: 108 MG/DL
GFR SERPLBLD CREATININE-BSD FMLA CKD-EPI: 63 ML/MIN/1.73
GFR SERPLBLD CREATININE-BSD FMLA CKD-EPI: 73 ML/MIN/1.73
GLOBULIN SER CALC-MCNC: 2.8 G/DL (ref 1.5–4.5)
GLUCOSE SERPL-MCNC: 103 MG/DL (ref 65–99)
HBA1C MFR BLD: 5.4 % (ref 4.8–5.6)
HCT VFR BLD AUTO: 40.1 % (ref 37.5–51)
HDLC SERPL-MCNC: 52 MG/DL
HGB BLD-MCNC: 13.2 G/DL (ref 13–17.7)
LDLC SERPL CALC-MCNC: 175 MG/DL (ref 0–99)
MCH RBC QN AUTO: 29.8 PG (ref 26.6–33)
MCHC RBC AUTO-ENTMCNC: 32.9 G/DL (ref 31.5–35.7)
MCV RBC AUTO: 91 FL (ref 79–97)
PLATELET # BLD AUTO: 320 X10E3/UL (ref 150–379)
POTASSIUM SERPL-SCNC: 4.9 MMOL/L (ref 3.5–5.2)
PROT SERPL-MCNC: 7.4 G/DL (ref 6–8.5)
RBC # BLD AUTO: 4.43 X10E6/UL (ref 4.14–5.8)
SODIUM SERPL-SCNC: 140 MMOL/L (ref 134–144)
TRIGL SERPL-MCNC: 116 MG/DL (ref 0–149)
TSH SERPL DL<=0.005 MIU/L-ACNC: 1.73 UIU/ML (ref 0.45–4.5)
VLDLC SERPL CALC-MCNC: 23 MG/DL (ref 5–40)
WBC # BLD AUTO: 5.7 X10E3/UL (ref 3.4–10.8)

## 2018-01-31 NOTE — PROGRESS NOTES
Cholesterol worse    Suspect non compliance with pravachol    Lets change to crestor 10mg daily which is more effective in any case at this time    Repeat lipids in 6 weeks

## 2018-02-01 RX ORDER — ROSUVASTATIN CALCIUM 10 MG/1
10 TABLET, COATED ORAL
Qty: 30 TAB | Refills: 3 | Status: SHIPPED | OUTPATIENT
Start: 2018-02-01 | End: 2018-06-17 | Stop reason: SDUPTHER

## 2018-02-01 NOTE — PROGRESS NOTES
Spoke to [employer] (HIPAA). Vipul Demarco informed per Dr. Mil Jo pt's lipids worse. Vipul Dmearco informed per Dr. Mil Jo that she would like to change crestor from pravastatin-ordered. Vipul Demarco informed to repeat fasting labs in 6wks. Ordered and mailed to pt. Pt verbalized understanding of information discussed w/ no further questions at this time.

## 2018-03-30 ENCOUNTER — APPOINTMENT (OUTPATIENT)
Dept: INTERNAL MEDICINE CLINIC | Age: 58
End: 2018-03-30

## 2018-03-30 DIAGNOSIS — E78.00 PURE HYPERCHOLESTEROLEMIA: ICD-10-CM

## 2018-03-31 LAB
CHOLEST SERPL-MCNC: 152 MG/DL (ref 100–199)
HDLC SERPL-MCNC: 51 MG/DL
LDLC SERPL CALC-MCNC: 90 MG/DL (ref 0–99)
TRIGL SERPL-MCNC: 57 MG/DL (ref 0–149)
VLDLC SERPL CALC-MCNC: 11 MG/DL (ref 5–40)

## 2018-04-24 ENCOUNTER — TELEPHONE (OUTPATIENT)
Dept: INTERNAL MEDICINE CLINIC | Age: 58
End: 2018-04-24

## 2018-04-24 ENCOUNTER — DOCUMENTATION ONLY (OUTPATIENT)
Dept: INTERNAL MEDICINE CLINIC | Age: 58
End: 2018-04-24

## 2018-04-24 NOTE — PROGRESS NOTES
Biometric form completed, signed, and faxed to Sera Salazar at [phone] w/ confirmation received. Copy given to pt, copy placed in scanning.

## 2018-04-24 NOTE — TELEPHONE ENCOUNTER
#[phone] pt states his employer faxed a form for Dr. Annamaria Hilario to fill out. Did you get this? Was it faxed back? They say they haven't received yet per pt. Pt asking for a call back to let him know. You may leave a detailed vm if needed. Thanks.

## 2018-04-24 NOTE — TELEPHONE ENCOUNTER
Called, spoke to pt. Two pt identifiers confirmed. Pt informed that no forms were received. Pt states that a biometric form was mailed in 4/10/18. Pt states that he can bring in the form this afternoon. Pt advised to ask for LPN RR to hand over forms. Pt verbalized understanding of information discussed w/ no further questions at this time.

## 2018-06-18 RX ORDER — ROSUVASTATIN CALCIUM 10 MG/1
TABLET, COATED ORAL
Qty: 30 TAB | Refills: 0 | Status: SHIPPED | OUTPATIENT
Start: 2018-06-18 | End: 2018-07-14 | Stop reason: SDUPTHER

## 2018-07-15 RX ORDER — ROSUVASTATIN CALCIUM 10 MG/1
TABLET, COATED ORAL
Qty: 30 TAB | Refills: 0 | Status: SHIPPED | OUTPATIENT
Start: 2018-07-15 | End: 2018-07-30 | Stop reason: SDUPTHER

## 2018-07-30 ENCOUNTER — OFFICE VISIT (OUTPATIENT)
Dept: INTERNAL MEDICINE CLINIC | Age: 58
End: 2018-07-30

## 2018-07-30 VITALS
DIASTOLIC BLOOD PRESSURE: 62 MMHG | BODY MASS INDEX: 22.36 KG/M2 | TEMPERATURE: 98.1 F | SYSTOLIC BLOOD PRESSURE: 105 MMHG | OXYGEN SATURATION: 97 % | RESPIRATION RATE: 16 BRPM | HEART RATE: 78 BPM | WEIGHT: 151 LBS | HEIGHT: 69 IN

## 2018-07-30 DIAGNOSIS — K21.9 GASTROESOPHAGEAL REFLUX DISEASE WITHOUT ESOPHAGITIS: ICD-10-CM

## 2018-07-30 DIAGNOSIS — I10 ESSENTIAL HYPERTENSION: ICD-10-CM

## 2018-07-30 DIAGNOSIS — M48.02 CERVICAL STENOSIS OF SPINAL CANAL: ICD-10-CM

## 2018-07-30 DIAGNOSIS — J45.20 MILD INTERMITTENT ASTHMA WITHOUT COMPLICATION: ICD-10-CM

## 2018-07-30 DIAGNOSIS — E78.00 PURE HYPERCHOLESTEROLEMIA: Primary | ICD-10-CM

## 2018-07-30 DIAGNOSIS — C61 PROSTATE CANCER (HCC): ICD-10-CM

## 2018-07-30 DIAGNOSIS — F41.9 ANXIETY: ICD-10-CM

## 2018-07-30 RX ORDER — LISINOPRIL 10 MG/1
TABLET ORAL
Qty: 30 TAB | Refills: 6 | Status: SHIPPED | OUTPATIENT
Start: 2018-07-30 | End: 2019-01-28 | Stop reason: SDUPTHER

## 2018-07-30 RX ORDER — ROSUVASTATIN CALCIUM 10 MG/1
10 TABLET, COATED ORAL
Qty: 30 TAB | Refills: 6 | Status: SHIPPED | OUTPATIENT
Start: 2018-07-30 | End: 2019-01-28 | Stop reason: SDUPTHER

## 2018-07-30 RX ORDER — PANTOPRAZOLE SODIUM 20 MG/1
20 TABLET, DELAYED RELEASE ORAL DAILY
Qty: 30 TAB | Refills: 3 | Status: SHIPPED | OUTPATIENT
Start: 2018-07-30 | End: 2019-01-28

## 2018-07-30 RX ORDER — CITALOPRAM 20 MG/1
TABLET, FILM COATED ORAL
Qty: 30 TAB | Refills: 6 | Status: SHIPPED | OUTPATIENT
Start: 2018-07-30 | End: 2019-01-28 | Stop reason: SDUPTHER

## 2018-07-30 RX ORDER — ALBUTEROL SULFATE 90 UG/1
2 AEROSOL, METERED RESPIRATORY (INHALATION)
Qty: 1 INHALER | Refills: 11 | Status: SHIPPED | OUTPATIENT
Start: 2018-07-30 | End: 2019-12-17

## 2018-07-30 NOTE — MR AVS SNAPSHOT
[address] 
[phone] Patient: Binta Tobar MRN:  [id number] Visit Information Date & Time Provider Department Dept. Phone Encounter #  
 7/30/2018  2:45 PM Jazmine West, Jones [address] [place] [address],[address] [phone] [id number] Follow-up Instructions Return in about 6 months (around 1/30/2019). Upcoming Health Maintenance Date Due Pneumococcal 19-64 Highest Risk (1 of 3 - PCV13) 4/13/1978 DTaP/Tdap/Td series (1 - Tdap) 7/10/2010 Influenza Age 5 to Adult 8/1/2018 COLONOSCOPY 10/1/2022 Allergies as of 7/30/2018  Review Complete On: 7/30/2018 By: Jazmine West MD  
 No Known Allergies Current Immunizations  Reviewed on 7/27/2016 Name Date Influenza Vaccine (Quad) PF 9/25/2017, 1/27/2017 Influenza Vaccine PF 10/29/2014 TD Vaccine 7/9/2010  4:00 PM, 7/9/2003 Not reviewed this visit You Were Diagnosed With   
  
 Codes Comments Pure hypercholesterolemia    -  Primary ICD-10-CM: E78.00 ICD-9-CM: 272.0 Essential hypertension     ICD-10-CM: I10 
ICD-9-CM: 401.9 Anxiety     ICD-10-CM: F41.9 ICD-9-CM: 300.00 Mild intermittent asthma without complication     Z-47-VG: J45.20 ICD-9-CM: 493.90 Prostate cancer [facility])     ICD-10-CM: K50 ICD-9-CM: 176 Cervical stenosis of spinal canal     ICD-10-CM: M48.02 
ICD-9-CM: 723.0 Gastroesophageal reflux disease without esophagitis     ICD-10-CM: K21.9 ICD-9-CM: 530.81 Vitals BP Pulse Temp Resp Height(growth percentile) Weight(growth percentile) 105/62 (BP 1 Location: Left arm, BP Patient Position: Sitting) 78 98.1 °F (36.7 °C) (Oral) 16 5' 8.5\" (1.74 m) 151 lb (68.5 kg) SpO2 BMI Smoking Status 97% 22.63 kg/m2 Former Smoker Vitals History BMI and BSA Data  Body Mass Index Body Surface Area  
 22.63 kg/m 2 1.82 m 2  
  
  
 Preferred Pharmacy Pharmacy Name Phone [facility] [facility] Harriet Conde [phone] Your Updated Medication List  
  
   
This list is accurate as of 18  2:50 PM.  Always use your most recent med list.  
  
  
  
  
 albuterol 90 mcg/actuation inhaler Commonly known as:  VENTOLIN HFA Take 2 Puffs by inhalation every six (6) hours as needed for Wheezing. citalopram 20 mg tablet Commonly known as:  CELEXA  
TAKE 1 TABLET BY MOUTH DAILY  
  
 FLONASE 50 mcg/actuation nasal spray Generic drug:  fluticasone 2 Sprays by Both Nostrils route daily. lisinopril 10 mg tablet Commonly known as:  PRINIVIL, ZESTRIL  
TAKE 1 TABLET BY MOUTH DAILY pantoprazole 20 mg tablet Commonly known as:  PROTONIX Take 1 Tab by mouth daily. rosuvastatin 10 mg tablet Commonly known as:  CRESTOR Take 1 Tab by mouth nightly. senna-docusate 8.6-50 mg per tablet Commonly known as:  Dalton Jubilee Take 1 Tab by mouth daily. varicella-zoster recombinant (PF) 50 mcg/0.5 mL Susr injection Commonly known as:  SHINGRIX (PF)  
0.5 mL by IntraMUSCular route once for 1 dose. Prescriptions Printed Refills  
 varicella-zoster recombinant, PF, (SHINGRIX, PF,) 50 mcg/0.5 mL susr injection 1 Si.5 mL by IntraMUSCular route once for 1 dose. Class: Print Route: IntraMUSCular Prescriptions Sent to Pharmacy Refills  
 rosuvastatin (CRESTOR) 10 mg tablet 6 Sig: Take 1 Tab by mouth nightly. Class: Normal  
 Pharmacy: [facility] [address] [place] [address] Ph #: [phone] Route: Oral  
 lisinopril (PRINIVIL, ZESTRIL) 10 mg tablet 6 Sig: TAKE 1 TABLET BY MOUTH DAILY Class: Normal  
 Pharmacy: [facility] [address] [place] [address] Ph #: [phone] citalopram (CELEXA) 20 mg tablet 6 Sig: TAKE 1 TABLET BY MOUTH DAILY Class: Normal  
 Pharmacy: [facility] [address] Ph #: [phone]  
 albuterol (VENTOLIN HFA) 90 mcg/actuation inhaler 11 Sig: Take 2 Puffs by inhalation every six (6) hours as needed for Wheezing. Class: Normal  
 Pharmacy: [facility] [address] Ph #: [phone] Route: Inhalation  
 pantoprazole (PROTONIX) 20 mg tablet 3 Sig: Take 1 Tab by mouth daily. Class: Normal  
 Pharmacy: [facility] [address] Ph #: [phone] Route: Oral  
  
We Performed the Following HEMOGLOBIN A1C WITH EAG [47697 CPT(R)] METABOLIC PANEL, COMPREHENSIVE [59161 CPT(R)] Follow-up Instructions Return in about 6 months (around 1/30/2019). Patient Instructions   
protonix for heart burn. Introducing [facility]! [employer] introduces Viewster patient portal. Now you can access parts of your medical record, email your doctor's office, and request medication refills online. 1. In your internet browser, go to [email or web address] 2. Click on the First Time User? Click Here link in the Sign In box. You will see the New Member Sign Up page. 3. Enter your Viewster Access Code exactly as it appears below. You will not need to use this code after youve completed the sign-up process. If you do not sign up before the expiration date, you must request a new code. · Viewster Access Code: [email or web address] Expires: 10/28/2018  2:50 PM 
 
4. Enter the last four digits of your Social Security Number (xxxx) and Date of Birth (mm/dd/yyyy) as indicated and click Submit. You will be taken to the next sign-up page. 5. Create a Viewster ID.  This will be your Viewster login ID and cannot be changed, so think of one that is secure and easy to remember. 6. Create a 800APP password. You can change your password at any time. 7. Enter your Password Reset Question and Answer. This can be used at a later time if you forget your password. 8. Enter your e-mail address. You will receive e-mail notification when new information is available in [address] 9. Click Sign Up. You can now view and download portions of your medical record. 10. Click the Download Summary menu link to download a portable copy of your medical information. If you have questions, please visit the Frequently Asked Questions section of the [employer] website. Remember, 800APP is NOT to be used for urgent needs. For medical emergencies, dial 911. Now available from your iPhone and Android! Please provide this summary of care documentation to your next provider. Your primary care clinician is listed as Cinda Gee. If you have any questions after today's visit, please call [phone].

## 2018-07-30 NOTE — PROGRESS NOTES
HISTORY OF PRESENT ILLNESS  Arianna Dobson is a 60 y.o. male. HPI  Last here 1/30/18. Pt is here for routine care.     BP is 106/62  SBPs around 113 at home  Continues on lisinopril 10mg daily   No home readings to reviewed     Wt is stable since last visit   His weight is within normal ranges  He states that his appetite has improved     Reviewed labs 1/18 and 3/18  Will get labs today       Lov, pt had a C5-7 anterior cervical discectomy with fusion with Dr. Pablo Armenta (ortho) on 10/30/17  Pt had a f/u visit with this physician on 1/15/18  Pt completed PT  Pt has been discharged from his care     Pt follows with Dr. Trey Daniel ([facility]) for h/o prostate cancer, annually  Last visit was 1/18    Lov, pt c/o intermittent burning in epigastric region - recurrent issue  Pt has been using a [facility] brand acid control, which helps significantly  However, he would like a prescription med  Ordered protonix      Continues celexa 20mg daily for anxiety, which works well, happy with dose   Not feeling anxious or sad     Pt is now on crestor 10mg rather than pravachol, changed this over the phone  Tolerating well, taking routinely no issue  Recall could not tolerate lipitor      Pt has albuterol to use prn for wheezing/breathing  Pt works around dust at his job, which causes him to use his inhaler    ACP not on file. SDM is his wife.   Provided information today.      PREVENTIVE:    Colonoscopy: 8/17/11, Dr. Tian Mancia, repeat 10 years  PSA: 8/11, 10/14, 5/16 undetectable, 1/18, uro follows  Tdap: 7/09/2010  Pneumovax: not yet needed  Tahira Pachecoter: not yet needed  Shingrix: ordered 07/30/18   Flu shot: 9/25/17  A1c: 7/17 5.6, 10/17 5.8, 1/18 5.4  Eye exam: due, will schedule, reminded him again  Hep C screen: 7/14/15, negative   Lipids: 3/18 LDL 90  EKG: 10/17/17, new ST flattenings     Patient Active Problem List    Diagnosis Date Noted    Cervical stenosis of spinal canal 10/30/2017    Pure hypercholesterolemia 07/28/2017    Prostate cancer ([facility] [address] 01/13/2015    GERD (gastroesophageal reflux disease) 09/07/2011    HTN (hypertension) 09/07/2011    Dizziness and giddiness 06/08/2011    Anxiety 03/29/2011     Current Outpatient Prescriptions   Medication Sig Dispense Refill    rosuvastatin (CRESTOR) 10 mg tablet TAKE 1 TABLET BY MOUTH EVERY NIGHT 30 Tab 0    lisinopril (PRINIVIL, ZESTRIL) 10 mg tablet TAKE 1 TABLET BY MOUTH DAILY 30 Tab 6    citalopram (CELEXA) 20 mg tablet TAKE 1 TABLET BY MOUTH DAILY 30 Tab 6    raNITIdine (ZANTAC) 150 mg tablet Take 1 Tab by mouth nightly. 30 Tab 6    senna-docusate (PERICOLACE) 8.6-50 mg per tablet Take 1 Tab by mouth daily. 30 Tab 0    albuterol (VENTOLIN HFA) 90 mcg/actuation inhaler Take 2 Puffs by inhalation every six (6) hours as needed for Wheezing. 1 Inhaler 11    fluticasone (FLONASE) 50 mcg/actuation nasal spray 2 Sprays by Both Nostrils route daily.        Past Surgical History:   Procedure Laterality Date    ABDOMEN SURGERY PROC UNLISTED Right 1980    inguinal    HX PROSTATECTOMY  8/2015      Lab Results  Component Value Date/Time   WBC 5.7 01/30/2018 09:46 AM   HGB 13.2 01/30/2018 09:46 AM   Hemoglobin (POC) 15.0 10/30/2017 02:13 PM   HCT 40.1 01/30/2018 09:46 AM   Hematocrit (POC) 44 10/30/2017 02:13 PM   PLATELET 106 01/02/2239 09:46 AM   MCV 91 01/30/2018 09:46 AM     Lab Results  Component Value Date/Time   Cholesterol, total 152 03/30/2018 08:02 AM   HDL Cholesterol 51 03/30/2018 08:02 AM   LDL, calculated 90 03/30/2018 08:02 AM   Triglyceride 57 03/30/2018 08:02 AM     Lab Results  Component Value Date/Time   GFR est non-AA 63 01/30/2018 09:46 AM   GFRNA, POC >60 10/30/2017 02:13 PM   GFR est AA 73 01/30/2018 09:46 AM   GFRAA, POC >60 10/30/2017 02:13 PM   Creatinine 1.25 01/30/2018 09:46 AM   Creatinine (POC) 1.1 10/30/2017 02:13 PM   BUN 17 01/30/2018 09:46 AM   BUN (POC) 19 10/30/2017 02:13 PM   Sodium 140 01/30/2018 09:46 AM   Sodium (POC) 137 10/30/2017 02:13 PM   Potassium 4.9 01/30/2018 09:46 AM   Potassium (POC) 4.4 10/30/2017 02:13 PM   Chloride 99 01/30/2018 09:46 AM   Chloride (POC) 103 10/30/2017 02:13 PM   CO2 25 01/30/2018 09:46 AM        Review of Systems   Respiratory: Negative for shortness of breath. Cardiovascular: Negative for chest pain. Physical Exam   Constitutional: He is oriented to person, place, and time. He appears well-developed and well-nourished. No distress. HENT:   Head: Normocephalic and atraumatic. Mouth/Throat: Oropharynx is clear and moist. No oropharyngeal exudate. Eyes: Conjunctivae and EOM are normal. Right eye exhibits no discharge. Left eye exhibits no discharge. Neck: Normal range of motion. Neck supple. Cardiovascular: Normal rate, regular rhythm, normal heart sounds and intact distal pulses. Exam reveals no gallop and no friction rub. No murmur heard. Pulmonary/Chest: Effort normal and breath sounds normal. No respiratory distress. He has no wheezes. He has no rales. He exhibits no tenderness. Musculoskeletal: Normal range of motion. He exhibits no edema, tenderness or deformity. Lymphadenopathy:     He has no cervical adenopathy. Neurological: He is alert and oriented to person, place, and time. He has normal reflexes. Coordination normal.   Skin: Skin is warm and dry. No rash noted. He is not diaphoretic. No erythema. No pallor. Psychiatric: He has a normal mood and affect. His behavior is normal.       ASSESSMENT and PLAN    ICD-10-CM ICD-9-CM    1. Pure hypercholesterolemia    Now on crestor 10mg, tolerating well, repeat lipids at goal   M10.89 649.1 METABOLIC PANEL, COMPREHENSIVE      HEMOGLOBIN A1C WITH EAG   2. Essential hypertension    Well controlled on lisinopril 10mg   N39 730.2 METABOLIC PANEL, COMPREHENSIVE      HEMOGLOBIN A1C WITH EAG   3. Anxiety    Doing well with celexa, happy with dose, continue no change   J00.3 182.42 METABOLIC PANEL, COMPREHENSIVE      HEMOGLOBIN A1C WITH EAG   4.  Mild intermittent asthma without complication    No recent flares, uses albuterol infrequently   X21.37 592.35 METABOLIC PANEL, COMPREHENSIVE      HEMOGLOBIN A1C WITH EAG   5. Prostate cancer ([facility] [address]    Remains in remission, follows with Dr Smitha Thakkar, will see him next in 1/19   J99 429 METABOLIC PANEL, COMPREHENSIVE      HEMOGLOBIN A1C WITH EAG   6. Cervical stenosis of spinal canal    S/p surg with Dr Sol Pires, much improved, has been discharged from his care   X66.62 261.7 METABOLIC PANEL, COMPREHENSIVE      HEMOGLOBIN A1C WITH EAG   7. Gastroesophageal reflux disease without esophagitis    Using OTC PPI, would like prescription PPI, ordered protonix, overall sx improved   S04.4 612.60 METABOLIC PANEL, COMPREHENSIVE      HEMOGLOBIN A1C WITH EAG   Depression screen reviewed and negative. Scribed by Donaldo Ramos [facility], as dictated by Dr. Pete Mccauley. Current diagnosis and concerns discussed with pt at length. Pt understands risks and benefits or current treatment plan and medications, and accepts the treatment and medication with any possible risks. Pt asks appropriate questions, which were answered. Pt was instructed to call with any concerns or problems. This note will not be viewable in [address]

## 2018-07-31 LAB
ALBUMIN SERPL-MCNC: 4.3 G/DL (ref 3.5–5.5)
ALBUMIN/GLOB SERPL: 1.7 {RATIO} (ref 1.2–2.2)
ALP SERPL-CCNC: 83 IU/L (ref 39–117)
ALT SERPL-CCNC: 23 IU/L (ref 0–44)
AST SERPL-CCNC: 23 IU/L (ref 0–40)
BILIRUB SERPL-MCNC: 0.6 MG/DL (ref 0–1.2)
BUN SERPL-MCNC: 11 MG/DL (ref 6–24)
BUN/CREAT SERPL: 11 (ref 9–20)
CALCIUM SERPL-MCNC: 10.1 MG/DL (ref 8.7–10.2)
CHLORIDE SERPL-SCNC: 101 MMOL/L (ref 96–106)
CO2 SERPL-SCNC: 20 MMOL/L (ref 20–29)
CREAT SERPL-MCNC: 0.97 MG/DL (ref 0.76–1.27)
EST. AVERAGE GLUCOSE BLD GHB EST-MCNC: 114 MG/DL
GLOBULIN SER CALC-MCNC: 2.6 G/DL (ref 1.5–4.5)
GLUCOSE SERPL-MCNC: 93 MG/DL (ref 65–99)
HBA1C MFR BLD: 5.6 % (ref 4.8–5.6)
POTASSIUM SERPL-SCNC: 4.9 MMOL/L (ref 3.5–5.2)
PROT SERPL-MCNC: 6.9 G/DL (ref 6–8.5)
SODIUM SERPL-SCNC: 140 MMOL/L (ref 134–144)

## 2018-08-02 ENCOUNTER — TELEPHONE (OUTPATIENT)
Dept: INTERNAL MEDICINE CLINIC | Age: 58
End: 2018-08-02

## 2018-08-02 NOTE — TELEPHONE ENCOUNTER
At pt request, VM left that physician form was faxed with confirmation received. Copy of form also mailed out to pt.

## 2018-08-13 RX ORDER — ALBUTEROL SULFATE 90 UG/1
AEROSOL, METERED RESPIRATORY (INHALATION)
Qty: 1 INHALER | Refills: 6 | Status: SHIPPED | OUTPATIENT
Start: 2018-08-13 | End: 2019-09-18 | Stop reason: SDUPTHER

## 2018-08-13 RX ORDER — ALBUTEROL SULFATE 90 UG/1
AEROSOL, METERED RESPIRATORY (INHALATION)
Qty: 1 INHALER | Refills: 4 | Status: SHIPPED | OUTPATIENT
Start: 2018-08-13 | End: 2019-01-28 | Stop reason: SDUPTHER

## 2018-09-23 RX ORDER — CITALOPRAM 20 MG/1
TABLET, FILM COATED ORAL
Qty: 30 TAB | Refills: 0 | Status: SHIPPED | OUTPATIENT
Start: 2018-09-23 | End: 2019-01-28

## 2019-01-28 ENCOUNTER — OFFICE VISIT (OUTPATIENT)
Dept: INTERNAL MEDICINE CLINIC | Age: 59
End: 2019-01-28

## 2019-01-28 VITALS
TEMPERATURE: 97.4 F | DIASTOLIC BLOOD PRESSURE: 69 MMHG | RESPIRATION RATE: 16 BRPM | OXYGEN SATURATION: 98 % | HEART RATE: 77 BPM | SYSTOLIC BLOOD PRESSURE: 131 MMHG | WEIGHT: 156 LBS | BODY MASS INDEX: 23.11 KG/M2 | HEIGHT: 69 IN

## 2019-01-28 DIAGNOSIS — I10 ESSENTIAL HYPERTENSION: ICD-10-CM

## 2019-01-28 DIAGNOSIS — Z00.00 PHYSICAL EXAM, ANNUAL: Primary | ICD-10-CM

## 2019-01-28 DIAGNOSIS — Z23 ENCOUNTER FOR IMMUNIZATION: ICD-10-CM

## 2019-01-28 DIAGNOSIS — F41.9 ANXIETY: ICD-10-CM

## 2019-01-28 DIAGNOSIS — E78.00 PURE HYPERCHOLESTEROLEMIA: ICD-10-CM

## 2019-01-28 DIAGNOSIS — C61 PROSTATE CANCER (HCC): ICD-10-CM

## 2019-01-28 RX ORDER — ALBUTEROL SULFATE 90 UG/1
2 AEROSOL, METERED RESPIRATORY (INHALATION)
Qty: 1 INHALER | Refills: 4 | Status: SHIPPED | OUTPATIENT
Start: 2019-01-28 | End: 2019-09-05 | Stop reason: SDUPTHER

## 2019-01-28 RX ORDER — CITALOPRAM 20 MG/1
TABLET, FILM COATED ORAL
Qty: 30 TAB | Refills: 6 | Status: SHIPPED | OUTPATIENT
Start: 2019-01-28 | End: 2019-09-30 | Stop reason: SDUPTHER

## 2019-01-28 RX ORDER — ROSUVASTATIN CALCIUM 10 MG/1
10 TABLET, COATED ORAL
Qty: 30 TAB | Refills: 6 | Status: SHIPPED | OUTPATIENT
Start: 2019-01-28 | End: 2019-09-30 | Stop reason: SDUPTHER

## 2019-01-28 RX ORDER — LISINOPRIL 10 MG/1
TABLET ORAL
Qty: 30 TAB | Refills: 6 | Status: SHIPPED | OUTPATIENT
Start: 2019-01-28 | End: 2019-09-30 | Stop reason: SDUPTHER

## 2019-01-28 NOTE — PROGRESS NOTES
HISTORY OF PRESENT ILLNESS  Jenny Fisher is a 60 y.o. male. HPI  Last here 7/30/18. Pt is here for routine care.     BP is 131/69  Reports BP was 122/80s at visit with urologist  Continues on lisinopril 10mg daily   No home readings to reviewed     Wt today is 156 lbs --up 5 lbs x lov  Pt states he has been eating a fair amount of candy  His weight is within normal ranges     Reviewed labs 7/18  Will get labs today  Pt is not fasting      Pt had a C5-7 anterior cervical discectomy with fusion with Dr. Tristan Heller (ortho) on 10/30/17  Last visit was 1/15/18  Pt completed PT  Pt has been discharged from his care     Pt follows with Dr. Eddy Mckee ([facility]) for h/o prostate cancer, annually  Last visit was 1/19, will get notes for review    Lov, ordered protonix  Pt states that he is doing well and not having sx  Discussed he does not need to take protonix if he is not having sx      Continues celexa 20mg daily for anxiety, which works well, happy with dose   Not feeling anxious or sad     Pt is now on crestor 10mg rather than pravachol, changed this over the phone  Tolerating well, taking routinely no issue  Recall could not tolerate lipitor      Pt has albuterol to use prn for wheezing/breathing  Pt works around dust at his job, which causes him to use his inhaler     ACP not on file. SDM is his wife.   Provided information in the past.      PREVENTIVE:    Colonoscopy: 8/17/11, Dr. Megha Ashby, repeat 10 years  PSA: 8/11, 10/14, 5/16 undetectable, 1/18, 1/19, uro follows  Tdap: 7/09/2010  Pneumovax: not yet needed  Irving Sarwat: not yet needed  Shingrix: ordered 07/30/18   Flu shot: 01/28/19   A1c: 7/17 5.6, 10/17 5.8, 1/18 5.4, 7/18 5.6  Eye exam: Dr Kirsten Melgoza, 9/11/18  Hep C screen: 7/14/15, negative   Lipids: 3/18 LDL 90  EKG: 10/17/17, new ST flattenings     Patient Active Problem List    Diagnosis Date Noted    Cervical stenosis of spinal canal 10/30/2017    Pure hypercholesterolemia 07/28/2017    Prostate cancer ([facility] [address] 01/13/2015    GERD (gastroesophageal reflux disease) 09/07/2011    HTN (hypertension) 09/07/2011    Dizziness and giddiness 06/08/2011    Anxiety 03/29/2011     Current Outpatient Medications   Medication Sig Dispense Refill    citalopram (CELEXA) 20 mg tablet TAKE 1 TABLET BY MOUTH DAILY 30 Tab 0    VENTOLIN HFA 90 mcg/actuation inhaler INHALE 2 PUFFS BY MOUTH EVERY 6 HOURS AS NEEDED FOR WHEEZING 1 Inhaler 4    VENTOLIN HFA 90 mcg/actuation inhaler INHALE 2 PUFFS BY MOUTH EVERY 6 HOURS AS NEEDED FOR WHEEZING 1 Inhaler 6    rosuvastatin (CRESTOR) 10 mg tablet Take 1 Tab by mouth nightly. 30 Tab 6    lisinopril (PRINIVIL, ZESTRIL) 10 mg tablet TAKE 1 TABLET BY MOUTH DAILY 30 Tab 6    citalopram (CELEXA) 20 mg tablet TAKE 1 TABLET BY MOUTH DAILY 30 Tab 6    albuterol (VENTOLIN HFA) 90 mcg/actuation inhaler Take 2 Puffs by inhalation every six (6) hours as needed for Wheezing. 1 Inhaler 11    pantoprazole (PROTONIX) 20 mg tablet Take 1 Tab by mouth daily. 30 Tab 3    senna-docusate (PERICOLACE) 8.6-50 mg per tablet Take 1 Tab by mouth daily. 30 Tab 0    fluticasone (FLONASE) 50 mcg/actuation nasal spray 2 Sprays by Both Nostrils route daily.        Past Surgical History:   Procedure Laterality Date    ABDOMEN SURGERY PROC UNLISTED Right 1980    inguinal    HX PROSTATECTOMY  8/2015      Lab Results   Component Value Date/Time    WBC 5.7 01/30/2018 09:46 AM    HGB 13.2 01/30/2018 09:46 AM    Hemoglobin (POC) 15.0 10/30/2017 02:13 PM    HCT 40.1 01/30/2018 09:46 AM    Hematocrit (POC) 44 10/30/2017 02:13 PM    PLATELET 791 29/53/7647 09:46 AM    MCV 91 01/30/2018 09:46 AM     Lab Results   Component Value Date/Time    Cholesterol, total 152 03/30/2018 08:02 AM    HDL Cholesterol 51 03/30/2018 08:02 AM    LDL, calculated 90 03/30/2018 08:02 AM    Triglyceride 57 03/30/2018 08:02 AM     Lab Results   Component Value Date/Time    GFR est non-AA 85 07/30/2018 03:07 PM    GFRNA, POC >60 10/30/2017 02:13 PM GFR est AA 98 07/30/2018 03:07 PM    GFRAA, POC >60 10/30/2017 02:13 PM    Creatinine 0.97 07/30/2018 03:07 PM    Creatinine (POC) 1.1 10/30/2017 02:13 PM    BUN 11 07/30/2018 03:07 PM    BUN (POC) 19 10/30/2017 02:13 PM    Sodium 140 07/30/2018 03:07 PM    Sodium (POC) 137 10/30/2017 02:13 PM    Potassium 4.9 07/30/2018 03:07 PM    Potassium (POC) 4.4 10/30/2017 02:13 PM    Chloride 101 07/30/2018 03:07 PM    Chloride (POC) 103 10/30/2017 02:13 PM    CO2 20 07/30/2018 03:07 PM        Review of Systems   Respiratory: Negative for shortness of breath. Cardiovascular: Negative for chest pain. Physical Exam   Constitutional: He is oriented to person, place, and time. He appears well-developed and well-nourished. No distress. HENT:   Head: Normocephalic and atraumatic. Mouth/Throat: Oropharynx is clear and moist. No oropharyngeal exudate. Eyes: Conjunctivae and EOM are normal. Pupils are equal, round, and reactive to light. Right eye exhibits no discharge. Left eye exhibits no discharge. No scleral icterus. Neck: Normal range of motion. Neck supple. No carotid bruits    Cardiovascular: Normal rate, regular rhythm, normal heart sounds and intact distal pulses. Exam reveals no gallop and no friction rub. No murmur heard. Pulmonary/Chest: Effort normal and breath sounds normal. No respiratory distress. He has no wheezes. He has no rales. He exhibits no tenderness. Abdominal: Soft. He exhibits no distension and no mass. There is no tenderness. There is no rebound and no guarding. Musculoskeletal: Normal range of motion. He exhibits no edema, tenderness or deformity. Lymphadenopathy:     He has no cervical adenopathy. Neurological: He is alert and oriented to person, place, and time. He has normal reflexes. Coordination normal.   Skin: Skin is warm and dry. No rash noted. He is not diaphoretic. No erythema. No pallor. Psychiatric: He has a normal mood and affect.  His behavior is normal. ASSESSMENT and PLAN    ICD-10-CM ICD-9-CM    1. Physical exam, annual    Nl wt, flu shot today, labs ordered, shingrix given to him previously, can get at local pharmacy, eye exam UTD, tdap UTD, PSA just completed this month with uro, will get notes for review   Z00.00 V70.0 LIPID PANEL      METABOLIC PANEL, COMPREHENSIVE      CBC W/O DIFF      TSH 3RD GENERATION      HEMOGLOBIN A1C WITH EAG   2. Prostate cancer ([facility] [address]    Remains in remission, UTD with Dr Egan Cull 185 LIPID PANEL      METABOLIC PANEL, COMPREHENSIVE      CBC W/O DIFF      TSH 3RD GENERATION      HEMOGLOBIN A1C WITH EAG   3. Essential hypertension    Well controlled on lisinopril 10mg daily, continue   I10 401.9 LIPID PANEL      METABOLIC PANEL, COMPREHENSIVE      CBC W/O DIFF      TSH 3RD GENERATION      HEMOGLOBIN A1C WITH EAG   4. Anxiety    Controlled with celexa, no change to dose   F41.9 300.00 LIPID PANEL      METABOLIC PANEL, COMPREHENSIVE      CBC W/O DIFF      TSH 3RD GENERATION      HEMOGLOBIN A1C WITH EAG   5. Pure hypercholesterolemia    At goal on crestor, repeat labs, of note pt is not completely fasting today   E78.00 272.0 LIPID PANEL      METABOLIC PANEL, COMPREHENSIVE      CBC W/O DIFF      TSH 3RD GENERATION      HEMOGLOBIN A1C WITH EAG        Scribed by Tawanna Mason of [address] [place] [address] [address], as dictated by Dr. Katya Huff. Current diagnosis and concerns discussed with pt at length. Pt understands risks and benefits or current treatment plan and medications, and accepts the treatment and medication with any possible risks. Pt asks appropriate questions, which were answered. Pt was instructed to call with any concerns or problems. I have reviewed the note documented by the scribe. The services provided are my own.   The documentation is accurate

## 2019-01-29 LAB
ALBUMIN SERPL-MCNC: 4.4 G/DL (ref 3.5–5.5)
ALBUMIN/GLOB SERPL: 1.7 {RATIO} (ref 1.2–2.2)
ALP SERPL-CCNC: 74 IU/L (ref 39–117)
ALT SERPL-CCNC: 21 IU/L (ref 0–44)
AST SERPL-CCNC: 22 IU/L (ref 0–40)
BILIRUB SERPL-MCNC: 0.4 MG/DL (ref 0–1.2)
BUN SERPL-MCNC: 13 MG/DL (ref 6–24)
BUN/CREAT SERPL: 12 (ref 9–20)
CALCIUM SERPL-MCNC: 9.2 MG/DL (ref 8.7–10.2)
CHLORIDE SERPL-SCNC: 102 MMOL/L (ref 96–106)
CHOLEST SERPL-MCNC: 157 MG/DL (ref 100–199)
CO2 SERPL-SCNC: 22 MMOL/L (ref 20–29)
CREAT SERPL-MCNC: 1.1 MG/DL (ref 0.76–1.27)
ERYTHROCYTE [DISTWIDTH] IN BLOOD BY AUTOMATED COUNT: 12.8 % (ref 12.3–15.4)
EST. AVERAGE GLUCOSE BLD GHB EST-MCNC: 111 MG/DL
GLOBULIN SER CALC-MCNC: 2.6 G/DL (ref 1.5–4.5)
GLUCOSE SERPL-MCNC: 74 MG/DL (ref 65–99)
HBA1C MFR BLD: 5.5 % (ref 4.8–5.6)
HCT VFR BLD AUTO: 39.1 % (ref 37.5–51)
HDLC SERPL-MCNC: 49 MG/DL
HGB BLD-MCNC: 13.2 G/DL (ref 13–17.7)
LDLC SERPL CALC-MCNC: 89 MG/DL (ref 0–99)
MCH RBC QN AUTO: 29.7 PG (ref 26.6–33)
MCHC RBC AUTO-ENTMCNC: 33.8 G/DL (ref 31.5–35.7)
MCV RBC AUTO: 88 FL (ref 79–97)
PLATELET # BLD AUTO: 264 X10E3/UL (ref 150–379)
POTASSIUM SERPL-SCNC: 4.6 MMOL/L (ref 3.5–5.2)
PROT SERPL-MCNC: 7 G/DL (ref 6–8.5)
RBC # BLD AUTO: 4.44 X10E6/UL (ref 4.14–5.8)
SODIUM SERPL-SCNC: 140 MMOL/L (ref 134–144)
TRIGL SERPL-MCNC: 96 MG/DL (ref 0–149)
TSH SERPL DL<=0.005 MIU/L-ACNC: 2.19 UIU/ML (ref 0.45–4.5)
VLDLC SERPL CALC-MCNC: 19 MG/DL (ref 5–40)
WBC # BLD AUTO: 6.9 X10E3/UL (ref 3.4–10.8)

## 2019-03-19 ENCOUNTER — DOCUMENTATION ONLY (OUTPATIENT)
Dept: INTERNAL MEDICINE CLINIC | Age: 59
End: 2019-03-19

## 2019-03-19 ENCOUNTER — OFFICE VISIT (OUTPATIENT)
Dept: INTERNAL MEDICINE CLINIC | Age: 59
End: 2019-03-19

## 2019-03-19 VITALS
OXYGEN SATURATION: 96 % | BODY MASS INDEX: 23.25 KG/M2 | RESPIRATION RATE: 16 BRPM | TEMPERATURE: 97.9 F | WEIGHT: 157 LBS | DIASTOLIC BLOOD PRESSURE: 71 MMHG | HEART RATE: 74 BPM | SYSTOLIC BLOOD PRESSURE: 134 MMHG | HEIGHT: 69 IN

## 2019-03-19 DIAGNOSIS — I10 ESSENTIAL HYPERTENSION: ICD-10-CM

## 2019-03-19 DIAGNOSIS — J01.00 ACUTE NON-RECURRENT MAXILLARY SINUSITIS: Primary | ICD-10-CM

## 2019-03-19 RX ORDER — PREDNISONE 20 MG/1
TABLET ORAL
Qty: 6 TAB | Refills: 0 | Status: SHIPPED | OUTPATIENT
Start: 2019-03-19 | End: 2019-04-23

## 2019-03-19 NOTE — PROGRESS NOTES
Biometric form completed, signed, and [facility] [phone] w/ confirmation received. Original placed in scanning. Copy mailed to pt.

## 2019-03-19 NOTE — PROGRESS NOTES
HISTORY OF PRESENT ILLNESS  Will Aliya is a 60 y.o. male. HPI  Last here 1/28/19. Pt is here for acute care. Pt c/o being ill x last week  He has had congestion, head pressure, sore throat, sinus pressure, hoarseness  He has also had some chills, wheezing  Denies N/V, body aches, ear pain, fever  Pt had a cough last week but this has improved  He did not go to work today because he did not feel well  Pt took some aleve and has been using flonase for his sx  He also has used his albuterol  Will give prednisone  Advised continuing with flonase     Patient Active Problem List    Diagnosis Date Noted    Cervical stenosis of spinal canal 10/30/2017    Pure hypercholesterolemia 07/28/2017    Prostate cancer ([facility] [address] 01/13/2015    GERD (gastroesophageal reflux disease) 09/07/2011    HTN (hypertension) 09/07/2011    Dizziness and giddiness 06/08/2011    Anxiety 03/29/2011     Current Outpatient Medications   Medication Sig Dispense Refill    albuterol (VENTOLIN HFA) 90 mcg/actuation inhaler Take 2 Puffs by inhalation every six (6) hours as needed for Wheezing. 1 Inhaler 4    citalopram (CELEXA) 20 mg tablet TAKE 1 TABLET BY MOUTH DAILY 30 Tab 6    lisinopril (PRINIVIL, ZESTRIL) 10 mg tablet TAKE 1 TABLET BY MOUTH DAILY 30 Tab 6    rosuvastatin (CRESTOR) 10 mg tablet Take 1 Tab by mouth nightly. 30 Tab 6    VENTOLIN HFA 90 mcg/actuation inhaler INHALE 2 PUFFS BY MOUTH EVERY 6 HOURS AS NEEDED FOR WHEEZING 1 Inhaler 6    albuterol (VENTOLIN HFA) 90 mcg/actuation inhaler Take 2 Puffs by inhalation every six (6) hours as needed for Wheezing. 1 Inhaler 11    fluticasone (FLONASE) 50 mcg/actuation nasal spray 2 Sprays by Both Nostrils route daily.        Past Surgical History:   Procedure Laterality Date    ABDOMEN SURGERY PROC UNLISTED Right 1980    inguinal    HX PROSTATECTOMY  8/2015      Lab Results   Component Value Date/Time    WBC 6.9 01/28/2019 03:17 PM    HGB 13.2 01/28/2019 03:17 PM    Hemoglobin (POC) 15.0 10/30/2017 02:13 PM    HCT 39.1 01/28/2019 03:17 PM    Hematocrit (POC) 44 10/30/2017 02:13 PM    PLATELET 551 92/67/6727 03:17 PM    MCV 88 01/28/2019 03:17 PM     Lab Results   Component Value Date/Time    Cholesterol, total 157 01/28/2019 03:17 PM    HDL Cholesterol 49 01/28/2019 03:17 PM    LDL, calculated 89 01/28/2019 03:17 PM    Triglyceride 96 01/28/2019 03:17 PM     Lab Results   Component Value Date/Time    GFR est non-AA 73 01/28/2019 03:17 PM    GFRNA, POC >60 10/30/2017 02:13 PM    GFR est AA 84 01/28/2019 03:17 PM    GFRAA, POC >60 10/30/2017 02:13 PM    Creatinine 1.10 01/28/2019 03:17 PM    Creatinine (POC) 1.1 10/30/2017 02:13 PM    BUN 13 01/28/2019 03:17 PM    BUN (POC) 19 10/30/2017 02:13 PM    Sodium 140 01/28/2019 03:17 PM    Sodium (POC) 137 10/30/2017 02:13 PM    Potassium 4.6 01/28/2019 03:17 PM    Potassium (POC) 4.4 10/30/2017 02:13 PM    Chloride 102 01/28/2019 03:17 PM    Chloride (POC) 103 10/30/2017 02:13 PM    CO2 22 01/28/2019 03:17 PM        Review of Systems   Constitutional: Positive for chills. Negative for fever. HENT: Positive for congestion, sinus pain and sore throat. Negative for ear pain. Respiratory: Positive for wheezing. Negative for shortness of breath. Cardiovascular: Negative for chest pain. Gastrointestinal: Negative for nausea and vomiting. Musculoskeletal: Negative for myalgias. Physical Exam   Constitutional: He is oriented to person, place, and time. He appears well-developed and well-nourished. No distress. HENT:   Head: Normocephalic and atraumatic. Right Ear: External ear normal.   Left Ear: External ear normal.   Mouth/Throat: Oropharynx is clear and moist. No oropharyngeal exudate. Mild erythema to BL ears  Mild erythema to throat   Eyes: Conjunctivae and EOM are normal. Right eye exhibits no discharge. Left eye exhibits no discharge. Neck: Normal range of motion. Neck supple.    Cardiovascular: Normal rate, regular rhythm and normal heart sounds. Exam reveals no gallop and no friction rub. No murmur heard. Pulmonary/Chest: Effort normal and breath sounds normal. No respiratory distress. He has no wheezes. He has no rales. He exhibits no tenderness. Musculoskeletal: Normal range of motion. He exhibits no edema, tenderness or deformity. Lymphadenopathy:     He has no cervical adenopathy. Neurological: He is alert and oriented to person, place, and time. He has normal reflexes. Coordination normal.   Skin: Skin is warm and dry. No rash noted. He is not diaphoretic. No erythema. No pallor. Psychiatric: He has a normal mood and affect. His behavior is normal.       ASSESSMENT and PLAN    ICD-10-CM ICD-9-CM    1. Acute non-recurrent maxillary sinusitis    No signs of bacterial infection, will give short prednisone taper, no abx, zyrtec and flonase OTC   J01.00 461.0    2. Essential hypertension    Well controlled   I10 401.9       Depression screen reviewed and negative. Scribed by Mccoy Burkitt of [address] [place] [address] [address], as dictated by Dr. Felicitas Ontiveros. Current diagnosis and concerns discussed with pt at length. Pt understands risks and benefits or current treatment plan and medications, and accepts the treatment and medication with any possible risks. Pt asks appropriate questions, which were answered. Pt was instructed to call with any concerns or problems. I have reviewed the note documented by the scribe. The services provided are my own.   The documentation is accurate

## 2019-04-09 ENCOUNTER — OFFICE VISIT (OUTPATIENT)
Dept: INTERNAL MEDICINE CLINIC | Age: 59
End: 2019-04-09

## 2019-04-09 VITALS
WEIGHT: 157 LBS | DIASTOLIC BLOOD PRESSURE: 69 MMHG | SYSTOLIC BLOOD PRESSURE: 122 MMHG | RESPIRATION RATE: 16 BRPM | TEMPERATURE: 97.9 F | OXYGEN SATURATION: 97 % | HEART RATE: 85 BPM | BODY MASS INDEX: 23.25 KG/M2 | HEIGHT: 69 IN

## 2019-04-09 DIAGNOSIS — M25.521 RIGHT ELBOW PAIN: Primary | ICD-10-CM

## 2019-04-09 DIAGNOSIS — I10 ESSENTIAL HYPERTENSION: ICD-10-CM

## 2019-04-09 DIAGNOSIS — K64.9 HEMORRHOIDS, UNSPECIFIED HEMORRHOID TYPE: ICD-10-CM

## 2019-04-09 RX ORDER — IBUPROFEN 800 MG/1
800 TABLET ORAL
Qty: 30 TAB | Refills: 0 | Status: SHIPPED | OUTPATIENT
Start: 2019-04-09 | End: 2019-09-05

## 2019-04-09 RX ORDER — HYDROCORTISONE 25 MG/G
CREAM TOPICAL 4 TIMES DAILY
Qty: 30 G | Refills: 0 | Status: SHIPPED | OUTPATIENT
Start: 2019-04-09 | End: 2019-06-21 | Stop reason: SDUPTHER

## 2019-04-09 NOTE — PROGRESS NOTES
HISTORY OF PRESENT ILLNESS  Inessa Patel is a 60 y.o. male. HPI  Last here 3/19/19. Pt is here for acute care. the patient has R elbow pain for 1 week  No injury   Not getting worse,   hurts when still or when he moves it  Constant ache  Goes down his arms  Put some vicks vapor rub on it . He is left handed  Does  concrete a lot at work, but starts another job tomorrow with no lifting. Will give motrin  Discussed resting     Also states hemorrhoids starting to itch again  Prep H helped it  Did not resolve sx  Will give anusol  Advised using sitz baths     Denies constipation recently but some straining  No blood in the stool   Has daily BM  Advised miralax to help     bp up a little today   Took his lisinopril this am   Had DOT recently which was normal bp   Not sure why    Patient Active Problem List    Diagnosis Date Noted    Cervical stenosis of spinal canal 10/30/2017    Pure hypercholesterolemia 07/28/2017    Prostate cancer ([facility] [address] 01/13/2015    GERD (gastroesophageal reflux disease) 09/07/2011    HTN (hypertension) 09/07/2011    Dizziness and giddiness 06/08/2011    Anxiety 03/29/2011     Current Outpatient Medications   Medication Sig Dispense Refill    ibuprofen (MOTRIN) 800 mg tablet Take 1 Tab by mouth every eight (8) hours as needed for Pain. 30 Tab 0    hydrocortisone (ANUSOL-HC) 2.5 % rectal cream Insert  into rectum four (4) times daily. 30 g 0    predniSONE (DELTASONE) 20 mg tablet , 40mg po daily for 2 days, 20mg po daily for 2days then stop 6 Tab 0    albuterol (VENTOLIN HFA) 90 mcg/actuation inhaler Take 2 Puffs by inhalation every six (6) hours as needed for Wheezing. 1 Inhaler 4    citalopram (CELEXA) 20 mg tablet TAKE 1 TABLET BY MOUTH DAILY 30 Tab 6    lisinopril (PRINIVIL, ZESTRIL) 10 mg tablet TAKE 1 TABLET BY MOUTH DAILY 30 Tab 6    rosuvastatin (CRESTOR) 10 mg tablet Take 1 Tab by mouth nightly.  30 Tab 6    VENTOLIN HFA 90 mcg/actuation inhaler INHALE 2 PUFFS BY MOUTH EVERY 6 HOURS AS NEEDED FOR WHEEZING 1 Inhaler 6    albuterol (VENTOLIN HFA) 90 mcg/actuation inhaler Take 2 Puffs by inhalation every six (6) hours as needed for Wheezing. 1 Inhaler 11    fluticasone (FLONASE) 50 mcg/actuation nasal spray 2 Sprays by Both Nostrils route daily. Past Surgical History:   Procedure Laterality Date    ABDOMEN SURGERY PROC UNLISTED Right 1980    inguinal    HX PROSTATECTOMY  8/2015      Lab Results   Component Value Date/Time    WBC 6.9 01/28/2019 03:17 PM    HGB 13.2 01/28/2019 03:17 PM    Hemoglobin (POC) 15.0 10/30/2017 02:13 PM    HCT 39.1 01/28/2019 03:17 PM    Hematocrit (POC) 44 10/30/2017 02:13 PM    PLATELET 143 90/27/5061 03:17 PM    MCV 88 01/28/2019 03:17 PM     Lab Results   Component Value Date/Time    Cholesterol, total 157 01/28/2019 03:17 PM    HDL Cholesterol 49 01/28/2019 03:17 PM    LDL, calculated 89 01/28/2019 03:17 PM    Triglyceride 96 01/28/2019 03:17 PM     Lab Results   Component Value Date/Time    GFR est non-AA 73 01/28/2019 03:17 PM    GFRNA, POC >60 10/30/2017 02:13 PM    GFR est AA 84 01/28/2019 03:17 PM    GFRAA, POC >60 10/30/2017 02:13 PM    Creatinine 1.10 01/28/2019 03:17 PM    Creatinine (POC) 1.1 10/30/2017 02:13 PM    BUN 13 01/28/2019 03:17 PM    BUN (POC) 19 10/30/2017 02:13 PM    Sodium 140 01/28/2019 03:17 PM    Sodium (POC) 137 10/30/2017 02:13 PM    Potassium 4.6 01/28/2019 03:17 PM    Potassium (POC) 4.4 10/30/2017 02:13 PM    Chloride 102 01/28/2019 03:17 PM    Chloride (POC) 103 10/30/2017 02:13 PM    CO2 22 01/28/2019 03:17 PM        Review of Systems   Respiratory: Negative for shortness of breath. Cardiovascular: Negative for chest pain. Gastrointestinal: Negative for constipation. Musculoskeletal: Positive for joint pain. Physical Exam   Constitutional: He is oriented to person, place, and time. He appears well-developed and well-nourished. No distress. HENT:   Head: Normocephalic and atraumatic. Eyes: Conjunctivae and EOM are normal. Right eye exhibits no discharge. Left eye exhibits no discharge. Neck: Normal range of motion. Neck supple. Cardiovascular: Normal rate, regular rhythm and normal heart sounds. Exam reveals no gallop and no friction rub. No murmur heard. Pulmonary/Chest: Effort normal and breath sounds normal. No respiratory distress. He has no wheezes. He has no rales. He exhibits no tenderness. Musculoskeletal: Normal range of motion. He exhibits no edema, tenderness or deformity. R elbow nl ROM  NO SWELLING  NO ERYTHEMA   Lymphadenopathy:     He has no cervical adenopathy. Neurological: He is alert and oriented to person, place, and time. He has normal reflexes. Coordination normal.   Skin: Skin is warm and dry. No rash noted. He is not diaphoretic. No erythema. No pallor. Psychiatric: He has a normal mood and affect. His behavior is normal.       ASSESSMENT and PLAN    ICD-10-CM ICD-9-CM    1. Right elbow pain    Likely tennis elbow, will check plain film, no heavy lifting for 2 weeks, rest, ice, elevation, motrin TID   M25.521 719.42 XR ELBOW RT MIN 3 V   2. Essential hypertension    Initially elevated, repeat improved, just took his BP meds right before coming in this AM, no change to dose, previously well controlled   I10 401.9    3. Hemorrhoids, unspecified hemorrhoid type    anusol cream prn and sitz baths, COLO UTD   K64.9 455.6      Depression screen reviewed and negative. Scribed by Jessa Weaver of [facility], as dictated by Dr. Karl Barreto. Current diagnosis and concerns discussed with pt at length. Pt understands risks and benefits or current treatment plan and medications, and accepts the treatment and medication with any possible risks. Pt asks appropriate questions, which were answered. Pt was instructed to call with any concerns or problems. I have reviewed the note documented by the scribe. The services provided are my own.   The documentation is accurate

## 2019-04-09 NOTE — PATIENT INSTRUCTIONS
SITZ BATHES OVER THE COUNTER    MOTRIN 3 TIMES PER DAY FOR 1 WEEK    NO LIFTING FOR 2 WEEKS    XRAY TODAY

## 2019-04-09 NOTE — PROGRESS NOTES
the patient has R elow pain for 1 week No injury Not getting worse,  
hurts when still or when he moves it Constant ache Goes down his arms Put some vicks vapor rub on it . He is left handed Does  concrete a lot at work, but starts another job tomorrow with no lifting. Also states hemorrhoids starting to itch again Prep H helped it Did not resolve sx Denies constipation recently but some straining No blood in the stool Has daily BM 
 
bp up a little today Took his lisinopril this am  
Had DOT recently which was normal bp Not sure why

## 2019-04-09 NOTE — LETTER
Sekou King is currently under the care of [facility] with Dr. Jose Jin. The patient should not lift more than 10-15 pounds for 2 weeks. Any questions please call our office at [phone].

## 2019-04-23 ENCOUNTER — HOSPITAL ENCOUNTER (EMERGENCY)
Age: 59
Discharge: HOME | End: 2019-04-23
Attending: EMERGENCY MEDICINE
Payer: COMMERCIAL

## 2019-04-23 VITALS
HEIGHT: 68 IN | DIASTOLIC BLOOD PRESSURE: 68 MMHG | RESPIRATION RATE: 18 BRPM | OXYGEN SATURATION: 97 % | SYSTOLIC BLOOD PRESSURE: 141 MMHG | BODY MASS INDEX: 23.26 KG/M2 | WEIGHT: 153.44 LBS | TEMPERATURE: 98 F | HEART RATE: 80 BPM

## 2019-04-23 DIAGNOSIS — M77.11 LATERAL EPICONDYLITIS OF RIGHT ELBOW: Primary | ICD-10-CM

## 2019-04-23 PROCEDURE — 99282 EMERGENCY DEPT VISIT SF MDM: CPT

## 2019-04-23 RX ORDER — NAPROXEN 500 MG/1
500 TABLET ORAL
Qty: 20 TAB | Refills: 0 | Status: SHIPPED | OUTPATIENT
Start: 2019-04-23 | End: 2019-09-05

## 2019-04-23 RX ORDER — PREDNISONE 50 MG/1
50 TABLET ORAL DAILY
Qty: 3 TAB | Refills: 0 | Status: SHIPPED | OUTPATIENT
Start: 2019-04-23 | End: 2019-04-26

## 2019-04-23 NOTE — DISCHARGE INSTRUCTIONS
Patient Education        Tennis Elbow: Exercises  Your Care Instructions  Here are some examples of typical rehabilitation exercises for your condition. Start each exercise slowly. Ease off the exercise if you start to have pain. Your doctor or physical therapist will tell you when you can start these exercises and which ones will work best for you. How to do the exercises  Wrist flexor stretch    1. Extend your arm in front of you with your palm up. 2. Bend your wrist, pointing your hand toward the floor. 3. With your other hand, gently bend your wrist farther until you feel a mild to moderate stretch in your forearm. 4. Hold for at least 15 to 30 seconds. Repeat 2 to 4 times. Wrist extensor stretch    1. Repeat steps 1 to 4 of the stretch above but begin with your extended hand palm down. Ball or sock squeeze    1. Hold a tennis ball (or a rolled-up sock) in your hand. 2. Make a fist around the ball (or sock) and squeeze. 3. Hold for about 6 seconds, and then relax for up to 10 seconds. 4. Repeat 8 to 12 times. 5. Switch the ball (or sock) to your other hand and do 8 to 12 times. Wrist deviation    1. Sit so that your arm is supported but your hand hangs off the edge of a flat surface, such as a table. 2. Hold your hand out like you are shaking hands with someone. 3. Move your hand up and down. 4. Repeat this motion 8 to 12 times. 5. Switch arms. 6. Try to do this exercise twice with each hand. Wrist curls    1. Place your forearm on a table with your hand hanging over the edge of the table, palm up. 2. Place a 1- to 2-pound weight in your hand. This may be a dumbbell, a can of food, or a filled water bottle. 3. Slowly raise and lower the weight while keeping your forearm on the table and your palm facing up. 4. Repeat this motion 8 to 12 times. 5. Switch arms, and do steps 1 through 4.  6. Repeat with your hand facing down toward the floor. Switch arms. Biceps curls    1.  Sit leaning forward with your legs slightly spread and your left hand on your left thigh. 2. Place your right elbow on your right thigh, and hold the weight with your forearm horizontal.  3. Slowly curl the weight up and toward your chest.  4. Repeat this motion 8 to 12 times. 5. Switch arms, and do steps 1 through 4. Follow-up care is a key part of your treatment and safety. Be sure to make and go to all appointments, and call your doctor if you are having problems. It's also a good idea to know your test results and keep a list of the medicines you take. Where can you learn more? Go to http://nhung-carly.info/. Enter J164 in the search box to learn more about \"Tennis Elbow: Exercises. \"  Current as of: September 20, 2018  Content Version: 11.9  © 8292-5780 [employer]. Care instructions adapted under license by [employer] (which disclaims liability or warranty for this information). If you have questions about a medical condition or this instruction, always ask your healthcare professional. [address] any warranty or liability for your use of this information.
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
## 2019-06-17 ENCOUNTER — TELEPHONE (OUTPATIENT)
Dept: INTERNAL MEDICINE CLINIC | Age: 59
End: 2019-06-17

## 2019-06-17 NOTE — TELEPHONE ENCOUNTER
----- Message from Argelia Yepez sent at 6/17/2019  9:22 AM EDT -----  Regarding: Dr. Scott Gonzalez  Patient is experiencing hemorrhoid flare. Is requesting an earlier appointment than the already scheduled appointment on 6/30/19 at 2:45pm. Would like an appointment for Wednesday or Thursday of this week. Best contact number is [phone] and speak with patient's wife to schedule.        Copy/paste envera

## 2019-06-17 NOTE — TELEPHONE ENCOUNTER
Called, spoke to [employer] (HIPAA). Two pt identifiers confirmed. Sarah Members offered and accepted appt for 06/21/19 8084. Sarah Members verbalized understanding of information discussed w/ no further questions at this time.

## 2019-06-21 ENCOUNTER — OFFICE VISIT (OUTPATIENT)
Dept: INTERNAL MEDICINE CLINIC | Age: 59
End: 2019-06-21

## 2019-06-21 VITALS
HEART RATE: 74 BPM | HEIGHT: 68 IN | WEIGHT: 155 LBS | BODY MASS INDEX: 23.49 KG/M2 | DIASTOLIC BLOOD PRESSURE: 69 MMHG | OXYGEN SATURATION: 97 % | SYSTOLIC BLOOD PRESSURE: 136 MMHG | TEMPERATURE: 97.9 F | RESPIRATION RATE: 16 BRPM

## 2019-06-21 DIAGNOSIS — E78.00 PURE HYPERCHOLESTEROLEMIA: ICD-10-CM

## 2019-06-21 DIAGNOSIS — K21.9 GASTROESOPHAGEAL REFLUX DISEASE WITHOUT ESOPHAGITIS: ICD-10-CM

## 2019-06-21 DIAGNOSIS — K59.00 CONSTIPATION, UNSPECIFIED CONSTIPATION TYPE: Primary | ICD-10-CM

## 2019-06-21 DIAGNOSIS — I10 ESSENTIAL HYPERTENSION: ICD-10-CM

## 2019-06-21 DIAGNOSIS — F41.9 ANXIETY: ICD-10-CM

## 2019-06-21 DIAGNOSIS — K59.00 CONSTIPATION, UNSPECIFIED CONSTIPATION TYPE: ICD-10-CM

## 2019-06-21 DIAGNOSIS — K64.9 HEMORRHOIDS, UNSPECIFIED HEMORRHOID TYPE: Primary | ICD-10-CM

## 2019-06-21 RX ORDER — HYDROCORTISONE 25 MG/G
CREAM TOPICAL 4 TIMES DAILY
Qty: 30 G | Refills: 0 | Status: SHIPPED | OUTPATIENT
Start: 2019-06-21 | End: 2019-09-05

## 2019-06-21 RX ORDER — PANTOPRAZOLE SODIUM 20 MG/1
20 TABLET, DELAYED RELEASE ORAL
Qty: 30 TAB | Refills: 1 | Status: SHIPPED | OUTPATIENT
Start: 2019-06-21 | End: 2020-04-28

## 2019-06-21 NOTE — PATIENT INSTRUCTIONS
Office Policies    Phone calls/patient messages:            Please allow up to 24 hours for someone in the office to contact you about your call or message. Be mindful your provider may be out of the office or your message may require further review. We encourage you to use [employer] for your messages as this is a faster, more efficient way to communicate with our office                         Medication Refills:            Prescription medications require 48-72 business hours to process. We encourage you to use [employer] for your refills. For controlled medications: Please allow 72 business hours to process. Certain medications may require you to  a written prescription at our office. NO narcotic/controlled medications will be prescribed after 4pm Monday through Friday or on weekends              Form/Paperwork Completion:            Please note a $25 fee may incur for all paperwork for completed by our providers. We ask that you allow 7-10 business days. Pre-payment is due prior to picking up/faxing the completed form. You may also download your forms to [employer] to have your doctor print off.       START MIRALAX DAILY AS NEEDED TO PREVENT CONSTIPATION     CANCEL July APPOINTMENT

## 2019-06-21 NOTE — PROGRESS NOTES
HISTORY OF PRESENT ILLNESS  Liz Singletary is a 60 y.o. male. HPI  Last here 4/9/19. Pt is here for acute/routine care.     Lov in 4/19 pt c/o hemorrhoids  At that time gave anusol cream and told him to do sitz baths  Pt c/o this again today, states that it has been itching again x last week  Denies anything protruding, thinks he itched a abrasion   Pt states that the anusol cream helped some but did not resolve the problem of itch   Will refill anusol cream - discussed not using more than 5 days in a row  Provided info for colorectal surg if the hemorrhoids continue to bother him  Pt wonders about the cause of hemorrhoids, discussed this is likely related to constipation    He has had a mild abd pain x 2-3 days  It is helpful if he has a BM  Denies N/V  Pt has not taken anything for his sx  He has had some mild constipation   Advised using miralax to assist with this  Pt sometimes gets heartburn, but off and on  Ordered XR abd     BP is 136/69  Continues on lisinopril 10mg daily   No home readings to reviewed     Wt today is 155 lbs --stable x lov  His weight is within normal ranges     Reviewed labs 1/19      Pt had a C5-7 anterior cervical discectomy with fusion with Dr. Daryl Vila (ortho) on 10/30/17  Last visit was 1/15/18  Pt completed PT  Pt has been discharged from his care     Pt follows with Dr. Peter Lucas ([facility]) for h/o prostate cancer, annually  Last visit was 1/19     Previously, ordered protonix - however he has not been taking this  Pt gets mild heartburn sx off and on  Reordered protonix, discussed he can take this prn      Continues celexa 20mg daily for anxiety, which works well, happy with dose 6/19  Not feeling anxious or sad     Continues on crestor 10mg for cholesterol  Tolerating well, taking routinely no issue at goal last check   Recall could not tolerate lipitor      Pt has albuterol to use prn for wheezing/breathing  Pt works around dust at his job, which causes him to use his inhaler     ACP not on file. SDM is his wife. Provided information in the past.      PREVENTIVE:    Colonoscopy: 8/17/11, Dr. Jose L Bone, repeat 10 years  PSA: 8/11, 10/14, 5/16 undetectable, 1/18, 1/19, uro follows  Tdap: 7/09/2010  Pneumovax: not yet needed  Clint Caryquito: not yet needed  Shingrix: ordered 07/30/18, reordered 06/21/19   Flu shot: 01/28/19   A1c: 7/17 5.6, 10/17 5.8, 1/18 5.4, 7/18 5.6, 1/19 5.5  Eye exam: Dr Michael Zamora, 9/11/18  Hep C screen: 7/14/15, negative   Lipids: 1/19 LDL 89  EKG: 10/17/17, new ST flattenings     Patient Active Problem List    Diagnosis Date Noted    Cervical stenosis of spinal canal 10/30/2017    Pure hypercholesterolemia 07/28/2017    Prostate cancer ([facility] [address] 01/13/2015    GERD (gastroesophageal reflux disease) 09/07/2011    HTN (hypertension) 09/07/2011    Dizziness and giddiness 06/08/2011    Anxiety 03/29/2011     Current Outpatient Medications   Medication Sig Dispense Refill    naproxen (NAPROSYN) 500 mg tablet Take 1 Tab by mouth every twelve (12) hours as needed for Pain. 20 Tab 0    albuterol (VENTOLIN HFA) 90 mcg/actuation inhaler Take 2 Puffs by inhalation every six (6) hours as needed for Wheezing. 1 Inhaler 4    citalopram (CELEXA) 20 mg tablet TAKE 1 TABLET BY MOUTH DAILY 30 Tab 6    lisinopril (PRINIVIL, ZESTRIL) 10 mg tablet TAKE 1 TABLET BY MOUTH DAILY 30 Tab 6    rosuvastatin (CRESTOR) 10 mg tablet Take 1 Tab by mouth nightly. 30 Tab 6    VENTOLIN HFA 90 mcg/actuation inhaler INHALE 2 PUFFS BY MOUTH EVERY 6 HOURS AS NEEDED FOR WHEEZING 1 Inhaler 6    albuterol (VENTOLIN HFA) 90 mcg/actuation inhaler Take 2 Puffs by inhalation every six (6) hours as needed for Wheezing. 1 Inhaler 11    fluticasone (FLONASE) 50 mcg/actuation nasal spray 2 Sprays by Both Nostrils route daily.  ibuprofen (MOTRIN) 800 mg tablet Take 1 Tab by mouth every eight (8) hours as needed for Pain.  30 Tab 0    hydrocortisone (ANUSOL-HC) 2.5 % rectal cream Insert  into rectum four (4) times daily. 30 g 0     Past Surgical History:   Procedure Laterality Date    ABDOMEN SURGERY PROC UNLISTED Right 1980    inguinal    HX PROSTATECTOMY  8/2015      Lab Results   Component Value Date/Time    WBC 6.9 01/28/2019 03:17 PM    HGB 13.2 01/28/2019 03:17 PM    Hemoglobin (POC) 15.0 10/30/2017 02:13 PM    HCT 39.1 01/28/2019 03:17 PM    Hematocrit (POC) 44 10/30/2017 02:13 PM    PLATELET 934 69/63/5844 03:17 PM    MCV 88 01/28/2019 03:17 PM     Lab Results   Component Value Date/Time    Cholesterol, total 157 01/28/2019 03:17 PM    HDL Cholesterol 49 01/28/2019 03:17 PM    LDL, calculated 89 01/28/2019 03:17 PM    Triglyceride 96 01/28/2019 03:17 PM     Lab Results   Component Value Date/Time    GFR est non-AA 73 01/28/2019 03:17 PM    GFRNA, POC >60 10/30/2017 02:13 PM    GFR est AA 84 01/28/2019 03:17 PM    GFRAA, POC >60 10/30/2017 02:13 PM    Creatinine 1.10 01/28/2019 03:17 PM    Creatinine (POC) 1.1 10/30/2017 02:13 PM    BUN 13 01/28/2019 03:17 PM    BUN (POC) 19 10/30/2017 02:13 PM    Sodium 140 01/28/2019 03:17 PM    Sodium (POC) 137 10/30/2017 02:13 PM    Potassium 4.6 01/28/2019 03:17 PM    Potassium (POC) 4.4 10/30/2017 02:13 PM    Chloride 102 01/28/2019 03:17 PM    Chloride (POC) 103 10/30/2017 02:13 PM    CO2 22 01/28/2019 03:17 PM        Review of Systems   Respiratory: Negative for shortness of breath. Cardiovascular: Negative for chest pain. Gastrointestinal: Positive for abdominal pain and constipation. Negative for nausea and vomiting. Skin: Positive for itching. Physical Exam   Constitutional: He is oriented to person, place, and time. He appears well-developed and well-nourished. No distress. HENT:   Head: Normocephalic and atraumatic. Mouth/Throat: Oropharynx is clear and moist. No oropharyngeal exudate. Eyes: Conjunctivae and EOM are normal. Right eye exhibits no discharge. Left eye exhibits no discharge. Neck: Normal range of motion. Neck supple.    Cardiovascular: Normal rate, regular rhythm and normal heart sounds. Exam reveals no gallop and no friction rub. No murmur heard. Pulmonary/Chest: Effort normal and breath sounds normal. No respiratory distress. He has no wheezes. He has no rales. He exhibits no tenderness. Abdominal: Soft. He exhibits no distension and no mass. There is no tenderness. There is no rebound and no guarding. Musculoskeletal: Normal range of motion. He exhibits no edema, tenderness or deformity. Lymphadenopathy:     He has no cervical adenopathy. Neurological: He is alert and oriented to person, place, and time. He has normal reflexes. Coordination normal.   Skin: Skin is warm and dry. No rash noted. He is not diaphoretic. No erythema. No pallor. Psychiatric: He has a normal mood and affect. His behavior is normal.       ASSESSMENT and PLAN    ICD-10-CM ICD-9-CM    1. Hemorrhoids, unspecified hemorrhoid type    Refill anusol cream, discussed avoiding straining, discussed sitz baths, will have him see colorectal surgeon   K64.9 455.6 REFERRAL TO COLON AND RECTAL SURGERY   2. Essential hypertension    Controlled on lisinopril, continue   K30 083.3 METABOLIC PANEL, BASIC   3. Constipation, unspecified constipation type    Still struggling with constipation, suspect this is the cause of his occasional GI upset, will have him use miralax, will get acute abd series   K59.00 564.00 XR ABD ACUTE W 1 V CHEST   4. Anxiety    Controlled on celexa   F41.9 300.00    5. Pure hypercholesterolemia    Controlled on crestor   E78.00 272.0    6. Gastroesophageal reflux disease without esophagitis    Discussed OK to use protonix prn, ordered this for him   K21.9 530.81       Depression screen reviewed and negative. Scribed by Betty Brennan of [facility], as dictated by Dr. Leslie Tom. Current diagnosis and concerns discussed with pt at length.  Pt understands risks and benefits or current treatment plan and medications, and accepts the treatment and medication with any possible risks. Pt asks appropriate questions, which were answered. Pt was instructed to call with any concerns or problems. I have reviewed the note documented by the scribe. The services provided are my own.   The documentation is accurate

## 2019-06-21 NOTE — LETTER
NOTIFICATION RETURN TO WORK / SCHOOL 
 
6/21/2019 8:42 AM 
 
Mr. Denice Milton [facility] [address] [id number] To Whom It May Concern: 
 
Denice Milton is currently under the care of [facility]. He will return to work/school on: 6/21/19 If there are questions or concerns please have the patient contact our office.  
 
 
 
Sincerely, 
 
 
Melody Nichols MD

## 2019-06-22 LAB
BUN SERPL-MCNC: 12 MG/DL (ref 8–27)
BUN/CREAT SERPL: 11 (ref 10–24)
CALCIUM SERPL-MCNC: 9.6 MG/DL (ref 8.6–10.2)
CHLORIDE SERPL-SCNC: 102 MMOL/L (ref 96–106)
CO2 SERPL-SCNC: 24 MMOL/L (ref 20–29)
CREAT SERPL-MCNC: 1.05 MG/DL (ref 0.76–1.27)
GLUCOSE SERPL-MCNC: 91 MG/DL (ref 65–99)
POTASSIUM SERPL-SCNC: 4.8 MMOL/L (ref 3.5–5.2)
SODIUM SERPL-SCNC: 138 MMOL/L (ref 134–144)

## 2019-09-05 ENCOUNTER — HOSPITAL ENCOUNTER (EMERGENCY)
Age: 59
Discharge: HOME | End: 2019-09-05
Attending: EMERGENCY MEDICINE
Payer: COMMERCIAL

## 2019-09-05 VITALS
RESPIRATION RATE: 14 BRPM | OXYGEN SATURATION: 98 % | BODY MASS INDEX: 23.57 KG/M2 | WEIGHT: 154.98 LBS | TEMPERATURE: 97.9 F | DIASTOLIC BLOOD PRESSURE: 71 MMHG | HEART RATE: 86 BPM | SYSTOLIC BLOOD PRESSURE: 142 MMHG

## 2019-09-05 DIAGNOSIS — K08.89 DENTALGIA: Primary | ICD-10-CM

## 2019-09-05 DIAGNOSIS — K02.9 DENTAL CARIES: ICD-10-CM

## 2019-09-05 PROCEDURE — 74011250637 HC RX REV CODE- 250/637: Performed by: PHYSICIAN ASSISTANT

## 2019-09-05 PROCEDURE — 74011000250 HC RX REV CODE- 250: Performed by: PHYSICIAN ASSISTANT

## 2019-09-05 PROCEDURE — 99283 EMERGENCY DEPT VISIT LOW MDM: CPT

## 2019-09-05 RX ORDER — PENICILLIN V POTASSIUM 500 MG/1
500 TABLET, FILM COATED ORAL 4 TIMES DAILY
Qty: 40 TAB | Refills: 0 | Status: SHIPPED | OUTPATIENT
Start: 2019-09-05 | End: 2019-09-15

## 2019-09-05 RX ORDER — IBUPROFEN 800 MG/1
800 TABLET ORAL
Qty: 30 TAB | Refills: 0 | Status: SHIPPED | OUTPATIENT
Start: 2019-09-05 | End: 2019-09-15

## 2019-09-05 RX ADMIN — LIDOCAINE HYDROCHLORIDE: 20 SOLUTION ORAL; TOPICAL at 07:44

## 2019-09-05 NOTE — ED NOTES
Patient discharge instructions reviewed with patient by LLOYD Carrion. Patient verbalized understanding. Patient ambulatory off unit.
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
## 2019-09-05 NOTE — DISCHARGE INSTRUCTIONS
Emergency [address] by DONNA ORTIZ [facility]  [address] [facility], [address] [facility]  Open M, W, F: 8AM - 5PM and T, Th: 8AM-6PM  Phone: [phone], press 4  $70 for Emergency Care  $60 for first routine care, then pay by sliding scale based upon income. [facility]  [address]  Phone: [phone]    [employer]  [address] [facility]  Open Monday - Friday 8AM - 4:30 PM  Phone: [address] [place] [address] Dentistry Urgent [address] [place] [address] Dentistry, [address] [place] [address], [place] [address], [address] [place] [address] starting at 8:30 AM M-F  Phone: [phone], press 2  Fee: $150 per tooth (x-ray & extractions only)  Pediatrics Phone[de-identified] [phone], 8-5 M-F    [address] Dentistry, [address] [facility], [place] [address], 2nd Floor, [address] [place] [address] starting at 8:30 AM - 3 PM [address] [place] [address] St  [address] [facility], [address] [place] [address]  Phone: [phone] or [phone]  Emergency Hours: 9:30AM - 11AM (extractions)  Simple tooth extraction $ per tooth. #75 for x-ray    [facility] Residents only, over the age of 25  Phone: [phone]. Leave message saying you need an appointment to register. Hours: Tuesday Evenings     Patient Education     Dental Pain: After Your Visit  Your Care Instructions  The most common cause of dental pain is tooth decay. It can also be caused by an infection of the tooth (abscess) or gum, a tooth that has not broken all the way through the gum (impacted tooth), or a problem with the nerve-filled center of the tooth. Follow-up care is a key part of your treatment and safety. Be sure to make and go to all appointments, and call your doctor if you are having problems.  Its also a good idea to know your test results and keep a list of the medicines you take. How can you care for yourself at home? · Contact a dentist for follow-up care. · Put ice or a cold pack on the outside of your mouth for 10 to 20 minutes at a time to reduce pain and swelling. Put a thin cloth between the ice and your skin. · Take an over-the-counter pain medicine, such as acetaminophen (Tylenol), ibuprofen (Advil, Motrin), or naproxen (Aleve). Read and follow all instructions on the label. · Do not take two or more pain medicines at the same time unless the doctor told you to. Many pain medicines have acetaminophen, which is Tylenol. Too much acetaminophen (Tylenol) can be harmful. · Rinse your mouth with warm salt water every 2 hours to help relieve pain and swelling from an infected tooth. Mix 1 teaspoon of salt in 8 ounces of water. · If your doctor prescribed antibiotics, take them as directed. Do not stop taking them just because you feel better. You need to take the full course of antibiotics. When should you call for help? Call your doctor now or seek immediate medical care if:  · You have signs of infection, such as:  ¨ Increased pain, swelling, warmth, or redness. ¨ Pus draining from the gum, tooth, or face. ¨ A fever. Watch closely for changes in your health, and be sure to contact your doctor if:  · You do not get better as expected. Where can you learn more? Go to Vastrm.be  Enter I564 in the search box to learn more about \"Dental Pain: After Your Visit. \"   © 8911-4350 Healthwise, Incorporated. Care instructions adapted under license by [facility] (which disclaims liability or warranty for this information). This care instruction is for use with your licensed healthcare professional. If you have questions about a medical condition or this instruction, always ask your healthcare professional. [place] [address] any warranty or liability for your use of this information. Content Version: 90.7.644965;  Last Revised: May 17, 2013               Patient Education        Tooth Decay: Care Instructions  Your Care Instructions    Tooth decay is damage to a tooth caused by plaque. Plaque is a thin film of bacteria that sticks to the teeth above and below the gum line. If plaque isn't removed from the teeth, it can build up and harden into tartar. The bacteria in plaque and tartar use sugars in food to make acids. These acids can cause tooth decay and gum disease. Any part of your tooth can decay, from the roots below the gum line to the chewing surface. Decay can affect the outer layer (enamel) or inner layer (dentin) of your teeth. The deeper the decay, the worse the damage. Untreated tooth decay will get worse and may lead to tooth loss. If you have a small hole (cavity) in your tooth, your dentist can repair it by removing the decay and filling the hole. If you have deeper decay, you may need more treatment. A very badly damaged tooth may have to be removed. Follow-up care is a key part of your treatment and safety. Be sure to make and go to all appointments, and call your dentist if you are having problems. It's also a good idea to know your test results and keep a list of the medicines you take. How can you care for yourself at home? If you have pain:  · Take an over-the-counter pain medicine, such as acetaminophen (Tylenol), ibuprofen (Advil, Motrin), or naproxen (Aleve). Be safe with medicines. Read and follow all instructions on the label. ? Do not take two or more pain medicines at the same time unless the doctor told you to. Many pain medicines have acetaminophen, which is Tylenol. Too much acetaminophen (Tylenol) can be harmful. · Put ice or a cold pack on your cheek over the tooth for 10 to 15 minutes at a time. Put a thin cloth between the ice and your skin. To prevent tooth decay  · Brush teeth twice a day, and floss once a day.  Brushing with fluoride toothpaste and flossing may be enough to reverse early decay. · Use a toothbrush with soft, rounded-end bristles and a head that is small enough to reach all parts of your teeth and mouth. Replace your toothbrush every 3 or 4 months. You may also use an electric toothbrush that has rotating and oscillating (back-and-forth) action. · Ask your dentist about having fluoride treatments at the dental office. · Brush your tongue to help get rid of bacteria. · Eat healthy foods that include whole grains, vegetables, and fruits. · Have your teeth cleaned by a professional at least two times a year. · Do not smoke or use smokeless tobacco. Tobacco can make tooth decay worse. When should you call for help? Call 911 anytime you think you may need emergency care. For example, call if:    · You have trouble breathing.    Call your dentist now or seek immediate medical care if:    · You have new or worse symptoms of infection, such as:  ? Increased pain, swelling, warmth, or redness. ? Red streaks leading from the area. ? Pus draining from the area. ? A fever.    Watch closely for changes in your health, and be sure to contact your doctor if:    · You do not get better as expected. Where can you learn more? Go to http://nhung-carly.info/. Enter U544 in the search box to learn more about \"Tooth Decay: Care Instructions. \"  Current as of: October 3, 2018  Content Version: 12.1  © 1514-6831 [employer]. Care instructions adapted under license by [employer] (which disclaims liability or warranty for this information). If you have questions about a medical condition or this instruction, always ask your healthcare professional. [place] [address] any warranty or liability for your use of this information.

## 2019-09-18 ENCOUNTER — TELEPHONE (OUTPATIENT)
Dept: INTERNAL MEDICINE CLINIC | Age: 59
End: 2019-09-18

## 2019-09-18 RX ORDER — ALBUTEROL SULFATE 90 UG/1
AEROSOL, METERED RESPIRATORY (INHALATION)
Qty: 1 INHALER | Refills: 6 | Status: SHIPPED | OUTPATIENT
Start: 2019-09-18 | End: 2019-11-15 | Stop reason: SDUPTHER

## 2019-09-18 NOTE — TELEPHONE ENCOUNTER
PCP: Geovani Starr MD    Last appt: 6/21/2019  Future Appointments   Date Time Provider Dedrick Zimmerman   12/17/2019  8:45 AM Geovani Starr MD [facility]       Requested Prescriptions     Pending Prescriptions Disp Refills    albuterol (VENTOLIN HFA) 90 mcg/actuation inhaler 1 Inhaler 6     Sig: INHALE 2 PUFFS BY MOUTH EVERY 6 HOURS AS NEEDED FOR WHEEZING

## 2019-09-18 NOTE — TELEPHONE ENCOUNTER
Caller (if not patient):       Relationship of caller (if not patient):       Best contact number(s):   [phone]     Name of medication and dosage if known:   Inhaler     Is patient out of this medication (yes/no):   yes     Pharmacy name:   [facility] ([address] [place] [address])   Pharmacy listed in chart? (yes/no):   Yes   Pharmacy phone number:       Details to clarify the request:       Velasquez Ribeiro

## 2019-09-30 ENCOUNTER — OFFICE VISIT (OUTPATIENT)
Dept: INTERNAL MEDICINE CLINIC | Age: 59
End: 2019-09-30

## 2019-09-30 VITALS
TEMPERATURE: 97.9 F | BODY MASS INDEX: 23.04 KG/M2 | HEIGHT: 68 IN | HEART RATE: 74 BPM | SYSTOLIC BLOOD PRESSURE: 122 MMHG | RESPIRATION RATE: 16 BRPM | OXYGEN SATURATION: 98 % | WEIGHT: 152 LBS | DIASTOLIC BLOOD PRESSURE: 75 MMHG

## 2019-09-30 DIAGNOSIS — M54.30 SCIATICA, UNSPECIFIED LATERALITY: Primary | ICD-10-CM

## 2019-09-30 DIAGNOSIS — I10 ESSENTIAL HYPERTENSION: ICD-10-CM

## 2019-09-30 DIAGNOSIS — Z23 ENCOUNTER FOR IMMUNIZATION: Primary | ICD-10-CM

## 2019-09-30 DIAGNOSIS — M54.32 SCIATICA, LEFT SIDE: ICD-10-CM

## 2019-09-30 RX ORDER — ROSUVASTATIN CALCIUM 10 MG/1
10 TABLET, COATED ORAL
Qty: 30 TAB | Refills: 6 | Status: SHIPPED | OUTPATIENT
Start: 2019-09-30 | End: 2020-03-18 | Stop reason: SDUPTHER

## 2019-09-30 RX ORDER — METHYLPREDNISOLONE 4 MG/1
TABLET ORAL
Qty: 1 DOSE PACK | Refills: 0 | Status: SHIPPED | OUTPATIENT
Start: 2019-09-30 | End: 2019-10-16

## 2019-09-30 RX ORDER — CITALOPRAM 20 MG/1
TABLET, FILM COATED ORAL
Qty: 30 TAB | Refills: 6 | Status: SHIPPED | OUTPATIENT
Start: 2019-09-30 | End: 2019-12-06

## 2019-09-30 RX ORDER — GABAPENTIN 100 MG/1
100 CAPSULE ORAL
Qty: 30 CAP | Refills: 1 | Status: SHIPPED | OUTPATIENT
Start: 2019-09-30 | End: 2019-12-17

## 2019-09-30 RX ORDER — LISINOPRIL 10 MG/1
TABLET ORAL
Qty: 30 TAB | Refills: 6 | Status: SHIPPED | OUTPATIENT
Start: 2019-09-30 | End: 2019-12-17

## 2019-09-30 NOTE — PROGRESS NOTES
HISTORY OF PRESENT ILLNESS  Jocelyn Avila is a 60 y.o. male. HPI   Last here 6/21/19 Pt is here for acute/routine care. Pt c/o L leg pain  x 1 mo   Pt states he was getting into his truck and had to step up really high and thinks he pulled his muscle   Thinks he has irritated the injury by continuing to walk on leg   Has taken aleve for this which has not helped   States pain starts in hip and moves down his leg   Discussed this is likely sciatica   States the pain is also in his groin and hip  no pain in knee   Will get XR of back and hip   Will give steroid pack and gabapentin to take qhs prn   Will give work note      Will get flu shot today     Needs rf today on lisinopril   bp controlled today     Patient Active Problem List    Diagnosis Date Noted    Cervical stenosis of spinal canal 10/30/2017    Pure hypercholesterolemia 07/28/2017    Prostate cancer ([facility] [address] 01/13/2015    GERD (gastroesophageal reflux disease) 09/07/2011    HTN (hypertension) 09/07/2011    Dizziness and giddiness 06/08/2011    Anxiety 03/29/2011     Current Outpatient Medications   Medication Sig Dispense Refill    albuterol (VENTOLIN HFA) 90 mcg/actuation inhaler INHALE 2 PUFFS BY MOUTH EVERY 6 HOURS AS NEEDED FOR WHEEZING 1 Inhaler 6    pantoprazole (PROTONIX) 20 mg tablet Take 1 Tab by mouth daily as needed for Pain. 30 Tab 1    citalopram (CELEXA) 20 mg tablet TAKE 1 TABLET BY MOUTH DAILY 30 Tab 6    lisinopril (PRINIVIL, ZESTRIL) 10 mg tablet TAKE 1 TABLET BY MOUTH DAILY 30 Tab 6    rosuvastatin (CRESTOR) 10 mg tablet Take 1 Tab by mouth nightly. 30 Tab 6    albuterol (VENTOLIN HFA) 90 mcg/actuation inhaler Take 2 Puffs by inhalation every six (6) hours as needed for Wheezing. 1 Inhaler 11    fluticasone (FLONASE) 50 mcg/actuation nasal spray 2 Sprays by Both Nostrils route daily.        Past Surgical History:   Procedure Laterality Date    ABDOMEN SURGERY PROC UNLISTED Right 1980    inguinal    HX PROSTATECTOMY 8/2015      Lab Results   Component Value Date/Time    WBC 6.9 01/28/2019 03:17 PM    HGB 13.2 01/28/2019 03:17 PM    Hemoglobin (POC) 15.0 10/30/2017 02:13 PM    HCT 39.1 01/28/2019 03:17 PM    Hematocrit (POC) 44 10/30/2017 02:13 PM    PLATELET 347 06/25/8740 03:17 PM    MCV 88 01/28/2019 03:17 PM     Lab Results   Component Value Date/Time    Cholesterol, total 157 01/28/2019 03:17 PM    HDL Cholesterol 49 01/28/2019 03:17 PM    LDL, calculated 89 01/28/2019 03:17 PM    Triglyceride 96 01/28/2019 03:17 PM     Lab Results   Component Value Date/Time    GFR est non-AA 77 06/21/2019 09:03 AM    GFRNA, POC >60 10/30/2017 02:13 PM    GFR est AA 89 06/21/2019 09:03 AM    GFRAA, POC >60 10/30/2017 02:13 PM    Creatinine 1.05 06/21/2019 09:03 AM    Creatinine (POC) 1.1 10/30/2017 02:13 PM    BUN 12 06/21/2019 09:03 AM    BUN (POC) 19 10/30/2017 02:13 PM    Sodium 138 06/21/2019 09:03 AM    Sodium (POC) 137 10/30/2017 02:13 PM    Potassium 4.8 06/21/2019 09:03 AM    Potassium (POC) 4.4 10/30/2017 02:13 PM    Chloride 102 06/21/2019 09:03 AM    Chloride (POC) 103 10/30/2017 02:13 PM    CO2 24 06/21/2019 09:03 AM        Review of Systems   Respiratory: Negative for shortness of breath. Cardiovascular: Negative for chest pain. Physical Exam   Constitutional: He is oriented to person, place, and time. He appears well-developed and well-nourished. No distress. HENT:   Head: Normocephalic and atraumatic. Mouth/Throat: Oropharynx is clear and moist. No oropharyngeal exudate. Eyes: Conjunctivae and EOM are normal. Right eye exhibits no discharge. Left eye exhibits no discharge. Neck: Normal range of motion. Neck supple. Cardiovascular: Normal rate, regular rhythm and normal heart sounds. Exam reveals no gallop and no friction rub. No murmur heard. Pulmonary/Chest: Effort normal and breath sounds normal. No respiratory distress. He has no wheezes. He has no rales. He exhibits no tenderness.    Musculoskeletal: Normal range of motion. He exhibits no edema, tenderness or deformity. Mild crepitus in  L knee    Lymphadenopathy:     He has no cervical adenopathy. Neurological: He is alert and oriented to person, place, and time. He has normal reflexes. Coordination normal.   Skin: Skin is warm and dry. No rash noted. He is not diaphoretic. No erythema. No pallor. Psychiatric: He has a normal mood and affect. His behavior is normal.       ASSESSMENT and PLAN    ICD-10-CM ICD-9-CM    1. Sciatica, left side               will tx with medrol dose pack gabapentin qhs prn will get l spine plain film and check hip plain film as sxs are a bit atypical    M54.32 724.3 XR SPINE LUMB 2 OR 3 V      XR HIP LT W OR WO PELV 2-3 VWS      gabapentin (NEURONTIN) 100 mg capsule   2. Essential hypertension              Controlled  I10 401.9             Scribed by Cristina Barrios of [place], as dictated by Dr. Tan Tijerina. Current diagnosis and concerns discussed with pt at length. Pt understands risks and benefits or current treatment plan and medications, and accepts the treatment and medication with any possible risks. Pt asks appropriate questions, which were answered. Pt was instructed to call with any concerns or problems. I have reviewed the note documented by the scribe. The services provided are my own.   The documentation is accurate

## 2019-09-30 NOTE — PROGRESS NOTES
After obtaining consent, and per verbal order from Dr. Shirlene Griffith, patient received influenza vaccine given by Katie Mckeon in left Deltoid. Influenza Vaccine 0.5 mL IM now. Patient was observed for 10 minutes post injection. Patient tolerated injection well. VIS given.

## 2019-09-30 NOTE — PATIENT INSTRUCTIONS
Office Policies    Phone calls/patient messages:            Please allow up to 24 hours for someone in the office to contact you about your call or message. Be mindful your provider may be out of the office or your message may require further review. We encourage you to use [facility] for your messages as this is a faster, more efficient way to communicate with our office                         Medication Refills:            Prescription medications require 48-72 business hours to process. We encourage you to use [facility] for your refills. For controlled medications: Please allow 72 business hours to process. Certain medications may require you to  a written prescription at our office. NO narcotic/controlled medications will be prescribed after 4pm Monday through Friday or on weekends              Form/Paperwork Completion:            Please note a $25 fee may incur for all paperwork for completed by our providers. We ask that you allow 7-10 business days. Pre-payment is due prior to picking up/faxing the completed form. You may also download your forms to [facility] to have your doctor print off.

## 2019-09-30 NOTE — LETTER
NOTIFICATION RETURN TO WORK / SCHOOL 
 
9/30/2019 9:54 AM 
 
Mr. Sanjay Rocha [facility] [address] [id number] To Whom It May Concern: 
 
Sanjay Rocha is currently under the care of [facility]. He will return to work/school on: Wednesday, October 2, 2019. If there are questions or concerns please have the patient contact our office.  
 
 
 
Sincerely, 
 
 
Kimber Oliva MD

## 2019-10-01 ENCOUNTER — TELEPHONE (OUTPATIENT)
Dept: INTERNAL MEDICINE CLINIC | Age: 59
End: 2019-10-01

## 2019-10-01 NOTE — TELEPHONE ENCOUNTER
[facility] Office Pool             General Message/Vendor Calls     Caller's first and last name:Pt       Reason for call: xray results       Callback required yes/no and why:Yes       Best contact number(s):[phone]       Details to clarify the request:Pt requesting a call back regarding Xray results completed on 09/30/19.        Liz Hawk received & copied from [facility]

## 2019-10-01 NOTE — TELEPHONE ENCOUNTER
Called, spoke to pt. Two pt identifiers confirmed. Pt given xr reports. Pt asking what the diagnosis could be. Pt informed per Dr. Niyah Butler that it seems to be the sciatic nerve--continue medrol pack and gabapentin for sx. Pt verbalized understanding of information discussed w/ no further questions at this time.

## 2019-10-14 ENCOUNTER — TELEPHONE (OUTPATIENT)
Dept: INTERNAL MEDICINE CLINIC | Age: 59
End: 2019-10-14

## 2019-10-14 NOTE — TELEPHONE ENCOUNTER
Spoke to [employer] (HIPAA). Two pt identifiers confirmed. Daljit Cheney offered and accepted appt for 10/16/19 9570. Daljit Cheney verbalized understanding of information discussed w/ no further questions at this time.

## 2019-10-14 NOTE — TELEPHONE ENCOUNTER
----- Message from Tez Olivas sent at 10/14/2019  9:37 AM EDT -----  Regarding: Dr. Camila Rodriguez: [phone]  General Message/Vendor Calls    Caller's first and last name: Ms. Angelica Rosas      Reason for call: Requesting to schedule an appointment regarding her . Patient is still experiencing pain in his leg.        Callback required yes/no and why: Yes      Best contact number(s): [phone]      Details to clarify the request:      Tez Olivas

## 2019-10-15 ENCOUNTER — TELEPHONE (OUTPATIENT)
Dept: INTERNAL MEDICINE CLINIC | Age: 59
End: 2019-10-15

## 2019-10-15 NOTE — TELEPHONE ENCOUNTER
Called, spoke to [employer] (HIPAA). Two pt identifiers confirmed. Papo Santana states that pt's boss sent him out of town today and would not be able to make the 1430 appt. Papo Santana states that pt would be able to do 1430 10/16/19. Papo Santana offered and accepted appt for pt 10/16/19 1430. Papo Santana verbalized understanding of information discussed w/ no further questions at this time.

## 2019-10-15 NOTE — TELEPHONE ENCOUNTER
#[phone] pt couldn't make today's appt and needs something this week later in the afternoon or the beginning of next week for leg pain. Thanks.

## 2019-10-16 ENCOUNTER — OFFICE VISIT (OUTPATIENT)
Dept: INTERNAL MEDICINE CLINIC | Age: 59
End: 2019-10-16

## 2019-10-16 VITALS
DIASTOLIC BLOOD PRESSURE: 65 MMHG | HEART RATE: 77 BPM | SYSTOLIC BLOOD PRESSURE: 120 MMHG | RESPIRATION RATE: 16 BRPM | OXYGEN SATURATION: 98 % | TEMPERATURE: 98 F | WEIGHT: 158 LBS | HEIGHT: 68 IN | BODY MASS INDEX: 23.95 KG/M2

## 2019-10-16 DIAGNOSIS — M54.32 SCIATICA, LEFT SIDE: Primary | ICD-10-CM

## 2019-10-16 DIAGNOSIS — I10 ESSENTIAL HYPERTENSION: ICD-10-CM

## 2019-10-16 RX ORDER — MELOXICAM 15 MG/1
15 TABLET ORAL DAILY
Qty: 30 TAB | Refills: 0 | Status: SHIPPED | OUTPATIENT
Start: 2019-10-16 | End: 2019-12-17

## 2019-10-16 NOTE — PROGRESS NOTES
HISTORY OF PRESENT ILLNESS  Monroe Jackson is a 60 y.o. male. HPI   Last here 9/20/19 Pt is here for acute care. Lov, Pt c/o L leg pain  x 1 mo   Pt states he was getting into his truck and had to step up really high and thinks he pulled his muscle   He states he cannot walk because of pain   lov gave steroid and gabapentin to try pt states neither of these worked   [place] he still has pain when sitting down as well as walking around   Has pain when he is walking   Reviewed XR L hip 9/19: nl   Reviewed XR back 9/19: nl   States pain starts at the top of his thigh goes down his leg   Denies any burning sensation   Denies any weakness   Will get MRI back   Provided referral to ortho Dr Lilibeth Stokes   Will give mobic to take BID in the meantime   Discussed this could be a herniated disc in his back          Patient Active Problem List    Diagnosis Date Noted    Cervical stenosis of spinal canal 10/30/2017    Pure hypercholesterolemia 07/28/2017    Prostate cancer ([facility] [address] 01/13/2015    GERD (gastroesophageal reflux disease) 09/07/2011    HTN (hypertension) 09/07/2011    Dizziness and giddiness 06/08/2011    Anxiety 03/29/2011     Current Outpatient Medications   Medication Sig Dispense Refill    rosuvastatin (CRESTOR) 10 mg tablet Take 1 Tab by mouth nightly. 30 Tab 6    citalopram (CELEXA) 20 mg tablet TAKE 1 TABLET BY MOUTH DAILY 30 Tab 6    lisinopril (PRINIVIL, ZESTRIL) 10 mg tablet TAKE 1 TABLET BY MOUTH DAILY 30 Tab 6    methylPREDNISolone (MEDROL DOSEPACK) 4 mg tablet Per pack 1 Dose Pack 0    gabapentin (NEURONTIN) 100 mg capsule Take 1 Cap by mouth nightly as needed for Pain. Max Daily Amount: 100 mg. 30 Cap 1    albuterol (VENTOLIN HFA) 90 mcg/actuation inhaler INHALE 2 PUFFS BY MOUTH EVERY 6 HOURS AS NEEDED FOR WHEEZING 1 Inhaler 6    pantoprazole (PROTONIX) 20 mg tablet Take 1 Tab by mouth daily as needed for Pain.  30 Tab 1    albuterol (VENTOLIN HFA) 90 mcg/actuation inhaler Take 2 Puffs by inhalation every six (6) hours as needed for Wheezing. 1 Inhaler 11    fluticasone (FLONASE) 50 mcg/actuation nasal spray 2 Sprays by Both Nostrils route daily. Past Surgical History:   Procedure Laterality Date    ABDOMEN SURGERY PROC UNLISTED Right 1980    inguinal    HX PROSTATECTOMY  8/2015      Lab Results   Component Value Date/Time    WBC 6.9 01/28/2019 03:17 PM    HGB 13.2 01/28/2019 03:17 PM    Hemoglobin (POC) 15.0 10/30/2017 02:13 PM    HCT 39.1 01/28/2019 03:17 PM    Hematocrit (POC) 44 10/30/2017 02:13 PM    PLATELET 382 21/68/8171 03:17 PM    MCV 88 01/28/2019 03:17 PM     Lab Results   Component Value Date/Time    Cholesterol, total 157 01/28/2019 03:17 PM    HDL Cholesterol 49 01/28/2019 03:17 PM    LDL, calculated 89 01/28/2019 03:17 PM    Triglyceride 96 01/28/2019 03:17 PM     Lab Results   Component Value Date/Time    GFR est non-AA 77 06/21/2019 09:03 AM    GFRNA, POC >60 10/30/2017 02:13 PM    GFR est AA 89 06/21/2019 09:03 AM    GFRAA, POC >60 10/30/2017 02:13 PM    Creatinine 1.05 06/21/2019 09:03 AM    Creatinine (POC) 1.1 10/30/2017 02:13 PM    BUN 12 06/21/2019 09:03 AM    BUN (POC) 19 10/30/2017 02:13 PM    Sodium 138 06/21/2019 09:03 AM    Sodium (POC) 137 10/30/2017 02:13 PM    Potassium 4.8 06/21/2019 09:03 AM    Potassium (POC) 4.4 10/30/2017 02:13 PM    Chloride 102 06/21/2019 09:03 AM    Chloride (POC) 103 10/30/2017 02:13 PM    CO2 24 06/21/2019 09:03 AM        Review of Systems   Respiratory: Negative for shortness of breath. Cardiovascular: Negative for chest pain. Physical Exam   Constitutional: He is oriented to person, place, and time. He appears well-developed and well-nourished. No distress. HENT:   Head: Normocephalic and atraumatic. Mouth/Throat: Oropharynx is clear and moist. No oropharyngeal exudate. Eyes: Conjunctivae and EOM are normal. Right eye exhibits no discharge. Left eye exhibits no discharge. Neck: Normal range of motion. Neck supple. Cardiovascular: Normal rate, regular rhythm and normal heart sounds. Exam reveals no gallop and no friction rub. No murmur heard. Pulmonary/Chest: Effort normal and breath sounds normal. No respiratory distress. He has no wheezes. He has no rales. He exhibits no tenderness. Musculoskeletal: Normal range of motion. He exhibits no edema, tenderness or deformity. 5/5 strength BLE    R lower leg leg abduction stronger than L due to pain        Lymphadenopathy:     He has no cervical adenopathy. Neurological: He is alert and oriented to person, place, and time. He has normal reflexes. Coordination abnormal.   Skin: Skin is warm and dry. No rash noted. He is not diaphoretic. No erythema. No pallor. Psychiatric: He has a normal mood and affect. His behavior is normal.       ASSESSMENT and PLAN    ICD-10-CM ICD-9-CM    1. Sciatica, left side              Not improved with medrol dose pack and gabapentin plain film unrevealing will get MRI for further eval and give mobic for pain  M54.32 724.3 MRI LUMB SPINE WO CONT      REFERRAL TO ORTHOPEDICS   2. Essential hypertension                Controlled on lisinopril  I10 401.9               Depression screen reviewed and negative. Scribed by Vanna Dobbins of [address] [place] [address] [address], as dictated by Dr. Pierce Zamora. Current diagnosis and concerns discussed with pt at length. Pt understands risks and benefits or current treatment plan and medications, and accepts the treatment and medication with any possible risks. Pt asks appropriate questions, which were answered. Pt was instructed to call with any concerns or problems. I have reviewed the note documented by the scribe. The services provided are my own.   The documentation is accurate

## 2019-10-23 ENCOUNTER — TELEPHONE (OUTPATIENT)
Dept: INTERNAL MEDICINE CLINIC | Age: 59
End: 2019-10-23

## 2019-10-23 NOTE — TELEPHONE ENCOUNTER
Received call from Jacobo at [facility]. Jacobo inquiring if there was an actual order/prescription for the MRI. Informed that there is an order requisition for the MRI. Jacobo asking it be faxed to the same fax as earlier. Jacobo states that it is likely the MRI is to be covered. HERBERT SANTOS faxed MRI Req to Jacobo [phone] w/ confirmation received.

## 2019-10-23 NOTE — TELEPHONE ENCOUNTER
Received call from Jacobo at [facility]. Received OLESYA for pt. Jacobo asking the facility where the MRI would be done-gave info. Jacobo asking last 2 o/v notes and the xr reports from 9/30/19 be faxed in order to review pt's case to see if MRI is going to be covered. Jacobo states that she will contact the [facility] and the pt regarding the determination. Last 2 office notes and 9/30 xr reports faxed to [facility] at [phone] w/ confirmation received.

## 2019-10-23 NOTE — TELEPHONE ENCOUNTER
Coleman with [facility] has faxed over a release of records attentioned to Dr Jenny Garner for PT. PT is claiming workers comp but saw Dr Jenny Garner prior to doing so. Dr Jenny Garner referred PT to MRI and PT has appt tomorrow, but insurance company needs name of MRI center and visit notes.     Contact Coleman at [facility]    # [phone]

## 2019-10-23 NOTE — TELEPHONE ENCOUNTER
[facility] called and MRI has been denied.   There needs to be a peer/peer done and this is the number and ref number to call    Phone [phone]

## 2019-10-29 RX ORDER — CITALOPRAM 20 MG/1
TABLET, FILM COATED ORAL
Qty: 30 TAB | Refills: 0 | Status: SHIPPED | OUTPATIENT
Start: 2019-10-29 | End: 2019-12-06

## 2019-11-16 RX ORDER — ALBUTEROL SULFATE 90 UG/1
AEROSOL, METERED RESPIRATORY (INHALATION)
Qty: 18 G | Refills: 0 | Status: SHIPPED | OUTPATIENT
Start: 2019-11-16 | End: 2019-12-17

## 2019-11-16 RX ORDER — ALBUTEROL SULFATE 90 UG/1
AEROSOL, METERED RESPIRATORY (INHALATION)
Qty: 18 G | Refills: 0 | Status: SHIPPED | OUTPATIENT
Start: 2019-11-16 | End: 2020-03-18 | Stop reason: SDUPTHER

## 2019-11-27 NOTE — PROGRESS NOTES
HISTORY OF PRESENT ILLNESS Sarah Bustamante is a 60 y.o. male. HPI Last here 10/16/19. Pt is here for routine exam. 
 
BP is  
Continues on lisinopril 10mg daily No home readings to reviewed 
  
 
 
 
Wt today is 155 lbs --stable x lov His weight is within normal ranges 
  
Reviewed labs 1/19 Lov, Pt c/o L leg pain  x 1 mo Pt states he was getting into his truck and had to step up really high and thinks he pulled his muscle He states he cannot walk because of pain  
lov gave steroid and gabapentin to try pt states neither of these worked States he still has pain when sitting down as well as walking around Has pain when he is walking Reviewed XR L hip 9/19: nl  
Reviewed XR back 9/19: nl  
States pain starts at the top of his thigh goes down his leg Denies any burning sensation Denies any weakness Will get MRI back Provided referral to ortho Dr Jenelle Tinajero Will give mobic to take BID in the meantime Discussed this could be a herniated disc in his back Pt follows with Dr. Nicolasa Martel (uro) for h/o prostate cancer, annually Last visit was 1/19 
  
Previously, ordered protonix - however he has not been taking this Pt gets mild heartburn sx off and on Reordered protonix, discussed he can take this prn 
   
Continues celexa 20mg daily for anxiety, which works well, happy with dose 6/19 Not feeling anxious or sad 
  
Continues on crestor 10mg for cholesterol Tolerating well, taking routinely no issue at goal last check Recall could not tolerate lipitor  
  
Pt has albuterol to use prn for wheezing/breathing Pt works around dust at his job, which causes him to use his inhaler 
  
ACP not on file. SDM is his wife. Provided information in the past. 
   
PREVENTIVE:   
Colonoscopy: 8/17/11, Dr. Jordyn Sun, repeat 10 years PSA: 8/11, 10/14, 5/16 undetectable, 1/18, 1/19, uro follows Tdap: 7/09/2010 Pneumovax: not yet needed [id number]: not yet needed Shingrix: ordered 07/30/18, reordered 06/21/19 Flu shot: 01/28/19  
A1c: 7/17 5.6, 10/17 5.8, 1/18 5.4, 7/18 5.6, 1/19 5.5 Eye exam: Dr Lourdes Phoenix, 9/11/18 Hep C screen: 7/14/15, negative Lipids: 1/19 LDL 89 EKG: 10/17/17, new ST flattenings  
  
 
Patient Active Problem List  
 Diagnosis Date Noted  Cervical stenosis of spinal canal 10/30/2017  Pure hypercholesterolemia 07/28/2017  Prostate cancer ([facility] [address] 01/13/2015  GERD (gastroesophageal reflux disease) 09/07/2011  
 HTN (hypertension) 09/07/2011  Dizziness and giddiness 06/08/2011  Anxiety 03/29/2011 Current Outpatient Medications Medication Sig Dispense Refill  albuterol (PROVENTIL HFA, VENTOLIN HFA, PROAIR HFA) 90 mcg/actuation inhaler INHALE 2 PUFFS BY MOUTH EVERY 6 HOURS AS NEEDED FOR WHEEZING 18 g 0  
 albuterol (PROVENTIL HFA, VENTOLIN HFA, PROAIR HFA) 90 mcg/actuation inhaler INHALE 2 PUFFS BY MOUTH EVERY 6 HOURS AS NEEDED FOR WHEEZING 18 g 0  
 citalopram (CELEXA) 20 mg tablet TAKE 1 TABLET BY MOUTH DAILY 30 Tab 0  
 meloxicam (MOBIC) 15 mg tablet Take 1 Tab by mouth daily. 30 Tab 0  
 rosuvastatin (CRESTOR) 10 mg tablet Take 1 Tab by mouth nightly. 30 Tab 6  citalopram (CELEXA) 20 mg tablet TAKE 1 TABLET BY MOUTH DAILY 30 Tab 6  
 lisinopril (PRINIVIL, ZESTRIL) 10 mg tablet TAKE 1 TABLET BY MOUTH DAILY 30 Tab 6  
 gabapentin (NEURONTIN) 100 mg capsule Take 1 Cap by mouth nightly as needed for Pain. Max Daily Amount: 100 mg. 30 Cap 1  
 pantoprazole (PROTONIX) 20 mg tablet Take 1 Tab by mouth daily as needed for Pain. 30 Tab 1  
 albuterol (VENTOLIN HFA) 90 mcg/actuation inhaler Take 2 Puffs by inhalation every six (6) hours as needed for Wheezing. 1 Inhaler 11  
 fluticasone (FLONASE) 50 mcg/actuation nasal spray 2 Sprays by Both Nostrils route daily. Past Surgical History:  
Procedure Laterality Date  ABDOMEN SURGERY PROC UNLISTED Right 1980  
 inguinal  
 HX PROSTATECTOMY  8/2015 Lab Results Component Value Date/Time WBC 6.9 01/28/2019 03:17 PM  
 HGB 13.2 01/28/2019 03:17 PM  
 Hemoglobin (POC) 15.0 10/30/2017 02:13 PM  
 HCT 39.1 01/28/2019 03:17 PM  
 Hematocrit (POC) 44 10/30/2017 02:13 PM  
 PLATELET 625 89/20/6641 03:17 PM  
 MCV 88 01/28/2019 03:17 PM  
 
Lab Results Component Value Date/Time Cholesterol, total 157 01/28/2019 03:17 PM  
 HDL Cholesterol 49 01/28/2019 03:17 PM  
 LDL, calculated 89 01/28/2019 03:17 PM  
 Triglyceride 96 01/28/2019 03:17 PM  
 
Lab Results Component Value Date/Time GFR est non-AA 77 06/21/2019 09:03 AM  
 GFRNA, POC >60 10/30/2017 02:13 PM  
 GFR est AA 89 06/21/2019 09:03 AM  
 GFRAA, POC >60 10/30/2017 02:13 PM  
 Creatinine 1.05 06/21/2019 09:03 AM  
 Creatinine (POC) 1.1 10/30/2017 02:13 PM  
 BUN 12 06/21/2019 09:03 AM  
 BUN (POC) 19 10/30/2017 02:13 PM  
 Sodium 138 06/21/2019 09:03 AM  
 Sodium (POC) 137 10/30/2017 02:13 PM  
 Potassium 4.8 06/21/2019 09:03 AM  
 Potassium (POC) 4.4 10/30/2017 02:13 PM  
 Chloride 102 06/21/2019 09:03 AM  
 Chloride (POC) 103 10/30/2017 02:13 PM  
 CO2 24 06/21/2019 09:03 AM  
  
 
ROS Physical Exam 
 
ASSESSMENT and PLAN 
{No Diagnosis Found} Scribed by Jannette Andrea, as dictated by Dr. Yousuf Najera. Current diagnosis and concerns discussed with pt at length. Pt understands risks and benefits or current treatment plan and medications, and accepts the treatment and medication with any possible risks. Pt asks appropriate questions, which were answered. Pt was instructed to call with any concerns or problems. I have reviewed the note documented by the scribe. The services provided are my own. The documentation is accurate

## 2019-12-06 ENCOUNTER — TELEPHONE (OUTPATIENT)
Dept: INTERNAL MEDICINE CLINIC | Age: 59
End: 2019-12-06

## 2019-12-06 RX ORDER — CITALOPRAM 40 MG/1
40 TABLET, FILM COATED ORAL DAILY
Qty: 30 TAB | Refills: 2 | Status: SHIPPED | OUTPATIENT
Start: 2019-12-06 | End: 2020-02-13

## 2019-12-06 NOTE — TELEPHONE ENCOUNTER
Called, spoke to [employer] (HIPAA). Two pt identifiers confirmed. Robert Hutchison informed per  [facility] that pt can increase his citalopram--ordered. Robert Hutchison verbalized understanding of information discussed w/ no further questions at this time.

## 2019-12-16 NOTE — PROGRESS NOTES
HISTORY OF PRESENT ILLNESS  Meryl Sneed is a 60 y.o. male. HPI   Last here 10/16/19. Pt is here for routine exam.     BP is 147/74-- will get repeat   Continues on lisinopril 10mg daily   At  home readings  Running 130-140's  Some 150-160 he reports bp has been up more recently since his anxiety has been worse per his report         Wt today is 149 lbs-- down 6 lbs x lov   His weight is within normal ranges     Reviewed labs 6/19      Lov, Pt c/o L leg pain  x 1 mo   He is now following with Dr April Thakkar (ortho) for this 12/6/19: Mr. Teresa Nation presents with left hip pain radiating into the left anterior thigh into the knee. He experienced less than 50% relief from the L L5-S1 L S1. He describes any injury on Sept. 17 at work from climbing on a tractor trailer as the onset of his pain. Initially, it was thought to be from his back but it appears that it could very well be related to his hip. We will send him to Dr. Debbi Joe for further evaluation. I ordered an MRI for his left hip. Follow up after the MRI.    Reviewed MRI L spine 10/29/19-- mild djd in L spine, asymetric decreased signal in L femoral head, nonspecific could be from DJD   Of note, pt did just complete a MRI of L hip last week but has not received result  He will try to schedule his appt with dr fink after this visit today      Pt follows with Dr. Michelle Maya ([facility]) for h/o prostate cancer, annually  Last visit was 1/19  Next visit is scheduled but pt cannot remember when      Previously, ordered protonix - however he has not been taking this  He states he has not been having issues with this         Pt c/o worsening anxiety starting the week of juan   Pt went to pt's first the day after juan, was given some meds but cannot remember what this was   He feels like he is more shakey and jittery and his BP is up   He called in and we increased celexa to 40 mg  He has been on this for about 2 weeks which he thinks has calmed him down some but still having episodes of shakiness and anxiety   Will start buspar        Continues on crestor 10mg for cholesterol  Tolerating well, taking routinely no issue at goal last check   Recall could not tolerate lipitor      Pt has albuterol to use prn for wheezing/breathing  Pt works around dust at his job, which causes him to use his inhaler     ACP not on file. SDM is his wife. Provided information in the past.      PREVENTIVE:    Colonoscopy: 8/17/11, Dr. Juan Ford, repeat 10 years  PSA: 8/11, 10/14, 5/16 undetectable, 1/18, 1/19, uro follows  Tdap: 7/09/2010  Pneumovax: not yet needed  Brand Galeazzi: not yet needed  Shingrix: ordered 07/30/18, reordered 06/21/19   Flu shot: 9/2019   A1c: 7/17 5.6, 10/17 5.8, 1/18 5.4, 7/18 5.6, 1/19 5.5  Eye exam: Dr [facility], 9/11/18, due   Hep C screen: 7/14/15, negative   Lipids: 1/19 LDL 89  EKG: 10/17/17, new ST flattenings     Patient Active Problem List    Diagnosis Date Noted    Cervical stenosis of spinal canal 10/30/2017    Pure hypercholesterolemia 07/28/2017    Prostate cancer ([facility] [address] 01/13/2015    GERD (gastroesophageal reflux disease) 09/07/2011    HTN (hypertension) 09/07/2011    Dizziness and giddiness 06/08/2011    Anxiety 03/29/2011     Current Outpatient Medications   Medication Sig Dispense Refill    citalopram (CELEXA) 40 mg tablet Take 1 Tab by mouth daily. 30 Tab 2    albuterol (PROVENTIL HFA, VENTOLIN HFA, PROAIR HFA) 90 mcg/actuation inhaler INHALE 2 PUFFS BY MOUTH EVERY 6 HOURS AS NEEDED FOR WHEEZING 18 g 0    albuterol (PROVENTIL HFA, VENTOLIN HFA, PROAIR HFA) 90 mcg/actuation inhaler INHALE 2 PUFFS BY MOUTH EVERY 6 HOURS AS NEEDED FOR WHEEZING 18 g 0    meloxicam (MOBIC) 15 mg tablet Take 1 Tab by mouth daily. 30 Tab 0    rosuvastatin (CRESTOR) 10 mg tablet Take 1 Tab by mouth nightly.  30 Tab 6    lisinopril (PRINIVIL, ZESTRIL) 10 mg tablet TAKE 1 TABLET BY MOUTH DAILY 30 Tab 6    gabapentin (NEURONTIN) 100 mg capsule Take 1 Cap by mouth nightly as needed for Pain. Max Daily Amount: 100 mg. 30 Cap 1    pantoprazole (PROTONIX) 20 mg tablet Take 1 Tab by mouth daily as needed for Pain. 30 Tab 1    albuterol (VENTOLIN HFA) 90 mcg/actuation inhaler Take 2 Puffs by inhalation every six (6) hours as needed for Wheezing. 1 Inhaler 11    fluticasone (FLONASE) 50 mcg/actuation nasal spray 2 Sprays by Both Nostrils route daily. Past Surgical History:   Procedure Laterality Date    ABDOMEN SURGERY PROC UNLISTED Right 1980    inguinal    HX PROSTATECTOMY  8/2015      Lab Results   Component Value Date/Time    WBC 6.9 01/28/2019 03:17 PM    HGB 13.2 01/28/2019 03:17 PM    Hemoglobin (POC) 15.0 10/30/2017 02:13 PM    HCT 39.1 01/28/2019 03:17 PM    Hematocrit (POC) 44 10/30/2017 02:13 PM    PLATELET 535 14/69/7530 03:17 PM    MCV 88 01/28/2019 03:17 PM     Lab Results   Component Value Date/Time    Cholesterol, total 157 01/28/2019 03:17 PM    HDL Cholesterol 49 01/28/2019 03:17 PM    LDL, calculated 89 01/28/2019 03:17 PM    Triglyceride 96 01/28/2019 03:17 PM     Lab Results   Component Value Date/Time    GFR est non-AA 77 06/21/2019 09:03 AM    GFRNA, POC >60 10/30/2017 02:13 PM    GFR est AA 89 06/21/2019 09:03 AM    GFRAA, POC >60 10/30/2017 02:13 PM    Creatinine 1.05 06/21/2019 09:03 AM    Creatinine (POC) 1.1 10/30/2017 02:13 PM    BUN 12 06/21/2019 09:03 AM    BUN (POC) 19 10/30/2017 02:13 PM    Sodium 138 06/21/2019 09:03 AM    Sodium (POC) 137 10/30/2017 02:13 PM    Potassium 4.8 06/21/2019 09:03 AM    Potassium (POC) 4.4 10/30/2017 02:13 PM    Chloride 102 06/21/2019 09:03 AM    Chloride (POC) 103 10/30/2017 02:13 PM    CO2 24 06/21/2019 09:03 AM        Review of Systems   Respiratory: Negative for shortness of breath. Cardiovascular: Negative for chest pain. Psychiatric/Behavioral: The patient is nervous/anxious. Physical Exam  Constitutional:       General: He is not in acute distress. Appearance: He is well-developed.  He is not diaphoretic. HENT:      Head: Normocephalic and atraumatic. Mouth/Throat:      Pharynx: No oropharyngeal exudate. Eyes:      General:         Right eye: No discharge. Left eye: No discharge. Conjunctiva/sclera: Conjunctivae normal.   Neck:      Musculoskeletal: Normal range of motion and neck supple. Vascular: No carotid bruit. Cardiovascular:      Rate and Rhythm: Normal rate and regular rhythm. Heart sounds: Normal heart sounds. No murmur. No friction rub. No gallop. Pulmonary:      Effort: Pulmonary effort is normal. No respiratory distress. Breath sounds: Normal breath sounds. No wheezing or rales. Chest:      Chest wall: No tenderness. Musculoskeletal: Normal range of motion. General: No tenderness or deformity. Right lower leg: No edema. Left lower leg: No edema. Lymphadenopathy:      Cervical: No cervical adenopathy. Skin:     General: Skin is warm and dry. Coloration: Skin is not pale. Findings: No erythema or rash. Neurological:      Mental Status: He is alert and oriented to person, place, and time. Coordination: Coordination normal.      Deep Tendon Reflexes: Reflexes are normal and symmetric. Psychiatric:         Mood and Affect: Mood normal.         Behavior: Behavior normal.         ASSESSMENT and PLAN    ICD-10-CM ICD-9-CM    1. Essential hypertension          bp not well controlled will increase lisinopril to 20 mg  I10 401.9 LIPID PANEL      METABOLIC PANEL, COMPREHENSIVE      CBC W/O DIFF      TSH 3RD GENERATION   2. Prostate cancer [facility])              Follows with dr Beatrice Berumen will see next mo  C61 185 LIPID PANEL      METABOLIC PANEL, COMPREHENSIVE      CBC W/O DIFF      TSH 3RD GENERATION   3. Anxiety            Continue celexa 40 mg add buspar BID to improve anxiety  F41.9 300.00 LIPID PANEL      METABOLIC PANEL, COMPREHENSIVE      CBC W/O DIFF      TSH 3RD GENERATION   4.  Gastroesophageal reflux disease without esophagitis              No longer needing protonix  K21.9 530.81 LIPID PANEL      METABOLIC PANEL, COMPREHENSIVE      CBC W/O DIFF      TSH 3RD GENERATION   5. Pure hypercholesterolemia                  Controlled on crestor  E78.00 272.0 LIPID PANEL      METABOLIC PANEL, COMPREHENSIVE      CBC W/O DIFF      TSH 3RD GENERATION   6. Cervical stenosis of spinal canal                  Follows with dr Jocelyn Eugene currently having problems primamrily in L hip was sent to Dr Areli Wade, had MRI results pending, he will try to schedule with dr fink today  M48.02 723.0 LIPID PANEL      METABOLIC PANEL, COMPREHENSIVE      CBC W/O DIFF      TSH 3RD GENERATION   7. Left hip pain          See above  M25.552 719.45 LIPID PANEL      METABOLIC PANEL, COMPREHENSIVE      CBC W/O DIFF      TSH 3RD GENERATION      Dep screen neg      Scribed by Olya Cm of [address] [place] [address] [address], as dictated by Dr. Burton Townsend. Current diagnosis and concerns discussed with pt at length. Pt understands risks and benefits or current treatment plan and medications, and accepts the treatment and medication with any possible risks. Pt asks appropriate questions, which were answered. Pt was instructed to call with any concerns or problems. I have reviewed the note documented by the scribe. The services provided are my own.   The documentation is accurate

## 2019-12-17 ENCOUNTER — OFFICE VISIT (OUTPATIENT)
Dept: INTERNAL MEDICINE CLINIC | Age: 59
End: 2019-12-17

## 2019-12-17 VITALS
DIASTOLIC BLOOD PRESSURE: 71 MMHG | RESPIRATION RATE: 16 BRPM | OXYGEN SATURATION: 95 % | WEIGHT: 149 LBS | HEIGHT: 68 IN | HEART RATE: 86 BPM | TEMPERATURE: 98.2 F | SYSTOLIC BLOOD PRESSURE: 133 MMHG | BODY MASS INDEX: 22.58 KG/M2

## 2019-12-17 DIAGNOSIS — I10 ESSENTIAL HYPERTENSION: Primary | ICD-10-CM

## 2019-12-17 DIAGNOSIS — E78.00 PURE HYPERCHOLESTEROLEMIA: ICD-10-CM

## 2019-12-17 DIAGNOSIS — M25.552 LEFT HIP PAIN: ICD-10-CM

## 2019-12-17 DIAGNOSIS — C61 PROSTATE CANCER (HCC): ICD-10-CM

## 2019-12-17 DIAGNOSIS — M48.02 CERVICAL STENOSIS OF SPINAL CANAL: ICD-10-CM

## 2019-12-17 DIAGNOSIS — F41.9 ANXIETY: ICD-10-CM

## 2019-12-17 DIAGNOSIS — K21.9 GASTROESOPHAGEAL REFLUX DISEASE WITHOUT ESOPHAGITIS: ICD-10-CM

## 2019-12-17 RX ORDER — LISINOPRIL 20 MG/1
20 TABLET ORAL DAILY
Qty: 30 TAB | Refills: 3 | Status: SHIPPED | OUTPATIENT
Start: 2019-12-17 | End: 2020-03-18 | Stop reason: SDUPTHER

## 2019-12-17 RX ORDER — BUSPIRONE HYDROCHLORIDE 5 MG/1
5 TABLET ORAL 2 TIMES DAILY
Qty: 60 TAB | Refills: 1 | Status: SHIPPED | OUTPATIENT
Start: 2019-12-17 | End: 2020-02-13

## 2019-12-18 LAB
ALBUMIN SERPL-MCNC: 4.3 G/DL (ref 3.6–4.8)
ALBUMIN/GLOB SERPL: 1.7 {RATIO} (ref 1.2–2.2)
ALP SERPL-CCNC: 83 IU/L (ref 39–117)
ALT SERPL-CCNC: 27 IU/L (ref 0–44)
AST SERPL-CCNC: 29 IU/L (ref 0–40)
BILIRUB SERPL-MCNC: 0.7 MG/DL (ref 0–1.2)
BUN SERPL-MCNC: 12 MG/DL (ref 8–27)
BUN/CREAT SERPL: 11 (ref 10–24)
CALCIUM SERPL-MCNC: 9.7 MG/DL (ref 8.6–10.2)
CHLORIDE SERPL-SCNC: 99 MMOL/L (ref 96–106)
CHOLEST SERPL-MCNC: 178 MG/DL (ref 100–199)
CO2 SERPL-SCNC: 22 MMOL/L (ref 20–29)
CREAT SERPL-MCNC: 1.07 MG/DL (ref 0.76–1.27)
ERYTHROCYTE [DISTWIDTH] IN BLOOD BY AUTOMATED COUNT: 12.7 % (ref 12.3–15.4)
GLOBULIN SER CALC-MCNC: 2.6 G/DL (ref 1.5–4.5)
GLUCOSE SERPL-MCNC: 101 MG/DL (ref 65–99)
HCT VFR BLD AUTO: 44.5 % (ref 37.5–51)
HDLC SERPL-MCNC: 102 MG/DL
HGB BLD-MCNC: 14.4 G/DL (ref 13–17.7)
LDLC SERPL CALC-MCNC: 63 MG/DL (ref 0–99)
MCH RBC QN AUTO: 29.1 PG (ref 26.6–33)
MCHC RBC AUTO-ENTMCNC: 32.4 G/DL (ref 31.5–35.7)
MCV RBC AUTO: 90 FL (ref 79–97)
PLATELET # BLD AUTO: 306 X10E3/UL (ref 150–450)
POTASSIUM SERPL-SCNC: 4.6 MMOL/L (ref 3.5–5.2)
PROT SERPL-MCNC: 6.9 G/DL (ref 6–8.5)
RBC # BLD AUTO: 4.94 X10E6/UL (ref 4.14–5.8)
SODIUM SERPL-SCNC: 139 MMOL/L (ref 134–144)
TRIGL SERPL-MCNC: 64 MG/DL (ref 0–149)
TSH SERPL DL<=0.005 MIU/L-ACNC: 1.27 UIU/ML (ref 0.45–4.5)
VLDLC SERPL CALC-MCNC: 13 MG/DL (ref 5–40)
WBC # BLD AUTO: 6.9 X10E3/UL (ref 3.4–10.8)

## 2020-01-12 DIAGNOSIS — M54.32 SCIATICA, LEFT SIDE: ICD-10-CM

## 2020-02-06 ENCOUNTER — DOCUMENTATION ONLY (OUTPATIENT)
Dept: INTERNAL MEDICINE CLINIC | Age: 60
End: 2020-02-06

## 2020-02-06 NOTE — PROGRESS NOTES
Patient presents to the office to drop off Clearance Letter for use of Medications form for Junior Gina MD. Patient advised of 7-10 business day turnaround time for form completion & may incur a fee of $25.00. This has been fully explained to the patient, who indicates understanding.

## 2020-02-07 ENCOUNTER — TELEPHONE (OUTPATIENT)
Dept: INTERNAL MEDICINE CLINIC | Age: 60
End: 2020-02-07

## 2020-02-07 NOTE — TELEPHONE ENCOUNTER
----- Message from Georgette Casanova sent at 2020  8:38 AM EST -----  Regarding: YAEL Guallpa/ Telephone  Contact: [phone]  Patient is requesting a call regarding his paperwork he dropped off  yesterday morning, regarding medication that needed to be faxed over to [facility]. He would like to know if it has been faxed over to them. He needs it done before Wednesday of next week because his medical card will  and he doesn't want that to happen. Please contact the patient, his best contact # [phone] and if he doesn't , leave a detailed message on his voicemail.

## 2020-02-10 NOTE — TELEPHONE ENCOUNTER
Called, spoke to pt. Two pt identifiers confirmed. 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 Pt asking be faxed to the number on the form. Pt informed that [employer] will fax in form and mail a copy to pt. Pt verbalized understanding of information discussed w/ no further questions at this time. DOT forms completed, signed, and faxed to [place] [address]. at [phone] w/ confirmation received.

## 2020-02-13 ENCOUNTER — OFFICE VISIT (OUTPATIENT)
Dept: CARDIOLOGY CLINIC | Age: 60
End: 2020-02-13

## 2020-02-13 VITALS
DIASTOLIC BLOOD PRESSURE: 78 MMHG | SYSTOLIC BLOOD PRESSURE: 120 MMHG | OXYGEN SATURATION: 97 % | HEART RATE: 78 BPM | WEIGHT: 149.4 LBS | BODY MASS INDEX: 22.64 KG/M2 | HEIGHT: 68 IN | RESPIRATION RATE: 16 BRPM

## 2020-02-13 DIAGNOSIS — Z01.818 PRE-OPERATIVE CLEARANCE: Primary | ICD-10-CM

## 2020-02-13 DIAGNOSIS — R06.09 DOE (DYSPNEA ON EXERTION): ICD-10-CM

## 2020-02-13 DIAGNOSIS — I10 ESSENTIAL HYPERTENSION: ICD-10-CM

## 2020-02-13 DIAGNOSIS — C61 PROSTATE CANCER (HCC): ICD-10-CM

## 2020-02-13 DIAGNOSIS — E78.2 MIXED HYPERLIPIDEMIA: ICD-10-CM

## 2020-02-13 RX ORDER — IBUPROFEN 200 MG
200 CAPSULE ORAL AS NEEDED
COMMUNITY
Start: 2020-02-04 | End: 2020-03-17

## 2020-02-13 NOTE — PROGRESS NOTES
1. Have you been to the ER, urgent care clinic since your last visit? Hospitalized since your last visit? NO    2. Have you seen or consulted any other health care providers outside of the [address] [place] [address] since your last visit? Include any pap smears or colon screening. YES, ORTHO. NEW PATIENT. SURGICAL CLEARANCE. NO CARDIAC C/O.

## 2020-02-13 NOTE — PROGRESS NOTES
[address] [place] [address] Ne, [address] [facility]  [phone]     Subjective:      Dorian Bernstein is a 60 y.o. male with pmhx HTN HLD presents to establish care and Seeking cardiac clearance for left total hip arhtoplasty in March 25th to be performed by Dr Magy Carrillo. Has mild metzger. The patient denies chest pain,  orthopnea, PND, LE edema, palpitations, syncope, or presyncope.        Patient Active Problem List    Diagnosis Date Noted    Cervical stenosis of spinal canal 10/30/2017    Pure hypercholesterolemia 07/28/2017    Prostate cancer ([facility] 01/13/2015    GERD (gastroesophageal reflux disease) 09/07/2011    HTN (hypertension) 09/07/2011    Dizziness and giddiness 06/08/2011    Anxiety 03/29/2011      Riso Ferguson MD  Past Medical History:   Diagnosis Date    Anxiety disorder     Arthritis     Asthma     allergies environmental    Cancer ([facility]     prostate CA    HTN (hypertension) 9/7/2011    Other and unspecified symptoms and signs involving general sensations and perceptions     construction accident 2014    Other ill-defined conditions(799.89)     anxiety    Other ill-defined conditions(799.89)     seasonal allergies    Psychiatric disorder     anxiety      Past Surgical History:   Procedure Laterality Date    ABDOMEN SURGERY PROC UNLISTED Right 1980    inguinal    HX PROSTATECTOMY  8/2015     No Known Allergies   Family History   Problem Relation Age of Onset    Diabetes Mother     Hypertension Mother     Hypertension Sister     Kidney Disease Father     Alcohol abuse Brother     Cancer Brother         colon    Anesth Problems Neg Hx       Social History     Socioeconomic History    Marital status:      Spouse name: Not on file    Number of children: Not on file    Years of education: Not on file    Highest education level: Not on file   Occupational History    Not on file   Social Needs    Financial resource strain: Not on file   Yecenia-Edgardo insecurity:     Worry: Not on file     Inability: Not on file    Transportation needs:     Medical: Not on file     Non-medical: Not on file   Tobacco Use    Smoking status: Former Smoker     Packs/day: 0.25     Years: 12.00     Pack years: 3.00     Types: Cigarettes     Last attempt to quit: 2015     Years since quittin.7    Smokeless tobacco: Never Used   Substance and Sexual Activity    Alcohol use: No    Drug use: No    Sexual activity: Yes     Partners: Female   Lifestyle    Physical activity:     Days per week: Not on file     Minutes per session: Not on file    Stress: Not on file   Relationships    Social connections:     Talks on phone: Not on file     Gets together: Not on file     Attends Orthodox service: Not on file     Active member of club or organization: Not on file     Attends meetings of clubs or organizations: Not on file     Relationship status: Not on file    Intimate partner violence:     Fear of current or ex partner: Not on file     Emotionally abused: Not on file     Physically abused: Not on file     Forced sexual activity: Not on file   Other Topics Concern    Not on file   Social History Narrative    Not on file      Current Outpatient Medications   Medication Sig    ibuprofen 200 mg cap Take 200 mg by mouth as needed.  lisinopril (PRINIVIL, ZESTRIL) 20 mg tablet Take 1 Tab by mouth daily.  albuterol (PROVENTIL HFA, VENTOLIN HFA, PROAIR HFA) 90 mcg/actuation inhaler INHALE 2 PUFFS BY MOUTH EVERY 6 HOURS AS NEEDED FOR WHEEZING    rosuvastatin (CRESTOR) 10 mg tablet Take 1 Tab by mouth nightly.  pantoprazole (PROTONIX) 20 mg tablet Take 1 Tab by mouth daily as needed for Pain.  fluticasone (FLONASE) 50 mcg/actuation nasal spray 2 Sprays by Both Nostrils route as needed. No current facility-administered medications for this visit.           Review of Symptoms:  11 systems reviewed, negative other than as stated in the HPI    Physical ExamPhysical Exam: Vitals:    02/13/20 1437 02/13/20 1447   BP: 120/80 120/78   Pulse: 78    Resp: 16    SpO2: 97%    Weight: 149 lb 6.4 oz (67.8 kg)    Height: 5' 8\" (1.727 m)      Body mass index is 22.72 kg/m². General PE  Gen:  NAD  Mental Status - Alert. General Appearance - Not in acute distress. HEENT:  PERRL, no carotid bruits or JVD  Chest and Lung Exam   Inspection: Accessory muscles - No use of accessory muscles in breathing. Auscultation:   Breath sounds: - Normal.   Cardiovascular   Inspection: Jugular vein - Bilateral - Inspection Normal.   Palpation/Percussion:   Apical Impulse: - Normal.   Auscultation: Rhythm - Regular. Heart Sounds - S1 WNL and S2 WNL. No S3 or S4. Murmurs & Other Heart Sounds: Auscultation of the heart reveals - No Murmurs. Peripheral Vascular   Upper Extremity: Inspection - Bilateral - No Cyanotic nailbeds or Digital clubbing. Lower Extremity:   Palpation: Edema - Bilateral - No edema. Abdomen:   Soft, non-tender, bowel sounds are active.   Neuro: A&O times 3, CN and motor grossly WNL    Labs:   Lab Results   Component Value Date/Time    Cholesterol, total 178 12/17/2019 08:48 AM    Cholesterol, total 157 01/28/2019 03:17 PM    Cholesterol, total 152 03/30/2018 08:02 AM    Cholesterol, total 250 (H) 01/30/2018 09:46 AM    Cholesterol, total 220 (H) 07/26/2017 08:06 AM    HDL Cholesterol 102 12/17/2019 08:48 AM    HDL Cholesterol 49 01/28/2019 03:17 PM    HDL Cholesterol 51 03/30/2018 08:02 AM    HDL Cholesterol 52 01/30/2018 09:46 AM    HDL Cholesterol 58 07/26/2017 08:06 AM    LDL, calculated 63 12/17/2019 08:48 AM    LDL, calculated 89 01/28/2019 03:17 PM    LDL, calculated 90 03/30/2018 08:02 AM    LDL, calculated 175 (H) 01/30/2018 09:46 AM    LDL, calculated 143 (H) 07/26/2017 08:06 AM    Triglyceride 64 12/17/2019 08:48 AM    Triglyceride 96 01/28/2019 03:17 PM    Triglyceride 57 03/30/2018 08:02 AM    Triglyceride 116 01/30/2018 09:46 AM    Triglyceride 93 07/26/2017 08:06 AM No results found for: CPK, CPKX, CPX  Lab Results   Component Value Date/Time    Sodium 139 12/17/2019 08:48 AM    Potassium 4.6 12/17/2019 08:48 AM    Chloride 99 12/17/2019 08:48 AM    CO2 22 12/17/2019 08:48 AM    Anion gap 6 10/18/2017 02:22 PM    Glucose 101 (H) 12/17/2019 08:48 AM    BUN 12 12/17/2019 08:48 AM    Creatinine 1.07 12/17/2019 08:48 AM    BUN/Creatinine ratio 11 12/17/2019 08:48 AM    GFR est AA 87 12/17/2019 08:48 AM    GFR est non-AA 75 12/17/2019 08:48 AM    Calcium 9.7 12/17/2019 08:48 AM    Bilirubin, total 0.7 12/17/2019 08:48 AM    AST (SGOT) 29 12/17/2019 08:48 AM    Alk. phosphatase 83 12/17/2019 08:48 AM    Protein, total 6.9 12/17/2019 08:48 AM    Albumin 4.3 12/17/2019 08:48 AM    Globulin 4.0 10/18/2017 02:22 PM    A-G Ratio 1.7 12/17/2019 08:48 AM    ALT (SGPT) 27 12/17/2019 08:48 AM       EKG:  NSR      Assessment:     Assessment:      1. Pre-operative clearance    2. Essential hypertension    3. Prostate cancer ([address]    4. Mixed hyperlipidemia    5. METZGER (dyspnea on exertion)        Orders Placed This Encounter    AMB POC EKG ROUTINE W/ 12 LEADS, INTER & REP     Order Specific Question:   Reason for Exam:     Answer:   ROUTINE    ibuprofen 200 mg cap     Sig: Take 200 mg by mouth as needed. Plan:     Patient presents to establish care and Seeking cardiac clearance for left total hip arhtoplasty in March 25th to be performed by Dr Teri Jaeger. Has mild metzger. Preop clearance   C/o mild METZGER  Negative NST in 10/17  Obtain lenin    HTN  Controlled with Lisinopril    HLD  12/19 LDL at 63 On statin   Lipids and labs followed by PCP. Hx prostate Ca  Followed by Dr Nilton Melton current care and Follow-up to be determined based on test results. Zoltan Santana NP       [facility]    2/13/2020         Patient seen, examined by me personally. Plan discussed as detailed. Agree with note as outlined by  NP.  I confirm findings in history and physical exam. No additional findings noted. Agree with plan as outlined above. Unable to assess symptoms due to his hip injury. Will evaluate as noted.     Valery Gerardo MD

## 2020-02-26 ENCOUNTER — TELEPHONE (OUTPATIENT)
Dept: CARDIOLOGY CLINIC | Age: 60
End: 2020-02-26

## 2020-02-26 NOTE — TELEPHONE ENCOUNTER
Called patient to confirm Nuclear stress test for 02/28. Confirmed time for testing. Reviewed with patient 's spouse the need to hold all caffeine containing food, beverages and medicines for 24 hours. Pt's spouse verbalized understanding.

## 2020-03-03 ENCOUNTER — DOCUMENTATION ONLY (OUTPATIENT)
Dept: CARDIOLOGY CLINIC | Age: 60
End: 2020-03-03

## 2020-03-09 ENCOUNTER — HOSPITAL ENCOUNTER (OUTPATIENT)
Dept: CT IMAGING | Age: 60
Discharge: HOME | End: 2020-03-09
Attending: ORTHOPAEDIC SURGERY
Payer: COMMERCIAL

## 2020-03-09 DIAGNOSIS — M16.12 OSTEOARTHRITIS OF LEFT HIP, UNSPECIFIED OSTEOARTHRITIS TYPE: ICD-10-CM

## 2020-03-09 PROCEDURE — 73700 CT LOWER EXTREMITY W/O DYE: CPT

## 2020-03-16 ENCOUNTER — HOSPITAL ENCOUNTER (OUTPATIENT)
Dept: PREADMISSION TESTING | Age: 60
Discharge: HOME | End: 2020-03-16
Attending: ORTHOPAEDIC SURGERY

## 2020-03-16 RX ORDER — SODIUM CHLORIDE, SODIUM LACTATE, POTASSIUM CHLORIDE, CALCIUM CHLORIDE 600; 310; 30; 20 MG/100ML; MG/100ML; MG/100ML; MG/100ML
25 INJECTION, SOLUTION INTRAVENOUS CONTINUOUS
Status: CANCELLED | OUTPATIENT
Start: 2020-03-25

## 2020-03-16 RX ORDER — ACETAMINOPHEN 500 MG
1000 TABLET ORAL ONCE
Status: CANCELLED | OUTPATIENT
Start: 2020-03-25 | End: 2020-03-25

## 2020-03-16 RX ORDER — PREGABALIN 150 MG/1
150 CAPSULE ORAL ONCE
Status: CANCELLED | OUTPATIENT
Start: 2020-03-25 | End: 2020-03-25

## 2020-03-16 RX ORDER — CELECOXIB 200 MG/1
400 CAPSULE ORAL ONCE
Status: CANCELLED | OUTPATIENT
Start: 2020-03-25 | End: 2020-03-25

## 2020-03-16 NOTE — PERIOP NOTES

## 2020-03-16 NOTE — PERIOP NOTES
Hibiclens/Chlorhexidine    Preventing Infections Before and After - Your Surgery    IMPORTANT INSTRUCTIONS    Please read and follow these instructions carefully. If you are unable to comply with the below instructions your procedure will be cancelled. Every Night for Three (3) nights before your surgery:  1. Shower with an antibacterial soap, such as Dial, or the soap provided at your preassessment appointment. A shower is better than a bath for cleaning your skin. 2. If needed, ask someone to help you reach all areas of your body. Dont forget to clean your belly button with every shower. The night before your surgery: If you lose your Hibiclens/chlorhexidine please contact surgery center or you can purchase it at a local pharmacy  1. On the night before your surgery, shower with an antibacterial soap, such as Dial, or the soap provided at your preassessment appointment. 2. With one packet of Hibiclens/Chlorhexidine in hand, turn water off.  3. Apply Hibiclens antiseptic skin cleanser with a clean, freshly washed washcloth. ? Gently apply to your body from chin to toes (except the genital area) and especially the area(s) where your incision(s) will be. ? Leave Hibiclens/Chlorhexidine on your skin for at least 20 seconds. CAUTION: If needed, Hibiclens/chlorhexidine may be used to clean the folds of skin of the legs (such as in the area of the groin) and on your buttocks and hips. However, do not use Hibiclens/Chlorhexidine above the neck or in the genital area (your bottom) or put inside any area of your body. 4. Turn the water back on and rinse. 5. Dry gently with a clean, freshly washed towel. 6. After your shower, do not use any powder, deodorant, perfumes or lotion. 7. Use clean, freshly washed towels and washcloths every time you shower. 8. Wear clean, freshly washed pajamas to bed the night before surgery. 9. Sleep on clean, freshly washed sheets.   10. Do not allow pets to sleep in your bed with you. The Morning of your surgery:  1. Shower again thoroughly with an antibacterial soap, such as Dial or the soap provided at your preassessment appointment. If needed, ask someone for help to reach all areas of your body. Dont forget to clean your belly button! Rinse. 2. Dry gently with a clean, freshly washed towel. 3. After your shower, do not use any powder, deodorant, perfumes or lotion prior to surgery. 4. Put on clean, freshly washed clothing. Tips to help prevent infections after your surgery:  1. Protect your surgical wound from germs:  ? Hand washing is the most important thing you and your caregivers can do to prevent infections. ? Keep your bandage clean and dry! ? Do not touch your surgical wound. 2. Use clean, freshly washed towels and washcloths every time you shower; do not share bath linens with others. 3. Until your surgical wound is healed, wear clothing and sleep on bed linens each day that are clean and freshly washed. 4. Do not allow pets to sleep in your bed with you or touch your surgical wound. 5. Do not smoke - smoking delays wound healing. This may be a good time to stop smoking. 6. If you have diabetes, it is important for you to manage your blood sugar levels properly before your surgery as well as after your surgery. Poorly managed blood sugar levels slow down wound healing and prevent you from healing completely. If you lose your Hibiclens/chlorhexidine, please call the [facility], or it is available for purchase at your pharmacy.                ___________________      ___________________      ________________  (Signature of Patient)          (Witness)                   (Date and Time)

## 2020-03-16 NOTE — PERIOP NOTES
[facility] Joint/Spine Preoperative Instructions Surgery Date 03/25/2020          Time of Arrival 0615 Contact # 
 
1. On the day of your surgery, please report to the Surgical Services Registration Desk and sign in at your designated time. The Surgery Center is located to the right of the Emergency Room. 2. You must have someone with you to drive you home. You should not drive a car for 24 hours following surgery. Please make arrangements for a friend or family member to stay with you for the first 24 hours after your surgery. 3. No food after midnight . Medications morning of surgery should be taken with a sip of water. Please follow pre-surgery drink instructions that were given at your Pre Admission Testing appointment. 4. We recommend you do not drink any alcoholic beverages for 24 hours before and after your surgery. 5. Contact your surgeons office for instructions on the following medications: non-steroidal anti-inflammatory drugs (i.e. Advil, Aleve), vitamins, and supplements. (Some surgeons will want you to stop these medications prior to surgery and others may allow you to take them) **If you are currently taking Plavix, Coumadin, Aspirin and/or other blood-thinning agents, contact your surgeon for instructions. ** Your surgeon will partner with the physician prescribing these medications to determine if it is safe to stop or if you need to continue taking. Please do not stop taking these medications without instructions from your surgeon 6. Wear comfortable clothes. Wear glasses instead of contacts. Do not bring any money or jewelry. Please bring picture ID, insurance card, and any prearranged co-payment or hospital payment. Do not wear make-up, particularly mascara the morning of your surgery. Do not wear nail polish, particularly if you are having foot /hand surgery.   Wear your hair loose or down, no ponytails, buns, bimal pins or clips. All body piercings must be removed. Please shower with antibacterial soap for three consecutive days before and on the morning of surgery, but do not apply any lotions, powders or deodorants after the shower on the day of surgery. Please use a fresh towels after each shower. Please sleep in clean clothes and change bed linens the night before surgery. Please do not shave for 48 hours prior to surgery. Shaving of the face is acceptable. 7. You should understand that if you do not follow these instructions your surgery may be cancelled. If your physical condition changes (I.e. fever, cold or flu) please contact your surgeon as soon as possible. 8. It is important that you be on time. If a situation occurs where you may be late, please call [phone] (OR Holding Area). 9. If you have any questions and or problems, please call [phone] (Pre-admission Testing). 10. Your surgery time may be subject to change. You will receive a phone call the evening prior if your time changes. 11.  If having outpatient surgery, you must have someone to drive you here, stay with you during the duration of your stay, and to drive you home at time of discharge. 12.   In an effort to improve the efficiency, privacy, and safety for all of our Pre-op patients visitors are not allowed in the Holding area. Once you arrive and are registered your family/visitors will be asked to remain in the waiting room. The Pre-op staff will get you from the Surgical Waiting Area and will explain to you and your family/visitors that the Pre-op phase is beginning. The staff will answer any questions and provide instructions for tracking of the patient, by use of the existing tracking number and color-coded status board in the waiting room.   At this time the staff will also ask for your designated spokesperson information in the event that the physician or staff need to provide an update or obtain any pertinent information. The designated spokesperson will be notified if the physician needs to speak to family during the pre-operative phase. If at any time your family/visitors has questions or concerns they may approach the volunteer desk in the waiting area for assistance. Special Instructions: As a precaution [facility] has implemented a restricted visitation policy to limit to 1 visitor per patient. No visitors under the age of 15 will be permitted in the Hospital.  If you are ill, please call to reschedule your procedure. (For pediatric patients, 2 visitors per patient) TAKE ALL MEDICATIONS THE DAY OF SURGERY EXCEPT: 
 
 
I understand a pre-operative phone call will be made to verify my surgery time. In the event that I am not available, I give permission for a message to be left on my answering service and/or with another person? yes 
 
 
 
 ___________________      __________   _________ 
  (Signature of Patient)             (Witness)                (Date and Time)

## 2020-03-17 ENCOUNTER — HOSPITAL ENCOUNTER (OUTPATIENT)
Dept: PREADMISSION TESTING | Age: 60
Discharge: HOME | End: 2020-03-17
Attending: ORTHOPAEDIC SURGERY
Payer: COMMERCIAL

## 2020-03-17 VITALS
RESPIRATION RATE: 16 BRPM | BODY MASS INDEX: 22.73 KG/M2 | HEART RATE: 68 BPM | HEIGHT: 69 IN | SYSTOLIC BLOOD PRESSURE: 141 MMHG | OXYGEN SATURATION: 98 % | WEIGHT: 153.44 LBS | TEMPERATURE: 97.7 F | DIASTOLIC BLOOD PRESSURE: 66 MMHG

## 2020-03-17 LAB
ABO + RH BLD: NORMAL
ALBUMIN SERPL-MCNC: 3.4 G/DL (ref 3.5–5)
ALBUMIN/GLOB SERPL: 0.9 {RATIO} (ref 1.1–2.2)
ALP SERPL-CCNC: 76 U/L (ref 45–117)
ALT SERPL-CCNC: 36 U/L (ref 12–78)
ANION GAP SERPL CALC-SCNC: 2 MMOL/L (ref 5–15)
APPEARANCE UR: CLEAR
AST SERPL-CCNC: 18 U/L (ref 15–37)
BACTERIA URNS QL MICRO: NEGATIVE /HPF
BILIRUB SERPL-MCNC: 0.4 MG/DL (ref 0.2–1)
BILIRUB UR QL: NEGATIVE
BLOOD GROUP ANTIBODIES SERPL: NORMAL
BUN SERPL-MCNC: 14 MG/DL (ref 6–20)
BUN/CREAT SERPL: 13 (ref 12–20)
CALCIUM SERPL-MCNC: 8.7 MG/DL (ref 8.5–10.1)
CHLORIDE SERPL-SCNC: 105 MMOL/L (ref 97–108)
CO2 SERPL-SCNC: 28 MMOL/L (ref 21–32)
COLOR UR: NORMAL
CREAT SERPL-MCNC: 1.11 MG/DL (ref 0.7–1.3)
EPITH CASTS URNS QL MICRO: NORMAL /LPF
ERYTHROCYTE [DISTWIDTH] IN BLOOD BY AUTOMATED COUNT: 11.7 % (ref 11.5–14.5)
EST. AVERAGE GLUCOSE BLD GHB EST-MCNC: 117 MG/DL
GLOBULIN SER CALC-MCNC: 3.6 G/DL (ref 2–4)
GLUCOSE SERPL-MCNC: 91 MG/DL (ref 65–100)
GLUCOSE UR STRIP.AUTO-MCNC: NEGATIVE MG/DL
HBA1C MFR BLD: 5.7 % (ref 4–5.6)
HCT VFR BLD AUTO: 40.1 % (ref 36.6–50.3)
HGB BLD-MCNC: 13.3 G/DL (ref 12.1–17)
HGB UR QL STRIP: NEGATIVE
HYALINE CASTS URNS QL MICRO: NORMAL /LPF (ref 0–5)
INR PPP: 0.9 (ref 0.9–1.1)
KETONES UR QL STRIP.AUTO: NEGATIVE MG/DL
LEUKOCYTE ESTERASE UR QL STRIP.AUTO: NEGATIVE
MCH RBC QN AUTO: 30.3 PG (ref 26–34)
MCHC RBC AUTO-ENTMCNC: 33.2 G/DL (ref 30–36.5)
MCV RBC AUTO: 91.3 FL (ref 80–99)
NITRITE UR QL STRIP.AUTO: NEGATIVE
NRBC # BLD: 0 K/UL (ref 0–0.01)
NRBC BLD-RTO: 0 PER 100 WBC
PH UR STRIP: 5.5 [PH] (ref 5–8)
PLATELET # BLD AUTO: 311 K/UL (ref 150–400)
PMV BLD AUTO: 9.3 FL (ref 8.9–12.9)
POTASSIUM SERPL-SCNC: 4.3 MMOL/L (ref 3.5–5.1)
PROT SERPL-MCNC: 7 G/DL (ref 6.4–8.2)
PROT UR STRIP-MCNC: NEGATIVE MG/DL
PROTHROMBIN TIME: 9.8 SEC (ref 9–11.1)
RBC # BLD AUTO: 4.39 M/UL (ref 4.1–5.7)
RBC #/AREA URNS HPF: NORMAL /HPF (ref 0–5)
SODIUM SERPL-SCNC: 135 MMOL/L (ref 136–145)
SP GR UR REFRACTOMETRY: 1.01 (ref 1–1.03)
SPECIMEN EXP DATE BLD: NORMAL
UA: UC IF INDICATED,UAUC: NORMAL
UROBILINOGEN UR QL STRIP.AUTO: 0.2 EU/DL (ref 0.2–1)
WBC # BLD AUTO: 6.9 K/UL (ref 4.1–11.1)
WBC URNS QL MICRO: NORMAL /HPF (ref 0–4)

## 2020-03-17 PROCEDURE — 81001 URINALYSIS AUTO W/SCOPE: CPT

## 2020-03-17 PROCEDURE — 83036 HEMOGLOBIN GLYCOSYLATED A1C: CPT

## 2020-03-17 PROCEDURE — 80053 COMPREHEN METABOLIC PANEL: CPT

## 2020-03-17 PROCEDURE — 85610 PROTHROMBIN TIME: CPT

## 2020-03-17 PROCEDURE — 36415 COLL VENOUS BLD VENIPUNCTURE: CPT

## 2020-03-17 PROCEDURE — 86900 BLOOD TYPING SEROLOGIC ABO: CPT

## 2020-03-17 PROCEDURE — 85027 COMPLETE CBC AUTOMATED: CPT

## 2020-03-17 RX ORDER — IBUPROFEN 800 MG/1
800 TABLET ORAL
COMMUNITY
End: 2020-05-26

## 2020-03-17 NOTE — PERIOP NOTES
EKG last done 02/13/2020. Results in [facility]. Was not repeated during PAT appointment. Cardiac clearance done by Dr Kenyetta Josue.

## 2020-03-17 NOTE — PERIOP NOTES
Incentive Spirometer        Using the incentive spirometer helps expand the small air sacs of your lungs, helps you breathe deeply, and helps improve your lung function. Use your incentive spirometer twice a day (10 breaths each time) prior to surgery. How to Use Your Incentive Spirometer:  1. Hold the incentive spirometer in an upright position. 2. Breathe out as usual.   3. Place the mouthpiece in your mouth and seal your lips tightly around it. 4. Take a deep breath. Breathe in slowly and as deeply as possible. Keep the blue flow rate guide between the arrows. 5. Hold your breath as long as possible. Then exhale slowly and allow the piston to fall to the bottom of the column. 6. Rest for a few seconds and repeat steps one through five at least 10 times. PAT Tidal Volume________2500__________  x__2______________  Date__03/17/2020_____________________    Scotty Garcíaans THE INCENTIVE SPIROMETER WITH YOU TO THE HOSPITAL ON THE DAY OF YOUR SURGERY. Opportunity given to ask and answer questions as well as to observe return demonstration.     Patient signature_____________________________    Witness____________________________

## 2020-03-17 NOTE — PERIOP NOTES
[facility]  Joint/Spine Preoperative Instructions        Surgery Date 03/25/2020          Time of Arrival 0615 AM Contact # [phone] wife's number or cell [phone]    1. On the day of your surgery, please report to the Surgical Services Registration Desk and sign in at your designated time. The Surgery Center is located to the right of the Emergency Room. 2. You must have someone with you to drive you home. You should not drive a car for 24 hours following surgery. Please make arrangements for a friend or family member to stay with you for the first 24 hours after your surgery. 3. No food after midnight . Medications morning of surgery should be taken with a sip of water. Please follow pre-surgery drink instructions that were given at your Pre Admission Testing appointment. 4. We recommend you do not drink any alcoholic beverages for 24 hours before and after your surgery. 5. Contact your surgeons office for instructions on the following medications: non-steroidal anti-inflammatory drugs (i.e. Advil, Aleve), vitamins, and supplements. (Some surgeons will want you to stop these medications prior to surgery and others may allow you to take them)  **If you are currently taking Plavix, Coumadin, Aspirin and/or other blood-thinning agents, contact your surgeon for instructions. ** Your surgeon will partner with the physician prescribing these medications to determine if it is safe to stop or if you need to continue taking. Please do not stop taking these medications without instructions from your surgeon    6. Wear comfortable clothes. Wear glasses instead of contacts. Do not bring any money or jewelry. Please bring picture ID, insurance card, and any prearranged co-payment or hospital payment. Do not wear make-up, particularly mascara the morning of your surgery. Do not wear nail polish, particularly if you are having foot /hand surgery.   Wear your hair loose or down, no ponytails, buns, bimal pins or clips. All body piercings must be removed. Please shower with antibacterial soap for three consecutive days before and on the morning of surgery, but do not apply any lotions, powders or deodorants after the shower on the day of surgery. Please use a fresh towels after each shower. Please sleep in clean clothes and change bed linens the night before surgery. Please do not shave for 48 hours prior to surgery. Shaving of the face is acceptable. 7. You should understand that if you do not follow these instructions your surgery may be cancelled. If your physical condition changes (I.e. fever, cold or flu) please contact your surgeon as soon as possible. 8. It is important that you be on time. If a situation occurs where you may be late, please call [phone] (OR Holding Area). 9. If you have any questions and or problems, please call [phone] (Pre-admission Testing). 10. Your surgery time may be subject to change. You will receive a phone call the evening prior if your time changes. 11.  If having outpatient surgery, you must have someone to drive you here, stay with you during the duration of your stay, and to drive you home at time of discharge. 12.   In an effort to improve the efficiency, privacy, and safety for all of our Pre-op patients visitors are not allowed in the Holding area. Once you arrive and are registered your family/visitors will be asked to remain in the waiting room. The Pre-op staff will get you from the Surgical Waiting Area and will explain to you and your family/visitors that the Pre-op phase is beginning. The staff will answer any questions and provide instructions for tracking of the patient, by use of the existing tracking number and color-coded status board in the waiting room.   At this time the staff will also ask for your designated spokesperson information in the event that the physician or staff need to provide an update or obtain any pertinent information. The designated spokesperson will be notified if the physician needs to speak to family during the pre-operative phase. If at any time your family/visitors has questions or concerns they may approach the volunteer desk in the waiting area for assistance. Special Instructions:   As a precaution [facility] has implemented a restricted visitation policy to limit to 1 visitor per patient. No visitors under the age of 15 will be permitted in the Hospital.  If you are ill, please call to reschedule your procedure. (For pediatric patients, 2 visitors per patient)    TAKE ALL MEDICATIONS THE DAY OF SURGERY EXCEPT:LISINOPRIL      I understand a pre-operative phone call will be made to verify my surgery time. In the event that I am not available, I give permission for a message to be left on my answering service and/or with another person?   yes         ___________________      __________   _________    (Signature of Patient)             (Witness)                (Date and Time)

## 2020-03-18 ENCOUNTER — TELEPHONE (OUTPATIENT)
Dept: INTERNAL MEDICINE CLINIC | Age: 60
End: 2020-03-18

## 2020-03-18 ENCOUNTER — OFFICE VISIT (OUTPATIENT)
Dept: INTERNAL MEDICINE CLINIC | Age: 60
End: 2020-03-18

## 2020-03-18 VITALS
HEIGHT: 69 IN | DIASTOLIC BLOOD PRESSURE: 75 MMHG | BODY MASS INDEX: 22.99 KG/M2 | RESPIRATION RATE: 16 BRPM | TEMPERATURE: 97.2 F | OXYGEN SATURATION: 99 % | HEART RATE: 86 BPM | SYSTOLIC BLOOD PRESSURE: 130 MMHG | WEIGHT: 155.2 LBS

## 2020-03-18 DIAGNOSIS — K21.9 GASTROESOPHAGEAL REFLUX DISEASE WITHOUT ESOPHAGITIS: ICD-10-CM

## 2020-03-18 DIAGNOSIS — R21 RASH: ICD-10-CM

## 2020-03-18 DIAGNOSIS — L21.9 SEBORRHEIC DERMATITIS: ICD-10-CM

## 2020-03-18 DIAGNOSIS — E78.00 PURE HYPERCHOLESTEROLEMIA: ICD-10-CM

## 2020-03-18 DIAGNOSIS — I10 ESSENTIAL HYPERTENSION: ICD-10-CM

## 2020-03-18 DIAGNOSIS — M25.552 PAIN OF LEFT HIP JOINT: ICD-10-CM

## 2020-03-18 DIAGNOSIS — C61 PROSTATE CANCER (HCC): ICD-10-CM

## 2020-03-18 DIAGNOSIS — F41.9 ANXIETY: ICD-10-CM

## 2020-03-18 DIAGNOSIS — L82.1 SEBORRHEIC KERATOSIS: ICD-10-CM

## 2020-03-18 DIAGNOSIS — Z01.818 PREOP EXAMINATION: Primary | ICD-10-CM

## 2020-03-18 LAB
BACTERIA SPEC CULT: NORMAL
BACTERIA SPEC CULT: NORMAL
SERVICE CMNT-IMP: NORMAL

## 2020-03-18 RX ORDER — ROSUVASTATIN CALCIUM 10 MG/1
10 TABLET, COATED ORAL
Qty: 30 TAB | Refills: 6 | Status: SHIPPED | OUTPATIENT
Start: 2020-03-18 | End: 2020-06-16 | Stop reason: SDUPTHER

## 2020-03-18 RX ORDER — KETOCONAZOLE 20 MG/G
CREAM TOPICAL DAILY
Qty: 30 G | Refills: 1 | Status: SHIPPED | OUTPATIENT
Start: 2020-03-18 | End: 2020-05-26

## 2020-03-18 RX ORDER — ALBUTEROL SULFATE 90 UG/1
AEROSOL, METERED RESPIRATORY (INHALATION)
Qty: 18 G | Refills: 0 | Status: SHIPPED | OUTPATIENT
Start: 2020-03-18 | End: 2020-06-16 | Stop reason: SDUPTHER

## 2020-03-18 RX ORDER — BUSPIRONE HYDROCHLORIDE 5 MG/1
5 TABLET ORAL
Qty: 60 TAB | Refills: 1 | Status: SHIPPED | OUTPATIENT
Start: 2020-03-18 | End: 2020-05-26

## 2020-03-18 RX ORDER — KETOCONAZOLE 2 G/100G
AEROSOL, FOAM TOPICAL 2 TIMES DAILY
Qty: 100 G | Refills: 1 | Status: SHIPPED | OUTPATIENT
Start: 2020-03-18 | End: 2021-05-10

## 2020-03-18 RX ORDER — LISINOPRIL 20 MG/1
20 TABLET ORAL DAILY
Qty: 30 TAB | Refills: 5 | Status: SHIPPED | OUTPATIENT
Start: 2020-03-18 | End: 2020-10-02

## 2020-03-18 RX ORDER — CITALOPRAM 20 MG/1
20 TABLET, FILM COATED ORAL DAILY
Qty: 90 TAB | Refills: 1 | Status: SHIPPED | OUTPATIENT
Start: 2020-03-18 | End: 2020-05-26

## 2020-03-18 NOTE — ADVANCED PRACTICE NURSE
PAT Nurse Practitioner   Pre-Operative Chart Review/Assessment:-ORTHOPEDIC/NEUROSURGICAL SPINE                Patient Name:  Heidi Benjamin                                                         Age:   60 y.o.    :  1960     Today's Date:  3/18/2020     Date of PAT:   3/17/20      Date of Surgery:    6/3/20     Procedure(s):  Left total hip arthroplasty-Makoplasty     Surgeon:   Sushil Moon     Medical Clearance:  Dr. Elmer Aparicio                   PLAN:      1)  Cardiac Clearance:  Dr. James Lopez        2)  Diabetic Treatment Consult:  Not indicated-A1C 5.7      3)  Sleep Apnea evaluation:   Not indicated-JOSE 3       4) Treatment for MRSA/Staph Aureus:  Negative       5) Additional Concerns:  Anxiety, asthma, hx of prostate CA.  Former smoker                 Vital Signs:         Vitals:    20 0703   BP: 141/66   Pulse: 68   Resp: 16   Temp: 97.7 °F (36.5 °C)   SpO2: 98%   Weight: 69.6 kg (153 lb 7 oz)   Height: 5' 8.5\" (1.74 m)            ____________________________________________  PAST MEDICAL HISTORY  Past Medical History:   Diagnosis Date    Anxiety disorder     Arthritis     Asthma     allergies environmental    Cancer ([address]     prostate CA    GERD (gastroesophageal reflux disease)     High cholesterol     HTN (hypertension) 2011    Other and unspecified symptoms and signs involving general sensations and perceptions     construction accident     Other ill-defined conditions(799.89)     anxiety    Other ill-defined conditions(799.89)     seasonal allergies    Psychiatric disorder     anxiety      ____________________________________________  PAST SURGICAL HISTORY  Past Surgical History:   Procedure Laterality Date    ABDOMEN SURGERY PROC UNLISTED Right     inguinal    HX PROSTATECTOMY  2015    HX SHOULDER ARTHROSCOPY Left       ____________________________________________  HOME MEDICATIONS    Current Outpatient Medications   Medication Sig    ibuprofen (MOTRIN) 800 mg tablet Take 800 mg by mouth every eight (8) hours as needed for Pain.  pantoprazole (PROTONIX) 20 mg tablet Take 1 Tab by mouth daily as needed for Pain.  fluticasone (FLONASE) 50 mcg/actuation nasal spray 2 Sprays by Both Nostrils route as needed.  albuterol (PROVENTIL HFA, VENTOLIN HFA, PROAIR HFA) 90 mcg/actuation inhaler Prn    lisinopriL (PRINIVIL, ZESTRIL) 20 mg tablet Take 1 Tab by mouth daily.  rosuvastatin (CRESTOR) 10 mg tablet Take 1 Tab by mouth nightly.  citalopram (CELEXA) 20 mg tablet Take 1 Tab by mouth daily.  busPIRone (BUSPAR) 5 mg tablet Take 1 Tab by mouth two (2) times daily as needed (anxiety).  ketoconazole (NIZORAL) 2 % topical cream Apply  to affected area daily.  Ketoconazole 2 % topical foam Apply  to affected area two (2) times a day. Apply to scalp     No current facility-administered medications for this encounter.       ____________________________________________  ALLERGIES  No Known Allergies   ____________________________________________  SOCIAL HISTORY  Social History     Tobacco Use    Smoking status: Former Smoker     Packs/day: 0.25     Years: 12.00     Pack years: 3.00     Types: Cigarettes     Last attempt to quit: 2015     Years since quittin.8    Smokeless tobacco: Never Used   Substance Use Topics    Alcohol use: No      ____________________________________________        Labs:     Results for Marisela Moon (MRN [id number]) as of 3/18/2020 16:48   Ref.  Range 3/17/2020 07:19   WBC Latest Ref Range: 4.1 - 11.1 K/uL 6.9   NRBC Latest Ref Range: 0  WBC 0.0   RBC Latest Ref Range: 4.10 - 5.70 M/uL 4.39   HGB Latest Ref Range: 12.1 - 17.0 g/dL 13.3   HCT Latest Ref Range: 36.6 - 50.3 % 40.1   MCV Latest Ref Range: 80.0 - 99.0 FL 91.3   MCH Latest Ref Range: 26.0 - 34.0 PG 30.3   MCHC Latest Ref Range: 30.0 - 36.5 g/dL 33.2   RDW Latest Ref Range: 11.5 - 14.5 % 11.7   PLATELET Latest Ref Range: 150 - 400 K/uL 311   MPV Latest Ref Range: 8.9 - 12.9 FL 9.3   ABSOLUTE NRBC Latest Ref Range: 0.00 - 0.01 K/uL 0.00   Color Latest Units:   YELLOW/STRAW   Appearance Latest Ref Range: CLEAR   CLEAR   Specific gravity Latest Ref Range: 1.003 - 1.030   1.010   pH (UA) Latest Ref Range: 5.0 - 8.0   5.5   Protein Latest Ref Range: NEG mg/dL NEGATIVE   Glucose Latest Ref Range: NEG mg/dL NEGATIVE   Ketone Latest Ref Range: NEG mg/dL NEGATIVE   Blood Latest Ref Range: NEG   NEGATIVE   Bilirubin Latest Ref Range: NEG   NEGATIVE   Urobilinogen Latest Ref Range: 0.2 - 1.0 EU/dL 0.2   Nitrites Latest Ref Range: NEG   NEGATIVE   Leukocyte Esterase Latest Ref Range: NEG   NEGATIVE   Epithelial cells Latest Ref Range: FEW /lpf FEW   WBC Latest Ref Range: 0 - 4 /hpf 0-4   RBC Latest Ref Range: 0 - 5 /hpf 0-5   Bacteria Latest Ref Range: NEG /hpf NEGATIVE   Hyaline cast Latest Ref Range: 0 - 5 /lpf 0-2   INR Latest Ref Range: 0.9 - 1.1   0.9   Prothrombin time Latest Ref Range: 9.0 - 11.1 sec 9.8   Sodium Latest Ref Range: 136 - 145 mmol/L 135 (L)   Potassium Latest Ref Range: 3.5 - 5.1 mmol/L 4.3   Chloride Latest Ref Range: 97 - 108 mmol/L 105   CO2 Latest Ref Range: 21 - 32 mmol/L 28   Anion gap Latest Ref Range: 5 - 15 mmol/L 2 (L)   Glucose Latest Ref Range: 65 - 100 mg/dL 91   BUN Latest Ref Range: 6 - 20 MG/DL 14   Creatinine Latest Ref Range: 0.70 - 1.30 MG/DL 1.11   BUN/Creatinine ratio Latest Ref Range: 12 - 20   13   Calcium Latest Ref Range: 8.5 - 10.1 MG/DL 8.7   GFR est non-AA Latest Ref Range: >60 ml/min/1.73m2 >60   GFR est AA Latest Ref Range: >60 ml/min/1.73m2 >60   Bilirubin, total Latest Ref Range: 0.2 - 1.0 MG/DL 0.4   Protein, total Latest Ref Range: 6.4 - 8.2 g/dL 7.0   Albumin Latest Ref Range: 3.5 - 5.0 g/dL 3.4 (L)   Globulin Latest Ref Range: 2.0 - 4.0 g/dL 3.6   A-G Ratio Latest Ref Range: 1.1 - 2.2   0.9 (L)   ALT (SGPT) Latest Ref Range: 12 - 78 U/L 36   AST Latest Ref Range: 15 - 37 U/L 18   Alk.  phosphatase Latest Ref Range: 45 - 117 U/L 76   Hemoglobin A1c, (calculated) Latest Ref Range: 4.0 - 5.6 % 5.7 (H)   Est. average glucose Latest Units: mg/dL 117   CULTURE, MRSA Unknown Rpt   Crossmatch Expiration Unknown 03/28/2020   TYPE & SCREEN Unknown Rpt       Skin:   Denies open wounds, cuts, sores, rashes or other areas of concern in PAT assessment       Heena Fisher, NP

## 2020-03-18 NOTE — TELEPHONE ENCOUNTER
Called, Spoke with patient, scheduled appt today, 3/18/2020, at 11 am. Patient may arrive early. Patient states he has a rash on his face, mainly under his eyes. COVID19 screening complete, patient has no symptoms.       Pt # - [phone]

## 2020-03-18 NOTE — PROGRESS NOTES
HISTORY OF PRESENT ILLNESS  Veronica Merchant is a 60 y.o. male. HPI  Last here 12/17/19. Pt is here for routine exam.        Pt c/o rash on his face x 1-2 weeks   He states this comes and goes   States this happens after being outside   Has been using exederm otc for this which has not helped much   States this feels like burning   Discussed this is a seborrheic dermatitis likely   Will give nizoral topical cream and ketoconazole shampoo for itching in scalp          BP is 130/75   Continues on lisinopril 10mg daily   No home readings         Wt today is 155 lbs-- up 6 lbs x lov   His weight is within normal ranges     Reviewed labs 12/19       Prev Pt c/o L leg pain  x 1 mo   He is now following with Dr Dhruv Herzog (ortho) for this   He is getting a hip replacement with Dr Sera Pichardo has been completing PT, started yesterday     Pt denies cp, sob, palpitations, orthopnea, claudication, PND, and new swelling in legs  Pt can sleep laying flat  Pt can walk up a set of stairs  Pt can walk around the mall   Pt can vacuum and do laundry    Functional mets >>4       Pt saw Dr Kenyetta Josue for preop clearance   Reviewed notes 2/13/20: Patient presents to establish care and Seeking cardiac clearance for left total hip arhtoplasty in March 25th to be performed by Dr Stephanie Wan. Has mild metzger. Preop clearance   C/o mild METZGER  Negative NST in 10/17  Obtain lenin  HTN  Controlled with Lisinopril  HLD  12/19 LDL at 63 On statin   Lipids and labs followed by PCP. Hx prostate Ca  Followed by Dr Westley Basilio test 2/20: Baseline ECG: Sinus rhythm. · Gated SPECT: Left ventricular function post-stress was normal. Calculated ejection fraction is 72%. There is no evidence of transient ischemic dilation (TID). · Left ventricular perfusion is normal.  Normal myocardial perfusion imaging.  Myocardial perfusion imaging supports a low risk stress test.    Pt follows with Dr. Joaquin Rudolph (uro) for h/o prostate cancer, annually  Last visit was 1/19  Next visit is scheduled for 3/20     Pt uses protonix prn which works well          Lov, Pt c/o worsening anxiety starting the week of thanksgiving   Pt is on celexa 40 mg but ran out of this   Started buspar lov which helped but ran out of this as well   Will restart both, restart celexa at 20 mg         Continues on crestor 10mg for cholesterol  Tolerating well, taking routinely no issue at goal last check   Recall could not tolerate lipitor      Pt has albuterol to use prn for wheezing/breathing  Pt works around dust at his job, which causes him to use his inhaler      Pt wonders about spot on his back   Discussed this looks like SK, advised pt to see derm at his convenience to get this checked out     ACP not on file. SDM is his wife. Provided information in the past.      PREVENTIVE:    Colonoscopy: 8/17/11, Dr. Lindsey Acevedo, repeat 10 years  PSA: 8/11, 10/14, 5/16 undetectable, 1/18, 1/19, uro follows  Tdap: 7/09/2010  Pneumovax: not yet needed  Harmeet Nelly: not yet needed  Shingrix: ordered 07/30/18, reordered 06/21/19   Flu shot: 9/2019   A1c: 7/17 5.6, 10/17 5.8, 1/18 5.4, 7/18 5.6, 1/19 5.5 3/20 5.7   Eye exam: Dr Mandi Berger, 9/11/18, due, pt states he will schedule   Hep C screen: 7/14/15, negative   Lipids: 12/19 LDL 63  EKG: 10/17/17, new ST flattenings   Patient Active Problem List    Diagnosis Date Noted    Cervical stenosis of spinal canal 10/30/2017    Pure hypercholesterolemia 07/28/2017    Prostate cancer ([facility] [address] 01/13/2015    GERD (gastroesophageal reflux disease) 09/07/2011    HTN (hypertension) 09/07/2011    Dizziness and giddiness 06/08/2011    Anxiety 03/29/2011     Current Outpatient Medications   Medication Sig Dispense Refill    ibuprofen (MOTRIN) 800 mg tablet Take 800 mg by mouth every eight (8) hours as needed for Pain.  lisinopril (PRINIVIL, ZESTRIL) 20 mg tablet Take 1 Tab by mouth daily.  30 Tab 3    albuterol (PROVENTIL HFA, VENTOLIN HFA, PROAIR HFA) 90 mcg/actuation inhaler INHALE 2 PUFFS BY MOUTH EVERY 6 HOURS AS NEEDED FOR WHEEZING 18 g 0    rosuvastatin (CRESTOR) 10 mg tablet Take 1 Tab by mouth nightly. 30 Tab 6    pantoprazole (PROTONIX) 20 mg tablet Take 1 Tab by mouth daily as needed for Pain. 30 Tab 1    fluticasone (FLONASE) 50 mcg/actuation nasal spray 2 Sprays by Both Nostrils route as needed. Past Surgical History:   Procedure Laterality Date    ABDOMEN SURGERY PROC UNLISTED Right 1980    inguinal    HX PROSTATECTOMY  8/2015    HX SHOULDER ARTHROSCOPY Left       Lab Results   Component Value Date/Time    WBC 6.9 03/17/2020 07:19 AM    HGB 13.3 03/17/2020 07:19 AM    Hemoglobin (POC) 15.0 10/30/2017 02:13 PM    HCT 40.1 03/17/2020 07:19 AM    Hematocrit (POC) 44 10/30/2017 02:13 PM    PLATELET 151 67/30/2771 07:19 AM    MCV 91.3 03/17/2020 07:19 AM     Lab Results   Component Value Date/Time    Cholesterol, total 178 12/17/2019 08:48 AM    HDL Cholesterol 102 12/17/2019 08:48 AM    LDL, calculated 63 12/17/2019 08:48 AM    Triglyceride 64 12/17/2019 08:48 AM     Lab Results   Component Value Date/Time    GFR est non-AA >60 03/17/2020 07:19 AM    GFRNA, POC >60 10/30/2017 02:13 PM    GFR est AA >60 03/17/2020 07:19 AM    GFRAA, POC >60 10/30/2017 02:13 PM    Creatinine 1.11 03/17/2020 07:19 AM    Creatinine (POC) 1.1 10/30/2017 02:13 PM    BUN 14 03/17/2020 07:19 AM    BUN (POC) 19 10/30/2017 02:13 PM    Sodium 135 (L) 03/17/2020 07:19 AM    Sodium (POC) 137 10/30/2017 02:13 PM    Potassium 4.3 03/17/2020 07:19 AM    Potassium (POC) 4.4 10/30/2017 02:13 PM    Chloride 105 03/17/2020 07:19 AM    Chloride (POC) 103 10/30/2017 02:13 PM    CO2 28 03/17/2020 07:19 AM        Review of Systems   Respiratory: Negative for shortness of breath. Cardiovascular: Negative for chest pain. Skin: Positive for rash. Physical Exam  Constitutional:       General: He is not in acute distress. Appearance: He is well-developed. He is not diaphoretic.    HENT: Head: Normocephalic and atraumatic. Right Ear: Tympanic membrane, ear canal and external ear normal.      Left Ear: Tympanic membrane, ear canal and external ear normal.   Eyes:      General:         Right eye: No discharge. Left eye: No discharge. Conjunctiva/sclera: Conjunctivae normal.   Neck:      Musculoskeletal: Normal range of motion and neck supple. Cardiovascular:      Rate and Rhythm: Normal rate and regular rhythm. Heart sounds: Normal heart sounds. No murmur. No friction rub. No gallop. Pulmonary:      Effort: Pulmonary effort is normal. No respiratory distress. Breath sounds: Normal breath sounds. No wheezing or rales. Chest:      Chest wall: No tenderness. Abdominal:      General: Abdomen is flat. There is no distension. Palpations: Abdomen is soft. There is no mass. Musculoskeletal: Normal range of motion. General: No tenderness or deformity. Lymphadenopathy:      Cervical: No cervical adenopathy. Skin:     General: Skin is warm and dry. Coloration: Skin is not pale. Findings: No erythema or rash. Comments: Stuck on dark plaque 1 cm by 6 mm diameter on R back       Neurological:      General: No focal deficit present. Mental Status: He is alert and oriented to person, place, and time. Coordination: Coordination normal.      Deep Tendon Reflexes: Reflexes are normal and symmetric. Psychiatric:         Mood and Affect: Mood normal.         Behavior: Behavior normal.         ASSESSMENT and PLAN    ICD-10-CM ICD-9-CM    1. Preop examination                  Pt is low risk for intermediate risk surg with adequate functional mets neg stress test may proceed to hip surg w/o additional cardiac testing    Z01.818 V72.84    2. Prostate cancer [facility])                      Followed with dr Christiana Pineda will be seeing later this month  C61 185    3.  Anxiety              Did well on celexa will restart and buspar prn which he states helped significantly      F41.9 300.00    4. Essential hypertension                Controlled on lisinopril 10 mg continue  I10 401.9    5. Pure hypercholesterolemia                  Controlled on crestor refilled today  E78.00 272.0    6. Pain of left hip joint                    Getting hip replacement later this month  M25.552 719.45    7. Gastroesophageal reflux disease without esophagitis              Did quite well on protonix sxs improved not needing further  K21.9 530.81    8. Rash              C/w with seb derm will give ketoconazole cream  R21 782.1    9. Seborrheic dermatitis                  See above  L21.9 690.10    10. Seborrheic keratosis                Likely benign but atypical appearance will have derm look at this  L82.1 702.19 REFERRAL TO DERMATOLOGY            Depression screen reviewed and negative. Scribed by Frankie Love of [address] [place] [address] [address], as dictated by Dr. Caesar Fraser. Current diagnosis and concerns discussed with pt at length. Pt understands risks and benefits or current treatment plan and medications, and accepts the treatment and medication with any possible risks. Pt asks appropriate questions, which were answered. Pt was instructed to call with any concerns or problems. I have reviewed the note documented by the scribe. The services provided are my own.   The documentation is accurate

## 2020-03-18 NOTE — PATIENT INSTRUCTIONS
Foam to scalp can also use on face    Use cream on face daily     Zyrtec over the counter for allergies.

## 2020-03-19 ENCOUNTER — TELEPHONE (OUTPATIENT)
Dept: INTERNAL MEDICINE CLINIC | Age: 60
End: 2020-03-19

## 2020-03-19 NOTE — TELEPHONE ENCOUNTER
Soo//Dee states she needs a call back to get instruction Clarification for patient inhaler refill received. Please call.  Thank you

## 2020-03-19 NOTE — TELEPHONE ENCOUNTER
Spoke to Soo to provide clarification on pt's albuterol inhaler.   Kelly Dumont,         Sig: INHALE 2 PUFFS BY MOUTH EVERY 6 HOURS AS NEEDED FOR WHEEZING

## 2020-04-28 RX ORDER — PANTOPRAZOLE SODIUM 20 MG/1
TABLET, DELAYED RELEASE ORAL
Qty: 30 TAB | Refills: 1 | Status: SHIPPED | OUTPATIENT
Start: 2020-04-28 | End: 2020-05-26

## 2020-05-26 ENCOUNTER — HOSPITAL ENCOUNTER (OUTPATIENT)
Dept: PREADMISSION TESTING | Age: 60
Discharge: HOME | End: 2020-05-26
Payer: COMMERCIAL

## 2020-05-26 VITALS
DIASTOLIC BLOOD PRESSURE: 55 MMHG | OXYGEN SATURATION: 98 % | HEIGHT: 69 IN | SYSTOLIC BLOOD PRESSURE: 117 MMHG | WEIGHT: 155.42 LBS | HEART RATE: 80 BPM | TEMPERATURE: 98.2 F | BODY MASS INDEX: 23.02 KG/M2

## 2020-05-26 LAB
ABO + RH BLD: NORMAL
ALBUMIN SERPL-MCNC: 4 G/DL (ref 3.5–5)
ALBUMIN/GLOB SERPL: 1 {RATIO} (ref 1.1–2.2)
ALP SERPL-CCNC: 91 U/L (ref 45–117)
ALT SERPL-CCNC: 31 U/L (ref 12–78)
ANION GAP SERPL CALC-SCNC: 3 MMOL/L (ref 5–15)
APPEARANCE UR: CLEAR
AST SERPL-CCNC: 24 U/L (ref 15–37)
BACTERIA URNS QL MICRO: NEGATIVE /HPF
BILIRUB SERPL-MCNC: 0.6 MG/DL (ref 0.2–1)
BILIRUB UR QL: NEGATIVE
BLOOD GROUP ANTIBODIES SERPL: NORMAL
BUN SERPL-MCNC: 11 MG/DL (ref 6–20)
BUN/CREAT SERPL: 10 (ref 12–20)
CALCIUM SERPL-MCNC: 9.7 MG/DL (ref 8.5–10.1)
CHLORIDE SERPL-SCNC: 104 MMOL/L (ref 97–108)
CO2 SERPL-SCNC: 26 MMOL/L (ref 21–32)
COLOR UR: NORMAL
CREAT SERPL-MCNC: 1.07 MG/DL (ref 0.7–1.3)
EPITH CASTS URNS QL MICRO: NORMAL /LPF
ERYTHROCYTE [DISTWIDTH] IN BLOOD BY AUTOMATED COUNT: 11.6 % (ref 11.5–14.5)
GLOBULIN SER CALC-MCNC: 4.1 G/DL (ref 2–4)
GLUCOSE SERPL-MCNC: 106 MG/DL (ref 65–100)
GLUCOSE UR STRIP.AUTO-MCNC: NEGATIVE MG/DL
HCT VFR BLD AUTO: 39.9 % (ref 36.6–50.3)
HGB BLD-MCNC: 13.7 G/DL (ref 12.1–17)
HGB UR QL STRIP: NEGATIVE
HYALINE CASTS URNS QL MICRO: NORMAL /LPF (ref 0–5)
INR PPP: 1 (ref 0.9–1.1)
KETONES UR QL STRIP.AUTO: NEGATIVE MG/DL
LEUKOCYTE ESTERASE UR QL STRIP.AUTO: NEGATIVE
MCH RBC QN AUTO: 30.3 PG (ref 26–34)
MCHC RBC AUTO-ENTMCNC: 34.3 G/DL (ref 30–36.5)
MCV RBC AUTO: 88.3 FL (ref 80–99)
NITRITE UR QL STRIP.AUTO: NEGATIVE
NRBC # BLD: 0 K/UL (ref 0–0.01)
NRBC BLD-RTO: 0 PER 100 WBC
PH UR STRIP: 7.5 [PH] (ref 5–8)
PLATELET # BLD AUTO: 335 K/UL (ref 150–400)
PMV BLD AUTO: 9.1 FL (ref 8.9–12.9)
POTASSIUM SERPL-SCNC: 4.2 MMOL/L (ref 3.5–5.1)
PROT SERPL-MCNC: 8.1 G/DL (ref 6.4–8.2)
PROT UR STRIP-MCNC: NEGATIVE MG/DL
PROTHROMBIN TIME: 10.3 SEC (ref 9–11.1)
RBC # BLD AUTO: 4.52 M/UL (ref 4.1–5.7)
RBC #/AREA URNS HPF: NORMAL /HPF (ref 0–5)
SODIUM SERPL-SCNC: 133 MMOL/L (ref 136–145)
SP GR UR REFRACTOMETRY: 1.01 (ref 1–1.03)
SPECIMEN EXP DATE BLD: NORMAL
UA: UC IF INDICATED,UAUC: NORMAL
UROBILINOGEN UR QL STRIP.AUTO: 1 EU/DL (ref 0.2–1)
WBC # BLD AUTO: 8.5 K/UL (ref 4.1–11.1)
WBC URNS QL MICRO: NORMAL /HPF (ref 0–4)

## 2020-05-26 PROCEDURE — 80053 COMPREHEN METABOLIC PANEL: CPT

## 2020-05-26 PROCEDURE — 86900 BLOOD TYPING SEROLOGIC ABO: CPT

## 2020-05-26 PROCEDURE — 85027 COMPLETE CBC AUTOMATED: CPT

## 2020-05-26 PROCEDURE — 81001 URINALYSIS AUTO W/SCOPE: CPT

## 2020-05-26 PROCEDURE — 85610 PROTHROMBIN TIME: CPT

## 2020-05-26 PROCEDURE — 36415 COLL VENOUS BLD VENIPUNCTURE: CPT

## 2020-05-26 RX ORDER — CHOLECALCIFEROL (VITAMIN D3) 125 MCG
CAPSULE ORAL
COMMUNITY
End: 2020-06-04

## 2020-05-26 RX ORDER — ACETAMINOPHEN 500 MG
1000 TABLET ORAL ONCE
Status: CANCELLED | OUTPATIENT
Start: 2020-06-03 | End: 2020-06-03

## 2020-05-26 RX ORDER — CELECOXIB 200 MG/1
400 CAPSULE ORAL ONCE
Status: CANCELLED | OUTPATIENT
Start: 2020-06-03 | End: 2020-06-03

## 2020-05-26 RX ORDER — PREGABALIN 150 MG/1
150 CAPSULE ORAL ONCE
Status: CANCELLED | OUTPATIENT
Start: 2020-06-03 | End: 2020-06-03

## 2020-05-26 RX ORDER — SODIUM CHLORIDE, SODIUM LACTATE, POTASSIUM CHLORIDE, CALCIUM CHLORIDE 600; 310; 30; 20 MG/100ML; MG/100ML; MG/100ML; MG/100ML
25 INJECTION, SOLUTION INTRAVENOUS CONTINUOUS
Status: CANCELLED | OUTPATIENT
Start: 2020-06-03

## 2020-05-26 NOTE — PERIOP NOTES
EKG not repeated during PAT appt- has one in [facility] on 2/13/20  Cardiac clearance from 2/3/20 copy of paper form on chart . Patient was originally seen in March for planned procedure- postponed due to Covid. A1C done at that time. Reviewed ortho book- denies questions.

## 2020-05-26 NOTE — PERIOP NOTES
Incentive Spirometer        Using the incentive spirometer helps expand the small air sacs of your lungs, helps you breathe deeply, and helps improve your lung function. Use your incentive spirometer twice a day (10 breaths each time) prior to surgery. How to Use Your Incentive Spirometer:  1. Hold the incentive spirometer in an upright position. 2. Breathe out as usual.   3. Place the mouthpiece in your mouth and seal your lips tightly around it. 4. Take a deep breath. Breathe in slowly and as deeply as possible. Keep the blue flow rate guide between the arrows. 5. Hold your breath as long as possible. Then exhale slowly and allow the piston to fall to the bottom of the column. 6. Rest for a few seconds and repeat steps one through five at least 10 times. ** Patient received this and his instructions in March- he verbalizes his understanding of this- reminded to start practicing now  PAT Tidal Volume__________________  x______2__________  Date________5/26/20_______________    Mag Maldonadon THE INCENTIVE SPIROMETER WITH YOU TO [address] ON THE DAY OF YOUR SURGERY. Opportunity given to ask and answer questions as well as to observe return demonstration.     Patient signature_____________________________    Witness____________________________

## 2020-05-26 NOTE — PERIOP NOTES
[facility]  Joint/Spine Preoperative Instructions        Surgery Date Trish 3, 2020       Time of Arrival 7918  Contact #[phone]  1. On the day of your surgery, please report to the Surgical Services Registration Desk and sign in at your designated time. The Surgery Center is located to the right of the Emergency Room. 2. You must have someone with you to drive you home. You should not drive a car for 24 hours following surgery. Please make arrangements for a friend or family member to stay with you for the first 24 hours after your surgery. 3. No food after midnight 6/2/20  Medications morning of surgery should be taken with a sip of water. Please follow pre-surgery drink instructions that were given at your Pre Admission Testing appointment. 4. We recommend you do not drink any alcoholic beverages for 24 hours before and after your surgery. 5. Contact your surgeons office for instructions on the following medications: non-steroidal anti-inflammatory drugs (i.e. Advil, Aleve), vitamins, and supplements. (Some surgeons will want you to stop these medications prior to surgery and others may allow you to take them)  **If you are currently taking Plavix, Coumadin, Aspirin and/or other blood-thinning agents, contact your surgeon for instructions. ** Your surgeon will partner with the physician prescribing these medications to determine if it is safe to stop or if you need to continue taking. Please do not stop taking these medications without instructions from your surgeon    6. Wear comfortable clothes. Wear glasses instead of contacts. Do not bring any money or jewelry. Please bring picture ID, insurance card, and any prearranged co-payment or hospital payment. Do not wear make-up, particularly mascara the morning of your surgery. Do not wear nail polish, particularly if you are having foot /hand surgery.   Wear your hair loose or down, no ponytails, buns, bimal pins or clips. All body piercings must be removed. Please shower with antibacterial soap for three consecutive days before and on the morning of surgery, but do not apply any lotions, powders or deodorants after the shower on the day of surgery. Please use a fresh towels after each shower. Please sleep in clean clothes and change bed linens the night before surgery. Please do not shave for 48 hours prior to surgery. Shaving of the face is acceptable. 7. You should understand that if you do not follow these instructions your surgery may be cancelled. If your physical condition changes (I.e. fever, cold or flu) please contact your surgeon as soon as possible. 8. It is important that you be on time. If a situation occurs where you may be late, please call [phone] (OR Holding Area). 9. If you have any questions and or problems, please call [phone] (Pre-admission Testing). 10. Your surgery time may be subject to change. You will receive a phone call the evening prior if your time changes. 11.  If having outpatient surgery, you must have someone to drive you here, stay with you during the duration of your stay, and to drive you home at time of discharge. 12.   In an effort to improve the efficiency, privacy, and safety for all of our Pre-op patients visitors are not allowed in the Holding area. Once you arrive and are registered your family/visitors will be asked to remain in your vehicle. The Pre-op staff will call you when they are ready for you to enter the building and will explain to you and your family/visitors that the Pre-op phase is beginning. At this time, if your procedure is outpatient, your family member will be asked to stay in their vehicle until the surgery is complete and you are ready for discharge. If you are going to be admitted after your surgery, once you are called to come inside the building, your family will be able to leave the parking lot.    At this time the staff will also ask for your designated spokesperson information in the event that the physician or staff need to provide an update or obtain any pertinent information. The designated spokesperson will be notified if the physician needs to speak to family during the pre-operative phase.and/or in the post op phase. Special Instructions: Follow surgeon instructions  Practice Incentive Spirometry twice a day (ten times each)- bring day of surgery  Covid testing on May 30 at [address] [place] [address] from testing date to day of surgery    TAKE ALL MEDICATIONS THE DAY OF SURGERY EXCEPT:  Lisinopril      I understand a pre-operative phone call will be made to verify my surgery time. In the event that I am not available, I give permission for a message to be left on my answering service and/or with another person?   yes     ___________________      __________   _________    (Signature of Patient)             (Witness)                (Date and Time)

## 2020-05-26 NOTE — PERIOP NOTES

## 2020-05-26 NOTE — PERIOP NOTES
Hibiclens/Chlorhexidine    Preventing Infections Before and After - Your Surgery    IMPORTANT INSTRUCTIONS    Please read and follow these instructions carefully. If you are unable to comply with the below instructions your procedure will be cancelled. Every Night for Three (3) nights before your surgery:  1. Shower with an antibacterial soap, such as Dial, or the soap provided at your preassessment appointment. A shower is better than a bath for cleaning your skin. 2. If needed, ask someone to help you reach all areas of your body. Dont forget to clean your belly button with every shower. The night before your surgery: If you lose your Hibiclens/chlorhexidine please contact surgery center or you can purchase it at a local pharmacy  1. On the night before your surgery, shower with an antibacterial soap, such as Dial, or the soap provided at your preassessment appointment. 2. With one packet of Hibiclens/Chlorhexidine in hand, turn water off.  3. Apply Hibiclens antiseptic skin cleanser with a clean, freshly washed washcloth. ? Gently apply to your body from chin to toes (except the genital area) and especially the area(s) where your incision(s) will be. ? Leave Hibiclens/Chlorhexidine on your skin for at least 20 seconds. CAUTION: If needed, Hibiclens/chlorhexidine may be used to clean the folds of skin of the legs (such as in the area of the groin) and on your buttocks and hips. However, do not use Hibiclens/Chlorhexidine above the neck or in the genital area (your bottom) or put inside any area of your body. 4. Turn the water back on and rinse. 5. Dry gently with a clean, freshly washed towel. 6. After your shower, do not use any powder, deodorant, perfumes or lotion. 7. Use clean, freshly washed towels and washcloths every time you shower. 8. Wear clean, freshly washed pajamas to bed the night before surgery. 9. Sleep on clean, freshly washed sheets.   10. Do not allow pets to sleep in your bed with you. The Morning of your surgery:  1. Shower again thoroughly with an antibacterial soap, such as Dial or the soap provided at your preassessment appointment. If needed, ask someone for help to reach all areas of your body. Dont forget to clean your belly button! Rinse. 2. Dry gently with a clean, freshly washed towel. 3. After your shower, do not use any powder, deodorant, perfumes or lotion prior to surgery. 4. Put on clean, freshly washed clothing. Tips to help prevent infections after your surgery:  1. Protect your surgical wound from germs:  ? Hand washing is the most important thing you and your caregivers can do to prevent infections. ? Keep your bandage clean and dry! ? Do not touch your surgical wound. 2. Use clean, freshly washed towels and washcloths every time you shower; do not share bath linens with others. 3. Until your surgical wound is healed, wear clothing and sleep on bed linens each day that are clean and freshly washed. 4. Do not allow pets to sleep in your bed with you or touch your surgical wound. 5. Do not smoke - smoking delays wound healing. This may be a good time to stop smoking. 6. If you have diabetes, it is important for you to manage your blood sugar levels properly before your surgery as well as after your surgery. Poorly managed blood sugar levels slow down wound healing and prevent you from healing completely. If you lose your Hibiclens/chlorhexidine, please call the [facility], or it is available for purchase at your pharmacy.                ___________________      ___________________      ________________  (Signature of Patient)          (Witness)                   (Date and Time)

## 2020-05-27 LAB
BACTERIA SPEC CULT: NORMAL
BACTERIA SPEC CULT: NORMAL
SERVICE CMNT-IMP: NORMAL
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
## 2020-05-30 ENCOUNTER — HOSPITAL ENCOUNTER (OUTPATIENT)
Dept: PREADMISSION TESTING | Age: 60
Discharge: HOME | End: 2020-05-30
Payer: COMMERCIAL

## 2020-05-30 PROCEDURE — 87635 SARS-COV-2 COVID-19 AMP PRB: CPT

## 2020-05-31 LAB
SARS-COV-2, COV2NT: NOT DETECTED
SPECIMEN SOURCE, FCOV2M: NORMAL

## 2020-06-03 ENCOUNTER — ANESTHESIA (OUTPATIENT)
Dept: SURGERY | Age: 60
End: 2020-06-03
Payer: COMMERCIAL

## 2020-06-03 ENCOUNTER — HOSPITAL ENCOUNTER (OUTPATIENT)
Age: 60
Discharge: HOME HEALTH | End: 2020-06-04
Attending: ORTHOPAEDIC SURGERY | Admitting: ORTHOPAEDIC SURGERY
Payer: COMMERCIAL

## 2020-06-03 ENCOUNTER — ANESTHESIA EVENT (OUTPATIENT)
Dept: SURGERY | Age: 60
End: 2020-06-03
Payer: COMMERCIAL

## 2020-06-03 DIAGNOSIS — Z96.642 STATUS POST TOTAL REPLACEMENT OF LEFT HIP: Primary | ICD-10-CM

## 2020-06-03 PROBLEM — M16.9 DEGENERATIVE JOINT DISEASE (DJD) OF HIP: Status: ACTIVE | Noted: 2020-06-03

## 2020-06-03 PROCEDURE — 74011250636 HC RX REV CODE- 250/636: Performed by: ORTHOPAEDIC SURGERY

## 2020-06-03 PROCEDURE — 77030018673: Performed by: ORTHOPAEDIC SURGERY

## 2020-06-03 PROCEDURE — 77030003666 HC NDL SPINAL BD -A: Performed by: ORTHOPAEDIC SURGERY

## 2020-06-03 PROCEDURE — 97161 PT EVAL LOW COMPLEX 20 MIN: CPT

## 2020-06-03 PROCEDURE — 74011250636 HC RX REV CODE- 250/636: Performed by: NURSE ANESTHETIST, CERTIFIED REGISTERED

## 2020-06-03 PROCEDURE — 77030013708 HC HNDPC SUC IRR PULS STRY –B: Performed by: ORTHOPAEDIC SURGERY

## 2020-06-03 PROCEDURE — 97110 THERAPEUTIC EXERCISES: CPT

## 2020-06-03 PROCEDURE — 77030036660

## 2020-06-03 PROCEDURE — 74011250636 HC RX REV CODE- 250/636: Performed by: ANESTHESIOLOGY

## 2020-06-03 PROCEDURE — 77030013079 HC BLNKT BAIR HGGR 3M -A: Performed by: NURSE ANESTHETIST, CERTIFIED REGISTERED

## 2020-06-03 PROCEDURE — 77030018836 HC SOL IRR NACL ICUM -A: Performed by: ORTHOPAEDIC SURGERY

## 2020-06-03 PROCEDURE — 76010000171 HC OR TIME 2 TO 2.5 HR INTENSV-TIER 1: Performed by: ORTHOPAEDIC SURGERY

## 2020-06-03 PROCEDURE — 74011000250 HC RX REV CODE- 250: Performed by: ORTHOPAEDIC SURGERY

## 2020-06-03 PROCEDURE — 77030035643 HC BLD SAW OSC PRECIS STRY -C: Performed by: ORTHOPAEDIC SURGERY

## 2020-06-03 PROCEDURE — 77030014647 HC SEAL FBRN TISSL BAXT -D: Performed by: ORTHOPAEDIC SURGERY

## 2020-06-03 PROCEDURE — 74011250636 HC RX REV CODE- 250/636

## 2020-06-03 PROCEDURE — 77030008684 HC TU ET CUF COVD -B: Performed by: NURSE ANESTHETIST, CERTIFIED REGISTERED

## 2020-06-03 PROCEDURE — 76210000016 HC OR PH I REC 1 TO 1.5 HR: Performed by: ORTHOPAEDIC SURGERY

## 2020-06-03 PROCEDURE — 77030018846 HC SOL IRR STRL H20 ICUM -A: Performed by: ORTHOPAEDIC SURGERY

## 2020-06-03 PROCEDURE — 76060000035 HC ANESTHESIA 2 TO 2.5 HR: Performed by: ORTHOPAEDIC SURGERY

## 2020-06-03 PROCEDURE — 77030040361 HC SLV COMPR DVT MDII -B: Performed by: ORTHOPAEDIC SURGERY

## 2020-06-03 PROCEDURE — 77030033138 HC SUT PGA STRATFX J&J -B: Performed by: ORTHOPAEDIC SURGERY

## 2020-06-03 PROCEDURE — 77030003028 HC SUT VCRL J&J -A: Performed by: ORTHOPAEDIC SURGERY

## 2020-06-03 PROCEDURE — 77030003029 HC SUT VCRL J&J -B: Performed by: ORTHOPAEDIC SURGERY

## 2020-06-03 PROCEDURE — 77030038692 HC WND DEB SYS IRMX -B: Performed by: ORTHOPAEDIC SURGERY

## 2020-06-03 PROCEDURE — 74011000250 HC RX REV CODE- 250: Performed by: NURSE ANESTHETIST, CERTIFIED REGISTERED

## 2020-06-03 PROCEDURE — 77030002916 HC SUT ETHLN J&J -A: Performed by: ORTHOPAEDIC SURGERY

## 2020-06-03 PROCEDURE — 77030011640 HC PAD GRND REM COVD -A: Performed by: ORTHOPAEDIC SURGERY

## 2020-06-03 PROCEDURE — 77030018723 HC ELCTRD BLD COVD -A: Performed by: ORTHOPAEDIC SURGERY

## 2020-06-03 PROCEDURE — 77030018547 HC SUT ETHBND1 J&J -B: Performed by: ORTHOPAEDIC SURGERY

## 2020-06-03 PROCEDURE — C1776 JOINT DEVICE (IMPLANTABLE): HCPCS | Performed by: ORTHOPAEDIC SURGERY

## 2020-06-03 PROCEDURE — 74011250637 HC RX REV CODE- 250/637: Performed by: ORTHOPAEDIC SURGERY

## 2020-06-03 PROCEDURE — C1713 ANCHOR/SCREW BN/BN,TIS/BN: HCPCS | Performed by: ORTHOPAEDIC SURGERY

## 2020-06-03 PROCEDURE — 77030029821: Performed by: ORTHOPAEDIC SURGERY

## 2020-06-03 PROCEDURE — 77030020788: Performed by: ORTHOPAEDIC SURGERY

## 2020-06-03 PROCEDURE — 77030029829 HC TIB INST CKPNT DISP STRY -B: Performed by: ORTHOPAEDIC SURGERY

## 2020-06-03 PROCEDURE — 74011000258 HC RX REV CODE- 258: Performed by: NURSE ANESTHETIST, CERTIFIED REGISTERED

## 2020-06-03 PROCEDURE — 65270000029 HC RM PRIVATE

## 2020-06-03 PROCEDURE — 77030034479 HC ADH SKN CLSR PRINEO J&J -B: Performed by: ORTHOPAEDIC SURGERY

## 2020-06-03 PROCEDURE — 97116 GAIT TRAINING THERAPY: CPT

## 2020-06-03 DEVICE — TRIDENT II TRITANIUM CLUSTER 54E
Type: IMPLANTABLE DEVICE | Site: HIP | Status: FUNCTIONAL
Brand: TRIDENT II

## 2020-06-03 DEVICE — CERAMIC V40 FEMORAL HEAD
Type: IMPLANTABLE DEVICE | Site: HIP | Status: FUNCTIONAL
Brand: BIOLOX

## 2020-06-03 DEVICE — 0 DEGREE POLYETHYLENE INSERT
Type: IMPLANTABLE DEVICE | Site: HIP | Status: FUNCTIONAL
Brand: TRIDENT

## 2020-06-03 DEVICE — 127 DEGREE NECK ANGLE HIP STEM
Type: IMPLANTABLE DEVICE | Site: HIP | Status: FUNCTIONAL
Brand: ACCOLADE

## 2020-06-03 DEVICE — COMPONENT HIP PRSS FT MTL ON CERM POLYETH X3: Type: IMPLANTABLE DEVICE | Status: FUNCTIONAL

## 2020-06-03 RX ORDER — FENTANYL CITRATE 50 UG/ML
INJECTION, SOLUTION INTRAMUSCULAR; INTRAVENOUS AS NEEDED
Status: DISCONTINUED | OUTPATIENT
Start: 2020-06-03 | End: 2020-06-03 | Stop reason: HOSPADM

## 2020-06-03 RX ORDER — MIDAZOLAM HYDROCHLORIDE 1 MG/ML
0.5 INJECTION, SOLUTION INTRAMUSCULAR; INTRAVENOUS
Status: DISCONTINUED | OUTPATIENT
Start: 2020-06-03 | End: 2020-06-03 | Stop reason: HOSPADM

## 2020-06-03 RX ORDER — NALOXONE HYDROCHLORIDE 0.4 MG/ML
0.4 INJECTION, SOLUTION INTRAMUSCULAR; INTRAVENOUS; SUBCUTANEOUS AS NEEDED
Status: DISCONTINUED | OUTPATIENT
Start: 2020-06-03 | End: 2020-06-04 | Stop reason: HOSPADM

## 2020-06-03 RX ORDER — HYDROCODONE BITARTRATE AND ACETAMINOPHEN 5; 325 MG/1; MG/1
1 TABLET ORAL AS NEEDED
Status: DISCONTINUED | OUTPATIENT
Start: 2020-06-03 | End: 2020-06-03 | Stop reason: HOSPADM

## 2020-06-03 RX ORDER — ACETAMINOPHEN 325 MG/1
650 TABLET ORAL EVERY 6 HOURS
Status: DISCONTINUED | OUTPATIENT
Start: 2020-06-03 | End: 2020-06-04 | Stop reason: HOSPADM

## 2020-06-03 RX ORDER — CELECOXIB 200 MG/1
400 CAPSULE ORAL ONCE
Status: COMPLETED | OUTPATIENT
Start: 2020-06-03 | End: 2020-06-03

## 2020-06-03 RX ORDER — POLYETHYLENE GLYCOL 3350 17 G/17G
17 POWDER, FOR SOLUTION ORAL DAILY
Qty: 14 PACKET | Refills: 0 | Status: SHIPPED | OUTPATIENT
Start: 2020-06-04 | End: 2020-06-18

## 2020-06-03 RX ORDER — LIDOCAINE HYDROCHLORIDE 10 MG/ML
0.1 INJECTION, SOLUTION EPIDURAL; INFILTRATION; INTRACAUDAL; PERINEURAL AS NEEDED
Status: DISCONTINUED | OUTPATIENT
Start: 2020-06-03 | End: 2020-06-03 | Stop reason: HOSPADM

## 2020-06-03 RX ORDER — LIDOCAINE HYDROCHLORIDE 20 MG/ML
INJECTION, SOLUTION EPIDURAL; INFILTRATION; INTRACAUDAL; PERINEURAL AS NEEDED
Status: DISCONTINUED | OUTPATIENT
Start: 2020-06-03 | End: 2020-06-03 | Stop reason: HOSPADM

## 2020-06-03 RX ORDER — PROPOFOL 10 MG/ML
INJECTION, EMULSION INTRAVENOUS AS NEEDED
Status: DISCONTINUED | OUTPATIENT
Start: 2020-06-03 | End: 2020-06-03 | Stop reason: HOSPADM

## 2020-06-03 RX ORDER — OXYCODONE HYDROCHLORIDE 5 MG/1
10 TABLET ORAL
Status: DISCONTINUED | OUTPATIENT
Start: 2020-06-03 | End: 2020-06-04 | Stop reason: HOSPADM

## 2020-06-03 RX ORDER — ROPIVACAINE HYDROCHLORIDE 5 MG/ML
INJECTION, SOLUTION EPIDURAL; INFILTRATION; PERINEURAL AS NEEDED
Status: DISCONTINUED | OUTPATIENT
Start: 2020-06-03 | End: 2020-06-03 | Stop reason: HOSPADM

## 2020-06-03 RX ORDER — ACETAMINOPHEN 325 MG/1
650 TABLET ORAL EVERY 6 HOURS
Qty: 112 TAB | Refills: 0 | Status: SHIPPED | OUTPATIENT
Start: 2020-06-03 | End: 2020-06-17

## 2020-06-03 RX ORDER — AMOXICILLIN 250 MG
1 CAPSULE ORAL 2 TIMES DAILY
Status: DISCONTINUED | OUTPATIENT
Start: 2020-06-03 | End: 2020-06-04 | Stop reason: HOSPADM

## 2020-06-03 RX ORDER — PREGABALIN 75 MG/1
150 CAPSULE ORAL ONCE
Status: COMPLETED | OUTPATIENT
Start: 2020-06-03 | End: 2020-06-03

## 2020-06-03 RX ORDER — DIPHENHYDRAMINE HCL 12.5MG/5ML
12.5 LIQUID (ML) ORAL
Status: DISCONTINUED | OUTPATIENT
Start: 2020-06-03 | End: 2020-06-04 | Stop reason: HOSPADM

## 2020-06-03 RX ORDER — PHENYLEPHRINE HCL IN 0.9% NACL 0.4MG/10ML
SYRINGE (ML) INTRAVENOUS AS NEEDED
Status: DISCONTINUED | OUTPATIENT
Start: 2020-06-03 | End: 2020-06-03 | Stop reason: HOSPADM

## 2020-06-03 RX ORDER — DEXAMETHASONE SODIUM PHOSPHATE 4 MG/ML
10 INJECTION, SOLUTION INTRA-ARTICULAR; INTRALESIONAL; INTRAMUSCULAR; INTRAVENOUS; SOFT TISSUE ONCE
Status: COMPLETED | OUTPATIENT
Start: 2020-06-04 | End: 2020-06-04

## 2020-06-03 RX ORDER — FENTANYL CITRATE 50 UG/ML
INJECTION, SOLUTION INTRAMUSCULAR; INTRAVENOUS
Status: COMPLETED
Start: 2020-06-03 | End: 2020-06-03

## 2020-06-03 RX ORDER — AMOXICILLIN 250 MG
1 CAPSULE ORAL 2 TIMES DAILY
Qty: 28 TAB | Refills: 0 | Status: SHIPPED | OUTPATIENT
Start: 2020-06-03 | End: 2020-06-17

## 2020-06-03 RX ORDER — ROCURONIUM BROMIDE 10 MG/ML
INJECTION, SOLUTION INTRAVENOUS AS NEEDED
Status: DISCONTINUED | OUTPATIENT
Start: 2020-06-03 | End: 2020-06-03 | Stop reason: HOSPADM

## 2020-06-03 RX ORDER — SUCCINYLCHOLINE CHLORIDE 20 MG/ML
INJECTION INTRAMUSCULAR; INTRAVENOUS AS NEEDED
Status: DISCONTINUED | OUTPATIENT
Start: 2020-06-03 | End: 2020-06-03 | Stop reason: HOSPADM

## 2020-06-03 RX ORDER — FENTANYL CITRATE 50 UG/ML
50 INJECTION, SOLUTION INTRAMUSCULAR; INTRAVENOUS AS NEEDED
Status: DISCONTINUED | OUTPATIENT
Start: 2020-06-03 | End: 2020-06-03 | Stop reason: HOSPADM

## 2020-06-03 RX ORDER — MORPHINE SULFATE 2 MG/ML
2 INJECTION, SOLUTION INTRAMUSCULAR; INTRAVENOUS
Status: ACTIVE | OUTPATIENT
Start: 2020-06-03 | End: 2020-06-04

## 2020-06-03 RX ORDER — FENTANYL CITRATE 50 UG/ML
25 INJECTION, SOLUTION INTRAMUSCULAR; INTRAVENOUS
Status: DISCONTINUED | OUTPATIENT
Start: 2020-06-03 | End: 2020-06-03 | Stop reason: HOSPADM

## 2020-06-03 RX ORDER — SODIUM CHLORIDE 9 MG/ML
125 INJECTION, SOLUTION INTRAVENOUS CONTINUOUS
Status: DISPENSED | OUTPATIENT
Start: 2020-06-03 | End: 2020-06-04

## 2020-06-03 RX ORDER — ONDANSETRON 2 MG/ML
4 INJECTION INTRAMUSCULAR; INTRAVENOUS
Status: ACTIVE | OUTPATIENT
Start: 2020-06-03 | End: 2020-06-04

## 2020-06-03 RX ORDER — MIDAZOLAM HYDROCHLORIDE 1 MG/ML
INJECTION, SOLUTION INTRAMUSCULAR; INTRAVENOUS AS NEEDED
Status: DISCONTINUED | OUTPATIENT
Start: 2020-06-03 | End: 2020-06-03 | Stop reason: HOSPADM

## 2020-06-03 RX ORDER — ACETAMINOPHEN 500 MG
1000 TABLET ORAL ONCE
Status: COMPLETED | OUTPATIENT
Start: 2020-06-03 | End: 2020-06-03

## 2020-06-03 RX ORDER — DIPHENHYDRAMINE HYDROCHLORIDE 50 MG/ML
12.5 INJECTION, SOLUTION INTRAMUSCULAR; INTRAVENOUS AS NEEDED
Status: DISCONTINUED | OUTPATIENT
Start: 2020-06-03 | End: 2020-06-03 | Stop reason: HOSPADM

## 2020-06-03 RX ORDER — DEXAMETHASONE SODIUM PHOSPHATE 4 MG/ML
INJECTION, SOLUTION INTRA-ARTICULAR; INTRALESIONAL; INTRAMUSCULAR; INTRAVENOUS; SOFT TISSUE AS NEEDED
Status: DISCONTINUED | OUTPATIENT
Start: 2020-06-03 | End: 2020-06-03 | Stop reason: HOSPADM

## 2020-06-03 RX ORDER — SODIUM CHLORIDE 0.9 % (FLUSH) 0.9 %
5-40 SYRINGE (ML) INJECTION AS NEEDED
Status: DISCONTINUED | OUTPATIENT
Start: 2020-06-03 | End: 2020-06-04 | Stop reason: HOSPADM

## 2020-06-03 RX ORDER — ASPIRIN 81 MG/1
81 TABLET ORAL 2 TIMES DAILY
Qty: 60 TAB | Refills: 0 | Status: SHIPPED | OUTPATIENT
Start: 2020-06-03 | End: 2020-07-03

## 2020-06-03 RX ORDER — FAMOTIDINE 20 MG/1
20 TABLET, FILM COATED ORAL 2 TIMES DAILY
Status: DISCONTINUED | OUTPATIENT
Start: 2020-06-03 | End: 2020-06-04 | Stop reason: HOSPADM

## 2020-06-03 RX ORDER — GLYCOPYRROLATE 0.2 MG/ML
INJECTION INTRAMUSCULAR; INTRAVENOUS AS NEEDED
Status: DISCONTINUED | OUTPATIENT
Start: 2020-06-03 | End: 2020-06-03 | Stop reason: HOSPADM

## 2020-06-03 RX ORDER — OXYCODONE HYDROCHLORIDE 5 MG/1
5 TABLET ORAL
Status: DISCONTINUED | OUTPATIENT
Start: 2020-06-03 | End: 2020-06-04 | Stop reason: HOSPADM

## 2020-06-03 RX ORDER — FAMOTIDINE 20 MG/1
20 TABLET, FILM COATED ORAL 2 TIMES DAILY
Qty: 60 TAB | Refills: 0 | Status: SHIPPED | OUTPATIENT
Start: 2020-06-03 | End: 2020-07-03

## 2020-06-03 RX ORDER — MIDAZOLAM HYDROCHLORIDE 1 MG/ML
1 INJECTION, SOLUTION INTRAMUSCULAR; INTRAVENOUS AS NEEDED
Status: DISCONTINUED | OUTPATIENT
Start: 2020-06-03 | End: 2020-06-03 | Stop reason: HOSPADM

## 2020-06-03 RX ORDER — NEOSTIGMINE METHYLSULFATE 1 MG/ML
INJECTION, SOLUTION INTRAVENOUS AS NEEDED
Status: DISCONTINUED | OUTPATIENT
Start: 2020-06-03 | End: 2020-06-03 | Stop reason: HOSPADM

## 2020-06-03 RX ORDER — ONDANSETRON 4 MG/1
4 TABLET, ORALLY DISINTEGRATING ORAL
Status: DISCONTINUED | OUTPATIENT
Start: 2020-06-05 | End: 2020-06-04 | Stop reason: HOSPADM

## 2020-06-03 RX ORDER — POLYETHYLENE GLYCOL 3350 17 G/17G
17 POWDER, FOR SOLUTION ORAL DAILY
Status: DISCONTINUED | OUTPATIENT
Start: 2020-06-04 | End: 2020-06-04 | Stop reason: HOSPADM

## 2020-06-03 RX ORDER — LISINOPRIL 20 MG/1
20 TABLET ORAL DAILY
Status: DISCONTINUED | OUTPATIENT
Start: 2020-06-04 | End: 2020-06-04 | Stop reason: HOSPADM

## 2020-06-03 RX ORDER — HYDROMORPHONE HYDROCHLORIDE 1 MG/ML
0.5 INJECTION, SOLUTION INTRAMUSCULAR; INTRAVENOUS; SUBCUTANEOUS
Status: DISCONTINUED | OUTPATIENT
Start: 2020-06-03 | End: 2020-06-03 | Stop reason: HOSPADM

## 2020-06-03 RX ORDER — ONDANSETRON 2 MG/ML
4 INJECTION INTRAMUSCULAR; INTRAVENOUS AS NEEDED
Status: DISCONTINUED | OUTPATIENT
Start: 2020-06-03 | End: 2020-06-03 | Stop reason: HOSPADM

## 2020-06-03 RX ORDER — ONDANSETRON 2 MG/ML
INJECTION INTRAMUSCULAR; INTRAVENOUS AS NEEDED
Status: DISCONTINUED | OUTPATIENT
Start: 2020-06-03 | End: 2020-06-03 | Stop reason: HOSPADM

## 2020-06-03 RX ORDER — KETOROLAC TROMETHAMINE 30 MG/ML
30 INJECTION, SOLUTION INTRAMUSCULAR; INTRAVENOUS EVERY 6 HOURS
Status: COMPLETED | OUTPATIENT
Start: 2020-06-03 | End: 2020-06-04

## 2020-06-03 RX ORDER — SODIUM CHLORIDE 0.9 % (FLUSH) 0.9 %
5-40 SYRINGE (ML) INJECTION EVERY 8 HOURS
Status: DISCONTINUED | OUTPATIENT
Start: 2020-06-03 | End: 2020-06-04 | Stop reason: HOSPADM

## 2020-06-03 RX ORDER — OXYCODONE HYDROCHLORIDE 5 MG/1
5-10 TABLET ORAL
Qty: 60 TAB | Refills: 0 | Status: SHIPPED | OUTPATIENT
Start: 2020-06-03 | End: 2020-06-17

## 2020-06-03 RX ORDER — SODIUM CHLORIDE, SODIUM LACTATE, POTASSIUM CHLORIDE, CALCIUM CHLORIDE 600; 310; 30; 20 MG/100ML; MG/100ML; MG/100ML; MG/100ML
25 INJECTION, SOLUTION INTRAVENOUS CONTINUOUS
Status: DISCONTINUED | OUTPATIENT
Start: 2020-06-03 | End: 2020-06-03 | Stop reason: HOSPADM

## 2020-06-03 RX ORDER — ASPIRIN 81 MG/1
81 TABLET ORAL 2 TIMES DAILY
Status: DISCONTINUED | OUTPATIENT
Start: 2020-06-03 | End: 2020-06-04 | Stop reason: HOSPADM

## 2020-06-03 RX ORDER — FACIAL-BODY WIPES
10 EACH TOPICAL DAILY PRN
Status: DISCONTINUED | OUTPATIENT
Start: 2020-06-05 | End: 2020-06-04 | Stop reason: HOSPADM

## 2020-06-03 RX ADMIN — Medication 80 MCG: at 08:23

## 2020-06-03 RX ADMIN — FENTANYL CITRATE 50 MCG: 50 INJECTION INTRAMUSCULAR; INTRAVENOUS at 08:43

## 2020-06-03 RX ADMIN — FENTANYL CITRATE 25 MCG: 50 INJECTION INTRAMUSCULAR; INTRAVENOUS at 11:50

## 2020-06-03 RX ADMIN — SUCCINYLCHOLINE CHLORIDE 160 MG: 20 INJECTION, SOLUTION INTRAMUSCULAR; INTRAVENOUS at 08:09

## 2020-06-03 RX ADMIN — TRANEXAMIC ACID 1 G: 100 INJECTION, SOLUTION INTRAVENOUS at 08:17

## 2020-06-03 RX ADMIN — ONDANSETRON HYDROCHLORIDE 4 MG: 2 INJECTION, SOLUTION INTRAMUSCULAR; INTRAVENOUS at 09:47

## 2020-06-03 RX ADMIN — DEXAMETHASONE SODIUM PHOSPHATE 8 MG: 4 INJECTION, SOLUTION INTRAMUSCULAR; INTRAVENOUS at 09:16

## 2020-06-03 RX ADMIN — Medication 40 MCG: at 09:44

## 2020-06-03 RX ADMIN — SENNOSIDES AND DOCUSATE SODIUM 1 TABLET: 8.6; 5 TABLET ORAL at 17:19

## 2020-06-03 RX ADMIN — Medication 3 MG: at 10:07

## 2020-06-03 RX ADMIN — FAMOTIDINE 20 MG: 20 TABLET, FILM COATED ORAL at 17:19

## 2020-06-03 RX ADMIN — ACETAMINOPHEN 650 MG: 325 TABLET, FILM COATED ORAL at 17:19

## 2020-06-03 RX ADMIN — GLYCOPYRROLATE 0.6 MG: 0.2 INJECTION, SOLUTION INTRAMUSCULAR; INTRAVENOUS at 10:07

## 2020-06-03 RX ADMIN — OXYCODONE 5 MG: 5 TABLET ORAL at 13:09

## 2020-06-03 RX ADMIN — FENTANYL CITRATE 25 MCG: 50 INJECTION INTRAMUSCULAR; INTRAVENOUS at 11:00

## 2020-06-03 RX ADMIN — PHENYLEPHRINE HYDROCHLORIDE 40 MCG/MIN: 10 INJECTION INTRAVENOUS at 08:27

## 2020-06-03 RX ADMIN — PREGABALIN 150 MG: 75 CAPSULE ORAL at 06:26

## 2020-06-03 RX ADMIN — Medication 10 ML: at 21:29

## 2020-06-03 RX ADMIN — SODIUM CHLORIDE, SODIUM LACTATE, POTASSIUM CHLORIDE, AND CALCIUM CHLORIDE: 600; 310; 30; 20 INJECTION, SOLUTION INTRAVENOUS at 06:32

## 2020-06-03 RX ADMIN — FENTANYL CITRATE 25 MCG: 50 INJECTION INTRAMUSCULAR; INTRAVENOUS at 11:55

## 2020-06-03 RX ADMIN — Medication 80 MCG: at 08:28

## 2020-06-03 RX ADMIN — FENTANYL CITRATE 50 MCG: 50 INJECTION INTRAMUSCULAR; INTRAVENOUS at 08:08

## 2020-06-03 RX ADMIN — WATER 2 G: 1 INJECTION INTRAMUSCULAR; INTRAVENOUS; SUBCUTANEOUS at 16:44

## 2020-06-03 RX ADMIN — ACETAMINOPHEN 650 MG: 325 TABLET, FILM COATED ORAL at 13:09

## 2020-06-03 RX ADMIN — Medication 40 MCG: at 08:14

## 2020-06-03 RX ADMIN — CELECOXIB 400 MG: 200 CAPSULE ORAL at 06:25

## 2020-06-03 RX ADMIN — Medication 40 MCG: at 08:11

## 2020-06-03 RX ADMIN — OXYCODONE 10 MG: 5 TABLET ORAL at 16:45

## 2020-06-03 RX ADMIN — FENTANYL CITRATE 25 MCG: 50 INJECTION INTRAMUSCULAR; INTRAVENOUS at 10:36

## 2020-06-03 RX ADMIN — ACETAMINOPHEN 1000 MG: 500 TABLET ORAL at 06:25

## 2020-06-03 RX ADMIN — ROCURONIUM BROMIDE 40 MG: 10 INJECTION INTRAVENOUS at 08:18

## 2020-06-03 RX ADMIN — FENTANYL CITRATE 25 MCG: 50 INJECTION INTRAMUSCULAR; INTRAVENOUS at 09:13

## 2020-06-03 RX ADMIN — FENTANYL CITRATE 25 MCG: 50 INJECTION INTRAMUSCULAR; INTRAVENOUS at 10:33

## 2020-06-03 RX ADMIN — ROCURONIUM BROMIDE 10 MG: 10 INJECTION INTRAVENOUS at 08:08

## 2020-06-03 RX ADMIN — SODIUM CHLORIDE 125 ML/HR: 900 INJECTION, SOLUTION INTRAVENOUS at 10:45

## 2020-06-03 RX ADMIN — OXYCODONE 10 MG: 5 TABLET ORAL at 19:50

## 2020-06-03 RX ADMIN — FENTANYL CITRATE 25 MCG: 50 INJECTION INTRAMUSCULAR; INTRAVENOUS at 10:30

## 2020-06-03 RX ADMIN — Medication 40 MCG: at 08:16

## 2020-06-03 RX ADMIN — PROPOFOL 150 MG: 10 INJECTION, EMULSION INTRAVENOUS at 08:08

## 2020-06-03 RX ADMIN — FENTANYL CITRATE 25 MCG: 50 INJECTION INTRAMUSCULAR; INTRAVENOUS at 10:39

## 2020-06-03 RX ADMIN — FENTANYL CITRATE 25 MCG: 50 INJECTION INTRAMUSCULAR; INTRAVENOUS at 10:55

## 2020-06-03 RX ADMIN — OXYCODONE 10 MG: 5 TABLET ORAL at 22:44

## 2020-06-03 RX ADMIN — Medication 1 AMPULE: at 21:28

## 2020-06-03 RX ADMIN — MIDAZOLAM HYDROCHLORIDE 2 MG: 1 INJECTION, SOLUTION INTRAMUSCULAR; INTRAVENOUS at 07:58

## 2020-06-03 RX ADMIN — KETOROLAC TROMETHAMINE 30 MG: 30 INJECTION, SOLUTION INTRAMUSCULAR at 16:44

## 2020-06-03 RX ADMIN — ASPIRIN 81 MG: 81 TABLET, COATED ORAL at 17:19

## 2020-06-03 RX ADMIN — WATER 2 G: 1 INJECTION INTRAMUSCULAR; INTRAVENOUS; SUBCUTANEOUS at 08:21

## 2020-06-03 RX ADMIN — LIDOCAINE HYDROCHLORIDE 80 MG: 20 INJECTION, SOLUTION EPIDURAL; INFILTRATION; INTRACAUDAL; PERINEURAL at 08:08

## 2020-06-03 RX ADMIN — FENTANYL CITRATE 25 MCG: 50 INJECTION INTRAMUSCULAR; INTRAVENOUS at 09:24

## 2020-06-03 RX ADMIN — Medication 80 MCG: at 08:19

## 2020-06-03 RX ADMIN — SODIUM CHLORIDE, SODIUM LACTATE, POTASSIUM CHLORIDE, AND CALCIUM CHLORIDE: 600; 310; 30; 20 INJECTION, SOLUTION INTRAVENOUS at 09:50

## 2020-06-03 RX ADMIN — SODIUM CHLORIDE, SODIUM LACTATE, POTASSIUM CHLORIDE, AND CALCIUM CHLORIDE 25 ML/HR: 600; 310; 30; 20 INJECTION, SOLUTION INTRAVENOUS at 06:31

## 2020-06-03 NOTE — PERIOP NOTES
TRANSFER - OUT REPORT:    Verbal report given to Tito Shrestha RN(name) on Carvel Rae  being transferred to [facility](unit) for routine post - op       Report consisted of patients Situation, Background, Assessment and   Recommendations(SBAR). Information from the following report(s) SBAR, OR Summary, MAR and Cardiac Rhythm NSR was reviewed with the receiving nurse. Opportunity for questions and clarification was provided. Patient transported with:   O2 @ 2 liters  Tech       Wife updated via phone.

## 2020-06-03 NOTE — OP NOTES
[address]  OPERATIVE REPORT    Name:  Stephania Barrientos  MR#:  [id number]  :  1960  ACCOUNT #:  [de-identified]  DATE OF SERVICE:  2020    PREOPERATIVE DIAGNOSIS:  Left hip severe end-stage osteoarthritis. POSTOPERATIVE DIAGNOSIS:  Left hip severe end-stage osteoarthritis. PROCEDURE PERFORMED:  Left total hip arthroplasty. SURGEON:  Julissa Chamberlain MD    ASSISTANT:  None. ANESTHESIA:  General.    COMPLICATIONS:  None. SPECIMENS REMOVED:  None. IMPLANTS:  See note. ESTIMATED BLOOD LOSS:  200 mL. DISPOSITION:  Recovery room, stable. INDICATIONS:  This is a 26-year-old gentleman who presents with severe end-stage osteoarthritis in his left hip. He failed conservative management and his pain had progressed to the point that normal daily activities were severely limited. The risks, benefits, and alternatives of total hip arthroplasty were explained in detail and he agreed to proceed. TECHNIQUE:  The patient was taken to the operating room and laid supine on the [place] table after general anesthetic was administered. Left hip and right ASIS region were prepped and draped in usual sterile fashion. Time-out was called. Following time-out, two Schanz pins were placed in the right iliac crest posterior to the ASIS through small stab incisions in the skin. This was then connected to an external fixator and the pelvic array apparatus. Following this, incision was commenced 2 cm distal and lateral to the left ASIS extending towards the fibular head. The fascia was split in line with the incision, and perforating vessels were cauterized, and the reflected head of the rectus was swept off the capsule. Retractors were placed along the medial inferior aspect of the femoral neck and a superior lateral, and a T-shaped capsulotomy was made. The ends were tagged with #5 Ethibond.   Typical release was made along the medial calcar, superolateral acetabulum, and superior femoral neck. The femoral checkpoint was then placed in the lateral aspect of the trochanter. Appropriate data points were then entered into the computer, and then a neck cut was carried out and the head and neck fragment retrieved. The labrum was excised and the acetabulum was digitized. Following this, the [facility] robot was brought in and the hip was reamed with a 50, initially a 52, but it was a very difficult, hard  , and it was sized down to a 51 reamer, and then bumped up to the final 54 reamer. A 54 Tritanium II cup was then placed at 40 degrees of inclination and 20 degrees of anteversion with excellent purchase and fixation. A 36 poly liner was placed. The leg was then maximally externally rotated to about 120 degrees. Traction was let off. It was hyperextended and adducted. Relaxing incision was made to allow the trochanteric tubercle to buttonhole through, and then the femur was broached up to a size #5 broach which broached in with a solid endpoint and this was switched out to a final Accolade -degree angle, 5 stem. This was trialed with a standard, followed by a -2.5. The -2.5 gave leg length reapproximation within 3 mm, while maintaining anterior stability to 90 degrees of external rotation. The hip was dislocated again and a final size 36 Biolox head and -2.5 neck length was placed and impacted onto the trunnion after it was irrigated and dried. Hip was reduced and was stable. The wound was irrigated with pulsatile lavage as well as Irrisept closed with the Ethibond suture in the capsule and a couple of #1 Vicryl to the capsule as well. FloSeal was placed deep to the capsule, and then the fascia was closed with interrupted #1 Vicryl, the subcu with 2-0 Vicryl. The check point was removed along the way, and then a subcuticular skin closure with Dermabond tape was utilized.   The pins were removed from the opposite iliac crest, and those wounds were closed with subcuticular stitch and Dermabond as well. Sterile dressings were applied throughout. There were no complications.       Joyce Howell MD      [id number]  D:  06/03/2020 10:34  T:  06/03/2020 16:56  JOB #:  [id number]
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
## 2020-06-03 NOTE — PROGRESS NOTES
1245-TRANSFER - IN REPORT:    Verbal report received from ERIC Mack(name) on Leslie Hoyt  being received from [facility]) for routine post - op      Report consisted of patients Situation, Background, Assessment and   Recommendations(SBAR). Information from the following report(s) SBAR, Kardex, OR Summary, Procedure Summary, Intake/Output, MAR and Recent Results was reviewed with the receiving nurse. Opportunity for questions and clarification was provided. Assessment completed upon patients arrival to unit and care assumed. 1905-Bedside shift change report given to Atul (oncoming nurse) by Clayton Luevano (offgoing nurse). Report included the following information SBAR, Kardex, OR Summary, Procedure Summary, Intake/Output, MAR and Recent Results.

## 2020-06-03 NOTE — ANESTHESIA POSTPROCEDURE EVALUATION
Procedure(s):  LEFT TOTAL HIP ARTHROPLASTY WITH ANTERIOR APPROACH- MAKOPLASTY. general    Anesthesia Post Evaluation        Patient location during evaluation: PACU  Note status: Adequate. Level of consciousness: responsive to verbal stimuli and sleepy but conscious  Pain management: satisfactory to patient  Airway patency: patent  Anesthetic complications: no  Cardiovascular status: acceptable  Respiratory status: acceptable  Hydration status: acceptable  Comments: +Post-Anesthesia Evaluation and Assessment    Patient: Chip Mcdonald MRN: [id number]  SSN: [id number]   YOB: 1960  Age: 63 y.o. Sex: male      Cardiovascular Function/Vital Signs    /70   Pulse 68   Temp 36.5 °C (97.7 °F)   Resp 12   Ht 5' 8.5\" (1.74 m)   Wt 69.2 kg (152 lb 8.9 oz)   SpO2 98%   BMI 22.86 kg/m²     Patient is status post Procedure(s):  LEFT TOTAL HIP ARTHROPLASTY WITH ANTERIOR APPROACH- MAKOPLASTY. Nausea/Vomiting: Controlled. Postoperative hydration reviewed and adequate. Pain:  Pain Scale 1: Visual (06/03/20 1040)  Pain Intensity 1: 10 (06/03/20 1035)   Managed. Neurological Status:   Neuro (WDL): Within Defined Limits (06/03/20 0618)   At baseline. Mental Status and Level of Consciousness: Arousable. Pulmonary Status:   O2 Device: Nasal cannula (06/03/20 1035)   Adequate oxygenation and airway patent. Complications related to anesthesia: None    Post-anesthesia assessment completed. No concerns. Signed By: Victor Manuel Stephenson MD    6/3/2020  Post anesthesia nausea and vomiting:  controlled      INITIAL Post-op Vital signs:   Vitals Value Taken Time   /70 6/3/2020 11:15 AM   Temp 36.5 °C (97.7 °F) 6/3/2020 10:32 AM   Pulse 73 6/3/2020 11:23 AM   Resp 11 6/3/2020 11:23 AM   SpO2 99 % 6/3/2020 11:23 AM   Vitals shown include unvalidated device data.

## 2020-06-03 NOTE — PERIOP NOTES
Handoff Report from Operating Room to PACU    Report received from LUPE Clement RN and Girish Negro CRNA regarding Shaila Carpenter. Surgeon(s): Francy Taylor MD  And Procedure(s) (LRB):  LEFT TOTAL HIP ARTHROPLASTY WITH ANTERIOR APPROACH- Davion Ochoa (Left)  confirmed   with allergies, dressings and local anesthetic discussed. Anesthesia type, drugs, patient history, complications, estimated blood loss, vital signs, intake and output, and last pain medication, lines, reversal medications and temperature were reviewed.
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
## 2020-06-03 NOTE — DISCHARGE INSTRUCTIONS
Discharge Instructions: How to [address]  Surgery: Total Hip Replacement  Surgeon:   Wendy Cabrera MD  Surgery Date:  6/3/2020     To relieve pain:   Use ice/gel packs.  Put the ice pack directly over the wound, or anywhere you are hurting or swollen.  To control pain and swelling, keep ice on regularly, especially after physical activity.  The packs should stay cold for 3-4 hours. When it is not cold anymore, rotate with the packs in the freezer.  Elevate your leg. This will also keep swelling down.  Rest for at least 20 minutes between activity or exercises.  To keep track of your pain medications, write down what you take and when you take it.  The last dose of pain medication you got in the hospital was:       Medication    Dose    Date & Time           Choose your medications based on the pain scale below:     To keep your pain under control, take Tylenol every 6 hours for 14 days - even if you feel like you dont need it.  For mild to moderate pain (4-6 on pain scale), take one pain pill every 3-4 hours as needed.  For severe pain (7-10 on pain scale), take two pain pills every 3-4 hours as needed.  To prevent nausea, take your pain medications with food. Pain Scale              As your pain lessens:     Slowly start taking less pain medication. You may do this by waiting longer between doses or by taking smaller doses.  Stop using the pain medications as soon as you no longer need it, usually in 2-3 weeks.  Aspirin   To prevent blood clots, you will need to take Aspirin 81 mg twice a day for 30 days.  To prevent stomach upset or bleeding:   Do not take non-steroidal anti-inflammatory medications (Ibuprofen, Advil, Motrin, Naproxen, etc.)    Take Pepcid 20 mg twice a day, or a similar home medication, while you are taking a blood thinner. DRESSING: OPSITE (Honeycomb dressing)     Keep your clear, waterproof dressing in place for 5-7 days after your leave the hospital.     If you are still having drainage, you will need to change your dressing in 5-7 days. You will be given one extra dressing to use at home.  If there is no more drainage from the wound, you may leave it open to the air.  You will have some swelling, warmth, and bruising around the knee and up and down the leg after surgery. This will may get worse in the first few days you are home and will slowly get better over the next few weeks. OPSITE DRESSING INSTRUCTIONS                  PRINEO DRESSING INSTRUCTIONS      Prineo is a type of skin glue and mesh that is keeping your wound together.  Leave this covering alone. Do not peel it off.  Do not apply oils or lotions over it.  You may shower with this dressing but do not soak it. Gently pat your wound dry when you get out of the shower.  DO NOTs:   Do not rub your surgical wound   Do not put lotion or oils on your wound.  Do not take a tub bath or go swimming until your doctor says it is ok.  You may shower with this dressing over your wound.  After showering pat the dressing dry.  If you have staples a home health nurse will remove them in about 10 days.  To increase and promote healing:     Stop Smoking (or at least cut back on       Smoking).  Eat a well-balanced diet (high in protein       and vitamin C).  If you have a poor appetite, drink Ensure, Glucerna, or [place] Instant Breakfast for the next 30 days.  If you are diabetic, you should control your blood                                                sugars to prevent infection and help your wound                                                to heal.     Prevent Infection    1. Wash your hands.         This is the most important thing you or your caregiver can do.  Wash your hands with soap and water (or use the hand  we gave you) before you touch any wounds. 2. Shower.  Use the antibacterial soap we gave you when you take a shower.  Shower with this soap until your wounds are healed. 3.  Use clean sheets.  Put freshly cleaned sheets on your bed after surgery.  To keep the surgery site clean, do not allow pets to sleep with you while your wound is still healing.  To prevent constipation, stay active and drink plenty of fluid.  While using pain medications, you should also take stool softeners and laxatives, such as Pericolace       and Miralax.  If you are having too many bowel       Movements, then you may need       to stop taking the laxatives.  You should have a bowel movement 3-4 days        after surgery and then at least every other day while       on pain medication.  To improve your recovery, you must be active!  Use your walker and take short walks         (in your home). about every 2 hours during the day.  Try to increase how far you walk each day.  You can put as much weight on your leg as you can tolerate while walking.  Home health physical therapy will come to your       home the day after you leave the hospital.  The       therapist will visit about 4 times over the next couple of       weeks to teach you how to get out of bed, to safely walk       in your home, and to do your exercises.  NO DRIVING until your surgeon tells you it is ok.  You can return to work when cleared by a physician.  Please call your physician immediately if you have:     Constant bleeding from your wound.  Increasing redness or swelling around your wound (Some warmth, bruising and swelling is normal).  Change in wound drainage (increase in amount, color, or bad smell).      Change in mental status (unusual behavior   Temperature over 101.5 degrees Fahrenheit      Pain or redness in the calf (back of your lower leg - see picture)     Increased swelling of the thigh, ankle, calf, or foot.  Emergency: CALL 911 if you have:     Shortness of breath     Chest pain when you cough or taking a deep breath     Please call your surgeons office at [phone] for a follow up appointment. _   You should call as soon as you get home or the next day after discharge. Ask to make an appointment in 2 weeks.  If you have questions or concerns during normal business hours, you may reach Dr. Gianni Carrera' Team at [phone].

## 2020-06-03 NOTE — PERIOP NOTES
Solapt Wound Debridement and Cleansing System  Ref: Wanda Brittonthers: [id number] LOT: [id number] Expiration Date: 01/31/2022    Floseal Hemostatic Matrix: 10mL   Reference number: [id number]   Lot Number: [id number]   Expiration Date: 12/03/2020

## 2020-06-03 NOTE — PROGRESS NOTES
Attempted to see pt for OT services. Pt will not be leaving day 0. Will have OT follow up tomorrow per guidelines.

## 2020-06-03 NOTE — PROGRESS NOTES
Ortho / Neurosurgery NP Note    POD# 0  s/p LEFT TOTAL HIP ARTHROPLASTY WITH ANTERIOR APPROACH- MAKOPLASTY     Pt resting in bed, eating crackers. Complaints of expected post-op left hip - medicated with prn oxycodone by RN  Tolerating regular diet     VSS Afebrile. 2L NC. Visit Vitals  /69 (BP 1 Location: Left arm, BP Patient Position: At rest)   Pulse 74   Temp 98.1 °F (36.7 °C)   Resp 14   Ht 5' 8.5\" (1.74 m)   Wt 69.2 kg (152 lb 8.9 oz)   SpO2 100%   BMI 22.86 kg/m²       Voiding status: due to void  Output (mL)  Last Bowel Movement Date: 06/03/20 (06/03/20 1939)  Unmeasurable Output  Urine Occurrence(s): 1(Patient voided in preop) (06/03/20 0600)      Labs    Lab Results   Component Value Date/Time    HGB 13.7 05/26/2020 02:02 PM      Lab Results   Component Value Date/Time    INR 1.0 05/26/2020 02:02 PM      Lab Results   Component Value Date/Time    Sodium 133 (L) 05/26/2020 02:02 PM    Potassium 4.2 05/26/2020 02:02 PM    Chloride 104 05/26/2020 02:02 PM    CO2 26 05/26/2020 02:02 PM    Glucose 106 (H) 05/26/2020 02:02 PM    BUN 11 05/26/2020 02:02 PM    Creatinine 1.07 05/26/2020 02:02 PM    Calcium 9.7 05/26/2020 02:02 PM     Recent Glucose Results: No results found for: GLU, GLUPOC, GLUCPOC        Body mass index is 22.86 kg/m². : A BMI > 30 is classified as obesity and > 40 is classified as morbid obesity. Optifoam dressing c.d.i  Cryotherapy in place over incision  Calves soft and supple; No pain with passive stretch  Sensation and motor intact - PF/DF/EHL intact 5/5  SCDs for mechanical DVT proph while in bed     PLAN:  1) PT BID, OT - WBAT.  Pt given forearm crutches PTA  2) Aspirin 81 mg PO BID for DVT Prophylaxis   3) GI Prophylaxis - Pepcid  4) Readniess for discharge:     [x] Vital Signs stable - wean O2 as tolerated    [] Hgb stable - check in am   [] + Voiding - due to void   [x] Wound intact, drainage minimal    [x] Tolerating PO intake     [] Cleared by PT (OT if applicable) [] Stair training completed (if applicable)    [] Independent / Contact Guard Assist (household distance)     [] Bed mobility     [] Car transfers     [] ADLs    [x] Adequate pain control on oral medication alone     Discharge pending voiding status & PT/OT clearance. Plans to return home with wife's support. Prefers Rx to be e-prescribed. Family Communication Note:     Called and spoke with patient's wife, Art Chacko. Updated on patient's status and informed of plan for discharge tomorrow. She will be patient's primary caregiver at home as well as transportation home. Encouraged to be present for discharge teaching and gave estimated discharge time around 10a-12p tomorrow. Will provide screening desk with advocate's name, so she can be escorted to room and present for discharge teaching.        Almaz Rose, CLARA

## 2020-06-04 VITALS
WEIGHT: 152.56 LBS | HEIGHT: 69 IN | HEART RATE: 78 BPM | OXYGEN SATURATION: 96 % | TEMPERATURE: 98.5 F | DIASTOLIC BLOOD PRESSURE: 56 MMHG | BODY MASS INDEX: 22.6 KG/M2 | SYSTOLIC BLOOD PRESSURE: 115 MMHG | RESPIRATION RATE: 20 BRPM

## 2020-06-04 LAB
ANION GAP SERPL CALC-SCNC: 4 MMOL/L (ref 5–15)
BUN SERPL-MCNC: 14 MG/DL (ref 6–20)
BUN/CREAT SERPL: 10 (ref 12–20)
CALCIUM SERPL-MCNC: 8.8 MG/DL (ref 8.5–10.1)
CHLORIDE SERPL-SCNC: 101 MMOL/L (ref 97–108)
CO2 SERPL-SCNC: 28 MMOL/L (ref 21–32)
CREAT SERPL-MCNC: 1.4 MG/DL (ref 0.7–1.3)
GLUCOSE SERPL-MCNC: 105 MG/DL (ref 65–100)
HGB BLD-MCNC: 11.7 G/DL (ref 12.1–17)
POTASSIUM SERPL-SCNC: 4.4 MMOL/L (ref 3.5–5.1)
SODIUM SERPL-SCNC: 133 MMOL/L (ref 136–145)

## 2020-06-04 PROCEDURE — 97110 THERAPEUTIC EXERCISES: CPT

## 2020-06-04 PROCEDURE — 36415 COLL VENOUS BLD VENIPUNCTURE: CPT

## 2020-06-04 PROCEDURE — 74011250637 HC RX REV CODE- 250/637: Performed by: ORTHOPAEDIC SURGERY

## 2020-06-04 PROCEDURE — 97165 OT EVAL LOW COMPLEX 30 MIN: CPT | Performed by: OCCUPATIONAL THERAPIST

## 2020-06-04 PROCEDURE — 74011250636 HC RX REV CODE- 250/636: Performed by: ORTHOPAEDIC SURGERY

## 2020-06-04 PROCEDURE — 94760 N-INVAS EAR/PLS OXIMETRY 1: CPT

## 2020-06-04 PROCEDURE — 85018 HEMOGLOBIN: CPT

## 2020-06-04 PROCEDURE — 80048 BASIC METABOLIC PNL TOTAL CA: CPT

## 2020-06-04 PROCEDURE — 74011000250 HC RX REV CODE- 250: Performed by: ORTHOPAEDIC SURGERY

## 2020-06-04 PROCEDURE — 97116 GAIT TRAINING THERAPY: CPT

## 2020-06-04 PROCEDURE — 97535 SELF CARE MNGMENT TRAINING: CPT | Performed by: OCCUPATIONAL THERAPIST

## 2020-06-04 RX ADMIN — ACETAMINOPHEN 650 MG: 325 TABLET, FILM COATED ORAL at 05:22

## 2020-06-04 RX ADMIN — OXYCODONE 5 MG: 5 TABLET ORAL at 08:37

## 2020-06-04 RX ADMIN — KETOROLAC TROMETHAMINE 30 MG: 30 INJECTION, SOLUTION INTRAMUSCULAR at 05:23

## 2020-06-04 RX ADMIN — ACETAMINOPHEN 650 MG: 325 TABLET, FILM COATED ORAL at 12:41

## 2020-06-04 RX ADMIN — OXYCODONE 5 MG: 5 TABLET ORAL at 12:41

## 2020-06-04 RX ADMIN — DEXAMETHASONE SODIUM PHOSPHATE 10 MG: 4 INJECTION, SOLUTION INTRAMUSCULAR; INTRAVENOUS at 08:38

## 2020-06-04 RX ADMIN — POLYETHYLENE GLYCOL 3350 17 G: 17 POWDER, FOR SOLUTION ORAL at 08:37

## 2020-06-04 RX ADMIN — ASPIRIN 81 MG: 81 TABLET, COATED ORAL at 08:38

## 2020-06-04 RX ADMIN — ACETAMINOPHEN 650 MG: 325 TABLET, FILM COATED ORAL at 00:31

## 2020-06-04 RX ADMIN — Medication 10 ML: at 05:23

## 2020-06-04 RX ADMIN — SENNOSIDES AND DOCUSATE SODIUM 1 TABLET: 8.6; 5 TABLET ORAL at 08:38

## 2020-06-04 RX ADMIN — LISINOPRIL 20 MG: 20 TABLET ORAL at 08:38

## 2020-06-04 RX ADMIN — WATER 2 G: 1 INJECTION INTRAMUSCULAR; INTRAVENOUS; SUBCUTANEOUS at 00:33

## 2020-06-04 RX ADMIN — OXYCODONE 10 MG: 5 TABLET ORAL at 05:22

## 2020-06-04 RX ADMIN — Medication 1 AMPULE: at 08:39

## 2020-06-04 RX ADMIN — KETOROLAC TROMETHAMINE 30 MG: 30 INJECTION, SOLUTION INTRAMUSCULAR at 00:31

## 2020-06-04 RX ADMIN — FAMOTIDINE 20 MG: 20 TABLET, FILM COATED ORAL at 08:38

## 2020-06-04 RX ADMIN — KETOROLAC TROMETHAMINE 30 MG: 30 INJECTION, SOLUTION INTRAMUSCULAR at 12:41

## 2020-06-04 RX ADMIN — Medication 10 ML: at 00:32
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
## 2020-06-04 NOTE — PROGRESS NOTES
Transition of Care  Home Health: At [address] [place] [address] follow-up 2wks      Reason for Admission:   Degenerative joint disease                   RUR Score:    9%                 Plan for utilizing home health: At Home care has accepted       PCP: First and Last name:  Rito Oshea    Name of Practice:    Are you a current patient: Yes/No: Yes   Approximate date of last visit:     Can you participate in a virtual visit with your PCP: Yes                    Current Advanced Directive/Advance Care Plan:  Not interested at this time. Cm provided Pt with information where he can have ACP done when he decides to do one. Transition of Care Plan: Pt to discharge home with [facility]. Referral sent to [facility] home [facility], Pt was accepted. Care Management Interventions  PCP Verified by CM: Yes  Mode of Transport at Discharge:  Other (see comment)(wife private vehicle)  Transition of Care Consult (CM Consult): Discharge Planning  Physical Therapy Consult: Yes  Current Support Network: Lives with Spouse  Confirm Follow Up Transport: Family  Discharge Location  Discharge Placement: [address] [facility], [address] [facility]

## 2020-06-04 NOTE — PROGRESS NOTES
Bedside and Verbal shift change report given to Leydi Ziegler (oncoming nurse) by Davion Pacheco RN (offgoing nurse). Report included the following information SBAR, Kardex, OR Summary, Procedure Summary, Intake/Output, MAR and Recent Results.

## 2020-06-04 NOTE — PROGRESS NOTES
Ortho / Neurosurgery NP Note    POD# 1  s/p LEFT TOTAL HIP ARTHROPLASTY WITH ANTERIOR APPROACH- MAKOPLASTY     Pt resting in bed. Pain \"tolerable\" well controlled with prn oxycodone   Denies CP, sob, dizziness. Tolerating regular diet     VSS Afebrile. Room air. Visit Vitals  /87   Pulse 78   Temp 98.2 °F (36.8 °C)   Resp 20   Ht 5' 8.5\" (1.74 m)   Wt 69.2 kg (152 lb 8.9 oz)   SpO2 100%   BMI 22.86 kg/m²       Voiding status: voiding without difficulty  Output (mL)  Urine Voided: 300 ml (06/03/20 1520)  Emesis: 0 mL (06/03/20 1215)  Stool: 0 ml (06/03/20 1215)  Last Bowel Movement Date: 06/03/20 (06/03/20 1238)  Blood: 0 ml (06/03/20 1215)  Other Output: 0 mL (06/03/20 1215)  Unmeasurable Output  Urine Occurrence(s): 1 (06/04/20 0046)  Stool Occurrence(s): 0 (06/03/20 1215)  Emesis Occurrence(s): 0 (06/03/20 1215)      Labs    Lab Results   Component Value Date/Time    HGB 11.7 (L) 06/04/2020 05:02 AM      Lab Results   Component Value Date/Time    INR 1.0 05/26/2020 02:02 PM      Lab Results   Component Value Date/Time    Sodium 133 (L) 06/04/2020 05:02 AM    Potassium 4.4 06/04/2020 05:02 AM    Chloride 101 06/04/2020 05:02 AM    CO2 28 06/04/2020 05:02 AM    Glucose 105 (H) 06/04/2020 05:02 AM    BUN 14 06/04/2020 05:02 AM    Creatinine 1.40 (H) 06/04/2020 05:02 AM    Calcium 8.8 06/04/2020 05:02 AM     Recent Glucose Results:   Lab Results   Component Value Date/Time     (H) 06/04/2020 05:02 AM           Body mass index is 22.86 kg/m². : A BMI > 30 is classified as obesity and > 40 is classified as morbid obesity. Optifoam dressing c.d.i  Cryotherapy in place over incision  Calves soft and supple; No pain with passive stretch  Sensation and motor intact - PF/DF/EHL intact 5/5  SCDs for mechanical DVT proph while in bed     PLAN:  1) PT BID, OT - WBAT.  Pt given forearm crutches PTA  2) Aspirin 81 mg PO BID for DVT Prophylaxis   3) GI Prophylaxis - Pepcid  4) Readniess for discharge: [x] Vital Signs stable    [x] Hgb stable   [x] + Voiding    [x] Wound intact, drainage minimal    [x] Tolerating PO intake     [] Cleared by PT (OT if applicable)     [] Stair training completed (if applicable)    [] Independent / Contact Guard Assist (household distance)     [] Bed mobility     [] Car transfers     [] ADLs    [x] Adequate pain control on oral medication alone     Discharge pending PT/OT clearance. Plans to return home with wife's support and HH. Prefers Rx to be e-prescribed.        China Brown, NP

## 2020-06-04 NOTE — PROGRESS NOTES
18- report rec'd from [facility] and [facility] care . 0000- assessment completed. Ice pack refreshed. 0400- pt resting well. 0530- am labs drawn , sent.  Pain meds given see MAR  Ice refreshed.    0700- bedside report given to ERIC Mccarthy

## 2020-06-04 NOTE — ROUTINE PROCESS
Discharge AVS reviewed with pt. All medications reviewed individually with patient. Opportunities for questions and concerns provided. Patient discharged via car . Patient's arm band appropriately discarded.

## 2020-06-04 NOTE — PROGRESS NOTES
Problem: Mobility Impaired (Adult and Pediatric)  Goal: *Acute Goals and Plan of Care (Insert Text)  Description: FUNCTIONAL STATUS PRIOR TO ADMISSION: Patient was independent and active without use of DME.    HOME SUPPORT PRIOR TO ADMISSION: The patient lived with his wife but did not require assist.    Physical Therapy Goals  Initiated 6/3/2020    1. Patient will move from supine to sit and sit to supine  in bed with modified independence within 4 days. 2. Patient will perform sit to stand with modified independence within 4 days. 3. Patient will ambulate with modified independence for 250 feet with the least restrictive device within 4 days. 4. Patient will ascend/descend 8 stairs with 1 handrail(s) with modified independence within 4 days. 5. Patient will verbalize and demonstrate understanding of avoidance and sudden and extreme motions within 4 days. 6. Patient will perform anterior hip home exercise program per protocol with modified independence within 4 days. Outcome: Progressing Towards Goal  PHYSICAL THERAPY TREATMENT  Patient: Agustin Quintanilla (89 y.o. male)  Date: 6/4/2020  Diagnosis: Degenerative joint disease (DJD) of hip [M16.9]   <principal problem not specified>  Procedure(s) (LRB):  LEFT TOTAL HIP ARTHROPLASTY WITH ANTERIOR APPROACH- MAKOPLASTY (Left) 1 Day Post-Op  Precautions:    Chart, physical therapy assessment, plan of care and goals were reviewed. ASSESSMENT  Patient continues with skilled PT services and has made excellent progress towards therapy goals and overall functional mobility. Pt functioning at a modified independent level for all mobility, including bed mobility, sit<>stand transfers, ambulation w/ forearm crutches, and stair climbing. Gait speed decreased with a step-to gait pattern however steady overall with support of forearm crutches. No LOB/balance deficits noted.  Overall, pt tolerated therapy session well and is safe to discharge home w/ assist of wife and HHPT.      Current Level of Function Impacting Discharge (mobility/balance): modified independent w/ use of forearm crutches    Other factors to consider for discharge: pain levels         PLAN :  Patient continues to benefit from skilled intervention to address the above impairments. Continue treatment per established plan of care. to address goals. Recommendation for discharge: (in order for the patient to meet his/her long term goals)  Physical therapy at least 2 days/week in the home     This discharge recommendation:  Has been made in collaboration with the attending provider and/or case management    IF patient discharges home will need the following DME: patient owns DME required for discharge       SUBJECTIVE:   Patient stated My mom is 80, its hard to see her age like that.     OBJECTIVE DATA SUMMARY:   Critical Behavior:  Neurologic State: Alert, Appropriate for age  Orientation Level: Oriented X4  Cognition: Appropriate decision making, Follows commands     Functional Mobility Training:  Bed Mobility:  Rolling: Supervision  Supine to Sit: Supervision     Scooting: Supervision        Transfers:  Sit to Stand: Modified independent  Stand to Sit: Modified independent        Bed to Chair: Modified independent                    Balance:  Sitting: Intact  Standing: Intact; With support(forearm crutches)  Standing - Static: Good;Constant support  Standing - Dynamic : Good;Constant support  Ambulation/Gait Training:  Distance (ft): 240 Feet (ft)(120ft x2 w/ seated rest break b/t)  Assistive Device: Gait belt;Crutches(forearm crutches)  Ambulation - Level of Assistance: Modified independent        Gait Abnormalities: Step to gait              Speed/Mariela: Slow                       Stairs:  Number of Stairs Trained: 4  Stairs - Level of Assistance: Modified independent   Rail Use: Left     Therapeutic Exercises:    Ankle pumps, glut sets, LAQ, heel slides    Pain Ratin/10 pain in L hip    Activity Tolerance:   Good  Please refer to the flowsheet for vital signs taken during this treatment. After treatment patient left in no apparent distress:   Sitting in chair and Call bell within reach    COMMUNICATION/COLLABORATION:   The patients plan of care was discussed with: Registered nurse and NP .      Aide Charles, PT, DPT   Time Calculation: 35 mins
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
## 2020-06-16 DIAGNOSIS — E78.00 PURE HYPERCHOLESTEROLEMIA: Primary | ICD-10-CM

## 2020-06-16 DIAGNOSIS — R05.9 COUGH: ICD-10-CM

## 2020-06-16 RX ORDER — ROSUVASTATIN CALCIUM 10 MG/1
10 TABLET, COATED ORAL
Qty: 90 TAB | Refills: 1 | Status: SHIPPED | OUTPATIENT
Start: 2020-06-16 | End: 2021-03-29

## 2020-06-16 RX ORDER — ALBUTEROL SULFATE 90 UG/1
2 AEROSOL, METERED RESPIRATORY (INHALATION)
Qty: 54 G | Refills: 1 | Status: SHIPPED | OUTPATIENT
Start: 2020-06-16 | End: 2021-03-29

## 2020-06-16 NOTE — TELEPHONE ENCOUNTER
CP: Fernando Nyhan, MD    Last appt: 3/18/2020  Future Appointments   Date Time Provider Dedrick Zimmerman   9/22/2020 10:15 AM Fernando Nyhan, MD [facility]       Requested Prescriptions     Pending Prescriptions Disp Refills    rosuvastatin (CRESTOR) 10 mg tablet 90 Tab 1     Sig: Take 1 Tab by mouth nightly.  albuterol (PROVENTIL HFA, VENTOLIN HFA, PROAIR HFA) 90 mcg/actuation inhaler 54 g 1     Sig: Take 2 Puffs by inhalation every six (6) hours as needed for Wheezing.  Prn

## 2020-07-30 ENCOUNTER — HOSPITAL ENCOUNTER (OUTPATIENT)
Dept: CT IMAGING | Age: 60
Discharge: HOME | End: 2020-07-30
Attending: ORTHOPAEDIC SURGERY
Payer: COMMERCIAL

## 2020-07-30 DIAGNOSIS — Z96.642 PRESENCE OF LEFT ARTIFICIAL HIP JOINT: ICD-10-CM

## 2020-07-30 DIAGNOSIS — M25.552 LEFT HIP PAIN: ICD-10-CM

## 2020-07-30 PROCEDURE — 73700 CT LOWER EXTREMITY W/O DYE: CPT

## 2020-08-14 ENCOUNTER — HOSPITAL ENCOUNTER (OUTPATIENT)
Dept: GENERAL RADIOLOGY | Age: 60
Discharge: HOME | End: 2020-08-14
Attending: ORTHOPAEDIC SURGERY
Payer: COMMERCIAL

## 2020-08-14 DIAGNOSIS — M87.052 IDIOPATHIC ASEPTIC NECROSIS OF LEFT FEMUR (HCC): ICD-10-CM

## 2020-08-14 DIAGNOSIS — Z96.642 PRESENCE OF LEFT ARTIFICIAL HIP JOINT: ICD-10-CM

## 2020-08-14 PROCEDURE — 20610 DRAIN/INJ JOINT/BURSA W/O US: CPT

## 2020-08-14 RX ORDER — SODIUM CHLORIDE 9 MG/ML
3 INJECTION INTRAMUSCULAR; INTRAVENOUS; SUBCUTANEOUS
Status: DISPENSED | OUTPATIENT
Start: 2020-08-14 | End: 2020-08-14

## 2020-08-14 NOTE — PROGRESS NOTES
Left hip aspiration was attempted by Dr. Tariq Hermosillo at two sites near hip replacement incision. No fluid was found at sites.

## 2020-08-21 ENCOUNTER — HOSPITAL ENCOUNTER (OUTPATIENT)
Dept: NUCLEAR MEDICINE | Age: 60
Discharge: HOME | End: 2020-08-21
Attending: ORTHOPAEDIC SURGERY
Payer: COMMERCIAL

## 2020-08-21 DIAGNOSIS — M87.052 AVASCULAR NECROSIS OF BONE OF HIP, LEFT (HCC): ICD-10-CM

## 2020-08-21 DIAGNOSIS — Z96.642 STATUS POST TOTAL REPLACEMENT OF LEFT HIP: ICD-10-CM

## 2020-08-21 PROCEDURE — 78315 BONE IMAGING 3 PHASE: CPT

## 2020-08-24 ENCOUNTER — VIRTUAL VISIT (OUTPATIENT)
Dept: INTERNAL MEDICINE CLINIC | Age: 60
End: 2020-08-24
Payer: COMMERCIAL

## 2020-08-24 ENCOUNTER — TELEPHONE (OUTPATIENT)
Dept: INTERNAL MEDICINE CLINIC | Age: 60
End: 2020-08-24

## 2020-08-24 DIAGNOSIS — M16.12 PRIMARY OSTEOARTHRITIS OF LEFT HIP: ICD-10-CM

## 2020-08-24 DIAGNOSIS — J01.00 ACUTE NON-RECURRENT MAXILLARY SINUSITIS: Primary | ICD-10-CM

## 2020-08-24 DIAGNOSIS — F41.9 ANXIETY: ICD-10-CM

## 2020-08-24 DIAGNOSIS — I10 ESSENTIAL HYPERTENSION: ICD-10-CM

## 2020-08-24 PROCEDURE — 99442 PR PHYS/QHP TELEPHONE EVALUATION 11-20 MIN: CPT | Performed by: INTERNAL MEDICINE

## 2020-08-24 RX ORDER — AMOXICILLIN AND CLAVULANATE POTASSIUM 875; 125 MG/1; MG/1
1 TABLET, FILM COATED ORAL EVERY 12 HOURS
Qty: 14 TAB | Refills: 0 | Status: SHIPPED | OUTPATIENT
Start: 2020-08-24 | End: 2020-08-31

## 2020-08-24 NOTE — TELEPHONE ENCOUNTER
----- Message from Scotty Pena sent at 8/24/2020  9:34 AM EDT -----  Regarding: Dr. Bernice Santillan  Contact: [phone] (if not patient): self  Relationship of caller (if not patient): self  Best contact number(s): [phone] or [phone], please confirm with Pt prescription will be submitted, please leave a message if you don't reach Pt. Name of medication and dosage if known: Unknown  Is patient out of this medication (yes/no): n/a  Pharmacy name: [facility] listed in chart? (yes/no): Yes  Pharmacy phone number: [phone]  Date of last visit: 03/18/20  Details to clarify the request: Pt is having \"sinus\" issues and would like a prescription called in.     Message copied/pasted from Gus

## 2020-08-24 NOTE — TELEPHONE ENCOUNTER
Called, spoke to pt  Received two pt identifiers  Pt offered and accepted virtual appt for 08/24/20 @ 1215  Pt verbalizes understanding of the instructions and has no further questions at this time.

## 2020-08-24 NOTE — PROGRESS NOTES
HISTORY OF PRESENT ILLNESS  Meryl Sneed is a 63 y.o. male. HPI       This is an established visit completed with telemedicine was completed with    the patient acknowledges and agrees to this method of visitation    15 minutes  Last here 3/18/20 Pt is here for acute care    'He c/o bilateral sinus pressure x a few weeks  He endorses congestion  He denies fever, chills  , cough, sore throat, ear pain   He was using flonase   Dicsussed using flonase every day   Will send abx     SBP is 109  Continues on lisinopril 10mg daily   No home readings         Wt today is 155 lbs-- up 6 lbs x lov   His weight is within normal ranges     Reviewed labs   He tested negative for covid 5/30/20, 7/15/20       He is now following with Dr April Thakkar (ortho) for L leg pain  Pt has been completing PT  He had L arthroplasty surgery 6/3/20 performed by Dr. Debbi Joe however since his surgery he has not been able to walk very well has a lot of left hip pain they are evaluating for possible infection that hip  Reviewed note 7/21/20: Treatment Plan: Patient's x-ray shows a radiolucent line extending from the tip of the stem. Since the patient has pain with any range of motion in his left hip I have some concern for an occult fracture. I ordered a CT scan for further evaluation. Follow-up after CT results. Reviewed ct 7/30/20: Possible effusion posterior to the left hip prosthesis. Otherwise, left hip prosthesis is in good position without CT evidence of  complication. No fracture.    Next apt 8/25/20     Pt saw Dr Brois Chaney for preop clearance   Last visit 2/13/20     Pt follows with Dr. Michelle Maya ([facility]) for h/o prostate cancer, annually  Last visit was March 2020     Pt uses protonix prn which works well       He is no longer taking celexa or buspar for anxiety - doing well without meds addressed again today        Continues on crestor 10mg for cholesterol  Tolerating well, taking routinely no issue at goal last check   Recall could not tolerate lipitor      Pt has albuterol to use prn for wheezing/breathing     ACP not on file. SDM is his wife. Provided information in the past.      PREVENTIVE:    Colonoscopy: 8/17/11, Dr. Marimar Berger, repeat 10 years  PSA: 8/11, 10/14, 5/16 undetectable, 1/18, 1/19, uro follows  Tdap: 7/09/2010 will complete follow-up  Pneumovax: not yet needed  [id number]: not yet needed  Shingrix: ordered 07/30/18, reordered 06/21/19   Flu shot: 9/2019   A1c: 7/17 5.6, 10/17 5.8, 1/18 5.4, 7/18 5.6, 1/19 5.5 3/20 5.7   Eye exam: Dr Julio Grande, 9/11/18, due, pt states he will schedule   Hep C screen: 7/14/15, negative   Lipids: 12/19 LDL 63  EKG: 10/17/17, new ST flattenings     Patient Active Problem List    Diagnosis Date Noted    Degenerative joint disease (DJD) of hip 06/03/2020    Cervical stenosis of spinal canal 10/30/2017    Pure hypercholesterolemia 07/28/2017    Prostate cancer ([facility] [address] 01/13/2015    GERD (gastroesophageal reflux disease) 09/07/2011    HTN (hypertension) 09/07/2011    Dizziness and giddiness 06/08/2011    Anxiety 03/29/2011     Current Outpatient Medications   Medication Sig Dispense Refill    amoxicillin-clavulanate (AUGMENTIN) 875-125 mg per tablet Take 1 Tab by mouth every twelve (12) hours for 7 days. 14 Tab 0    rosuvastatin (CRESTOR) 10 mg tablet Take 1 Tab by mouth nightly. 90 Tab 1    albuterol (PROVENTIL HFA, VENTOLIN HFA, PROAIR HFA) 90 mcg/actuation inhaler Take 2 Puffs by inhalation every six (6) hours as needed for Wheezing. Prn 54 g 1    lisinopriL (PRINIVIL, ZESTRIL) 20 mg tablet Take 1 Tab by mouth daily. 30 Tab 5    Ketoconazole 2 % topical foam Apply  to affected area two (2) times a day.  Apply to scalp 100 g 1     Past Surgical History:   Procedure Laterality Date    ABDOMEN SURGERY PROC UNLISTED Right 1980    inguinal    HX PROSTATECTOMY  8/2015    HX SHOULDER ARTHROSCOPY Left       Lab Results   Component Value Date/Time    WBC 8.5 05/26/2020 02:02 PM    HGB 11.7 (L) 06/04/2020 05:02 AM    Hemoglobin (POC) 15.0 10/30/2017 02:13 PM    HCT 39.9 05/26/2020 02:02 PM    Hematocrit (POC) 44 10/30/2017 02:13 PM    PLATELET 551 76/71/6491 02:02 PM    MCV 88.3 05/26/2020 02:02 PM     Lab Results   Component Value Date/Time    Cholesterol, total 178 12/17/2019 08:48 AM    HDL Cholesterol 102 12/17/2019 08:48 AM    LDL, calculated 63 12/17/2019 08:48 AM    Triglyceride 64 12/17/2019 08:48 AM     Lab Results   Component Value Date/Time    GFR est non-AA 52 (L) 06/04/2020 05:02 AM    GFRNA, POC >60 10/30/2017 02:13 PM    GFR est AA >60 06/04/2020 05:02 AM    GFRAA, POC >60 10/30/2017 02:13 PM    Creatinine 1.40 (H) 06/04/2020 05:02 AM    Creatinine (POC) 1.1 10/30/2017 02:13 PM    BUN 14 06/04/2020 05:02 AM    BUN (POC) 19 10/30/2017 02:13 PM    Sodium 133 (L) 06/04/2020 05:02 AM    Sodium (POC) 137 10/30/2017 02:13 PM    Potassium 4.4 06/04/2020 05:02 AM    Potassium (POC) 4.4 10/30/2017 02:13 PM    Chloride 101 06/04/2020 05:02 AM    Chloride (POC) 103 10/30/2017 02:13 PM    CO2 28 06/04/2020 05:02 AM        Review of Systems   Constitutional: Negative for chills and fever. HENT: Positive for congestion and sinus pain. Negative for ear pain and sore throat. Respiratory: Negative for cough and shortness of breath. Cardiovascular: Negative for chest pain. Physical Exam    ASSESSMENT and PLAN    ICD-10-CM ICD-9-CM    1. Acute non-recurrent maxillary sinusitis     Treat with Augmentin twice daily for 7 days    Zyrtec    Add Flonase daily       J01.00 461.0    2. Primary osteoarthritis of left hip     Has follow-up with Ortho Dr. Rebecca Malik tomorrow has persistent pains and surgery M16.12 715.15    3. Essential hypertension reports good control on lisinopril I10 401.9    4. Anxiety no longer needing medication doing well F41.9 300.00       Depression screen reviewed and negative      Scribed by Kenneth Garcia of Tristan Gunderson, as dictated by Dr. Mark Bowles.      Current diagnosis and concerns discussed with pt at length. Pt understands risks and benefits or current treatment plan and medications, and accepts the treatment and medication with any possible risks. Pt asks appropriate questions, which were answered. Pt was instructed to call with any concerns or problems. I have reviewed the note documented by the scribe. The services provided are my own. The documentation is accurate     Filiberto Ghislaine, who was evaluated through a synchronous (real-time) audio only encounter, and/or his healthcare decision maker, is aware that it is a billable service, with coverage as determined by his insurance carrier. He provided verbal consent to proceed: Yes, and patient identification was verified. It was conducted pursuant to the emergency declaration under the [address] [place] [address], [address] [place] [address] authority and the [employer] General Act. A caregiver was present when appropriate. Ability to conduct physical exam was limited. I was at home. The patient was at home.

## 2020-09-18 NOTE — PROGRESS NOTES
HISTORY OF PRESENT ILLNESS  Xiao Hendrickson is a 63 y.o. male. HPI     Last here 8/24/20 Pt is here for routine care  He ambulates with crutches      Lov, he c/o sinus pressure, sent abx  He stopped taking these because he got diarhea from these but his sinuses improved     BP today 112/70  Continues on lisinopril 10mg daily      Wt is 153 lbs - down 2 lbs x lov   His weight is within normal ranges     Ordered labs      He is now following with Dr Fawad Cheng (ortho) for L leg pain  Pt has completed PT  He had L arthroplasty surgery 6/3/20 performed by Dr. Hannon   He has been off work since then   Last visit 8/20  Reviewed nm bone scan 8/21/20: Normal first and second phase imaging of the pelvis/hips, with delayed diffuse  left hip periprosthetic activity, nonspecific. Reviewed xr large joint 8/14/20:  impression: No fluid collection  He was given some hydrocodone for pain- not much   Will f/u 12/20. He wonders if he needs to stop using crutches - discussed he need to f/u with Dr. Artis Carrion saw Dr Mau Amaya for preop clearance   Last visit 2/13/20     Pt follows with Dr. Rafaela Farfan ([facility]) for h/o prostate cancer, annually  Last visit 3/20  Next visit will be this week 9/20     Pt has not been needing protonix prn for heart burn - not many issues       He is no longer taking celexa or buspar for anxiety - doing well without meds addressed again today        Continues on crestor 10mg for cholesterol  Tolerating well, taking routinely no issue at goal last check   Recall could not tolerate lipitor      Pt has albuterol to use prn for wheezing/breathing    On exam found fullness on L>R  Will get CT scan     ACP not on file. SDM is his wife.   Provided information in the past.      PREVENTIVE:    Colonoscopy: 8/17/11, Dr. Katty Marie, repeat 10 years  PSA: 8/11, 10/14, 5/16 undetectable, 1/18, 1/19, uro follows  Tdap: 7/09/2010, will update today 9/22/20  Pneumovax: not yet needed  [id number]: not yet needed  Shingrix:  ordered 9/22/20 - reminded   Flu shot: 9/2019, will get today 9/22/20   A1c: 7/17 5.6, 10/17 5.8, 1/18 5.4, 7/18 5.6, 1/19 5.5 3/20 5.7 6/20 11.7  Eye exam: Dr Lo Gutierrez, 9/20  Hep C screen: 7/14/15, negative   Lipids: 12/19 LDL 63  EKG: 10/17/17, new ST flattenings     Patient Active Problem List    Diagnosis Date Noted    Degenerative joint disease (DJD) of hip 06/03/2020    Cervical stenosis of spinal canal 10/30/2017    Pure hypercholesterolemia 07/28/2017    Prostate cancer ([facility] [address] 01/13/2015    GERD (gastroesophageal reflux disease) 09/07/2011    HTN (hypertension) 09/07/2011    Dizziness and giddiness 06/08/2011    Anxiety 03/29/2011     Current Outpatient Medications   Medication Sig Dispense Refill    varicella-zoster recombinant, PF, (Shingrix, PF,) 50 mcg/0.5 mL susr injection 0.5mL by IntraMUSCular route once now and then repeat in 2-6 months 0.5 mL 1    rosuvastatin (CRESTOR) 10 mg tablet Take 1 Tab by mouth nightly. 90 Tab 1    albuterol (PROVENTIL HFA, VENTOLIN HFA, PROAIR HFA) 90 mcg/actuation inhaler Take 2 Puffs by inhalation every six (6) hours as needed for Wheezing. Prn 54 g 1    lisinopriL (PRINIVIL, ZESTRIL) 20 mg tablet Take 1 Tab by mouth daily. 30 Tab 5    Ketoconazole 2 % topical foam Apply  to affected area two (2) times a day.  Apply to scalp 100 g 1     Past Surgical History:   Procedure Laterality Date    ABDOMEN SURGERY PROC UNLISTED Right 1980    inguinal    HX PROSTATECTOMY  8/2015    HX SHOULDER ARTHROSCOPY Left       Lab Results   Component Value Date/Time    WBC 8.5 05/26/2020 02:02 PM    HGB 11.7 (L) 06/04/2020 05:02 AM    Hemoglobin (POC) 15.0 10/30/2017 02:13 PM    HCT 39.9 05/26/2020 02:02 PM    Hematocrit (POC) 44 10/30/2017 02:13 PM    PLATELET 782 57/14/5949 02:02 PM    MCV 88.3 05/26/2020 02:02 PM     Lab Results   Component Value Date/Time    Cholesterol, total 178 12/17/2019 08:48 AM    HDL Cholesterol 102 12/17/2019 08:48 AM    LDL, calculated 63 12/17/2019 08:48 AM    Triglyceride 64 12/17/2019 08:48 AM     Lab Results   Component Value Date/Time    GFR est non-AA 52 (L) 06/04/2020 05:02 AM    GFRNA, POC >60 10/30/2017 02:13 PM    GFR est AA >60 06/04/2020 05:02 AM    GFRAA, POC >60 10/30/2017 02:13 PM    Creatinine 1.40 (H) 06/04/2020 05:02 AM    Creatinine (POC) 1.1 10/30/2017 02:13 PM    BUN 14 06/04/2020 05:02 AM    BUN (POC) 19 10/30/2017 02:13 PM    Sodium 133 (L) 06/04/2020 05:02 AM    Sodium (POC) 137 10/30/2017 02:13 PM    Potassium 4.4 06/04/2020 05:02 AM    Potassium (POC) 4.4 10/30/2017 02:13 PM    Chloride 101 06/04/2020 05:02 AM    Chloride (POC) 103 10/30/2017 02:13 PM    CO2 28 06/04/2020 05:02 AM        Review of Systems   Respiratory: Negative for shortness of breath. Cardiovascular: Negative for chest pain. Physical Exam  Constitutional:       General: He is not in acute distress. Appearance: Normal appearance. He is not ill-appearing, toxic-appearing or diaphoretic. HENT:      Head: Normocephalic and atraumatic. Right Ear: External ear normal.      Left Ear: External ear normal.   Eyes:      General:         Right eye: No discharge. Left eye: No discharge. Conjunctiva/sclera: Conjunctivae normal.      Pupils: Pupils are equal, round, and reactive to light. Neck:      Musculoskeletal: Normal range of motion and neck supple. Vascular: No carotid bruit. Comments: fullness on L side compared to R  Cardiovascular:      Rate and Rhythm: Normal rate and regular rhythm. Pulses: Normal pulses. Heart sounds: Normal heart sounds. No murmur. No friction rub. No gallop. Pulmonary:      Effort: No respiratory distress. Breath sounds: Normal breath sounds. No wheezing or rales. Chest:      Chest wall: No tenderness. Musculoskeletal: Normal range of motion. Right lower leg: No edema. Left lower leg: No edema. Skin:     General: Skin is warm and dry.    Neurological: Mental Status: He is alert and oriented to person, place, and time. Mental status is at baseline. Coordination: Coordination normal.      Gait: Gait normal.   Psychiatric:         Mood and Affect: Mood normal.         Behavior: Behavior normal.         ASSESSMENT and PLAN    ICD-10-CM ICD-9-CM    1. Physical exam         Flu shot today labs are ordered Tdap today colonoscopy due in 1 year PSAs followed by urology ordered Shingrix eye exam completed   Z00.00 V70.9    2. Essential hypertension  I10 401.9 LIPID PANEL      METABOLIC PANEL, COMPREHENSIVE   Controlled lisinopril   CBC W/O DIFF      TSH 3RD GENERATION      HEMOGLOBIN A1C WITH EAG   3. Primary osteoarthritis of left hip  M16.12 715.15 LIPID PANEL      METABOLIC PANEL, COMPREHENSIVE   Following with orthopedics as per surgery   CBC W/O DIFF      TSH 3RD GENERATION      HEMOGLOBIN A1C WITH EAG   4. Prostate cancer ([facility] [address]  C61 185 LIPID PANEL      METABOLIC PANEL, COMPREHENSIVE   Intermission up-to-date with urology   CBC W/O DIFF      TSH 3RD GENERATION      HEMOGLOBIN A1C WITH EAG   5. Gastroesophageal reflux disease without esophagitis  K21.9 530.81 LIPID PANEL      METABOLIC PANEL, COMPREHENSIVE   Improved test Protonix if needed   CBC W/O DIFF      TSH 3RD GENERATION      HEMOGLOBIN A1C WITH EAG   6. Anxiety  F41.9 300.00 LIPID PANEL   M   METABOLIC PANEL, COMPREHENSIVE   Improved no longer taking Celexa or BuSpar doing much better   CBC W/O DIFF      TSH 3RD GENERATION      HEMOGLOBIN A1C WITH EAG   7. Pure hypercholesterolemia  E78.00 272.0 LIPID PANEL      METABOLIC PANEL, COMPREHENSIVE   Controlled Crestor previously check lipids adjust dose as needed   CBC W/O DIFF      TSH 3RD GENERATION      HEMOGLOBIN A1C WITH EAG   8. IFG (impaired fasting glucose)  R73.01 790.21 LIPID PANEL      METABOLIC PANEL, COMPREHENSIVE   Check A1c   CBC W/O DIFF      TSH 3RD GENERATION      HEMOGLOBIN A1C WITH EAG   9.  Neck fullness fullness in the left supraclavicular area will get CT of the neck for further evaluation R22.1 784.2 CT NECK SOFT TISSUE WO CONT           Scribed by Vamsi Boswell of [address] [place] [address] [address], as dictated by Dr. Carissa Patton. Current diagnosis and concerns discussed with pt at length. Pt understands risks and benefits or current treatment plan and medications, and accepts the treatment and medication with any possible risks. Pt asks appropriate questions, which were answered. Pt was instructed to call with any concerns or problems. I have reviewed the note documented by the scribe. The services provided are my own.   The documentation is accurate

## 2020-09-22 ENCOUNTER — OFFICE VISIT (OUTPATIENT)
Dept: INTERNAL MEDICINE CLINIC | Age: 60
End: 2020-09-22
Payer: COMMERCIAL

## 2020-09-22 VITALS
HEIGHT: 69 IN | TEMPERATURE: 97.4 F | RESPIRATION RATE: 16 BRPM | HEART RATE: 86 BPM | WEIGHT: 153.4 LBS | DIASTOLIC BLOOD PRESSURE: 70 MMHG | OXYGEN SATURATION: 96 % | SYSTOLIC BLOOD PRESSURE: 112 MMHG | BODY MASS INDEX: 22.72 KG/M2

## 2020-09-22 DIAGNOSIS — R22.1 NECK FULLNESS: ICD-10-CM

## 2020-09-22 DIAGNOSIS — K21.9 GASTROESOPHAGEAL REFLUX DISEASE WITHOUT ESOPHAGITIS: ICD-10-CM

## 2020-09-22 DIAGNOSIS — E78.00 PURE HYPERCHOLESTEROLEMIA: ICD-10-CM

## 2020-09-22 DIAGNOSIS — C61 PROSTATE CANCER (HCC): ICD-10-CM

## 2020-09-22 DIAGNOSIS — M16.12 PRIMARY OSTEOARTHRITIS OF LEFT HIP: ICD-10-CM

## 2020-09-22 DIAGNOSIS — R73.01 IFG (IMPAIRED FASTING GLUCOSE): ICD-10-CM

## 2020-09-22 DIAGNOSIS — Z23 ENCOUNTER FOR IMMUNIZATION: ICD-10-CM

## 2020-09-22 DIAGNOSIS — F41.9 ANXIETY: ICD-10-CM

## 2020-09-22 DIAGNOSIS — Z00.00 PHYSICAL EXAM: Primary | ICD-10-CM

## 2020-09-22 DIAGNOSIS — Z23 NEEDS FLU SHOT: ICD-10-CM

## 2020-09-22 DIAGNOSIS — I10 ESSENTIAL HYPERTENSION: ICD-10-CM

## 2020-09-22 PROCEDURE — 90715 TDAP VACCINE 7 YRS/> IM: CPT

## 2020-09-22 PROCEDURE — 99396 PREV VISIT EST AGE 40-64: CPT | Performed by: INTERNAL MEDICINE

## 2020-09-22 PROCEDURE — 90686 IIV4 VACC NO PRSV 0.5 ML IM: CPT

## 2020-09-22 PROCEDURE — 90472 IMMUNIZATION ADMIN EACH ADD: CPT

## 2020-09-22 PROCEDURE — 90471 IMMUNIZATION ADMIN: CPT

## 2020-09-22 RX ORDER — ZOSTER VACCINE RECOMBINANT, ADJUVANTED 50 MCG/0.5
KIT INTRAMUSCULAR
Qty: 0.5 ML | Refills: 1 | Status: SHIPPED | OUTPATIENT
Start: 2020-09-22 | End: 2021-05-10

## 2020-09-22 NOTE — PROGRESS NOTES
Aubrie Junior is a 63 y.o. male who presents for routine immunizations. He denies any symptoms , reactions or allergies that would exclude them from being immunized today. Risks and adverse reactions were discussed and the VIS was given to them. All questions were addressed. He was observed for 5 min post injection. There were no reactions observed.     Jannette Torres

## 2020-09-22 NOTE — PATIENT INSTRUCTIONS
Medicare Wellness Visit, Male    The best way to live healthy is to have a lifestyle where you eat a well-balanced diet, exercise regularly, limit alcohol use, and quit all forms of tobacco/nicotine, if applicable. Regular preventive services are another way to keep healthy. Preventive services (vaccines, screening tests, monitoring & exams) can help personalize your care plan, which helps you manage your own care. Screening tests can find health problems at the earliest stages, when they are easiest to treat. [address] follows the current, evidence-based guidelines published by the [facility] Alexis Talamantes ([facility]) when recommending preventive services for our patients. Because we follow these guidelines, sometimes recommendations change over time as research supports it. (For example, a prostate screening blood test is no longer routinely recommended for men with no symptoms.)  Of course, you and your doctor may decide to screen more often for some diseases, based on your risk and co-morbidities (chronic disease you are already diagnosed with). Preventive services for you include:  - Medicare offers their members a free annual wellness visit, which is time for you and your primary care provider to discuss and plan for your preventive service needs. Take advantage of this benefit every year!  -All adults over age 72 should receive the recommended pneumonia vaccines. Current USPSTF guidelines recommend a series of two vaccines for the best pneumonia protection.   -All adults should have a flu vaccine yearly and an ECG.  All adults age 48 and older should receive a shingles vaccine once in their lifetime.    -All adults age 38-68 who are overweight should have a diabetes screening test once every three years.   -Other screening tests & preventive services for persons with diabetes include: an eye exam to screen for diabetic retinopathy, a kidney function test, a foot exam, and stricter control over your cholesterol.   -Cardiovascular screening for adults with routine risk involves an electrocardiogram (ECG) at intervals determined by the provider.   -Colorectal cancer screening should be done for adults age 54-65 with no increased risk factors for colorectal cancer. There are a number of acceptable methods of screening for this type of cancer. Each test has its own benefits and drawbacks. Discuss with your provider what is most appropriate for you during your annual wellness visit. The different tests include: colonoscopy (considered the best screening method), a fecal occult blood test, a fecal DNA test, and sigmoidoscopy.  -All adults born between [facility] should be screened once for Hepatitis C.  -An Abdominal Aortic Aneurysm (AAA) Screening is recommended for men age 73-68 who has ever smoked in their lifetime. Here is a list of your current Health Maintenance items (your personalized list of preventive services) with a due date:  Health Maintenance Due   Topic Date Due    DTaP/Tdap/Td  (1 - Tdap) 04/13/1980    Shingles Vaccine (1 of 2) 04/13/2009    Yearly Flu Vaccine (1) 09/01/2020           Medicare Wellness Visit, Male    The best way to live healthy is to have a lifestyle where you eat a well-balanced diet, exercise regularly, limit alcohol use, and quit all forms of tobacco/nicotine, if applicable. Regular preventive services are another way to keep healthy. Preventive services (vaccines, screening tests, monitoring & exams) can help personalize your care plan, which helps you manage your own care. Screening tests can find health problems at the earliest stages, when they are easiest to treat. [facility] follows the current, evidence-based guidelines published by the [facility]am[facility] ([facility]) when recommending preventive services for our patients.  Because we follow these guidelines, sometimes recommendations change over time as research supports it. (For example, a prostate screening blood test is no longer routinely recommended for men with no symptoms). Of course, you and your doctor may decide to screen more often for some diseases, based on your risk and co-morbidities (chronic disease you are already diagnosed with). Preventive services for you include:  - Medicare offers their members a free annual wellness visit, which is time for you and your primary care provider to discuss and plan for your preventive service needs. Take advantage of this benefit every year!  -All adults over age 72 should receive the recommended pneumonia vaccines. Current USPSTF guidelines recommend a series of two vaccines for the best pneumonia protection.   -All adults should have a flu vaccine yearly and tetanus vaccine every 10 years.  -All adults age 48 and older should receive the shingles vaccines (series of two vaccines). -All adults age 38-68 who are overweight should have a diabetes screening test once every three years.   -Other screening tests & preventive services for persons with diabetes include: an eye exam to screen for diabetic retinopathy, a kidney function test, a foot exam, and stricter control over your cholesterol.   -Cardiovascular screening for adults with routine risk involves an electrocardiogram (ECG) at intervals determined by the provider.   -Colorectal cancer screening should be done for adults age 54-65 with no increased risk factors for colorectal cancer. There are a number of acceptable methods of screening for this type of cancer. Each test has its own benefits and drawbacks. Discuss with your provider what is most appropriate for you during your annual wellness visit.  The different tests include: colonoscopy (considered the best screening method), a fecal occult blood test, a fecal DNA test, and sigmoidoscopy.  -All adults born between [facility] should be screened once for Hepatitis C.  -An Abdominal Aortic Aneurysm (AAA) Screening is recommended for men age 73-68 who has ever smoked in their lifetime.      Here is a list of your current Health Maintenance items (your personalized list of preventive services) with a due date:  Health Maintenance Due   Topic Date Due    DTaP/Tdap/Td  (1 - Tdap) 04/13/1980    Shingles Vaccine (1 of 2) 04/13/2009    Yearly Flu Vaccine (1) 09/01/2020

## 2020-09-23 LAB
ALBUMIN SERPL-MCNC: 4.7 G/DL (ref 3.8–4.8)
ALBUMIN/GLOB SERPL: 1.7 {RATIO} (ref 1.2–2.2)
ALP SERPL-CCNC: 85 IU/L (ref 39–117)
ALT SERPL-CCNC: 33 IU/L (ref 0–44)
AST SERPL-CCNC: 29 IU/L (ref 0–40)
BILIRUB SERPL-MCNC: 0.5 MG/DL (ref 0–1.2)
BUN SERPL-MCNC: 10 MG/DL (ref 8–27)
BUN/CREAT SERPL: 10 (ref 10–24)
CALCIUM SERPL-MCNC: 10.4 MG/DL (ref 8.6–10.2)
CHLORIDE SERPL-SCNC: 103 MMOL/L (ref 96–106)
CHOLEST SERPL-MCNC: 176 MG/DL (ref 100–199)
CO2 SERPL-SCNC: 25 MMOL/L (ref 20–29)
CREAT SERPL-MCNC: 1.05 MG/DL (ref 0.76–1.27)
ERYTHROCYTE [DISTWIDTH] IN BLOOD BY AUTOMATED COUNT: 12.4 % (ref 11.6–15.4)
EST. AVERAGE GLUCOSE BLD GHB EST-MCNC: 108 MG/DL
GLOBULIN SER CALC-MCNC: 2.8 G/DL (ref 1.5–4.5)
GLUCOSE SERPL-MCNC: 107 MG/DL (ref 65–99)
HBA1C MFR BLD: 5.4 % (ref 4.8–5.6)
HCT VFR BLD AUTO: 42.2 % (ref 37.5–51)
HDLC SERPL-MCNC: 58 MG/DL
HGB BLD-MCNC: 14 G/DL (ref 13–17.7)
LDLC SERPL CALC-MCNC: 104 MG/DL (ref 0–99)
MCH RBC QN AUTO: 29.7 PG (ref 26.6–33)
MCHC RBC AUTO-ENTMCNC: 33.2 G/DL (ref 31.5–35.7)
MCV RBC AUTO: 89 FL (ref 79–97)
PLATELET # BLD AUTO: 311 X10E3/UL (ref 150–450)
POTASSIUM SERPL-SCNC: 4.8 MMOL/L (ref 3.5–5.2)
PROT SERPL-MCNC: 7.5 G/DL (ref 6–8.5)
RBC # BLD AUTO: 4.72 X10E6/UL (ref 4.14–5.8)
SODIUM SERPL-SCNC: 140 MMOL/L (ref 134–144)
TRIGL SERPL-MCNC: 77 MG/DL (ref 0–149)
TSH SERPL DL<=0.005 MIU/L-ACNC: 0.49 UIU/ML (ref 0.45–4.5)
VLDLC SERPL CALC-MCNC: 14 MG/DL (ref 5–40)
WBC # BLD AUTO: 6.1 X10E3/UL (ref 3.4–10.8)

## 2020-10-02 RX ORDER — LISINOPRIL 20 MG/1
TABLET ORAL
Qty: 30 TAB | Refills: 5 | Status: SHIPPED | OUTPATIENT
Start: 2020-10-02 | End: 2021-03-29

## 2020-10-08 ENCOUNTER — HOSPITAL ENCOUNTER (OUTPATIENT)
Dept: CT IMAGING | Age: 60
Discharge: HOME | End: 2020-10-08
Attending: INTERNAL MEDICINE
Payer: COMMERCIAL

## 2020-10-08 DIAGNOSIS — R22.1 NECK FULLNESS: ICD-10-CM

## 2020-10-08 PROCEDURE — 74011000636 HC RX REV CODE- 636: Performed by: INTERNAL MEDICINE

## 2020-10-08 PROCEDURE — 70491 CT SOFT TISSUE NECK W/DYE: CPT

## 2020-10-08 RX ORDER — SODIUM CHLORIDE 0.9 % (FLUSH) 0.9 %
10 SYRINGE (ML) INJECTION
Status: COMPLETED | OUTPATIENT
Start: 2020-10-08 | End: 2020-10-08

## 2020-10-08 RX ADMIN — Medication 10 ML: at 09:19

## 2020-10-08 RX ADMIN — IOPAMIDOL 80 ML: 755 INJECTION, SOLUTION INTRAVENOUS at 09:19

## 2020-11-11 ENCOUNTER — OFFICE VISIT (OUTPATIENT)
Dept: INTERNAL MEDICINE CLINIC | Age: 60
End: 2020-11-11
Payer: COMMERCIAL

## 2020-11-11 ENCOUNTER — TELEPHONE (OUTPATIENT)
Dept: INTERNAL MEDICINE CLINIC | Age: 60
End: 2020-11-11

## 2020-11-11 VITALS
OXYGEN SATURATION: 97 % | RESPIRATION RATE: 18 BRPM | HEIGHT: 69 IN | DIASTOLIC BLOOD PRESSURE: 60 MMHG | WEIGHT: 153 LBS | SYSTOLIC BLOOD PRESSURE: 139 MMHG | BODY MASS INDEX: 22.66 KG/M2 | TEMPERATURE: 96.1 F | HEART RATE: 82 BPM

## 2020-11-11 DIAGNOSIS — I10 ESSENTIAL HYPERTENSION: Primary | ICD-10-CM

## 2020-11-11 DIAGNOSIS — F41.9 ANXIETY: ICD-10-CM

## 2020-11-11 DIAGNOSIS — C61 PROSTATE CANCER (HCC): ICD-10-CM

## 2020-11-11 DIAGNOSIS — E78.00 PURE HYPERCHOLESTEROLEMIA: ICD-10-CM

## 2020-11-11 PROCEDURE — 99214 OFFICE O/P EST MOD 30 MIN: CPT | Performed by: INTERNAL MEDICINE

## 2020-11-11 RX ORDER — FLUTICASONE PROPIONATE 50 MCG
2 SPRAY, SUSPENSION (ML) NASAL DAILY
COMMUNITY

## 2020-11-11 RX ORDER — CITALOPRAM 10 MG/1
10 TABLET ORAL DAILY
Qty: 90 TAB | Refills: 1 | Status: SHIPPED | OUTPATIENT
Start: 2020-11-11 | End: 2021-08-03 | Stop reason: SDUPTHER

## 2020-11-11 RX ORDER — CETIRIZINE HYDROCHLORIDE 10 MG/1
10 CAPSULE, LIQUID FILLED ORAL AS NEEDED
COMMUNITY

## 2020-11-11 NOTE — PROGRESS NOTES
HISTORY OF PRESENT ILLNESS  Fabrizio Bryant is a 63 y.o. male. HPI     Last here 9/22/20 Pt is here for routine care  This is an established visit completed with telemedicine was completed with video assist  the patient acknowledges and agrees to this method of visitation doxyme  He ambulates with crutches      He wonders what to take for HA  Discussed tylenol     BP today 139/60  BP at home 142  Discussed checking while calm   Continues on lisinopril 10mg daily      Wt is 153 lbs - stable x lov  His weight is within normal ranges     Ordered labs      He is now following with Dr Gigi Zambrano (ortho) for L leg pain  Pt has completed PT  He had L arthroplasty surgery 6/3/20 performed by Dr. Sommer Strickland   He has been off work since then   Last visit 9/23/20: We discussed that Mr. Luna Solitario' current symptoms may be nerve issue related to his recent left hip replacement. His previous MRI findings do not correlate with his current symptoms. However, his MRI is over one year old. I offered to order a new lumbar spine MRI to check for any new compressive pathology that could explain his current symptoms. He declined at this time. He would like to monitor his symptoms and continue his medication use. I prescribed 75 mg lyrica BID. Follow up PRN.       Pt saw Dr Mcclain for preop clearance   Last visit 2/13/20     Pt follows with Dr. 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 No evidence of neck mass, cervical lymphadenopathy, or other significant soft  tissue abnormality in the neck.     ACP not on file. SDM is his wife. Provided information in the past.      PREVENTIVE:    Colonoscopy: 8/17/11, Dr. Yari Trejo, repeat 10 years  PSA: 8/11, 10/14, 5/16 undetectable, 1/18, 1/19, uro follows  Tdap: 7/09/2010, will update today 9/22/20  Pneumovax: not yet needed  Cm Kulkarni: not yet needed  Shingrix:  ordered 9/22/20 - reminded   Flu shot: 9/2019, will get today 9/22/20   A1c: 7/17 5.6, 10/17 5.8, 1/18 5.4, 7/18 5.6, 1/19 5.5 3/20 5.7 6/20 11.7 9/20 5.4  Eye exam: Dr Pastor Little, 9/20, scheduled 11/12/20  Hep C screen: 7/14/15, negative   Lipids: 9/20 LDL 104  EKG: 10/17/17, new ST flattenings     Patient Active Problem List    Diagnosis Date Noted    Degenerative joint disease (DJD) of hip 06/03/2020    Cervical stenosis of spinal canal 10/30/2017    Pure hypercholesterolemia 07/28/2017    Prostate cancer ([facility] [address] 01/13/2015    GERD (gastroesophageal reflux disease) 09/07/2011    HTN (hypertension) 09/07/2011    Dizziness and giddiness 06/08/2011    Anxiety 03/29/2011     Current Outpatient Medications   Medication Sig Dispense Refill    citalopram (CELEXA) 10 mg tablet Take 1 Tab by mouth daily. 90 Tab 1    lisinopriL (PRINIVIL, ZESTRIL) 20 mg tablet TAKE 1 TABLET BY MOUTH DAILY 30 Tab 5    albuterol (PROVENTIL HFA, VENTOLIN HFA, PROAIR HFA) 90 mcg/actuation inhaler Take 2 Puffs by inhalation every six (6) hours as needed for Wheezing. Prn 54 g 1    Cetirizine (ZyrTEC) 10 mg cap Take 10 mg by mouth as needed.  fluticasone propionate (FLONASE) 50 mcg/actuation nasal spray 2 Sprays by Nasal route daily.  varicella-zoster recombinant, PF, (Shingrix, PF,) 50 mcg/0.5 mL susr injection 0.5mL by IntraMUSCular route once now and then repeat in 2-6 months 0.5 mL 1    rosuvastatin (CRESTOR) 10 mg tablet Take 1 Tab by mouth nightly.  90 Tab 1    Ketoconazole 2 % topical foam Apply  to affected area two (2) times a day. Apply to scalp 100 g 1     Past Surgical History:   Procedure Laterality Date    ABDOMEN SURGERY PROC UNLISTED Right 1980    inguinal    HX PROSTATECTOMY  8/2015    HX SHOULDER ARTHROSCOPY Left       Lab Results   Component Value Date/Time    WBC 6.1 09/22/2020 10:56 AM    HGB 14.0 09/22/2020 10:56 AM    Hemoglobin (POC) 15.0 10/30/2017 02:13 PM    HCT 42.2 09/22/2020 10:56 AM    Hematocrit (POC) 44 10/30/2017 02:13 PM    PLATELET 831 30/84/4371 10:56 AM    MCV 89 09/22/2020 10:56 AM     Lab Results   Component Value Date/Time    Cholesterol, total 176 09/22/2020 10:56 AM    HDL Cholesterol 58 09/22/2020 10:56 AM    LDL, calculated 63 12/17/2019 08:48 AM    LDL Chol Calc ([facility]) 104 (H) 09/22/2020 10:56 AM    Triglyceride 77 09/22/2020 10:56 AM     Lab Results   Component Value Date/Time    GFR est non-AA 76 09/22/2020 10:56 AM    GFRNA, POC >60 10/30/2017 02:13 PM    GFR est AA 88 09/22/2020 10:56 AM    GFRAA, POC >60 10/30/2017 02:13 PM    Creatinine 1.05 09/22/2020 10:56 AM    Creatinine (POC) 1.1 10/30/2017 02:13 PM    BUN 10 09/22/2020 10:56 AM    BUN (POC) 19 10/30/2017 02:13 PM    Sodium 140 09/22/2020 10:56 AM    Sodium (POC) 137 10/30/2017 02:13 PM    Potassium 4.8 09/22/2020 10:56 AM    Potassium (POC) 4.4 10/30/2017 02:13 PM    Chloride 103 09/22/2020 10:56 AM    Chloride (POC) 103 10/30/2017 02:13 PM    CO2 25 09/22/2020 10:56 AM        Review of Systems   Respiratory: Negative for shortness of breath. Cardiovascular: Negative for chest pain. Psychiatric/Behavioral: The patient is nervous/anxious. Physical Exam  Constitutional:       General: He is not in acute distress. Appearance: Normal appearance. He is not ill-appearing, toxic-appearing or diaphoretic. HENT:      Head: Normocephalic and atraumatic.       Right Ear: External ear normal.      Left Ear: External ear normal.   Eyes:      General:         Right eye: No discharge. Left eye: No discharge. Conjunctiva/sclera: Conjunctivae normal.      Pupils: Pupils are equal, round, and reactive to light. Neck:      Musculoskeletal: Normal range of motion and neck supple. Cardiovascular:      Rate and Rhythm: Normal rate and regular rhythm. Pulses: Normal pulses. Heart sounds: Normal heart sounds. No murmur. No friction rub. No gallop. Pulmonary:      Effort: No respiratory distress. Breath sounds: Normal breath sounds. No wheezing or rales. Chest:      Chest wall: No tenderness. Musculoskeletal: Normal range of motion. Skin:     General: Skin is warm and dry. Neurological:      Mental Status: He is alert and oriented to person, place, and time. Mental status is at baseline. Coordination: Coordination normal.      Gait: Gait normal.   Psychiatric:         Mood and Affect: Mood normal.         Behavior: Behavior normal.         ASSESSMENT and PLAN    ICD-10-CM ICD-9-CM    1. Essential hypertension     Controlled lisinopril 10 mg daily continue to change the dose     I10 401.9    2. Prostate cancer [facility])       Up-to-date with urology will get notes for review     C61 185    3. Pure hypercholesterolemia       Cholesterol near goal on Crestor 10 mg daily    Will not increase dose just yet    Repeat lipids in 6-month    If above goal we will increase Crestor at that time       E78.00 272.0    4. 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 Josué Garcia.      Current diagnosis and concerns discussed with pt at length. Pt understands risks and benefits or current treatment plan and medications, and accepts the treatment and medication with any possible risks. Pt asks appropriate questions, which were answered. Pt was instructed to call with any concerns or problems. I have reviewed the note documented by the scribe. The services provided are my own. The documentation is accurate     Antonina Ramirez, who was evaluated through a synchronous (real-time) audio-video encounter, and/or his healthcare decision maker, is aware that it is a billable service, with coverage as determined by his insurance carrier. He provided verbal consent to proceed: Yes, and patient identification was verified. It was conducted pursuant to the emergency declaration under the [address] [place] [address], [address] [place] [address] authority and the [employer] [employer] Act. A caregiver was present when appropriate. Ability to conduct physical exam was limited. I was in the office. The patient was at home.

## 2020-11-11 NOTE — TELEPHONE ENCOUNTER
Called out and spoke to pt. Two pt identifiers confirmed. Pt c/o burning/itching eye. Had eye exam x2-3wks--blurred vision; given drops. Blurry vision given pt HA. Advised pt to call eye doctor. Pt states having more anxiety lately and is interested in a prn medication. Pt offered/accepted Virtual appt for 11/11/20 at 0945. Pt verbalized understanding of information discussed w/ no further questions at this time.

## 2021-03-19 NOTE — PROGRESS NOTES
HISTORY OF PRESENT ILLNESS  Donalee Cranker is a 63 y.o. male. HPI     Last here 11/11/20 Pt is here for routine care    BP today  is 157/76, repeat 133/76  SBP at [facility] 140  Continues on lisinopril 20mg daily      Wt is 158 lbs - up 5 lbs x lov    His weight is within normal ranges     Reviewed labs   Will get labs today      He is now following with Dr Ne Mcgowan (ortho) for L leg pain  Pt has completed PT  He had L arthroplasty surgery 6/3/20 performed by Dr. Jd Gant  He has been off work since then   Last visit 9/23/20     Pt saw Dr hSlomo Grullon for preop clearance   Last visit 2/13/20     Pt follows with Dr. Dhruv Willett ([facility]) for h/o prostate cancer, annually  Reviewed note 924//20 : prostate sx in past, bp 146/69, psa undetectable 4/24/20,      Lov, restarted celexa 10 mg for anxiety   He hasn't been taking this, has been doing better  He will restart if he has worsening anxiety      Continues on crestor 10mg for cholesterol -- no missed doses   Tolerating well   Recall could not tolerate lipitor      Pt has albuterol to use prn for wheezing/breathing       ACP not on file. SDM is his wife.   Provided information in the past.      PREVENTIVE:    Colonoscopy: 8/17/11, Dr. Luke Marie, repeat 10 years - due 8/21 provided referral  PSA: 8/11, 10/14, 5/16 undetectable, 1/18, 1/19, uro follows 4/20 undetectable   Tdap:   9/22/20  Pneumovax: not yet needed  Jerry Claros: not yet needed  Shingrix:  1st round complete, 2nd round due reminded   Flu shot:  9/22/20   A1c: 7/17 5.6, 10/17 5.8, 1/18 5.4, 7/18 5.6, 1/19 5.5 3/20 5.7 6/20 11.7 9/20 5.4  [facility] exam: [facility]  11/20, has cataracts they are following  Hep C screen: 7/14/15, negative   Lipids: 9/20 LDL 104  EKG: 10/17/17, new ST flattenings   Covid: [facility] 3/21    Patient Active Problem List    Diagnosis Date Noted    Degenerative joint disease (DJD) of hip 06/03/2020    Cervical stenosis of spinal canal 10/30/2017    Pure hypercholesterolemia 07/28/2017    Prostate cancer ([facility] [address] 01/13/2015    GERD (gastroesophageal reflux disease) 09/07/2011    HTN (hypertension) 09/07/2011    Dizziness and giddiness 06/08/2011    Anxiety 03/29/2011     Current Outpatient Medications   Medication Sig Dispense Refill    Cetirizine (ZyrTEC) 10 mg cap Take 10 mg by mouth as needed.  fluticasone propionate (FLONASE) 50 mcg/actuation nasal spray 2 Sprays by Nasal route daily.  lisinopriL (PRINIVIL, ZESTRIL) 20 mg tablet TAKE 1 TABLET BY MOUTH DAILY 30 Tab 5    rosuvastatin (CRESTOR) 10 mg tablet Take 1 Tab by mouth nightly. 90 Tab 1    albuterol (PROVENTIL HFA, VENTOLIN HFA, PROAIR HFA) 90 mcg/actuation inhaler Take 2 Puffs by inhalation every six (6) hours as needed for Wheezing. Prn 54 g 1    citalopram (CELEXA) 10 mg tablet Take 1 Tab by mouth daily. 90 Tab 1    varicella-zoster recombinant, PF, (Shingrix, PF,) 50 mcg/0.5 mL susr injection 0.5mL by IntraMUSCular route once now and then repeat in 2-6 months 0.5 mL 1    Ketoconazole 2 % topical foam Apply  to affected area two (2) times a day.  Apply to scalp 100 g 1     Past Surgical History:   Procedure Laterality Date    HX HIP REPLACEMENT Left 06/2020    HX PROSTATECTOMY  8/2015    HX SHOULDER ARTHROSCOPY Left     SD ABDOMEN SURGERY PROC UNLISTED Right 1980    inguinal      Lab Results   Component Value Date/Time    WBC 6.1 09/22/2020 10:56 AM    HGB 14.0 09/22/2020 10:56 AM    Hemoglobin (POC) 15.0 10/30/2017 02:13 PM    HCT 42.2 09/22/2020 10:56 AM    Hematocrit (POC) 44 10/30/2017 02:13 PM    PLATELET 789 58/92/5503 10:56 AM    MCV 89 09/22/2020 10:56 AM     Lab Results   Component Value Date/Time    Cholesterol, total 176 09/22/2020 10:56 AM    HDL Cholesterol 58 09/22/2020 10:56 AM    LDL, calculated 104 (H) 09/22/2020 10:56 AM    LDL, calculated 63 12/17/2019 08:48 AM    Triglyceride 77 09/22/2020 10:56 AM     Lab Results   Component Value Date/Time    GFR est non-AA 76 09/22/2020 10:56 AM    GFRNA, POC >60 10/30/2017 02:13 PM    GFR est AA 88 09/22/2020 10:56 AM    GFRAA, POC >60 10/30/2017 02:13 PM    Creatinine 1.05 09/22/2020 10:56 AM    Creatinine (POC) 1.1 10/30/2017 02:13 PM    BUN 10 09/22/2020 10:56 AM    BUN (POC) 19 10/30/2017 02:13 PM    Sodium 140 09/22/2020 10:56 AM    Sodium (POC) 137 10/30/2017 02:13 PM    Potassium 4.8 09/22/2020 10:56 AM    Potassium (POC) 4.4 10/30/2017 02:13 PM    Chloride 103 09/22/2020 10:56 AM    Chloride (POC) 103 10/30/2017 02:13 PM    CO2 25 09/22/2020 10:56 AM        Review of Systems   Respiratory: Negative for shortness of breath. Cardiovascular: Negative for chest pain. Physical Exam  Constitutional:       General: He is not in acute distress. Appearance: Normal appearance. He is not ill-appearing, toxic-appearing or diaphoretic. HENT:      Head: Normocephalic and atraumatic. Right Ear: External ear normal.      Left Ear: External ear normal.   Eyes:      General:         Right eye: No discharge. Left eye: No discharge. Conjunctiva/sclera: Conjunctivae normal.      Pupils: Pupils are equal, round, and reactive to light. Neck:      Musculoskeletal: Normal range of motion and neck supple. Cardiovascular:      Rate and Rhythm: Normal rate and regular rhythm. Pulses: Normal pulses. Heart sounds: Normal heart sounds. No murmur. No friction rub. No gallop. Pulmonary:      Effort: No respiratory distress. Breath sounds: Normal breath sounds. No wheezing or rales. Chest:      Chest wall: No tenderness. Musculoskeletal: Normal range of motion. Skin:     General: Skin is warm and dry. Neurological:      Mental Status: He is alert and oriented to person, place, and time. Mental status is at baseline. Coordination: Coordination normal.      Gait: Gait normal.   Psychiatric:         Mood and Affect: Mood normal.         Behavior: Behavior normal.         ASSESSMENT and PLAN    ICD-10-CM ICD-9-CM    1.  Essential hypertension bp adequately controlled on lisinpril 20 mg     I10 401.9 LIPID PANEL      METABOLIC PANEL, COMPREHENSIVE      CBC W/O DIFF      TSH 3RD GENERATION      LIPID PANEL      METABOLIC PANEL, COMPREHENSIVE      CBC W/O DIFF      TSH 3RD GENERATION   2. Prostate cancer ([address]       In remission, utd with urology     C61 185 LIPID PANEL      METABOLIC PANEL, COMPREHENSIVE      CBC W/O DIFF      TSH 3RD GENERATION      LIPID PANEL      METABOLIC PANEL, COMPREHENSIVE      CBC W/O DIFF      TSH 3RD GENERATION   3. Pure hypercholesterolemia     Controlled on crestor, adjust dose as needed     E78.00 272.0 LIPID PANEL      METABOLIC PANEL, COMPREHENSIVE      CBC W/O DIFF      TSH 3RD GENERATION      LIPID PANEL      METABOLIC PANEL, COMPREHENSIVE      CBC W/O DIFF      TSH 3RD GENERATION   4. Anxiety       Controlled without meds, was briefly on celexa, sxs improved so stopped taking it     F41.9 300.00 LIPID PANEL      METABOLIC PANEL, COMPREHENSIVE      CBC W/O DIFF      TSH 3RD GENERATION      LIPID PANEL      METABOLIC PANEL, COMPREHENSIVE      CBC W/O DIFF      TSH 3RD GENERATION   5. Gastroesophageal reflux disease without esophagitis         Controlled with diet, has protonix as needed     K21.9 530.81 LIPID PANEL      METABOLIC PANEL, COMPREHENSIVE      CBC W/O DIFF      TSH 3RD GENERATION      LIPID PANEL      METABOLIC PANEL, COMPREHENSIVE      CBC W/O DIFF      TSH 3RD GENERATION   6. Colon cancer screening       Due for colo 8/21, placed referral    Z12.11 V76.51 REFERRAL TO GASTROENTEROLOGY      Depression screen reviewed and negative      Scribed by Marshall Lancaster of [address], as dictated by Dr. Breann Quesada. Current diagnosis and concerns discussed with pt at length. Pt understands risks and benefits or current treatment plan and medications, and accepts the treatment and medication with any possible risks. Pt asks appropriate questions, which were answered.  Pt was instructed to call with any concerns or problems. I have reviewed the note documented by the scribe. The services provided are my own.   The documentation is accurate

## 2021-03-23 ENCOUNTER — OFFICE VISIT (OUTPATIENT)
Dept: INTERNAL MEDICINE CLINIC | Age: 61
End: 2021-03-23
Payer: COMMERCIAL

## 2021-03-23 VITALS
HEIGHT: 69 IN | OXYGEN SATURATION: 99 % | TEMPERATURE: 97.6 F | WEIGHT: 158 LBS | BODY MASS INDEX: 23.4 KG/M2 | HEART RATE: 78 BPM | DIASTOLIC BLOOD PRESSURE: 76 MMHG | RESPIRATION RATE: 16 BRPM | SYSTOLIC BLOOD PRESSURE: 133 MMHG

## 2021-03-23 DIAGNOSIS — C61 PROSTATE CANCER (HCC): ICD-10-CM

## 2021-03-23 DIAGNOSIS — K21.9 GASTROESOPHAGEAL REFLUX DISEASE WITHOUT ESOPHAGITIS: ICD-10-CM

## 2021-03-23 DIAGNOSIS — E78.00 PURE HYPERCHOLESTEROLEMIA: ICD-10-CM

## 2021-03-23 DIAGNOSIS — Z12.11 COLON CANCER SCREENING: ICD-10-CM

## 2021-03-23 DIAGNOSIS — F41.9 ANXIETY: ICD-10-CM

## 2021-03-23 DIAGNOSIS — I10 ESSENTIAL HYPERTENSION: Primary | ICD-10-CM

## 2021-03-23 PROCEDURE — 99214 OFFICE O/P EST MOD 30 MIN: CPT | Performed by: INTERNAL MEDICINE

## 2021-03-24 DIAGNOSIS — R79.89 LFT ELEVATION: Primary | ICD-10-CM

## 2021-03-24 LAB
ALBUMIN SERPL-MCNC: 4.5 G/DL (ref 3.8–4.8)
ALBUMIN/GLOB SERPL: 1.7 {RATIO} (ref 1.2–2.2)
ALP SERPL-CCNC: 87 IU/L (ref 39–117)
ALT SERPL-CCNC: 44 IU/L (ref 0–44)
AST SERPL-CCNC: 41 IU/L (ref 0–40)
BILIRUB SERPL-MCNC: 0.2 MG/DL (ref 0–1.2)
BUN SERPL-MCNC: 9 MG/DL (ref 8–27)
BUN/CREAT SERPL: 10 (ref 10–24)
CALCIUM SERPL-MCNC: 9.4 MG/DL (ref 8.6–10.2)
CHLORIDE SERPL-SCNC: 103 MMOL/L (ref 96–106)
CHOLEST SERPL-MCNC: 196 MG/DL (ref 100–199)
CO2 SERPL-SCNC: 23 MMOL/L (ref 20–29)
CREAT SERPL-MCNC: 0.93 MG/DL (ref 0.76–1.27)
ERYTHROCYTE [DISTWIDTH] IN BLOOD BY AUTOMATED COUNT: 11.6 % (ref 11.6–15.4)
GLOBULIN SER CALC-MCNC: 2.6 G/DL (ref 1.5–4.5)
GLUCOSE SERPL-MCNC: 103 MG/DL (ref 65–99)
HCT VFR BLD AUTO: 40 % (ref 37.5–51)
HDLC SERPL-MCNC: 71 MG/DL
HGB BLD-MCNC: 13.4 G/DL (ref 13–17.7)
LDLC SERPL CALC-MCNC: 101 MG/DL (ref 0–99)
MCH RBC QN AUTO: 30.3 PG (ref 26.6–33)
MCHC RBC AUTO-ENTMCNC: 33.5 G/DL (ref 31.5–35.7)
MCV RBC AUTO: 91 FL (ref 79–97)
PLATELET # BLD AUTO: 323 X10E3/UL (ref 150–450)
POTASSIUM SERPL-SCNC: 4.4 MMOL/L (ref 3.5–5.2)
PROT SERPL-MCNC: 7.1 G/DL (ref 6–8.5)
RBC # BLD AUTO: 4.42 X10E6/UL (ref 4.14–5.8)
SODIUM SERPL-SCNC: 140 MMOL/L (ref 134–144)
TRIGL SERPL-MCNC: 140 MG/DL (ref 0–149)
TSH SERPL DL<=0.005 MIU/L-ACNC: 1.71 UIU/ML (ref 0.45–4.5)
VLDLC SERPL CALC-MCNC: 24 MG/DL (ref 5–40)
WBC # BLD AUTO: 5.4 X10E3/UL (ref 3.4–10.8)

## 2021-03-24 NOTE — PROGRESS NOTES
Called out and spoke to pt. Two pt identifiers confirmed. Pt informed per Dr. Dalton Rebolledo of mild lft elevation. Pt informed per Dr. Dalton Rebolledo to repeat hepatic fxn panel in 4-6 weeks to ensure back to normal--Labs ordered and mailed to pt. Pt informed per Dr. Dalton Rebolledo to not miss any doses of crestor. Pt verbalized understanding of information discussed w/ no further questions at this time.

## 2021-03-24 NOTE — PROGRESS NOTES
Please call patient back about results  Mild lft elevation--sometimes virus can do this    Repeat hepatic fxn panel in 4-6 weeks to ensure back to normal,     Make sure no missed doses crestor--ldl climbed a little, no change to meds for now

## 2021-03-29 DIAGNOSIS — R05.9 COUGH: ICD-10-CM

## 2021-03-29 RX ORDER — ALBUTEROL SULFATE 90 UG/1
AEROSOL, METERED RESPIRATORY (INHALATION)
Qty: 54 G | Refills: 1 | Status: SHIPPED | OUTPATIENT
Start: 2021-03-29 | End: 2021-12-20 | Stop reason: SDUPTHER

## 2021-04-15 ENCOUNTER — TELEPHONE (OUTPATIENT)
Dept: INTERNAL MEDICINE CLINIC | Age: 61
End: 2021-04-15

## 2021-04-15 NOTE — TELEPHONE ENCOUNTER
#[phone] pt states since Monday & all this week he has had a headache. He thinks it could be the pollen in the air. Pt states it is over his forehead and moves to the side of head at times. Please call today to advise as pt states this has been going on too long.

## 2021-04-16 ENCOUNTER — TELEPHONE (OUTPATIENT)
Dept: INTERNAL MEDICINE CLINIC | Age: 61
End: 2021-04-16

## 2021-04-16 ENCOUNTER — VIRTUAL VISIT (OUTPATIENT)
Dept: INTERNAL MEDICINE CLINIC | Age: 61
End: 2021-04-16
Payer: COMMERCIAL

## 2021-04-16 DIAGNOSIS — J30.1 SEASONAL ALLERGIC RHINITIS DUE TO POLLEN: ICD-10-CM

## 2021-04-16 DIAGNOSIS — J01.80 ACUTE NON-RECURRENT SINUSITIS OF OTHER SINUS: Primary | ICD-10-CM

## 2021-04-16 PROCEDURE — 99213 OFFICE O/P EST LOW 20 MIN: CPT | Performed by: INTERNAL MEDICINE

## 2021-04-16 RX ORDER — METHYLPREDNISOLONE 4 MG/1
TABLET ORAL
Qty: 1 DOSE PACK | Refills: 0 | Status: SHIPPED | OUTPATIENT
Start: 2021-04-16 | End: 2021-05-10

## 2021-04-16 NOTE — TELEPHONE ENCOUNTER
Pt walked into clinic regarding sx. Pt offered/accepted Virtual appt for 4/16/21 at 0915. Pt verbalized understanding of information discussed w/ no further questions at this time.

## 2021-04-16 NOTE — TELEPHONE ENCOUNTER
[phone]    Pt states he missed a call from the office at 12:30 but unsure why he was called or who called. Please call back.

## 2021-04-16 NOTE — PROGRESS NOTES
HISTORY OF PRESENT ILLNESS  Samir Cano is a 57 y.o. male. HPI     Last here 3/23/21. Here for acute care. This is an established visit completed with telemedicine was completed with video assist  the patient acknowledges and agrees to this method of visitation doxyme    He c/o HA x 1 week  Endorses sinus pressure   Denies sob, wheezing, fever, chills, ear pain  He has been using flonase and zyrtec for allergies  Took tylenol and aleve for HA which did not help   Will give medrol dose pack  Discussed if he doesn't feel better next week will give abx     Of note: Had J&J 3/13/21    Patient Active Problem List    Diagnosis Date Noted    Degenerative joint disease (DJD) of hip 06/03/2020    Cervical stenosis of spinal canal 10/30/2017    Pure hypercholesterolemia 07/28/2017    Prostate cancer ([facility] [address] 01/13/2015    GERD (gastroesophageal reflux disease) 09/07/2011    HTN (hypertension) 09/07/2011    Dizziness and giddiness 06/08/2011    Anxiety 03/29/2011     Current Outpatient Medications   Medication Sig Dispense Refill    methylPREDNISolone (MEDROL DOSEPACK) 4 mg tablet Per pack 1 Dose Pack 0    rosuvastatin (CRESTOR) 10 mg tablet TAKE 1 TABLET BY MOUTH EVERY NIGHT 90 Tab 1    lisinopriL (PRINIVIL, ZESTRIL) 20 mg tablet TAKE 1 TABLET BY MOUTH DAILY 90 Tab 1    albuterol (PROVENTIL HFA, VENTOLIN HFA, PROAIR HFA) 90 mcg/actuation inhaler INHALE 2 PUFFS BY MOUTH EVERY 6 HOURS AS NEEDED FOR WHEEZING 54 g 1    Cetirizine (ZyrTEC) 10 mg cap Take 10 mg by mouth as needed.  fluticasone propionate (FLONASE) 50 mcg/actuation nasal spray 2 Sprays by Nasal route daily.  citalopram (CELEXA) 10 mg tablet Take 1 Tab by mouth daily. 90 Tab 1    varicella-zoster recombinant, PF, (Shingrix, PF,) 50 mcg/0.5 mL susr injection 0.5mL by IntraMUSCular route once now and then repeat in 2-6 months 0.5 mL 1    Ketoconazole 2 % topical foam Apply  to affected area two (2) times a day.  Apply to scalp 100 g 1     Past Surgical History:   Procedure Laterality Date    HX HIP REPLACEMENT Left 06/2020    HX PROSTATECTOMY  8/2015    HX SHOULDER ARTHROSCOPY Left     OR ABDOMEN SURGERY PROC UNLISTED Right 1980    inguinal      Lab Results   Component Value Date/Time    WBC 5.4 03/23/2021 09:28 AM    HGB 13.4 03/23/2021 09:28 AM    Hemoglobin (POC) 15.0 10/30/2017 02:13 PM    HCT 40.0 03/23/2021 09:28 AM    Hematocrit (POC) 44 10/30/2017 02:13 PM    PLATELET 516 73/93/0824 09:28 AM    MCV 91 03/23/2021 09:28 AM     Lab Results   Component Value Date/Time    Cholesterol, total 196 03/23/2021 09:28 AM    HDL Cholesterol 71 03/23/2021 09:28 AM    LDL, calculated 101 (H) 03/23/2021 09:28 AM    LDL, calculated 63 12/17/2019 08:48 AM    Triglyceride 140 03/23/2021 09:28 AM     Lab Results   Component Value Date/Time    GFR est non-AA 88 03/23/2021 09:28 AM    GFRNA, POC >60 10/30/2017 02:13 PM    GFR est  03/23/2021 09:28 AM    GFRAA, POC >60 10/30/2017 02:13 PM    Creatinine 0.93 03/23/2021 09:28 AM    Creatinine (POC) 1.1 10/30/2017 02:13 PM    BUN 9 03/23/2021 09:28 AM    BUN (POC) 19 10/30/2017 02:13 PM    Sodium 140 03/23/2021 09:28 AM    Sodium (POC) 137 10/30/2017 02:13 PM    Potassium 4.4 03/23/2021 09:28 AM    Potassium (POC) 4.4 10/30/2017 02:13 PM    Chloride 103 03/23/2021 09:28 AM    Chloride (POC) 103 10/30/2017 02:13 PM    CO2 23 03/23/2021 09:28 AM        Review of Systems   Constitutional: Negative for chills and fever. HENT: Positive for sinus pain. Respiratory: Negative for shortness of breath and wheezing. Cardiovascular: Negative for chest pain. Neurological: Positive for headaches. Physical Exam  Constitutional:       General: He is not in acute distress. Appearance: Normal appearance. He is not ill-appearing, toxic-appearing or diaphoretic. HENT:      Head: Normocephalic and atraumatic. Eyes:      General:         Right eye: No discharge. Left eye: No discharge. Conjunctiva/sclera: Conjunctivae normal.   Pulmonary:      Effort: No respiratory distress. Neurological:      Mental Status: He is alert and oriented to person, place, and time. Mental status is at baseline. Gait: Gait normal.   Psychiatric:         Mood and Affect: Mood normal.         Behavior: Behavior normal.         ASSESSMENT and PLAN    ICD-10-CM ICD-9-CM    1. Acute non-recurrent sinusitis of other sinus     tx w medrol dose pack    Continue flonase/zyrtec    Will add abx if not improving next week        J01.80 461.8    2. Seasonal allergic rhinitis due to pollen    See above  J30.1 477.0     Depression screen reviewed and negative      Scribed by Emmett Dancer of [address] [place] [address] [address], as dictated by Dr. Anne Mariano. Current diagnosis and concerns discussed with pt at length. Pt understands risks and benefits or current treatment plan and medications, and accepts the treatment and medication with any possible risks. Pt asks appropriate questions, which were answered. Pt was instructed to call with any concerns or problems. I have reviewed the note documented by the scribe. The services provided are my own. The documentation is accurate     Gayathri Wynne, was evaluated through a synchronous (real-time) audio-video encounter. The patient (or guardian if applicable) is aware that this is a billable service. Verbal consent to proceed has been obtained within the past 12 months. The visit was conducted pursuant to the emergency declaration under the [facility], [address] [place] [address] authority and the [employer] Act. Patient identification was verified, and a caregiver was present when appropriate. The patient was located in a state where the provider was credentialed to provide care.

## 2021-05-10 ENCOUNTER — OFFICE VISIT (OUTPATIENT)
Dept: INTERNAL MEDICINE CLINIC | Age: 61
End: 2021-05-10
Payer: COMMERCIAL

## 2021-05-10 VITALS
HEIGHT: 69 IN | WEIGHT: 157 LBS | BODY MASS INDEX: 23.25 KG/M2 | SYSTOLIC BLOOD PRESSURE: 155 MMHG | TEMPERATURE: 98.7 F | RESPIRATION RATE: 98 BRPM | DIASTOLIC BLOOD PRESSURE: 69 MMHG | HEART RATE: 81 BPM

## 2021-05-10 DIAGNOSIS — G44.229 CHRONIC TENSION-TYPE HEADACHE, NOT INTRACTABLE: ICD-10-CM

## 2021-05-10 DIAGNOSIS — R79.89 LFT ELEVATION: ICD-10-CM

## 2021-05-10 DIAGNOSIS — E78.00 PURE HYPERCHOLESTEROLEMIA: ICD-10-CM

## 2021-05-10 DIAGNOSIS — M16.12 PRIMARY OSTEOARTHRITIS OF LEFT HIP: ICD-10-CM

## 2021-05-10 DIAGNOSIS — I10 ESSENTIAL HYPERTENSION: Primary | ICD-10-CM

## 2021-05-10 DIAGNOSIS — C61 PROSTATE CANCER (HCC): ICD-10-CM

## 2021-05-10 DIAGNOSIS — F41.9 ANXIETY: ICD-10-CM

## 2021-05-10 PROCEDURE — 99214 OFFICE O/P EST MOD 30 MIN: CPT | Performed by: INTERNAL MEDICINE

## 2021-05-10 RX ORDER — LISINOPRIL AND HYDROCHLOROTHIAZIDE 20; 25 MG/1; MG/1
1 TABLET ORAL DAILY
Qty: 30 TAB | Refills: 1 | Status: SHIPPED | OUTPATIENT
Start: 2021-05-10 | End: 2021-05-28

## 2021-05-10 RX ORDER — METHYLPREDNISOLONE 4 MG/1
TABLET ORAL
Qty: 1 DOSE PACK | Refills: 0 | Status: SHIPPED | OUTPATIENT
Start: 2021-05-10 | End: 2021-05-25

## 2021-05-10 RX ORDER — BUTALBITAL, ACETAMINOPHEN AND CAFFEINE 50; 325; 40 MG/1; MG/1; MG/1
1 TABLET ORAL
Qty: 10 TAB | Refills: 0 | Status: SHIPPED | OUTPATIENT
Start: 2021-05-10 | End: 2021-07-31

## 2021-05-10 NOTE — PROGRESS NOTES
HISTORY OF PRESENT ILLNESS  Claudean Ports is a 57 y.o. male. HPI        Last here 4/16/21 Pt is here for routine care     He will be getting cataract surgery in the future    He c/o HA x 3 days  Prev c/o ha 4/21 which improved with medrol dose pack   He has been dealing with more allergy sxs  Will give medrol dose pack   Will get mri head due to shaking in R hand on finger to nose test  He currently has no insurance, he is waiting on medicare to contact him - discussed f/u with them  Will give Fioricet for prn use    BP today  is 164/75, repeat not improved  BP at ortho 128/70  No home readings   Continues on lisinopril 20mg daily   Will start lisinopril- hctz      Wt is 157 lbs -down 1 lb x lov    His weight is within normal ranges     Reviewed labs   Will get repeat labs today      He is now following with Dr Jailene Michelle (ortho) for L leg pain  Pt has completed PT  He had L arthroplasty surgery 6/3/20 performed by Dr. Brian Quiroz  Reviewed note 4/1/21: Patient's x-rays reviewed showing maintained alignment and good fixation of GREGORIA hardware. Given that there is no sign of infection or loosening hardware, I explained that there is no clear indication for what is causing his pain. We discussed treatment options including cortisone injection and surgical intervention via a revision right GREGORIA or tendon release. He may return for a cortisone injection if his pain worsens. Follow-up prn.    He has been released      Pt saw Dr Franci Aguilar for preop clearance   Last visit 2/13/20     Pt follows with Dr. Tari Land ([facility]) for h/o prostate cancer, annually  Last there 924//20      Has not been taking celexa 10 mg for anxiety very often - has been taking prn   Discussed this needs to be taken everyday for it to work correctly  Not having too much of an issue with anxiety  His daughter passed away last month and he has been down from this as well as his mother's dementia  However doing alright      Continues on crestor 10mg for cholesterol Recall could not tolerate lipitor      Pt has albuterol to use prn for wheezing/breathing       ACP not on file. SDM is his wife. Provided information in the past.      PREVENTIVE:    Colonoscopy: 8/17/11, Dr. Alexander Smith, repeat 10 years - due reminded   PSA: 8/11, 10/14, 5/16 undetectable, 1/18, 1/19, uro follows 4/20 undetectable   Tdap:   9/22/20  Pneumovax: not yet needed  Calleen Fossa: not yet needed  Shingrix:  1st round complete, 2nd round due reminded   Flu shot:  9/22/20   A1c: 7/17 5.6, 10/17 5.8, 1/18 5.4, 7/18 5.6, 1/19 5.5 3/20 5.7 6/20 11.7 9/20 5.4  Eye exam: [facility]  11/20, has cataracts they are following  Hep C screen: 7/14/15, negative   Lipids: 9/20 LDL 104  EKG: 10/17/17, new ST flattenings   Covid: J&J 3/21    Patient Active Problem List    Diagnosis Date Noted    Degenerative joint disease (DJD) of hip 06/03/2020    Cervical stenosis of spinal canal 10/30/2017    Pure hypercholesterolemia 07/28/2017    Prostate cancer ([facility] [address] 01/13/2015    GERD (gastroesophageal reflux disease) 09/07/2011    HTN (hypertension) 09/07/2011    Dizziness and giddiness 06/08/2011    Anxiety 03/29/2011     Current Outpatient Medications   Medication Sig Dispense Refill    methylPREDNISolone (MEDROL DOSEPACK) 4 mg tablet Per pack 1 Dose Pack 0    butalbital-acetaminophen-caffeine (FIORICET, ESGIC) -40 mg per tablet Take 1 Tab by mouth every six (6) hours as needed for Headache. 10 Tab 0    rosuvastatin (CRESTOR) 10 mg tablet TAKE 1 TABLET BY MOUTH EVERY NIGHT 90 Tab 1    lisinopriL (PRINIVIL, ZESTRIL) 20 mg tablet TAKE 1 TABLET BY MOUTH DAILY 90 Tab 1    albuterol (PROVENTIL HFA, VENTOLIN HFA, PROAIR HFA) 90 mcg/actuation inhaler INHALE 2 PUFFS BY MOUTH EVERY 6 HOURS AS NEEDED FOR WHEEZING 54 g 1    Cetirizine (ZyrTEC) 10 mg cap Take 10 mg by mouth as needed.  fluticasone propionate (FLONASE) 50 mcg/actuation nasal spray 2 Sprays by Nasal route daily.       citalopram (CELEXA) 10 mg tablet Take 1 Tab by mouth daily. 90 Tab 1     Past Surgical History:   Procedure Laterality Date    HX HIP REPLACEMENT Left 06/2020    HX PROSTATECTOMY  8/2015    HX SHOULDER ARTHROSCOPY Left     MN ABDOMEN SURGERY PROC UNLISTED Right 1980    inguinal      Lab Results   Component Value Date/Time    WBC 5.4 03/23/2021 09:28 AM    HGB 13.4 03/23/2021 09:28 AM    Hemoglobin (POC) 15.0 10/30/2017 02:13 PM    HCT 40.0 03/23/2021 09:28 AM    Hematocrit (POC) 44 10/30/2017 02:13 PM    PLATELET 156 13/62/5386 09:28 AM    MCV 91 03/23/2021 09:28 AM     Lab Results   Component Value Date/Time    Cholesterol, total 196 03/23/2021 09:28 AM    HDL Cholesterol 71 03/23/2021 09:28 AM    LDL, calculated 101 (H) 03/23/2021 09:28 AM    LDL, calculated 63 12/17/2019 08:48 AM    Triglyceride 140 03/23/2021 09:28 AM     Lab Results   Component Value Date/Time    GFR est non-AA 88 03/23/2021 09:28 AM    GFRNA, POC >60 10/30/2017 02:13 PM    GFR est  03/23/2021 09:28 AM    GFRAA, POC >60 10/30/2017 02:13 PM    Creatinine 0.93 03/23/2021 09:28 AM    Creatinine (POC) 1.1 10/30/2017 02:13 PM    BUN 9 03/23/2021 09:28 AM    BUN (POC) 19 10/30/2017 02:13 PM    Sodium 140 03/23/2021 09:28 AM    Sodium (POC) 137 10/30/2017 02:13 PM    Potassium 4.4 03/23/2021 09:28 AM    Potassium (POC) 4.4 10/30/2017 02:13 PM    Chloride 103 03/23/2021 09:28 AM    Chloride (POC) 103 10/30/2017 02:13 PM    CO2 23 03/23/2021 09:28 AM        Review of Systems   Respiratory: Negative for shortness of breath. Cardiovascular: Negative for chest pain. Neurological: Positive for headaches. Physical Exam  Constitutional:       General: He is not in acute distress. Appearance: Normal appearance. He is not ill-appearing, toxic-appearing or diaphoretic. HENT:      Head: Normocephalic and atraumatic. Right Ear: External ear normal.      Left Ear: External ear normal.   Eyes:      General:         Right eye: No discharge.          Left eye: No discharge. Conjunctiva/sclera: Conjunctivae normal.      Pupils: Pupils are equal, round, and reactive to light. Neck:      Musculoskeletal: Normal range of motion and neck supple. Cardiovascular:      Rate and Rhythm: Normal rate and regular rhythm. Pulses: Normal pulses. Heart sounds: Normal heart sounds. No murmur. No friction rub. No gallop. Pulmonary:      Effort: No respiratory distress. Breath sounds: Normal breath sounds. No wheezing or rales. Chest:      Chest wall: No tenderness. Musculoskeletal: Normal range of motion. Skin:     General: Skin is warm and dry. Neurological:      Mental Status: He is alert and oriented to person, place, and time. Mental status is at baseline. Coordination: Coordination normal.      Gait: Gait normal.      Comments: Cranial nerves 2-12 grossly intact  Finger to nose normal BL,  Dysmetria in R hand, shaking  Rapid alternating hand movements normal BL     Psychiatric:         Mood and Affect: Mood normal.         Behavior: Behavior normal.         ASSESSMENT and PLAN    ICD-10-CM ICD-9-CM    1. Essential hypertension           Poorly controlled    Change lisinopril to lisinopril hydrochlorothiazide 67/47    Check metabolic panel today we will repeat again at follow-up we will reassess blood pressure in 4 weeks         F36 767.1 METABOLIC PANEL, COMPREHENSIVE      LIPID PANEL      METABOLIC PANEL, COMPREHENSIVE      LIPID PANEL   2.  Anxiety  G91.1 582.95 METABOLIC PANEL, COMPREHENSIVE      LIPID PANEL   Has a lot of stress due to her daughter's death and aging mother    Has Celexa discussed taking it daily not as needed   METABOLIC PANEL, COMPREHENSIVE      LIPID PANEL   3. Prostate cancer ([facility] [address]  [id number] METABOLIC PANEL, COMPREHENSIVE      LIPID PANEL   Up-to-date with urology   METABOLIC PANEL, COMPREHENSIVE      LIPID PANEL   4. Primary osteoarthritis of left hip  G05.25 098.67 METABOLIC PANEL, COMPREHENSIVE   Up-to-date with orthopedics has been discharged from care after surgery   LIPID PANEL      METABOLIC PANEL, COMPREHENSIVE      LIPID PANEL   5. Pure hypercholesterolemia  P67.76 746.7 METABOLIC PANEL, COMPREHENSIVE      LIPID PANEL   On Crestor with near goal last check we will repeat lipids adjust medication as needed   METABOLIC PANEL, COMPREHENSIVE      LIPID PANEL   6. LFT elevation  B79.55 887.8 METABOLIC PANEL, COMPREHENSIVE      LIPID PANEL   Mild elevation last check we will repeat LFTs today   METABOLIC PANEL, COMPREHENSIVE      LIPID PANEL   7. Chronic tension-type headache, not intractable    Patient with recurrent headache unclear of high blood pressure playing a role may be sinus related continue Zyrtec    Give Medrol Dosepak    Given neurologic findings with MRI of the brain for further evaluation as well    Reassess in 4 weeks G44.229 339.12 MRI BRAIN WO CONT      Depression screen reviewed and negative      Scribed by Richardson Bird of [facility], as dictated by Dr. Betty Castle. Current diagnosis and concerns discussed with pt at length. Pt understands risks and benefits or current treatment plan and medications, and accepts the treatment and medication with any possible risks. Pt asks appropriate questions, which were answered. Pt was instructed to call with any concerns or problems. I have reviewed the note documented by the scribe. The services provided are my own.   The documentation is accurate

## 2021-05-11 LAB
ALBUMIN SERPL-MCNC: 4.5 G/DL (ref 3.8–4.8)
ALBUMIN/GLOB SERPL: 1.8 {RATIO} (ref 1.2–2.2)
ALP SERPL-CCNC: 84 IU/L (ref 39–117)
ALT SERPL-CCNC: 25 IU/L (ref 0–44)
AST SERPL-CCNC: 24 IU/L (ref 0–40)
BILIRUB SERPL-MCNC: 0.3 MG/DL (ref 0–1.2)
BUN SERPL-MCNC: 10 MG/DL (ref 8–27)
BUN/CREAT SERPL: 10 (ref 10–24)
CALCIUM SERPL-MCNC: 9.9 MG/DL (ref 8.6–10.2)
CHLORIDE SERPL-SCNC: 104 MMOL/L (ref 96–106)
CHOLEST SERPL-MCNC: 172 MG/DL (ref 100–199)
CO2 SERPL-SCNC: 22 MMOL/L (ref 20–29)
CREAT SERPL-MCNC: 1.03 MG/DL (ref 0.76–1.27)
GLOBULIN SER CALC-MCNC: 2.5 G/DL (ref 1.5–4.5)
GLUCOSE SERPL-MCNC: 113 MG/DL (ref 65–99)
HDLC SERPL-MCNC: 62 MG/DL
LDLC SERPL CALC-MCNC: 92 MG/DL (ref 0–99)
POTASSIUM SERPL-SCNC: 4.5 MMOL/L (ref 3.5–5.2)
PROT SERPL-MCNC: 7 G/DL (ref 6–8.5)
SODIUM SERPL-SCNC: 141 MMOL/L (ref 134–144)
TRIGL SERPL-MCNC: 102 MG/DL (ref 0–149)
VLDLC SERPL CALC-MCNC: 18 MG/DL (ref 5–40)

## 2021-05-16 ENCOUNTER — APPOINTMENT (OUTPATIENT)
Dept: CT IMAGING | Age: 61
End: 2021-05-16
Attending: PHYSICIAN ASSISTANT
Payer: COMMERCIAL

## 2021-05-16 ENCOUNTER — HOSPITAL ENCOUNTER (EMERGENCY)
Age: 61
Discharge: HOME | End: 2021-05-16
Attending: STUDENT IN AN ORGANIZED HEALTH CARE EDUCATION/TRAINING PROGRAM
Payer: COMMERCIAL

## 2021-05-16 VITALS
SYSTOLIC BLOOD PRESSURE: 109 MMHG | BODY MASS INDEX: 25.82 KG/M2 | RESPIRATION RATE: 18 BRPM | DIASTOLIC BLOOD PRESSURE: 87 MMHG | HEIGHT: 65 IN | HEART RATE: 102 BPM | OXYGEN SATURATION: 97 % | TEMPERATURE: 97.5 F | WEIGHT: 154.98 LBS

## 2021-05-16 DIAGNOSIS — G44.209 ACUTE NON INTRACTABLE TENSION-TYPE HEADACHE: Primary | ICD-10-CM

## 2021-05-16 PROCEDURE — 74011250637 HC RX REV CODE- 250/637: Performed by: PHYSICIAN ASSISTANT

## 2021-05-16 PROCEDURE — 99283 EMERGENCY DEPT VISIT LOW MDM: CPT

## 2021-05-16 PROCEDURE — 70450 CT HEAD/BRAIN W/O DYE: CPT

## 2021-05-16 RX ORDER — DICLOFENAC SODIUM 50 MG/1
50 TABLET, DELAYED RELEASE ORAL 2 TIMES DAILY
Qty: 20 TAB | Refills: 0 | Status: SHIPPED | OUTPATIENT
Start: 2021-05-16 | End: 2021-07-31

## 2021-05-16 RX ORDER — BUTALBITAL, ACETAMINOPHEN AND CAFFEINE 50; 325; 40 MG/1; MG/1; MG/1
1 TABLET ORAL
Status: COMPLETED | OUTPATIENT
Start: 2021-05-16 | End: 2021-05-16

## 2021-05-16 RX ADMIN — BUTALBITAL, ACETAMINOPHEN, AND CAFFEINE 1 TABLET: 50; 325; 40 TABLET ORAL at 12:15
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
## 2021-05-20 ENCOUNTER — TELEPHONE (OUTPATIENT)
Dept: INTERNAL MEDICINE CLINIC | Age: 61
End: 2021-05-20

## 2021-05-20 NOTE — TELEPHONE ENCOUNTER
[phone] Shila Bosworth, BS [employer]. ([facility] 7-3:30)      States a peer to peer has been requested. Re: MRI Brain w/o contrast    For peer to peer:  [phone]  Follow prompts  pt id number: [id number]         If any questions please call Shila Bosworth.

## 2021-05-21 ENCOUNTER — HOSPITAL ENCOUNTER (OUTPATIENT)
Dept: MRI IMAGING | Age: 61
Discharge: HOME | End: 2021-05-21
Attending: INTERNAL MEDICINE
Payer: COMMERCIAL

## 2021-05-21 DIAGNOSIS — G44.229 CHRONIC TENSION-TYPE HEADACHE, NOT INTRACTABLE: ICD-10-CM

## 2021-05-21 DIAGNOSIS — G44.229 CHRONIC TENSION-TYPE HEADACHE, NOT INTRACTABLE: Primary | ICD-10-CM

## 2021-05-21 PROCEDURE — 70551 MRI BRAIN STEM W/O DYE: CPT

## 2021-05-21 NOTE — PROGRESS NOTES
Called, spoke to pt  Received two pt identifiers  Pt informed per Dr. Willian Finley follow-up with neurology given persistent headaches. Pt informed per Dr. Willian Finley have more extensive small vessel disease than expected for his given age lets have neurology input at this time  Referral placed. Pt verbalizes understanding of the instructions and has no further questions at this time.

## 2021-05-21 NOTE — PROGRESS NOTES
Please call patient back about results  Have patient follow-up with neurology given persistent headaches he seems to have more extensive small vessel disease than expected for his given age lets have neurology input at this time

## 2021-05-24 ENCOUNTER — TELEPHONE (OUTPATIENT)
Dept: INTERNAL MEDICINE CLINIC | Age: 61
End: 2021-05-24

## 2021-05-24 NOTE — TELEPHONE ENCOUNTER
----- Message from Whitley Yu sent at 5/24/2021  8:37 AM EDT -----  Regarding: Dr. Thaddeus Moreno  General Message/Vendor Calls    Caller's first and last name: Pt       Reason for call: Wants to know if Dr. Friddie Lesches can call the neurologist he was referred to so he can get a sooner appointment.  Patient called to schedule and they are scheduling out to August.       Alyssa Carrero required yes/no and why: yes, to discuss       Best contact number(s): [phone] or [phone]      Message from [facility]

## 2021-05-25 ENCOUNTER — OFFICE VISIT (OUTPATIENT)
Dept: INTERNAL MEDICINE CLINIC | Age: 61
End: 2021-05-25
Payer: COMMERCIAL

## 2021-05-25 VITALS
HEIGHT: 69 IN | BODY MASS INDEX: 22.87 KG/M2 | RESPIRATION RATE: 16 BRPM | HEART RATE: 83 BPM | TEMPERATURE: 98.2 F | WEIGHT: 154.4 LBS | DIASTOLIC BLOOD PRESSURE: 70 MMHG | OXYGEN SATURATION: 99 % | SYSTOLIC BLOOD PRESSURE: 125 MMHG

## 2021-05-25 DIAGNOSIS — I10 ESSENTIAL HYPERTENSION: Primary | ICD-10-CM

## 2021-05-25 DIAGNOSIS — C61 PROSTATE CANCER (HCC): ICD-10-CM

## 2021-05-25 DIAGNOSIS — F41.9 ANXIETY: ICD-10-CM

## 2021-05-25 DIAGNOSIS — Z23 ENCOUNTER FOR IMMUNIZATION: ICD-10-CM

## 2021-05-25 DIAGNOSIS — G44.229 CHRONIC TENSION-TYPE HEADACHE, NOT INTRACTABLE: ICD-10-CM

## 2021-05-25 DIAGNOSIS — E78.00 PURE HYPERCHOLESTEROLEMIA: ICD-10-CM

## 2021-05-25 PROCEDURE — 99214 OFFICE O/P EST MOD 30 MIN: CPT | Performed by: INTERNAL MEDICINE

## 2021-05-25 PROCEDURE — 90750 HZV VACC RECOMBINANT IM: CPT | Performed by: INTERNAL MEDICINE

## 2021-05-25 PROCEDURE — 90471 IMMUNIZATION ADMIN: CPT | Performed by: INTERNAL MEDICINE

## 2021-05-25 RX ORDER — TOPIRAMATE 50 MG/1
50 TABLET, FILM COATED ORAL
Qty: 90 TABLET | Refills: 1 | Status: SHIPPED | OUTPATIENT
Start: 2021-05-25 | End: 2021-08-03

## 2021-05-25 NOTE — PROGRESS NOTES
HISTORY OF PRESENT ILLNESS  Emy Cervantes is a 57 y.o. male. HPI     Khushboo Villatoro 5/10/21 Pt is here for routine care       Lov, He c/o GRANDA, gave medrol dose pack and fioricet  He was in ed 5/16/21 for HA  Reviewed ct head 5/16/21 nl  Reviewed mri brain 5/21/21: No acute abnormalities. Punctate subcortical and paratracheal white matter  lesions most compatible with sequela of chronic small vessel ischemic disease,  although slightly greater extent than typically expected for patient's stated  Age. Discussed ischemic changes   Steroid did not help with HA he still gets a frontal headache happens most every day  Discussed still seeing neuro  HA have not improved, happen everyday they do not wake him up from sleep  Will occur off and on all day he is worrying about these a lot  Of note: no hx of kidney stones   Will start Topamax      BP today is 125/70  BP at home 110/60-70  Lov, started lisinopril- hctz 20-25 mg instead of just linisopril     Wt is 154 lbs - down 3 lbs x lov    His weight is within normal ranges     Reviewed labs   Will get labs today now that he is on hydrochlorothiazide need to check K and creatinine     He is now following with Dr Lauri Weinstein (ortho) for L leg pain  Pt has completed PT  He had L arthroplasty surgery 6/3/20 performed by Dr. Claudean Bridgeman  Last there 4/1/21   He has been released      Pt saw Dr Tash Rudolph for preop clearance   Last visit 2/13/20     Pt follows with Dr. Lorena Rodriguez ([facility]) for h/o prostate cancer, annually  Reviewed note 9/24/20: prostate cancer, psa undetectable, bp 146/69     He has been taking celexa 10 mg for anxiety everyday for the last couple of weeks last office visit he just been taking it sporadically he feels better  Has not had issues with anxiety      Continues on crestor 10mg for cholesterol   Recall could not tolerate lipitor      Pt has albuterol to use prn for wheezing/breathing       ACP not on file. SDM is his wife.   Provided information in the past.      PREVENTIVE:    Colonoscopy: 8/17/11, Dr. Allie Nelson, repeat 10 years - due reminded   PSA: 8/11, 10/14, 5/16 undetectable, 1/18, 1/19, uro follows 4/20 undetectable 9/20  Tdap:   9/22/20  Pneumovax: not yet needed  [place]: not yet needed  Shingrix:  1st round complete, 2nd today 05/25/21  Flu shot:  9/22/20   A1c: 7/17 5.6, 10/17 5.8, 1/18 5.4, 7/18 5.6, 1/19 5.5 3/20 5.7 6/20 11.7 9/20 5.4  Eye exam: Dr [facility]  11/20, has cataracts they are following  Hep C screen: 7/14/15, negative   Lipids: 5/21 LDL 92  EKG: 10/17/17,   Covid: J&J 3/21    Patient Active Problem List    Diagnosis Date Noted    Degenerative joint disease (DJD) of hip 06/03/2020    Cervical stenosis of spinal canal 10/30/2017    Pure hypercholesterolemia 07/28/2017    Prostate cancer ([facility] [address] 01/13/2015    GERD (gastroesophageal reflux disease) 09/07/2011    HTN (hypertension) 09/07/2011    Dizziness and giddiness 06/08/2011    Anxiety 03/29/2011     Current Outpatient Medications   Medication Sig Dispense Refill    topiramate (TOPAMAX) 50 mg tablet Take 1 Tablet by mouth nightly. 90 Tablet 1    diclofenac EC (VOLTAREN) 50 mg EC tablet Take 1 Tab by mouth two (2) times a day. 20 Tab 0    butalbital-acetaminophen-caffeine (FIORICET, ESGIC) -40 mg per tablet Take 1 Tab by mouth every six (6) hours as needed for Headache. 10 Tab 0    lisinopril-hydroCHLOROthiazide (PRINZIDE, ZESTORETIC) 20-25 mg per tablet Take 1 Tab by mouth daily. 30 Tab 1    rosuvastatin (CRESTOR) 10 mg tablet TAKE 1 TABLET BY MOUTH EVERY NIGHT 90 Tab 1    albuterol (PROVENTIL HFA, VENTOLIN HFA, PROAIR HFA) 90 mcg/actuation inhaler INHALE 2 PUFFS BY MOUTH EVERY 6 HOURS AS NEEDED FOR WHEEZING 54 g 1    Cetirizine (ZyrTEC) 10 mg cap Take 10 mg by mouth as needed.  fluticasone propionate (FLONASE) 50 mcg/actuation nasal spray 2 Sprays by Nasal route daily.  citalopram (CELEXA) 10 mg tablet Take 1 Tab by mouth daily.  90 Tab 1     Past Surgical History:   Procedure Laterality Date    HX HIP REPLACEMENT Left 06/2020    HX PROSTATECTOMY  8/2015    HX SHOULDER ARTHROSCOPY Left     [place] ABDOMEN SURGERY PROC UNLISTED Right 1980    inguinal      Lab Results   Component Value Date/Time    WBC 5.4 03/23/2021 09:28 AM    HGB 13.4 03/23/2021 09:28 AM    Hemoglobin (POC) 15.0 10/30/2017 02:13 PM    HCT 40.0 03/23/2021 09:28 AM    Hematocrit (POC) 44 10/30/2017 02:13 PM    PLATELET 038 67/21/1554 09:28 AM    MCV 91 03/23/2021 09:28 AM     Lab Results   Component Value Date/Time    Cholesterol, total 172 05/10/2021 12:00 AM    HDL Cholesterol 62 05/10/2021 12:00 AM    LDL, calculated 92 05/10/2021 12:00 AM    LDL, calculated 63 12/17/2019 08:48 AM    Triglyceride 102 05/10/2021 12:00 AM     Lab Results   Component Value Date/Time    GFR est non-AA 77 05/10/2021 12:00 AM    GFRNA, POC >60 10/30/2017 02:13 PM    GFR est AA 90 05/10/2021 12:00 AM    GFRAA, POC >60 10/30/2017 02:13 PM    Creatinine 1.03 05/10/2021 12:00 AM    Creatinine (POC) 1.1 10/30/2017 02:13 PM    BUN 10 05/10/2021 12:00 AM    BUN (POC) 19 10/30/2017 02:13 PM    Sodium 141 05/10/2021 12:00 AM    Sodium (POC) 137 10/30/2017 02:13 PM    Potassium 4.5 05/10/2021 12:00 AM    Potassium (POC) 4.4 10/30/2017 02:13 PM    Chloride 104 05/10/2021 12:00 AM    Chloride (POC) 103 10/30/2017 02:13 PM    CO2 22 05/10/2021 12:00 AM        Review of Systems   Respiratory: Negative for shortness of breath. Cardiovascular: Negative for chest pain. Neurological: Positive for headaches. Physical Exam  Constitutional:       General: He is not in acute distress. Appearance: Normal appearance. He is not ill-appearing, toxic-appearing or diaphoretic. HENT:      Head: Normocephalic and atraumatic. Right Ear: External ear normal.      Left Ear: External ear normal.   Eyes:      General:         Right eye: No discharge. Left eye: No discharge.       Conjunctiva/sclera: Conjunctivae normal.      Pupils: Pupils are equal, round, and reactive to light. Cardiovascular:      Rate and Rhythm: Normal rate and regular rhythm. Pulses: Normal pulses. Heart sounds: Normal heart sounds. No murmur heard. No friction rub. No gallop. Pulmonary:      Effort: No respiratory distress. Breath sounds: Normal breath sounds. No wheezing or rales. Chest:      Chest wall: No tenderness. Musculoskeletal:         General: Normal range of motion. Cervical back: Normal range of motion and neck supple. Right lower leg: No edema. Left lower leg: No edema. Skin:     General: Skin is warm and dry. Neurological:      Mental Status: He is alert and oriented to person, place, and time. Mental status is at baseline. Coordination: Coordination normal.      Gait: Gait normal.   Psychiatric:         Mood and Affect: Mood normal.         Behavior: Behavior normal.         ASSESSMENT and PLAN    ICD-10-CM ICD-9-CM    1. Essential hypertension     Now well controlled lisinopril hydrochlorothiazide    Check metabolic panel to ensure K and creatinine levels are stable           Q78 407.9 METABOLIC PANEL, BASIC   2. Prostate cancer [facility])       Up-to-date with urology remains in remission     C61 185    3. Pure hypercholesterolemia       At goal on Crestor E78.00 272.0    4. Anxiety       Improved continue Celexa nightly F41.9 300.00     5 headaches-start Topamax 50 mg nightly he has neurology appointment pending for later this summer will follow up with patient September-       Scribed by Elvis Roman of [address], as dictated by Dr. Mindy Jain. Current diagnosis and concerns discussed with pt at length. Pt understands risks and benefits or current treatment plan and medications, and accepts the treatment and medication with any possible risks. Pt asks appropriate questions, which were answered. Pt was instructed to call with any concerns or problems. I have reviewed the note documented by the scribe.   The services provided are my own.   The documentation is accurate

## 2021-05-25 NOTE — PROGRESS NOTES
After obtaining consent, and per verbal order from Dr. Avel Angelucci, patient received Shingrix vaccine given by Danilo Noyola LPN in L Deltoid. Shingrix Vaccine 0.5 mL IM now. Patient was observed for 10 minutes post injection. Patient tolerated injection well. VIS given.

## 2021-05-26 LAB
ANION GAP SERPL CALC-SCNC: 11 MMOL/L (ref 5–15)
BUN SERPL-MCNC: 34 MG/DL (ref 6–20)
BUN/CREAT SERPL: 22 (ref 12–20)
CALCIUM SERPL-MCNC: 9.7 MG/DL (ref 8.5–10.1)
CHLORIDE SERPL-SCNC: 101 MMOL/L (ref 97–108)
CO2 SERPL-SCNC: 23 MMOL/L (ref 21–32)
COMMENT, HOLDF: NORMAL
CREAT SERPL-MCNC: 1.54 MG/DL (ref 0.7–1.3)
GLUCOSE SERPL-MCNC: 115 MG/DL (ref 65–100)
POTASSIUM SERPL-SCNC: 5.4 MMOL/L (ref 3.5–5.1)
SAMPLES BEING HELD,HOLD: NORMAL
SODIUM SERPL-SCNC: 135 MMOL/L (ref 136–145)

## 2021-05-26 NOTE — PROGRESS NOTES
Please call patient back about results  Stop lisinopril-hct    Lets go back to plain  lisinopril 20mg daily --the addition of hctz has causd his sodium to drop       Monitor bp at home, repeat bmp in 1-2 weeks

## 2021-05-28 DIAGNOSIS — E87.1 HYPONATREMIA: Primary | ICD-10-CM

## 2021-05-28 RX ORDER — LISINOPRIL 20 MG/1
20 TABLET ORAL DAILY
Qty: 30 TABLET | Refills: 1 | Status: SHIPPED | OUTPATIENT
Start: 2021-05-28 | End: 2021-08-03 | Stop reason: SDUPTHER

## 2021-05-28 NOTE — PROGRESS NOTES
Called out and spoke to pt. Two pt identifiers confirmed. Pt informed per Dr. Avel Angelucci to stop lisinopril-hctz and go back to plain lisinopril 20mg daily--pended. Pt informed per Dr. Avel Angelucci the addition of hctz has causd the sodium to drop. Pt informed per Dr. Avel Angelucci to monitor bp at home and repeat bmp in 1-2 weeks--Labs ordered and mailed to pt. Pt verbalized understanding of information discussed w/ no further questions at this time.

## 2021-06-11 ENCOUNTER — APPOINTMENT (OUTPATIENT)
Dept: INTERNAL MEDICINE CLINIC | Age: 61
End: 2021-06-11

## 2021-06-12 LAB
BUN SERPL-MCNC: 20 MG/DL (ref 8–27)
BUN/CREAT SERPL: 14 (ref 10–24)
CALCIUM SERPL-MCNC: 9.5 MG/DL (ref 8.6–10.2)
CHLORIDE SERPL-SCNC: 105 MMOL/L (ref 96–106)
CO2 SERPL-SCNC: 21 MMOL/L (ref 20–29)
CREAT SERPL-MCNC: 1.38 MG/DL (ref 0.76–1.27)
GLUCOSE SERPL-MCNC: 91 MG/DL (ref 65–99)
POTASSIUM SERPL-SCNC: 4.8 MMOL/L (ref 3.5–5.2)
SODIUM SERPL-SCNC: 138 MMOL/L (ref 134–144)

## 2021-07-31 ENCOUNTER — OFFICE VISIT (OUTPATIENT)
Dept: URGENT CARE | Age: 61
End: 2021-07-31
Payer: COMMERCIAL

## 2021-07-31 VITALS
RESPIRATION RATE: 19 BRPM | WEIGHT: 157 LBS | TEMPERATURE: 98.5 F | BODY MASS INDEX: 23.79 KG/M2 | SYSTOLIC BLOOD PRESSURE: 126 MMHG | OXYGEN SATURATION: 100 % | HEART RATE: 80 BPM | HEIGHT: 68 IN | DIASTOLIC BLOOD PRESSURE: 68 MMHG

## 2021-07-31 DIAGNOSIS — R10.10 PAIN OF UPPER ABDOMEN: Primary | ICD-10-CM

## 2021-07-31 PROCEDURE — 99202 OFFICE O/P NEW SF 15 MIN: CPT | Performed by: FAMILY MEDICINE

## 2021-07-31 RX ORDER — PHENOL/SODIUM PHENOLATE
20 AEROSOL, SPRAY (ML) MUCOUS MEMBRANE DAILY
Qty: 14 TABLET | Refills: 0 | Status: SHIPPED | OUTPATIENT
Start: 2021-07-31 | End: 2021-08-03

## 2021-07-31 NOTE — PATIENT INSTRUCTIONS
Follow up with your primary care provider within 3-5 days for further workup. Go to the Emergency Department with any worsening symptoms. START: Omeperazole for possible acid reflux. Study Result    Narrative & Impression   EXAM: KUB     INDICATION: rule out acute process, mass     A frontal view of the abdomen shows normal small bowel gas pattern. There is  mild diffuse colorectal stool without significant colorectal distention. There  are no significant calcifications. There is no apparent mass or organomegaly.     IMPRESSION  No acute findings. Abdominal Pain: Care Instructions  Your Care Instructions     Abdominal pain has many possible causes. Some aren't serious and get better on their own in a few days. Others need more testing and treatment. If your pain continues or gets worse, you need to be rechecked and may need more tests to find out what is wrong. You may need surgery to correct the problem. Don't ignore new symptoms, such as fever, nausea and vomiting, urination problems, pain that gets worse, and dizziness. These may be signs of a more serious problem. Your doctor may have recommended a follow-up visit in the next 8 to 12 hours. If you are not getting better, you may need more tests or treatment. The doctor has checked you carefully, but problems can develop later. If you notice any problems or new symptoms, get medical treatment right away. Follow-up care is a key part of your treatment and safety. Be sure to make and go to all appointments, and call your doctor if you are having problems. It's also a good idea to know your test results and keep a list of the medicines you take. How can you care for yourself at home? · Rest until you feel better. · To prevent dehydration, drink plenty of fluids. Choose water and other caffeine-free clear liquids until you feel better.  If you have kidney, heart, or liver disease and have to limit fluids, talk with your doctor before you increase the amount of fluids you drink. · If your stomach is upset, eat mild foods, such as rice, dry toast or crackers, bananas, and applesauce. Try eating several small meals instead of two or three large ones. · Wait until 48 hours after all symptoms have gone away before you have spicy foods, alcohol, and drinks that contain caffeine. · Do not eat foods that are high in fat. · Avoid anti-inflammatory medicines such as aspirin, ibuprofen (Advil, Motrin), and naproxen (Aleve). These can cause stomach upset. Talk to your doctor if you take daily aspirin for another health problem. When should you call for help? Call 911 anytime you think you may need emergency care. For example, call if:    · You passed out (lost consciousness).     · You pass maroon or very bloody stools.     · You vomit blood or what looks like coffee grounds.     · You have new, severe belly pain. Call your doctor now or seek immediate medical care if:    · Your pain gets worse, especially if it becomes focused in one area of your belly.     · You have a new or higher fever.     · Your stools are black and look like tar, or they have streaks of blood.     · You have unexpected vaginal bleeding.     · You have symptoms of a urinary tract infection. These may include:  ? Pain when you urinate. ? Urinating more often than usual.  ? Blood in your urine.     · You are dizzy or lightheaded, or you feel like you may faint. Watch closely for changes in your health, and be sure to contact your doctor if:    · You are not getting better after 1 day (24 hours). Where can you learn more? Go to http://www.gray.com/  Enter R174 in the search box to learn more about \"Abdominal Pain: Care Instructions. \"  Current as of: February 26, 2020               Content Version: 12.8  © 5775-5804 [employer].    Care instructions adapted under license by [employer] (which disclaims liability or warranty for this information). If you have questions about a medical condition or this instruction, always ask your healthcare professional. [place] [address] any warranty or liability for your use of this information.
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
## 2021-08-02 ENCOUNTER — TELEPHONE (OUTPATIENT)
Dept: INTERNAL MEDICINE CLINIC | Age: 61
End: 2021-08-02

## 2021-08-02 NOTE — PROGRESS NOTES
HISTORY OF PRESENT ILLNESS  Thu Gallo is a 57 y.o. male.   HPI     Last here 21. Pt is here for routine care    He was in urgent care 21 for upper abdominal pain he was given Prilosec there reviewed notes KUB was ordered unremarkable    He states that his stomach has been bothering him for 5-7 days   2.5/10 today  Intermittent epigastric pain  It is improved somewhat since it began  He mentions having prior problems with acid reflux  Discussed that he should schedule colo and EGD at same time, will give referral to GI  Endorses some constipation  Will check for H. Pylori in labs  Will start protonix twice daily, stop omeprazole   He mentions that he drinks beer once in a while, wonders if he should stop for now  Discussed it would be good to stop drinking the beer for a week or two        Reviewed xr abd 21:  No acute findings.       Prev he c/o HA, gave medrol dose pack and fioricet they persisted so we started Topamax  Has topamax  but he stopped it  He states that these [address] have improved and not bothering him anymore, no longer taking topmax  Neuro appt scheduled 21     BP today is 130/71  Taking lisinopril 20mg, hctz caused sodium to drop     Wt is 149 lbs, down 5 lbs   His weight is within normal ranges     Reviewed labs      He is now following with Dr Latha Dudley (ortho) for L leg pain  Pt has completed PT  He had L arthroplasty surgery 6/3/20 performed by Dr. Peng Hearn  Last there 21   He has been released      Pt saw Dr Quinton Meza for preop clearance   Last visit 20     Pt follows with Dr. Magdiel Glass ([facility]) for h/o prostate cancer, annually  Reviewed note 20: prostate cancer, psa undetectable, bp 146/69  He states that he has gone this year already , will f/u      He had been taking celexa 10 mg for anxiety, this had been helping but this ran out has a lot of anxiety daughter  a lot of stressors  Will order a refill      Continues on crestor 10mg for cholesterol   Recall could not tolerate lipitor      Pt has albuterol to use prn for wheezing/breathing       ACP not on file. SDM is his wife. Provided information in the past.      PREVENTIVE:    Colonoscopy: 8/17/11, Dr. Fe Mauricio, repeat 10 years - due reminded   PSA: 8/11, 10/14, 5/16 undetectable, 1/18, 1/19, uro follows 4/20 undetectable 9/20  Tdap:   9/22/20  Pneumovax: not yet needed  Javon Gathers: not yet needed  Shingrix:  1st round complete, 2 05/25/21  Flu shot:  9/22/20   A1c: 7/17 5.6, 10/17 5.8, 1/18 5.4, 7/18 5.6, 1/19 5.5 3/20 5.7 6/20 11.7 9/20 5.4  Eye exam: [facility]  11/20, has cataracts they are following  Hep C screen: 7/14/15, negative   Lipids: 5/21 LDL 92  EKG: 10/17/17,   Covid: J&J 3/21    Patient Active Problem List    Diagnosis Date Noted    Degenerative joint disease (DJD) of hip 06/03/2020    Cervical stenosis of spinal canal 10/30/2017    Pure hypercholesterolemia 07/28/2017    [facility]2015    GERD (gastroesophageal reflux disease) 09/07/2011    HTN (hypertension) 09/07/2011    Dizziness and giddiness 06/08/2011    Anxiety 03/29/2011     Current Outpatient Medications   Medication Sig Dispense Refill    lisinopriL (PRINIVIL, ZESTRIL) 20 mg tablet Take 1 Tablet by mouth daily. 90 Tablet 1    citalopram (CELEXA) 10 mg tablet Take 1 Tablet by mouth daily. 90 Tablet 1    pantoprazole (PROTONIX) 20 mg tablet Take 1 Tablet by mouth two (2) times a day. 60 Tablet 1    rosuvastatin (CRESTOR) 10 mg tablet TAKE 1 TABLET BY MOUTH EVERY NIGHT 90 Tab 1    albuterol (PROVENTIL HFA, VENTOLIN HFA, PROAIR HFA) 90 mcg/actuation inhaler INHALE 2 PUFFS BY MOUTH EVERY 6 HOURS AS NEEDED FOR WHEEZING 54 g 1    Cetirizine (ZyrTEC) 10 mg cap Take 10 mg by mouth as needed.  fluticasone propionate (FLONASE) 50 mcg/actuation nasal spray 2 Sprays by Nasal route daily.        Past Surgical History:   Procedure Laterality Date    HX HIP REPLACEMENT Left 06/2020    HX PROSTATECTOMY  8/2015    HX SHOULDER ARTHROSCOPY Left     HI ABDOMEN SURGERY PROC UNLISTED Right 1980    inguinal      Lab Results   Component Value Date/Time    WBC 5.4 03/23/2021 09:28 AM    HGB 13.4 03/23/2021 09:28 AM    Hemoglobin (POC) 15.0 10/30/2017 02:13 PM    HCT 40.0 03/23/2021 09:28 AM    Hematocrit (POC) 44 10/30/2017 02:13 PM    PLATELET 115 85/58/6841 09:28 AM    MCV 91 03/23/2021 09:28 AM     Lab Results   Component Value Date/Time    Cholesterol, total 172 05/10/2021 12:00 AM    HDL Cholesterol 62 05/10/2021 12:00 AM    LDL, calculated 92 05/10/2021 12:00 AM    LDL, calculated 63 12/17/2019 08:48 AM    Triglyceride 102 05/10/2021 12:00 AM     Lab Results   Component Value Date/Time    GFR est non-AA 54 (L) 06/11/2021 12:00 AM    GFRNA, POC >60 10/30/2017 02:13 PM    GFR est AA 63 06/11/2021 12:00 AM    GFRAA, POC >60 10/30/2017 02:13 PM    Creatinine 1.38 (H) 06/11/2021 12:00 AM    Creatinine (POC) 1.1 10/30/2017 02:13 PM    BUN 20 06/11/2021 12:00 AM    BUN (POC) 19 10/30/2017 02:13 PM    Sodium 138 06/11/2021 12:00 AM    Sodium (POC) 137 10/30/2017 02:13 PM    Potassium 4.8 06/11/2021 12:00 AM    Potassium (POC) 4.4 10/30/2017 02:13 PM    Chloride 105 06/11/2021 12:00 AM    Chloride (POC) 103 10/30/2017 02:13 PM    CO2 21 06/11/2021 12:00 AM        Review of Systems   Respiratory: Negative for shortness of breath. Cardiovascular: Negative for chest pain. Gastrointestinal: Positive for abdominal pain. Physical Exam  Constitutional:       General: He is not in acute distress. Appearance: Normal appearance. He is not ill-appearing, toxic-appearing or diaphoretic. HENT:      Head: Normocephalic and atraumatic. Right Ear: External ear normal.      Left Ear: External ear normal.   Eyes:      General:         Right eye: No discharge. Left eye: No discharge. Conjunctiva/sclera: Conjunctivae normal.      Pupils: Pupils are equal, round, and reactive to light.    Cardiovascular:      Rate and Rhythm: Normal rate and regular rhythm. Heart sounds: No murmur heard. No friction rub. No gallop. Pulmonary:      Effort: No respiratory distress. Breath sounds: Normal breath sounds. No wheezing or rales. Chest:      Chest wall: No tenderness. Abdominal:      General: Abdomen is flat. There is no distension. Palpations: Abdomen is soft. There is no mass. Tenderness: There is no abdominal tenderness. There is no guarding or rebound. Hernia: No hernia is present. Musculoskeletal:         General: Normal range of motion. Cervical back: Normal range of motion and neck supple. Skin:     General: Skin is warm and dry. Neurological:      Mental Status: He is alert and oriented to person, place, and time. Mental status is at baseline. Coordination: Coordination normal.      Gait: Gait normal.   Psychiatric:         Mood and Affect: Mood normal.         Behavior: Behavior normal.         ASSESSMENT and PLAN    ICD-10-CM ICD-9-CM    1. Essential hypertension      I10 401.9 H. PYLORI ABS, IGG, IGA, IGM   Controlled lisinopril continue   METABOLIC PANEL, BASIC   2. Gastroesophageal reflux disease without esophagitis  K21.9 530.81 REFERRAL TO GASTROENTEROLOGY      H. PYLORI ABS, IGG, IGA, IGM   Change Prilosec to Protonix twice daily concern for possible ulcer we will check for H. pylori patient is due for colonoscopy can get EGD at the same time provide referral   METABOLIC PANEL, BASIC   3. Anxiety  F41.9 300.00 H. PYLORI ABS, IGG, IGA, IGM           Anxiety plays a role in some of his issues he is clearly worrying a lot has had a lot of stressors restart Celexa which works well       METABOLIC PANEL, BASIC   4. Chronic tension-type headache, not intractable  G44.229 339.12 H. PYLORI ABS, IGG, IGA, IGM       Took Topamax ultimately headaches resolved when he stopped it has neurology appointment pending will remain off Topamax at this time   METABOLIC PANEL, BASIC   5.  Colon cancer screening  Z12.11 V76.51 REFERRAL TO GASTROENTEROLOGY   Due for colonoscopy   H. PYLORI ABS, IGG, IGA, IGM      METABOLIC PANEL, BASIC   6. Prostate cancer [facility])    Reports recent evaluation with urology will get notes for review [id number]         Depression screen reviewed and negative     Scribed by Gary Díaz of [address] [place] [address] [address], as dictated by Dr. Daljit Pollock. Current diagnosis and concerns discussed with pt at length. Pt understands risks and benefits or current treatment plan and medications, and accepts the treatment and medication with any possible risks. Pt asks appropriate questions, which were answered. Pt was instructed to call with any concerns or problems. I have reviewed the note documented by the scribe. The services provided are my own.   The documentation is accurate

## 2021-08-02 NOTE — TELEPHONE ENCOUNTER
Patient states he needs a call back to discuss his Persistent Stomach pain/Acid Reflux that patient reports he was seen for on Sat., 7/31/21 that he was seen At [facility] on [address] [facility] Medication prescribed is not helping. Patient needs to know if something else can be prescribed or if appt needed to be seen by Dr. Marcelina Cohen. Please call.  Thank you

## 2021-08-02 NOTE — TELEPHONE ENCOUNTER
This writer contacted patient in reference to symptoms and follow up care. Two patient identifiers confirmed. Appointment scheduled with Dr. Jennifer Carias on 08/03/2021 at 08:00AM Patient advised to arrive at 07:45AM. Patient verbalized understanding and gratitude.

## 2021-08-03 ENCOUNTER — OFFICE VISIT (OUTPATIENT)
Dept: INTERNAL MEDICINE CLINIC | Age: 61
End: 2021-08-03

## 2021-08-03 VITALS
TEMPERATURE: 97.9 F | BODY MASS INDEX: 22.67 KG/M2 | SYSTOLIC BLOOD PRESSURE: 130 MMHG | DIASTOLIC BLOOD PRESSURE: 71 MMHG | RESPIRATION RATE: 16 BRPM | HEIGHT: 68 IN | OXYGEN SATURATION: 98 % | WEIGHT: 149.6 LBS | HEART RATE: 79 BPM

## 2021-08-03 DIAGNOSIS — I10 ESSENTIAL HYPERTENSION: Primary | ICD-10-CM

## 2021-08-03 DIAGNOSIS — F41.9 ANXIETY: ICD-10-CM

## 2021-08-03 DIAGNOSIS — C61 PROSTATE CANCER (HCC): ICD-10-CM

## 2021-08-03 DIAGNOSIS — Z12.11 COLON CANCER SCREENING: ICD-10-CM

## 2021-08-03 DIAGNOSIS — G44.229 CHRONIC TENSION-TYPE HEADACHE, NOT INTRACTABLE: ICD-10-CM

## 2021-08-03 DIAGNOSIS — K21.9 GASTROESOPHAGEAL REFLUX DISEASE WITHOUT ESOPHAGITIS: ICD-10-CM

## 2021-08-03 LAB
ANION GAP SERPL CALC-SCNC: 6 MMOL/L (ref 5–15)
BUN SERPL-MCNC: 14 MG/DL (ref 6–20)
BUN/CREAT SERPL: 13 (ref 12–20)
CALCIUM SERPL-MCNC: 9.4 MG/DL (ref 8.5–10.1)
CHLORIDE SERPL-SCNC: 106 MMOL/L (ref 97–108)
CO2 SERPL-SCNC: 26 MMOL/L (ref 21–32)
CREAT SERPL-MCNC: 1.05 MG/DL (ref 0.7–1.3)
GLUCOSE SERPL-MCNC: 99 MG/DL (ref 65–100)
POTASSIUM SERPL-SCNC: 4.5 MMOL/L (ref 3.5–5.1)
SODIUM SERPL-SCNC: 138 MMOL/L (ref 136–145)

## 2021-08-03 PROCEDURE — 99214 OFFICE O/P EST MOD 30 MIN: CPT | Performed by: INTERNAL MEDICINE

## 2021-08-03 RX ORDER — PANTOPRAZOLE SODIUM 20 MG/1
20 TABLET, DELAYED RELEASE ORAL 2 TIMES DAILY
Qty: 60 TABLET | Refills: 1 | Status: SHIPPED | OUTPATIENT
Start: 2021-08-03 | End: 2021-08-23 | Stop reason: SDUPTHER

## 2021-08-03 RX ORDER — CITALOPRAM 10 MG/1
10 TABLET ORAL DAILY
Qty: 90 TABLET | Refills: 1 | Status: SHIPPED | OUTPATIENT
Start: 2021-08-03 | End: 2021-12-20

## 2021-08-03 RX ORDER — LISINOPRIL 20 MG/1
20 TABLET ORAL DAILY
Qty: 90 TABLET | Refills: 1 | Status: SHIPPED | OUTPATIENT
Start: 2021-08-03 | End: 2022-04-18

## 2021-08-03 NOTE — LETTER
8/3/2021 10:57 AM    Mr. Tara Kohler  [address] [facility] [place] [address]      Dear Care Provider    Please fax us the most recent office note so that we may update the patient's records for continuity of care. Our fax number: [phone].     Patient:   Tara Kohler  1960          Sincerely,      Ariana Clay MD

## 2021-08-05 LAB
H PYLORI IGA SER-ACNC: <9 UNITS (ref 0–8.9)
H PYLORI IGG SER IA-ACNC: 0.12 INDEX VALUE (ref 0–0.79)
H PYLORI IGM SER-ACNC: <9 UNITS (ref 0–8.9)

## 2021-08-09 ENCOUNTER — OFFICE VISIT (OUTPATIENT)
Dept: NEUROLOGY | Age: 61
End: 2021-08-09

## 2021-08-09 VITALS
HEART RATE: 80 BPM | BODY MASS INDEX: 22.58 KG/M2 | SYSTOLIC BLOOD PRESSURE: 137 MMHG | HEIGHT: 68 IN | WEIGHT: 149 LBS | DIASTOLIC BLOOD PRESSURE: 64 MMHG | OXYGEN SATURATION: 99 % | RESPIRATION RATE: 12 BRPM

## 2021-08-09 DIAGNOSIS — R42 DIZZINESS AND GIDDINESS: ICD-10-CM

## 2021-08-09 DIAGNOSIS — R42 DIZZINESS AND GIDDINESS: Primary | ICD-10-CM

## 2021-08-09 DIAGNOSIS — I65.23 BILATERAL CAROTID ARTERY STENOSIS: ICD-10-CM

## 2021-08-09 DIAGNOSIS — G44.209 TENSION VASCULAR HEADACHE: ICD-10-CM

## 2021-08-09 DIAGNOSIS — I67.89 CEREBRAL MICROVASCULAR DISEASE: ICD-10-CM

## 2021-08-09 LAB
BASOPHILS # BLD: 0 K/UL (ref 0–0.1)
BASOPHILS NFR BLD: 1 % (ref 0–1)
DIFFERENTIAL METHOD BLD: ABNORMAL
EOSINOPHIL # BLD: 0.2 K/UL (ref 0–0.4)
EOSINOPHIL NFR BLD: 3 % (ref 0–7)
ERYTHROCYTE [DISTWIDTH] IN BLOOD BY AUTOMATED COUNT: 11.1 % (ref 11.5–14.5)
ERYTHROCYTE [SEDIMENTATION RATE] IN BLOOD: 11 MM/HR (ref 0–20)
HCT VFR BLD AUTO: 38.4 % (ref 36.6–50.3)
HGB BLD-MCNC: 12.6 G/DL (ref 12.1–17)
IMM GRANULOCYTES # BLD AUTO: 0 K/UL (ref 0–0.04)
IMM GRANULOCYTES NFR BLD AUTO: 0 % (ref 0–0.5)
LYMPHOCYTES # BLD: 1.6 K/UL (ref 0.8–3.5)
LYMPHOCYTES NFR BLD: 25 % (ref 12–49)
MCH RBC QN AUTO: 30.6 PG (ref 26–34)
MCHC RBC AUTO-ENTMCNC: 32.8 G/DL (ref 30–36.5)
MCV RBC AUTO: 93.2 FL (ref 80–99)
MONOCYTES # BLD: 0.6 K/UL (ref 0–1)
MONOCYTES NFR BLD: 9 % (ref 5–13)
NEUTS SEG # BLD: 4.1 K/UL (ref 1.8–8)
NEUTS SEG NFR BLD: 62 % (ref 32–75)
NRBC # BLD: 0 K/UL (ref 0–0.01)
NRBC BLD-RTO: 0 PER 100 WBC
PLATELET # BLD AUTO: 301 K/UL (ref 150–400)
PMV BLD AUTO: 10 FL (ref 8.9–12.9)
RBC # BLD AUTO: 4.12 M/UL (ref 4.1–5.7)
WBC # BLD AUTO: 6.6 K/UL (ref 4.1–11.1)

## 2021-08-09 PROCEDURE — 99204 OFFICE O/P NEW MOD 45 MIN: CPT | Performed by: PSYCHIATRY & NEUROLOGY

## 2021-08-09 RX ORDER — ASPIRIN 81 MG/1
TABLET ORAL DAILY
COMMUNITY
End: 2022-06-21

## 2021-08-10 LAB
CENTROMERE B AB SER-ACNC: <0.2 AI (ref 0–0.9)
CHROMATIN AB SERPL-ACNC: <0.2 AI (ref 0–0.9)
DSDNA AB SER-ACNC: <1 IU/ML (ref 0–9)
ENA JO1 AB SER-ACNC: <0.2 AI (ref 0–0.9)
ENA RNP AB SER-ACNC: <0.2 AI (ref 0–0.9)
ENA SCL70 AB SER-ACNC: <0.2 AI (ref 0–0.9)
ENA SM AB SER-ACNC: <0.2 AI (ref 0–0.9)
ENA SM+RNP AB SER-ACNC: <0.2 AI (ref 0–0.9)
ENA SS-A AB SER-ACNC: <0.2 AI (ref 0–0.9)
ENA SS-B AB SER-ACNC: <0.2 AI (ref 0–0.9)
RIBOSOMAL P AB SER-ACNC: <0.2 AI (ref 0–0.9)
SEE BELOW:, 164879: NORMAL

## 2021-08-10 NOTE — PROGRESS NOTES
Consult  REFERRED BY:  Kain Dong MD    CHIEF COMPLAINT: Severe headaches and abnormal MRI      Subjective:     Shana Lockett is a 57 y.o. right-handed -American male seen as a new patient to me, at the request of Dr. Munir Johnson for evaluation of new problem of bad headaches that began around May, with a history described as pressure pain in his temple regions and posterior head regions, during which time the patient was under increased stress with hip surgery, his daughter being ill, and his mother having severe Parkinson's disease and getting worse. He was given Topamax for about 2 weeks, and the headaches improved dramatically, and he stopped the medication and has remained almost asymptomatic since. However he did have a MRI scan that showed some microvascular disease probably consistent with his history of hypertension, and he had his medications increased for that and that also seem to be helping his headaches. His headaches are most likely tension vascular type headaches related to his stress and family problems. He has had no history of stroke, TIAs, focal weakness or sensory loss, fever, meningismus, head trauma, or any other focal neurologic deficit and says he feels better and back to his normal baseline. Because of his temporal pain we will check a sed rate and an PAULETTE, and because of his microvascular disease we will check a carotid Doppler study this week which he agrees to have. He is also advised to start taking a baby aspirin every day to help prevent any further progression of his microvascular disease, and we reviewed his MRI scan personally on the PACS system with the patient in detail today, showing him his lesions, and discussing the main risk factors for that vascular disease, being smoking which he does not do, high blood pressure, cholesterol, and diabetes and poor diet, he will try to control those as much as possible with his PCP.   Please encourage exercise regular, start getting more active, both physically and mentally, and to follow closely with his PCP about his medical problems. Past Medical History:   Diagnosis Date    Anxiety disorder     Arthritis     Asthma     allergies environmental    Cancer ([address]     prostate CA    GERD (gastroesophageal reflux disease)     High cholesterol     HTN (hypertension) 2011    Other and unspecified symptoms and signs involving general sensations and perceptions     construction accident     Other ill-defined conditions(799.89)     anxiety    Other ill-defined conditions(799.89)     seasonal allergies    Psychiatric disorder     anxiety      Past Surgical History:   Procedure Laterality Date    HX HIP REPLACEMENT Left 2020    HX PROSTATECTOMY  2015    HX SHOULDER ARTHROSCOPY Left     [place] ABDOMEN SURGERY PROC UNLISTED Right     inguinal     Family History   Problem Relation Age of Onset    Diabetes Mother     Hypertension Mother     Hypertension Sister     Kidney Disease Father     Alcohol abuse Brother     Cancer Brother         colon    Anesth Problems Neg Hx       Social History     Tobacco Use    Smoking status: Former Smoker     Packs/day: 0.25     Years: 12.00     Pack years: 3.00     Types: Cigarettes     Quit date: 2015     Years since quittin.1    Smokeless tobacco: Never Used   Substance Use Topics    Alcohol use: No         Current Outpatient Medications:     aspirin delayed-release 81 mg tablet, Take  by mouth daily. , Disp: , Rfl:     lisinopriL (PRINIVIL, ZESTRIL) 20 mg tablet, Take 1 Tablet by mouth daily. , Disp: 90 Tablet, Rfl: 1    citalopram (CELEXA) 10 mg tablet, Take 1 Tablet by mouth daily. , Disp: 90 Tablet, Rfl: 1    pantoprazole (PROTONIX) 20 mg tablet, Take 1 Tablet by mouth two (2) times a day., Disp: 60 Tablet, Rfl: 1    rosuvastatin (CRESTOR) 10 mg tablet, TAKE 1 TABLET BY MOUTH EVERY NIGHT, Disp: 90 Tab, Rfl: 1    albuterol (PROVENTIL HFA, VENTOLIN HFA, PROAIR HFA) 90 mcg/actuation inhaler, INHALE 2 PUFFS BY MOUTH EVERY 6 HOURS AS NEEDED FOR WHEEZING, Disp: 54 g, Rfl: 1    Cetirizine (ZyrTEC) 10 mg cap, Take 10 mg by mouth as needed. , Disp: , Rfl:     fluticasone propionate (FLONASE) 50 mcg/actuation nasal spray, 2 Sprays by Nasal route daily. , Disp: , Rfl:         No Known Allergies   MRI Results (most recent):  Results from Hospital Encounter encounter on 05/21/21    MRI BRAIN WO CONT    Narrative  EXAM: MRI BRAIN WO CONT    INDICATION: new dsymmetria    COMPARISON: MRI brain April 21, 2011. CONTRAST: None. TECHNIQUE:  Multiplanar multisequence acquisition without contrast of the brain. FINDINGS:  Numerous small T2/FLAIR hyperintensities in the subcortical and periventricular  white matter, these are slightly increased compared to April 2011. The ventricles are normal in size and position. There is no acute infarct,  hemorrhage, extra-axial fluid collection, or mass effect. There is no cerebellar  tonsillar herniation. Expected arterial flow-voids are present. The paranasal sinuses, mastoid air cells, and middle ears are clear. The orbital  contents are within normal limits. No significant osseous or scalp lesions are  identified. Impression  No acute abnormalities. Punctate subcortical and paratracheal white matter  lesions most compatible with sequela of chronic small vessel ischemic disease,  although slightly greater extent than typically expected for patient's stated  age. Results from [place] encounter on 05/21/21    MRI BRAIN WO CONT    Narrative  EXAM: MRI BRAIN WO CONT    INDICATION: new dsymmetria    COMPARISON: MRI brain April 21, 2011. CONTRAST: None. TECHNIQUE:  Multiplanar multisequence acquisition without contrast of the brain. FINDINGS:  Numerous small T2/FLAIR hyperintensities in the subcortical and periventricular  white matter, these are slightly increased compared to April 2011.     The ventricles are normal in size and position. There is no acute infarct,  hemorrhage, extra-axial fluid collection, or mass effect. There is no cerebellar  tonsillar herniation. Expected arterial flow-voids are present. The paranasal sinuses, mastoid air cells, and middle ears are clear. The orbital  contents are within normal limits. No significant osseous or scalp lesions are  identified. Impression  No acute abnormalities. Punctate subcortical and paratracheal white matter  lesions most compatible with sequela of chronic small vessel ischemic disease,  although slightly greater extent than typically expected for patient's stated  age. Review of Systems:  A comprehensive review of systems was negative except for: Musculoskeletal: positive for back pain, bone pain and Right leg pain  Neurological: positive for headaches  Behvioral/Psych: positive for anxiety and depression   Vitals:    08/09/21 0800   BP: 137/64   Pulse: 80   Resp: 12   SpO2: 99%   Weight: 149 lb (67.6 kg)   Height: 5' 8\" (1.727 m)     Objective:     I      NEUROLOGICAL EXAM:    Appearance: The patient is well developed, well nourished, provides a coherent history and is in no acute distress. Mental Status: Oriented to time, place and person, and the president, cognitive function is normal and speech is fluent and no aphasia or dysarthria. Mood and affect appropriate. Cranial Nerves:   Intact visual fields. Fundi are benign, disc are flat, no lesions seen on funduscopy. AMIRA, EOM's full, no nystagmus, no ptosis. Facial sensation is normal. Corneal reflexes are not tested. Facial movement is symmetric. Hearing is normal bilaterally. Palate is midline with normal sternocleidomastoid and trapezius muscles are normal. Tongue is midline. Neck without meningismus or bruits  Temporal arteries are not tender or enlarged  TMJ areas are not tender on palpation   Motor:  5/5 strength in upper and lower proximal and distal muscles. Normal bulk and tone.  No fasciculations. Rapid alternating movement is symmetric and intact bilaterally   Reflexes:   Deep tendon reflexes 2+/4 and symmetrical.  No babinski or clonus present   Sensory:   Normal to touch, pinprick and vibration and temperature. DSS is intact   Gait:  Abnormal gait for patient's age, as he walks with a marked limp occasionally a cane because of his right hip pain and right leg injury. Tremor:   No tremor noted. Cerebellar:  No abnormal cerebellar signs present on Romberg and tandem testing and finger-nose-finger exam.   Neurovascular:  Normal heart sounds and regular rhythm, peripheral pulses decreased, and no carotid bruits. Assessment:       ICD-10-CM ICD-9-CM    1. Dizziness and giddiness  R42 780.4 PAULETTE COMPREHENSIVE PLUS PANEL      CBC WITH AUTOMATED DIFF      SED RATE (ESR)      DUPLEX CAROTID BILATERAL   2. Bilateral carotid artery stenosis  I65.23 433.10 PAULETTE COMPREHENSIVE PLUS PANEL     433.30 CBC WITH AUTOMATED DIFF      SED RATE (ESR)      DUPLEX CAROTID BILATERAL   3. Cerebral microvascular disease  I67.89 437.8 PAULETTE COMPREHENSIVE PLUS PANEL      CBC WITH AUTOMATED DIFF      SED RATE (ESR)      DUPLEX CAROTID BILATERAL   4. Tension vascular headache  G44.209 307.81 PAULETTE COMPREHENSIVE PLUS PANEL      CBC WITH AUTOMATED DIFF      SED RATE (ESR)      DUPLEX CAROTID BILATERAL     Active Problems:    * No active hospital problems. *      Plan:     Patient with increased headaches several months ago associated with several problems including his disability and chronic right leg pain, his daughters recent illness and death, and his mother's illness and progressive degenerative brain condition of Parkinson's disease.   We reviewed his MRI scan personally on the PACS system ourselves, I agree with report and dictation images shows microvascular disease but no other structural lesion since his headaches are better we will just continue aspirin therapy and check a carotid Doppler to make sure there is no significant extracranial obstructive cerebrovascular disease. Patient will also get a sed rate and PAULETTE to rule out any type of arteritis, and will try to avoid touching his teeth and bruxism, try to get more physical activity and mental activity to try to break up the stress and tension. He does not like medication and does not want take any other medication. 54 minutes per the patient, going over his history, reviewing his MRI scan personally back system, and I agree with report as dictated, and he agrees with plans and therapy as above, and will call if this is a problem otherwise we will see him again in 6 months time or earlier as needed.     Signed By: Dianne Ham MD     August 9, 2021       CC: Caden Mendiola MD  FAX: [phone]

## 2021-08-23 ENCOUNTER — OFFICE VISIT (OUTPATIENT)
Dept: INTERNAL MEDICINE CLINIC | Age: 61
End: 2021-08-23

## 2021-08-23 VITALS
TEMPERATURE: 98.9 F | HEART RATE: 83 BPM | RESPIRATION RATE: 16 BRPM | SYSTOLIC BLOOD PRESSURE: 121 MMHG | WEIGHT: 147 LBS | OXYGEN SATURATION: 96 % | BODY MASS INDEX: 22.28 KG/M2 | DIASTOLIC BLOOD PRESSURE: 73 MMHG | HEIGHT: 68 IN

## 2021-08-23 DIAGNOSIS — R42 DIZZINESS AND GIDDINESS: ICD-10-CM

## 2021-08-23 DIAGNOSIS — K21.9 GASTROESOPHAGEAL REFLUX DISEASE WITHOUT ESOPHAGITIS: Primary | ICD-10-CM

## 2021-08-23 PROCEDURE — 99213 OFFICE O/P EST LOW 20 MIN: CPT | Performed by: INTERNAL MEDICINE

## 2021-08-23 RX ORDER — PANTOPRAZOLE SODIUM 20 MG/1
20 TABLET, DELAYED RELEASE ORAL 2 TIMES DAILY
Qty: 60 TABLET | Refills: 1 | Status: SHIPPED | OUTPATIENT
Start: 2021-08-23 | End: 2021-12-20 | Stop reason: SDUPTHER

## 2021-08-23 RX ORDER — SUCRALFATE 1 G/10ML
1 SUSPENSION ORAL
Qty: 414 ML | Refills: 0 | Status: SHIPPED | OUTPATIENT
Start: 2021-08-23 | End: 2022-01-15 | Stop reason: SDUPTHER

## 2021-08-23 NOTE — PROGRESS NOTES
HISTORY OF PRESENT ILLNESS  Dalila Regalado is a 57 y.o. male. HPI     Last here 8/03/21. Pt is here for acute care.     He was in urgent care 7/31/21 for upper abdominal pain he was given Prilosec there reviewed notes KUB was ordered unremarkable     Lov he stated that his stomach had been bothering him for 5-7 days   Intermittent epigastric pain  Endorsed some constipation  Taking protonix twice daily    This is still bothering him today, he states that the protonix has not been helping  Denies fever, chills, nausea, vomiting, diarrhea  Endorses some constipation   Denies CP, pain is centralized to abdomen  Will order CT abd  Will order sucralfate      Prev he c/o HA, this has improved     BP today is 121/73  Taking lisinopril 20mg, hctz caused sodium to drop     Wt is 147 lbs, down 2 lbs x lov  His weight is within normal ranges     Reviewed labs     He followed with Dr. Matias Rangel   Reviewed note 8/09/21:  Patient with increased headaches several months ago associated with several problems including his disability and chronic right leg pain, his daughters recent illness and death, and his mother's illness and progressive degenerative brain condition of Parkinson's disease. We reviewed his MRI scan personally on the PACS system ourselves, I agree with report and dictation images shows microvascular disease but no other structural lesion since his headaches are better we will just continue aspirin therapy and check a carotid Doppler to make sure there is no significant extracranial obstructive cerebrovascular disease. Patient will also get a sed rate and PAULETTE to rule out any type of arteritis, and will try to avoid touching his teeth and bruxism, try to get more physical activity and mental activity to try to break up the stress and tension. He does not like medication and does not want take any other medication.   54 minutes per the patient, going over his history, reviewing his MRI scan personally back system, and I agree with report as dictated, and he agrees with plans and therapy as above, and will call if this is a problem otherwise we will see him again in 6 months time or earlier as needed.     He is now following with Dr Romayne Needs (ortho) for L leg pain  Pt has completed PT  He had L arthroplasty surgery 6/3/20 performed by Dr. Jody Dean  Last there 4/1/21   He has been released      Pt saw Dr Lashanda Hinson for preop clearance in the past  Last visit 2/13/20     Pt follows with Dr. Betty Valdez (Darl[facility] Tim) for h/o prostate cancer, annually  Last visit 6/21     He had been taking celexa 10 mg for anxiety     Continues on crestor 10mg for cholesterol   Recall could not tolerate lipitor      Pt has albuterol to use prn for wheezing/breathing       ACP not on file. SDM is his wife.   Provided information in the past.      PREVENTIVE:    Colonoscopy: 8/17/11, Dr. Abimbola Mello, repeat 10 years - due reminded   PSA: 8/11, 10/14, 5/16 undetectable, 1/18, 1/19, uro follows 4/20 undetectable 9/20  Tdap:   9/22/20  Pneumovax: not yet needed  [place]: not yet needed  Shingrix:  1st round complete, 2 05/25/21  Flu shot:  9/22/20   A1c: 7/17 5.6, 10/17 5.8, 1/18 5.4, 7/18 5.6, 1/19 5.5 3/20 5.7 6/20 11.7 9/20 5.4  Eye exam: Dr [facility]  11/20, has cataracts they are following  Hep C screen: 7/14/15, negative   Lipids: 5/21 LDL 92  EKG: 10/17/17,   Covid: J&J 3/21    Patient Active Problem List    Diagnosis Date Noted    Bilateral carotid artery stenosis 08/09/2021    Cerebral microvascular disease 08/09/2021    Tension vascular headache 08/09/2021    Degenerative joint disease (DJD) of hip 06/03/2020    Cervical stenosis of spinal canal 10/30/2017    Pure hypercholesterolemia 07/28/2017    Prostate cancer ([facility] [address] 01/13/2015    GERD (gastroesophageal reflux disease) 09/07/2011    HTN (hypertension) 09/07/2011    Dizziness and giddiness 06/08/2011    Anxiety 03/29/2011     Current Outpatient Medications   Medication Sig Dispense Refill    sucralfate (CARAFATE) 100 mg/mL suspension Take 5 mL by mouth four (4) times daily as needed for GI Pain. 414 mL 0    aspirin delayed-release 81 mg tablet Take  by mouth daily.  lisinopriL (PRINIVIL, ZESTRIL) 20 mg tablet Take 1 Tablet by mouth daily. 90 Tablet 1    citalopram (CELEXA) 10 mg tablet Take 1 Tablet by mouth daily. 90 Tablet 1    rosuvastatin (CRESTOR) 10 mg tablet TAKE 1 TABLET BY MOUTH EVERY NIGHT 90 Tab 1    albuterol (PROVENTIL HFA, VENTOLIN HFA, PROAIR HFA) 90 mcg/actuation inhaler INHALE 2 PUFFS BY MOUTH EVERY 6 HOURS AS NEEDED FOR WHEEZING 54 g 1    Cetirizine (ZyrTEC) 10 mg cap Take 10 mg by mouth as needed.  fluticasone propionate (FLONASE) 50 mcg/actuation nasal spray 2 Sprays by Nasal route daily.  pantoprazole (PROTONIX) 20 mg tablet Take 1 Tablet by mouth two (2) times a day.  60 Tablet 1     Past Surgical History:   Procedure Laterality Date    HX HIP REPLACEMENT Left 06/2020    HX PROSTATECTOMY  8/2015    HX SHOULDER ARTHROSCOPY Left     [place] ABDOMEN SURGERY PROC UNLISTED Right 1980    inguinal      Lab Results   Component Value Date/Time    WBC 6.6 08/09/2021 08:56 AM    HGB 12.6 08/09/2021 08:56 AM    Hemoglobin (POC) 15.0 10/30/2017 02:13 PM    HCT 38.4 08/09/2021 08:56 AM    Hematocrit (POC) 44 10/30/2017 02:13 PM    PLATELET 293 52/38/5367 08:56 AM    MCV 93.2 08/09/2021 08:56 AM     Lab Results   Component Value Date/Time    Cholesterol, total 172 05/10/2021 12:00 AM    HDL Cholesterol 62 05/10/2021 12:00 AM    LDL, calculated 92 05/10/2021 12:00 AM    LDL, calculated 63 12/17/2019 08:48 AM    Triglyceride 102 05/10/2021 12:00 AM     Lab Results   Component Value Date/Time    GFR est non-AA >60 08/03/2021 08:21 AM    GFRNA, POC >60 10/30/2017 02:13 PM    GFR est AA >60 08/03/2021 08:21 AM    GFRAA, POC >60 10/30/2017 02:13 PM    Creatinine 1.05 08/03/2021 08:21 AM    Creatinine (POC) 1.1 10/30/2017 02:13 PM    BUN 14 08/03/2021 08:21 AM    BUN (POC) 19 10/30/2017 02:13 PM    Sodium 138 08/03/2021 08:21 AM    Sodium (POC) 137 10/30/2017 02:13 PM    Potassium 4.5 08/03/2021 08:21 AM    Potassium (POC) 4.4 10/30/2017 02:13 PM    Chloride 106 08/03/2021 08:21 AM    Chloride (POC) 103 10/30/2017 02:13 PM    CO2 26 08/03/2021 08:21 AM        Review of Systems   Respiratory: Negative for shortness of breath. Cardiovascular: Negative for chest pain. Gastrointestinal: Positive for abdominal pain. Negative for nausea. Physical Exam  Constitutional:       General: He is not in acute distress. Appearance: Normal appearance. He is not ill-appearing, toxic-appearing or diaphoretic. HENT:      Head: Normocephalic and atraumatic. Right Ear: External ear normal.      Left Ear: External ear normal.   Eyes:      General:         Right eye: No discharge. Left eye: No discharge. Conjunctiva/sclera: Conjunctivae normal.      Pupils: Pupils are equal, round, and reactive to light. Cardiovascular:      Rate and Rhythm: Normal rate and regular rhythm. Heart sounds: No murmur heard. No friction rub. No gallop. Pulmonary:      Effort: No respiratory distress. Breath sounds: Normal breath sounds. No wheezing or rales. Chest:      Chest wall: No tenderness. Abdominal:      General: Abdomen is flat. There is no distension. Palpations: There is no mass. Tenderness: There is no abdominal tenderness. There is no guarding or rebound. Hernia: No hernia is present. Musculoskeletal:         General: Normal range of motion. Cervical back: Normal range of motion and neck supple. Skin:     General: Skin is warm and dry. Neurological:      Mental Status: He is alert and oriented to person, place, and time. Mental status is at baseline.       Coordination: Coordination normal.      Gait: Gait normal.   Psychiatric:         Mood and Affect: Mood normal.         Behavior: Behavior normal.         ASSESSMENT and PLAN ICD-10-CM ICD-9-CM    1. Gastroesophageal reflux disease without esophagitis       Progressive abdominal pain epigastric in nature despite Protonix    We will get CT of the abdomen at this time    Patient has information for GI still and still needs to call and make an appointment he is having some difficulty with insurance and cost unfortunately    Sucralfate ordered   K21.9 530.81 CT ABDOMEN W WO CONT AND PELVIS W CONT   2. Dizziness and giddiness     Had neurology evaluation R42 780.4         Depression screen reviewed and negative       Scribed by Nuria Bass of [facility], as dictated by Dr. Francesco Morris. Current diagnosis and concerns discussed with pt at length. Pt understands risks and benefits or current treatment plan and medications, and accepts the treatment and medication with any possible risks. Pt asks appropriate questions, which were answered. Pt was instructed to call with any concerns or problems. I have reviewed the note documented by the scribe. The services provided are my own.   The documentation is accurate

## 2021-08-23 NOTE — TELEPHONE ENCOUNTER
Future Appointments:  Future Appointments   Date Time Provider Dedrick Zimmerman   8/23/2021  8:15 AM Suzi Mclean MD [facility] BS AMB   2/2/2022  8:15 AM Suzi Mclean MD [facility] BS AMB   2/9/2022  8:00 AM DOPPLER_ROBIN SOTOMAYOR BS AMB   2/17/2022  8:40 AM MD BRAN Vaughn BS AMB        Last Appointment With Me:  8/3/2021     Requested Prescriptions     Pending Prescriptions Disp Refills    pantoprazole (PROTONIX) 20 mg tablet 60 Tablet 1     Sig: Take 1 Tablet by mouth two (2) times a day.

## 2021-08-31 ENCOUNTER — HOSPITAL ENCOUNTER (OUTPATIENT)
Dept: CT IMAGING | Age: 61
Discharge: HOME | End: 2021-08-31
Attending: INTERNAL MEDICINE

## 2021-08-31 DIAGNOSIS — K21.9 GASTROESOPHAGEAL REFLUX DISEASE WITHOUT ESOPHAGITIS: ICD-10-CM

## 2021-08-31 PROCEDURE — 74011000636 HC RX REV CODE- 636: Performed by: INTERNAL MEDICINE

## 2021-08-31 PROCEDURE — 74177 CT ABD & PELVIS W/CONTRAST: CPT

## 2021-08-31 PROCEDURE — 74011000250 HC RX REV CODE- 250: Performed by: INTERNAL MEDICINE

## 2021-08-31 RX ORDER — BARIUM SULFATE 20 MG/ML
900 SUSPENSION ORAL
Status: COMPLETED | OUTPATIENT
Start: 2021-08-31 | End: 2021-08-31

## 2021-08-31 RX ADMIN — BARIUM SULFATE 900 ML: 21 SUSPENSION ORAL at 07:27

## 2021-08-31 RX ADMIN — IOPAMIDOL 100 ML: 755 INJECTION, SOLUTION INTRAVENOUS at 07:26

## 2021-09-03 ENCOUNTER — TELEPHONE (OUTPATIENT)
Dept: INTERNAL MEDICINE CLINIC | Age: 61
End: 2021-09-03

## 2021-09-03 NOTE — TELEPHONE ENCOUNTER
#[phone]  Pt states he had gotten a CT Scan on 8-31-21 and is waiting on the results. Please call pt today with those results he ask. Thanks.

## 2021-09-03 NOTE — TELEPHONE ENCOUNTER
Adal Sainz MD  You 7 minutes ago (8:51 AM)   JS  Normal    Message text     Camille Godinez MD 19 minutes ago (8:39 AM)   DB  Can you result CT scan please.      Routing comment

## 2021-09-03 NOTE — TELEPHONE ENCOUNTER
Called, spoke with Pt. Two pt identifiers confirmed. Pt informed his CT is normal.   Pt stated what about his pain, it's scaring him and he's trying to get insurance. Pt informed to call GI Dr. Daryle Conner office and see if they will beable to work with him on payments. Pt given contact info for Dr. Daryle Conner. Pt verbalized understanding of information discussed w/ no further questions at this time.

## 2021-09-29 DIAGNOSIS — E78.00 PURE HYPERCHOLESTEROLEMIA: ICD-10-CM

## 2021-09-29 RX ORDER — ROSUVASTATIN CALCIUM 10 MG/1
TABLET, COATED ORAL
Qty: 90 TABLET | Refills: 1 | Status: SHIPPED | OUTPATIENT
Start: 2021-09-29 | End: 2022-04-18

## 2021-11-05 DIAGNOSIS — F41.9 ANXIETY: Primary | ICD-10-CM

## 2021-11-05 NOTE — TELEPHONE ENCOUNTER
Patient is requesting a refill on his medication for anxiety. He can' t recall the name of it. Patient took a fall this morining and his lip is swollen and little skin was taken off. He may get it evaluated at [facility].

## 2021-11-05 NOTE — TELEPHONE ENCOUNTER
PCP: Sami Burleson MD    Last appt: 8/23/2021  Future Appointments   Date Time Provider Dedrick Zimmerman   2/2/2022  8:15 AM Sami Burleson MD [facility] BS AMB   2/9/2022  8:00 AM NICK_ROBIN SOTOMAYOR BS [facility]   2/17/2022  8:40 AM MD VEE Gonzalez BS AMB       Requested Prescriptions     Pending Prescriptions Disp Refills    citalopram (CELEXA) 10 mg tablet 90 Tablet 1     Sig: Take 1 Tablet by mouth daily.

## 2021-11-06 RX ORDER — CITALOPRAM 10 MG/1
10 TABLET ORAL DAILY
Qty: 90 TABLET | Refills: 1 | Status: SHIPPED | OUTPATIENT
Start: 2021-11-06 | End: 2021-12-20

## 2021-12-20 ENCOUNTER — OFFICE VISIT (OUTPATIENT)
Dept: INTERNAL MEDICINE CLINIC | Age: 61
End: 2021-12-20

## 2021-12-20 VITALS
HEART RATE: 83 BPM | RESPIRATION RATE: 16 BRPM | TEMPERATURE: 97.9 F | HEIGHT: 68 IN | SYSTOLIC BLOOD PRESSURE: 138 MMHG | WEIGHT: 143.6 LBS | BODY MASS INDEX: 21.76 KG/M2 | OXYGEN SATURATION: 97 % | DIASTOLIC BLOOD PRESSURE: 73 MMHG

## 2021-12-20 DIAGNOSIS — I10 PRIMARY HYPERTENSION: Primary | ICD-10-CM

## 2021-12-20 DIAGNOSIS — C61 PROSTATE CANCER (HCC): ICD-10-CM

## 2021-12-20 DIAGNOSIS — R05.9 COUGH: ICD-10-CM

## 2021-12-20 DIAGNOSIS — Z23 NEEDS FLU SHOT: ICD-10-CM

## 2021-12-20 DIAGNOSIS — E78.00 PURE HYPERCHOLESTEROLEMIA: ICD-10-CM

## 2021-12-20 DIAGNOSIS — F41.9 ANXIETY: ICD-10-CM

## 2021-12-20 DIAGNOSIS — R73.01 IFG (IMPAIRED FASTING GLUCOSE): ICD-10-CM

## 2021-12-20 DIAGNOSIS — K21.9 GASTROESOPHAGEAL REFLUX DISEASE WITHOUT ESOPHAGITIS: ICD-10-CM

## 2021-12-20 LAB
ALBUMIN SERPL-MCNC: 3.9 G/DL (ref 3.5–5)
ALBUMIN/GLOB SERPL: 1.1 {RATIO} (ref 1.1–2.2)
ALP SERPL-CCNC: 71 U/L (ref 45–117)
ALT SERPL-CCNC: 26 U/L (ref 12–78)
ANION GAP SERPL CALC-SCNC: 4 MMOL/L (ref 5–15)
AST SERPL-CCNC: 15 U/L (ref 15–37)
BILIRUB SERPL-MCNC: 0.5 MG/DL (ref 0.2–1)
BUN SERPL-MCNC: 12 MG/DL (ref 6–20)
BUN/CREAT SERPL: 12 (ref 12–20)
CALCIUM SERPL-MCNC: 9.7 MG/DL (ref 8.5–10.1)
CHLORIDE SERPL-SCNC: 106 MMOL/L (ref 97–108)
CO2 SERPL-SCNC: 27 MMOL/L (ref 21–32)
CREAT SERPL-MCNC: 1 MG/DL (ref 0.7–1.3)
EST. AVERAGE GLUCOSE BLD GHB EST-MCNC: 111 MG/DL
GLOBULIN SER CALC-MCNC: 3.5 G/DL (ref 2–4)
GLUCOSE SERPL-MCNC: 91 MG/DL (ref 65–100)
HBA1C MFR BLD: 5.5 % (ref 4–5.6)
POTASSIUM SERPL-SCNC: 4.7 MMOL/L (ref 3.5–5.1)
PROT SERPL-MCNC: 7.4 G/DL (ref 6.4–8.2)
SODIUM SERPL-SCNC: 137 MMOL/L (ref 136–145)
TSH SERPL DL<=0.05 MIU/L-ACNC: 0.9 UIU/ML (ref 0.36–3.74)

## 2021-12-20 PROCEDURE — 90686 IIV4 VACC NO PRSV 0.5 ML IM: CPT | Performed by: INTERNAL MEDICINE

## 2021-12-20 PROCEDURE — 99214 OFFICE O/P EST MOD 30 MIN: CPT | Performed by: INTERNAL MEDICINE

## 2021-12-20 RX ORDER — SERTRALINE HYDROCHLORIDE 50 MG/1
50 TABLET, FILM COATED ORAL DAILY
Qty: 30 TABLET | Refills: 1 | Status: SHIPPED | OUTPATIENT
Start: 2021-12-20 | End: 2022-09-20

## 2021-12-20 RX ORDER — ALBUTEROL SULFATE 90 UG/1
AEROSOL, METERED RESPIRATORY (INHALATION)
Qty: 54 G | Refills: 1 | Status: SHIPPED | OUTPATIENT
Start: 2021-12-20 | End: 2022-04-19 | Stop reason: SDUPTHER

## 2021-12-20 RX ORDER — PANTOPRAZOLE SODIUM 20 MG/1
20 TABLET, DELAYED RELEASE ORAL 2 TIMES DAILY
Qty: 180 TABLET | Refills: 1 | Status: SHIPPED | OUTPATIENT
Start: 2021-12-20 | End: 2022-10-04 | Stop reason: SDUPTHER

## 2021-12-20 NOTE — PROGRESS NOTES
HISTORY OF PRESENT ILLNESS  Trevor Holman is a 57 y.o. male.   HPI     Donte Mcpherson 8/23/21. Pt is here for acute care.     He c/o abd pain, this is an ongoing issue recurrent  His mother passed this month, and his daughter passed in June 2021, he wonders if this might be contributing  Discussed f/u with GI, he has been having trouble with insurance and hasn't been able to schedule an appt yet  Will refer to different GI group, discussed that if can't get an appt with either group [facility] has program that might be able to help  Previously was taking protonix twice daily however he ran out of it and stopped taking it it seemed to help his symptoms that he had previously we did a CT of the abdomen that was unremarkable back in August 2021  Last office visit I gave him sucralfate this was expensive it did not help so much  He thinks that the protonix helped while the sucralfate did not, he has been out of protonix for several months        BP today is 138/73  No home readings to review  Taking lisinopril 20mg,   Please recall hctz caused sodium to drop     Wt today is 143 lbs, down 4 lbs x lov   His weight is within normal ranges     Reviewed labs          He followed with Dr. Lizett Pradhan   Last visit 8/09/21    He is now following with Dr Amy Rob (ortho) for L leg pain  Pt has completed PT  He had L arthroplasty surgery 6/3/20 performed by Dr. Merril Baumgarten  Last there 4/1/21   He has been released      Pt saw Dr Severa Blue for preop clearance in the past  Last visit 2/13/20     Pt follows with Dr. Joey Arroyo (Diogo Spencer) for h/o prostate cancer, annually  Last visit 6/21     He had been taking celexa 10 mg for anxiety--no longer taking this because it wasn't helping, he is feeling jittery again which is what happens when he gets anxious he tapered off his Celexa over a month ago  Will order zoloft and see if this is more effective for him  Could consider hydroxyzine for acute anxiety if needed down the road         Continues on crestor 10mg for cholesterol   Recall could not tolerate lipitor      Pt has albuterol to use prn for wheezing/breathing       ACP not on file. SDM is his wife. Provided information in the past.      PREVENTIVE:    Colonoscopy: 8/17/11, Dr. Mercy Maki, repeat 10 years - due reminded   PSA: 8/11, 10/14, 5/16 undetectable, 1/18, 1/19, uro follows 4/20 undetectable 9/20, 6/21 urology  Tdap: 9/22/20  Pneumovax: not yet needed  Burnadette Ayala: not yet needed  Shingrix:  1st round complete, 2 05/25/21  Flu shot:  9/22/20, will get today 12/20/21  A1c: 7/17 5.6, 10/17 5.8, 1/18 5.4, 7/18 5.6, 1/19 5.5 3/20 5.7 6/20 11.7 9/20 5.4  Eye exam: Dr [facility]  11/20, has cataracts they are following  Hep C screen: 7/14/15, negative   Lipids: 5/21 LDL 92  EKG: 10/17/17,   Covid: J&J 3/21, plans on getting booster discussed getting moderna or pfizer    Patient Active Problem List    Diagnosis Date Noted    Bilateral carotid artery stenosis 08/09/2021    Cerebral microvascular disease 08/09/2021    Tension vascular headache 08/09/2021    Degenerative joint disease (DJD) of hip 06/03/2020    Cervical stenosis of spinal canal 10/30/2017    Pure hypercholesterolemia 07/28/2017    Prostate cancer ([facility] [address] 01/13/2015    GERD (gastroesophageal reflux disease) 09/07/2011    HTN (hypertension) 09/07/2011    Dizziness and giddiness 06/08/2011    Anxiety 03/29/2011     Current Outpatient Medications   Medication Sig Dispense Refill    citalopram (CELEXA) 10 mg tablet Take 1 Tablet by mouth daily. 90 Tablet 1    rosuvastatin (CRESTOR) 10 mg tablet TAKE 1 TABLET BY MOUTH EVERY NIGHT 90 Tablet 1    pantoprazole (PROTONIX) 20 mg tablet Take 1 Tablet by mouth two (2) times a day. 60 Tablet 1    sucralfate (CARAFATE) 100 mg/mL suspension Take 5 mL by mouth four (4) times daily as needed for GI Pain. 414 mL 0    aspirin delayed-release 81 mg tablet Take  by mouth daily.  lisinopriL (PRINIVIL, ZESTRIL) 20 mg tablet Take 1 Tablet by mouth daily.  90 Tablet 1  citalopram (CELEXA) 10 mg tablet Take 1 Tablet by mouth daily. 90 Tablet 1    albuterol (PROVENTIL HFA, VENTOLIN HFA, PROAIR HFA) 90 mcg/actuation inhaler INHALE 2 PUFFS BY MOUTH EVERY 6 HOURS AS NEEDED FOR WHEEZING 54 g 1    Cetirizine (ZyrTEC) 10 mg cap Take 10 mg by mouth as needed.  fluticasone propionate (FLONASE) 50 mcg/actuation nasal spray 2 Sprays by Nasal route daily. Past Surgical History:   Procedure Laterality Date    HX HIP REPLACEMENT Left 06/2020    HX PROSTATECTOMY  8/2015    HX SHOULDER ARTHROSCOPY Left     NE ABDOMEN SURGERY PROC UNLISTED Right 1980    inguinal      Lab Results   Component Value Date/Time    WBC 6.6 08/09/2021 08:56 AM    HGB 12.6 08/09/2021 08:56 AM    Hemoglobin (POC) 15.0 10/30/2017 02:13 PM    HCT 38.4 08/09/2021 08:56 AM    Hematocrit (POC) 44 10/30/2017 02:13 PM    PLATELET 555 21/56/5043 08:56 AM    MCV 93.2 08/09/2021 08:56 AM     Lab Results   Component Value Date/Time    Cholesterol, total 172 05/10/2021 12:00 AM    HDL Cholesterol 62 05/10/2021 12:00 AM    LDL, calculated 92 05/10/2021 12:00 AM    LDL, calculated 63 12/17/2019 08:48 AM    Triglyceride 102 05/10/2021 12:00 AM     Lab Results   Component Value Date/Time    GFR est non-AA >60 08/03/2021 08:21 AM    GFRNA, POC >60 10/30/2017 02:13 PM    GFR est AA >60 08/03/2021 08:21 AM    GFRAA, POC >60 10/30/2017 02:13 PM    Creatinine 1.05 08/03/2021 08:21 AM    Creatinine (POC) 1.1 10/30/2017 02:13 PM    BUN 14 08/03/2021 08:21 AM    BUN (POC) 19 10/30/2017 02:13 PM    Sodium 138 08/03/2021 08:21 AM    Sodium (POC) 137 10/30/2017 02:13 PM    Potassium 4.5 08/03/2021 08:21 AM    Potassium (POC) 4.4 10/30/2017 02:13 PM    Chloride 106 08/03/2021 08:21 AM    Chloride (POC) 103 10/30/2017 02:13 PM    CO2 26 08/03/2021 08:21 AM        Review of Systems   Respiratory: Negative for shortness of breath. Cardiovascular: Negative for chest pain. Gastrointestinal: Positive for abdominal pain. Physical Exam  Constitutional:       General: He is not in acute distress. Appearance: Normal appearance. He is not ill-appearing, toxic-appearing or diaphoretic. HENT:      Head: Normocephalic and atraumatic. Right Ear: External ear normal.      Left Ear: External ear normal.   Eyes:      General:         Right eye: No discharge. Left eye: No discharge. Conjunctiva/sclera: Conjunctivae normal.      Pupils: Pupils are equal, round, and reactive to light. Cardiovascular:      Rate and Rhythm: Normal rate and regular rhythm. Heart sounds: No murmur heard. No friction rub. No gallop. Pulmonary:      Effort: No respiratory distress. Breath sounds: Normal breath sounds. No wheezing or rales. Chest:      Chest wall: No tenderness. Musculoskeletal:         General: Normal range of motion. Cervical back: Normal range of motion and neck supple. Skin:     General: Skin is warm and dry. Neurological:      Mental Status: He is alert and oriented to person, place, and time. Mental status is at baseline. Coordination: Coordination normal.      Gait: Gait normal.   Psychiatric:         Mood and Affect: Mood normal.         Behavior: Behavior normal.         ASSESSMENT and PLAN    ICD-10-CM ICD-9-CM    1.  Cough   Resolved   R05.9 786.2    Hypertension--controlled on lisinopril 20 mg    GERD--recurrent issue getting epigastric pain had a CT in August that was unremarkable send him to GI but due to insurance and cost issues he never was able to get his endoscopy and colonoscopy which she is due for for further evaluation his symptoms improved on Protonix but he ran out now they have returned we will go ahead and resume Protonix to twice daily dosing sucralfate did not seem to help    Anxiety--patient stopped taking Celexa thought it was not helping we will start Zoloft 50 mg daily instead consider hydroxyzine for acute stress in the future if needed      Hyperlipidemia--controlled on Crestor    Prostate cancer--up-to-date with urology sees him annually in June in remission            Scribed by Mekhi Burleson [address], as dictated by Dr. Carvajal. Current diagnosis and concerns discussed with pt at length. Pt understands risks and benefits or current treatment plan and medications, and accepts the treatment and medication with any possible risks. Pt asks appropriate questions, which were answered. Pt was instructed to call with any concerns or problems. I have reviewed the note documented by the scribe. The services provided are my own.   The documentation is accurate

## 2021-12-27 ENCOUNTER — TELEPHONE (OUTPATIENT)
Dept: INTERNAL MEDICINE CLINIC | Age: 61
End: 2021-12-27

## 2021-12-27 NOTE — TELEPHONE ENCOUNTER
No answer, left msg to keep taking medication, Dr. Bronson Carlton out of the office until 01/03/22.

## 2021-12-27 NOTE — TELEPHONE ENCOUNTER
Patient states that he has been on the stomach medicine pantoprazole for about a week and he is still having stomach issues. States does he keep taking it or try something else. Please call to advise. [facility] doctor Sheela Gomez told him to let her know status in one week. Also has question about a program for colonoscopy.     Please call [phone]  Wifes: [phone]    (voicemail ok per patient.)

## 2022-01-15 ENCOUNTER — HOSPITAL ENCOUNTER (EMERGENCY)
Age: 62
Discharge: HOME | End: 2022-01-15
Attending: EMERGENCY MEDICINE

## 2022-01-15 VITALS
RESPIRATION RATE: 17 BRPM | WEIGHT: 139.33 LBS | OXYGEN SATURATION: 98 % | HEART RATE: 71 BPM | DIASTOLIC BLOOD PRESSURE: 71 MMHG | HEIGHT: 68 IN | TEMPERATURE: 97.6 F | SYSTOLIC BLOOD PRESSURE: 132 MMHG | BODY MASS INDEX: 21.12 KG/M2

## 2022-01-15 DIAGNOSIS — R10.13 ABDOMINAL PAIN, EPIGASTRIC: Primary | ICD-10-CM

## 2022-01-15 DIAGNOSIS — K27.9 PEPTIC ULCER DISEASE: ICD-10-CM

## 2022-01-15 LAB
ALBUMIN SERPL-MCNC: 3.9 G/DL (ref 3.5–5)
ALBUMIN/GLOB SERPL: 1 {RATIO} (ref 1.1–2.2)
ALP SERPL-CCNC: 70 U/L (ref 45–117)
ALT SERPL-CCNC: 36 U/L (ref 12–78)
ANION GAP SERPL CALC-SCNC: 6 MMOL/L (ref 5–15)
APPEARANCE UR: CLEAR
AST SERPL-CCNC: 23 U/L (ref 15–37)
BACTERIA URNS QL MICRO: NEGATIVE /HPF
BASOPHILS # BLD: 0.1 K/UL (ref 0–0.1)
BASOPHILS NFR BLD: 1 % (ref 0–1)
BILIRUB SERPL-MCNC: 0.8 MG/DL (ref 0.2–1)
BILIRUB UR QL: NEGATIVE
BUN SERPL-MCNC: 20 MG/DL (ref 6–20)
BUN/CREAT SERPL: 16 (ref 12–20)
CALCIUM SERPL-MCNC: 9.3 MG/DL (ref 8.5–10.1)
CHLORIDE SERPL-SCNC: 104 MMOL/L (ref 97–108)
CO2 SERPL-SCNC: 26 MMOL/L (ref 21–32)
COLOR UR: NORMAL
CREAT SERPL-MCNC: 1.22 MG/DL (ref 0.7–1.3)
DIFFERENTIAL METHOD BLD: NORMAL
EOSINOPHIL # BLD: 0.1 K/UL (ref 0–0.4)
EOSINOPHIL NFR BLD: 1 % (ref 0–7)
EPITH CASTS URNS QL MICRO: NORMAL /LPF
ERYTHROCYTE [DISTWIDTH] IN BLOOD BY AUTOMATED COUNT: 12 % (ref 11.5–14.5)
GLOBULIN SER CALC-MCNC: 4.1 G/DL (ref 2–4)
GLUCOSE SERPL-MCNC: 115 MG/DL (ref 65–100)
GLUCOSE UR STRIP.AUTO-MCNC: NEGATIVE MG/DL
HCT VFR BLD AUTO: 39.8 % (ref 36.6–50.3)
HGB BLD-MCNC: 13.2 G/DL (ref 12.1–17)
HGB UR QL STRIP: NEGATIVE
HYALINE CASTS URNS QL MICRO: NORMAL /LPF (ref 0–5)
IMM GRANULOCYTES # BLD AUTO: 0 K/UL (ref 0–0.04)
IMM GRANULOCYTES NFR BLD AUTO: 0 % (ref 0–0.5)
KETONES UR QL STRIP.AUTO: NEGATIVE MG/DL
LEUKOCYTE ESTERASE UR QL STRIP.AUTO: NEGATIVE
LIPASE SERPL-CCNC: 193 U/L (ref 73–393)
LYMPHOCYTES # BLD: 1.9 K/UL (ref 0.8–3.5)
LYMPHOCYTES NFR BLD: 27 % (ref 12–49)
MCH RBC QN AUTO: 30.1 PG (ref 26–34)
MCHC RBC AUTO-ENTMCNC: 33.2 G/DL (ref 30–36.5)
MCV RBC AUTO: 90.9 FL (ref 80–99)
MONOCYTES # BLD: 0.7 K/UL (ref 0–1)
MONOCYTES NFR BLD: 10 % (ref 5–13)
NEUTS SEG # BLD: 4.4 K/UL (ref 1.8–8)
NEUTS SEG NFR BLD: 61 % (ref 32–75)
NITRITE UR QL STRIP.AUTO: NEGATIVE
NRBC # BLD: 0 K/UL (ref 0–0.01)
NRBC BLD-RTO: 0 PER 100 WBC
PH UR STRIP: 5.5 [PH] (ref 5–8)
PLATELET # BLD AUTO: 305 K/UL (ref 150–400)
PMV BLD AUTO: 9.2 FL (ref 8.9–12.9)
POTASSIUM SERPL-SCNC: 4 MMOL/L (ref 3.5–5.1)
PROT SERPL-MCNC: 8 G/DL (ref 6.4–8.2)
PROT UR STRIP-MCNC: NEGATIVE MG/DL
RBC # BLD AUTO: 4.38 M/UL (ref 4.1–5.7)
RBC #/AREA URNS HPF: NORMAL /HPF (ref 0–5)
SODIUM SERPL-SCNC: 136 MMOL/L (ref 136–145)
SP GR UR REFRACTOMETRY: 1.02 (ref 1–1.03)
TROPONIN-HIGH SENSITIVITY: 5 NG/L (ref 0–76)
UA: UC IF INDICATED,UAUC: NORMAL
UROBILINOGEN UR QL STRIP.AUTO: 1 EU/DL (ref 0.2–1)
WBC # BLD AUTO: 7.2 K/UL (ref 4.1–11.1)
WBC URNS QL MICRO: NORMAL /HPF (ref 0–4)

## 2022-01-15 PROCEDURE — 99284 EMERGENCY DEPT VISIT MOD MDM: CPT

## 2022-01-15 PROCEDURE — 93005 ELECTROCARDIOGRAM TRACING: CPT

## 2022-01-15 PROCEDURE — 74011250636 HC RX REV CODE- 250/636: Performed by: EMERGENCY MEDICINE

## 2022-01-15 PROCEDURE — 85025 COMPLETE CBC W/AUTO DIFF WBC: CPT

## 2022-01-15 PROCEDURE — 81001 URINALYSIS AUTO W/SCOPE: CPT

## 2022-01-15 PROCEDURE — 74011000250 HC RX REV CODE- 250: Performed by: EMERGENCY MEDICINE

## 2022-01-15 PROCEDURE — 80053 COMPREHEN METABOLIC PANEL: CPT

## 2022-01-15 PROCEDURE — C9113 INJ PANTOPRAZOLE SODIUM, VIA: HCPCS | Performed by: EMERGENCY MEDICINE

## 2022-01-15 PROCEDURE — 74011250637 HC RX REV CODE- 250/637: Performed by: EMERGENCY MEDICINE

## 2022-01-15 PROCEDURE — 84484 ASSAY OF TROPONIN QUANT: CPT

## 2022-01-15 PROCEDURE — 36415 COLL VENOUS BLD VENIPUNCTURE: CPT

## 2022-01-15 PROCEDURE — 96374 THER/PROPH/DIAG INJ IV PUSH: CPT

## 2022-01-15 PROCEDURE — 83690 ASSAY OF LIPASE: CPT

## 2022-01-15 RX ORDER — SUCRALFATE 1 G/10ML
1 SUSPENSION ORAL
Qty: 414 ML | Refills: 0 | Status: SHIPPED | OUTPATIENT
Start: 2022-01-15 | End: 2022-06-21

## 2022-01-15 RX ADMIN — LIDOCAINE HYDROCHLORIDE 40 ML: 20 SOLUTION OROPHARYNGEAL at 09:44

## 2022-01-15 RX ADMIN — SODIUM CHLORIDE 80 MG: 9 INJECTION, SOLUTION INTRAMUSCULAR; INTRAVENOUS; SUBCUTANEOUS at 09:45

## 2022-01-15 NOTE — DISCHARGE INSTRUCTIONS
You were evaluated in the emergency department for epigastric abdominal pain. Your examination was reassuring as was your work-up including blood work and EKG. It will be important for you to follow-up with your primary care physician in 2-5 days. If you develop worsening symptoms such as worsening abdominal pain, chest pain, or fevers, please return to the emergency department immediately. It was a pleasure taking care of you at [facility] Emergency Department today. We know that when you come to [address] [place] [address], you are entrusting us with your health, comfort, and safety. Our physicians and nurses honor that trust, and we truly appreciate the opportunity to care for you and your loved ones. We also value your feedback. If you receive a survey about your Emergency Department experience today, please fill it out. We care about our patients' feedback, and we listen to what you have to say. Thank you!

## 2022-01-15 NOTE — ED PROVIDER NOTES
EMERGENCY DEPARTMENT HISTORY AND PHYSICAL EXAM      Date: 1/15/2022  Patient Name: Fara Lara    History of Presenting Illness     Chief Complaint   Patient presents with    Abdominal Pain     stomach pain comes and goes for 3 weeks; has seen his doctor before isai was put on pantoprazole. does not seem to help. no vomiting. no diarrhea. sometimes stool gets dark and then goes back to brown. History Provided By: Patient    HPI: Fara Lara, 57 y.o. male with history of hypertension, asthma, GERD presents to the ED with cc of epigastric pain. Symptoms have been present intermittently over the past 3 to 4 weeks. Pain is located epigastric region without radiation. No aggravating factors, he has been tolerating normal p.o. Pantoprazole sometimes helps but recently it has not been helping. Denies any chest pain, shortness of breath, nausea, vomiting, diarrhea. He has seen his PCP for this and took Carafate over the summer and then was just darted on pantoprazole. There are no other complaints, changes, or physical findings at this time. PCP: Leeann Giraldo MD    No current facility-administered medications on file prior to encounter. Current Outpatient Medications on File Prior to Encounter   Medication Sig Dispense Refill    albuterol (PROVENTIL HFA, VENTOLIN HFA, PROAIR HFA) 90 mcg/actuation inhaler INHALE 2 PUFFS BY MOUTH EVERY 6 HOURS AS NEEDED FOR WHEEZING 54 g 1    pantoprazole (PROTONIX) 20 mg tablet Take 1 Tablet by mouth two (2) times a day. 180 Tablet 1    sertraline (ZOLOFT) 50 mg tablet Take 1 Tablet by mouth daily. 30 Tablet 1    rosuvastatin (CRESTOR) 10 mg tablet TAKE 1 TABLET BY MOUTH EVERY NIGHT 90 Tablet 1    [DISCONTINUED] sucralfate (CARAFATE) 100 mg/mL suspension Take 5 mL by mouth four (4) times daily as needed for GI Pain. 414 mL 0    aspirin delayed-release 81 mg tablet Take  by mouth daily.       lisinopriL (PRINIVIL, ZESTRIL) 20 mg tablet Take 1 Tablet by mouth daily. 90 Tablet 1    Cetirizine (ZyrTEC) 10 mg cap Take 10 mg by mouth as needed.  fluticasone propionate (FLONASE) 50 mcg/actuation nasal spray 2 Sprays by Nasal route daily. Past History     Past Medical History:  Past Medical History:   Diagnosis Date    Anxiety disorder     Arthritis     Asthma     allergies environmental    Cancer ([facility] [address]     prostate CA    GERD (gastroesophageal reflux disease)     High cholesterol     HTN (hypertension) 2011    Other and unspecified symptoms and signs involving general sensations and perceptions     construction accident     Other ill-defined conditions(799.89)     anxiety    Other ill-defined conditions(799.89)     seasonal allergies    Psychiatric disorder     anxiety       Past Surgical History:  Past Surgical History:   Procedure Laterality Date    HX HIP REPLACEMENT Left 2020    HX PROSTATECTOMY  2015    HX SHOULDER ARTHROSCOPY Left     TX ABDOMEN SURGERY PROC UNLISTED Right 1980    inguinal       Family History:  Family History   Problem Relation Age of Onset    Diabetes Mother     Hypertension Mother     Hypertension Sister     Kidney Disease Father     Alcohol abuse Brother     Cancer Brother         colon    Anesth Problems Neg Hx        Social History:  Social History     Tobacco Use    Smoking status: Former Smoker     Packs/day: 0.25     Years: 12.00     Pack years: 3.00     Types: Cigarettes     Quit date: 2015     Years since quittin.6    Smokeless tobacco: Never Used   Vaping Use    Vaping Use: Never used   Substance Use Topics    Alcohol use: No    Drug use: No       Allergies:  No Known Allergies      Review of Systems   Review of Systems   Constitutional: Negative for chills and fever. Respiratory: Negative for cough and shortness of breath. Cardiovascular: Negative for chest pain and leg swelling. Gastrointestinal: Positive for abdominal pain.  Negative for nausea and vomiting. Musculoskeletal: Negative for back pain and gait problem. Skin: Negative for color change and rash. Neurological: Negative for dizziness, weakness, light-headedness and headaches. All other systems reviewed and are negative. Physical Exam   Physical Exam  Vitals and nursing note reviewed. Constitutional:       General: He is not in acute distress. Appearance: Normal appearance. He is not ill-appearing or toxic-appearing. HENT:      Head: Normocephalic and atraumatic. Nose: Nose normal.      Mouth/Throat:      Mouth: Mucous membranes are moist.   Eyes:      Extraocular Movements: Extraocular movements intact. Pupils: Pupils are equal, round, and reactive to light. Cardiovascular:      Rate and Rhythm: Normal rate and regular rhythm. Heart sounds: No murmur heard. Pulmonary:      Effort: Pulmonary effort is normal. No respiratory distress. Breath sounds: Normal breath sounds. No wheezing. Abdominal:      General: There is no distension. Palpations: Abdomen is soft. Tenderness: There is no abdominal tenderness. There is no guarding or rebound. Musculoskeletal:         General: No swelling or tenderness. Normal range of motion. Cervical back: Normal range of motion and neck supple. Right lower leg: No edema. Left lower leg: No edema. Skin:     General: Skin is warm and dry. Coloration: Skin is not pale. Findings: No erythema. Neurological:      General: No focal deficit present. Mental Status: He is alert and oriented to person, place, and time.          Diagnostic Study Results     Labs -     Recent Results (from the past 12 hour(s))   URINALYSIS W/ REFLEX CULTURE    Collection Time: 01/15/22  8:41 AM    Specimen: Urine   Result Value Ref Range    Color YELLOW/STRAW      Appearance CLEAR CLEAR      Specific gravity 1.020 1.003 - 1.030      pH (UA) 5.5 5.0 - 8.0      Protein Negative NEG mg/dL    Glucose Negative NEG mg/dL    Ketone Negative NEG mg/dL    Bilirubin Negative NEG      Blood Negative NEG      Urobilinogen 1.0 0.2 - 1.0 EU/dL    Nitrites Negative NEG      Leukocyte Esterase Negative NEG      WBC 0-4 0 - 4 /hpf    RBC 0-5 0 - 5 /hpf    Epithelial cells FEW FEW /lpf    Bacteria Negative NEG /hpf    UA:UC IF INDICATED CULTURE NOT INDICATED BY UA RESULT CNI      Hyaline cast 0-2 0 - 5 /lpf   CBC WITH AUTOMATED DIFF    Collection Time: 01/15/22  8:41 AM   Result Value Ref Range    WBC 7.2 4.1 - 11.1 K/uL    RBC 4.38 4.10 - 5.70 M/uL    HGB 13.2 12.1 - 17.0 g/dL    HCT 39.8 36.6 - 50.3 %    MCV 90.9 80.0 - 99.0 FL    MCH 30.1 26.0 - 34.0 PG    MCHC 33.2 30.0 - 36.5 g/dL    RDW 12.0 11.5 - 14.5 %    PLATELET 421 064 - 403 K/uL    MPV 9.2 8.9 - 12.9 FL    NRBC 0.0 0  WBC    ABSOLUTE NRBC 0.00 0.00 - 0.01 K/uL    NEUTROPHILS 61 32 - 75 %    LYMPHOCYTES 27 12 - 49 %    MONOCYTES 10 5 - 13 %    EOSINOPHILS 1 0 - 7 %    BASOPHILS 1 0 - 1 %    IMMATURE GRANULOCYTES 0 0.0 - 0.5 %    ABS. NEUTROPHILS 4.4 1.8 - 8.0 K/UL    ABS. LYMPHOCYTES 1.9 0.8 - 3.5 K/UL    ABS. MONOCYTES 0.7 0.0 - 1.0 K/UL    ABS. EOSINOPHILS 0.1 0.0 - 0.4 K/UL    ABS. BASOPHILS 0.1 0.0 - 0.1 K/UL    ABS. IMM. GRANS. 0.0 0.00 - 0.04 K/UL    DF AUTOMATED     METABOLIC PANEL, COMPREHENSIVE    Collection Time: 01/15/22  8:41 AM   Result Value Ref Range    Sodium 136 136 - 145 mmol/L    Potassium 4.0 3.5 - 5.1 mmol/L    Chloride 104 97 - 108 mmol/L    CO2 26 21 - 32 mmol/L    Anion gap 6 5 - 15 mmol/L    Glucose 115 (H) 65 - 100 mg/dL    BUN 20 6 - 20 MG/DL    Creatinine 1.22 0.70 - 1.30 MG/DL    BUN/Creatinine ratio 16 12 - 20      GFR est AA >60 >60 ml/min/1.73m2    GFR est non-AA >60 >60 ml/min/1.73m2    Calcium 9.3 8.5 - 10.1 MG/DL    Bilirubin, total 0.8 0.2 - 1.0 MG/DL    ALT (SGPT) 36 12 - 78 U/L    AST (SGOT) 23 15 - 37 U/L    Alk.  phosphatase 70 45 - 117 U/L    Protein, total 8.0 6.4 - 8.2 g/dL    Albumin 3.9 3.5 - 5.0 g/dL    Globulin 4.1 (H) 2.0 - 4.0 g/dL    A-G Ratio 1.0 (L) 1.1 - 2.2     LIPASE    Collection Time: 01/15/22  8:41 AM   Result Value Ref Range    Lipase 193 73 - 393 U/L   TROPONIN-HIGH SENSITIVITY    Collection Time: 01/15/22  9:46 AM   Result Value Ref Range    Troponin-High Sensitivity 5 0 - 76 ng/L       Radiologic Studies -   No orders to display     CT Results  (Last 48 hours)    None        CXR Results  (Last 48 hours)    None          Medical Decision Making   I am the first provider for this patient. I reviewed the vital signs, available nursing notes, past medical history, past surgical history, family history and social history. Vital Signs-Reviewed the patient's vital signs. Patient Vitals for the past 12 hrs:   Temp Pulse Resp BP SpO2   01/15/22 1104 -- -- -- 125/60 97 %   01/15/22 0946 -- 71 17 135/67 99 %   01/15/22 0828 97.6 °F (36.4 °C) 86 14 134/66 99 %       EKG interpretation:   EKG per my interpretation normal sinus rhythm, rate 70 bpm, normal axis, no acute ischemic changes or interval changes. Records Reviewed: Nursing Notes    Provider Notes (Medical Decision Making):   59-year-old male with epigastric abdominal pain over the past 3 to 4 weeks. Patient is afebrile and vital signs are stable. Abdomen soft, benign, nontender, nonperitoneal.  Symptoms appear consistent with gastritis versus peptic ulcer disease but also consider atypical angina, ACS, colitis. Doubt AAA. Will evaluate with blood work, LFTs, lipase, troponin, EKG. Do not feel he requires CT imaging at this time. He was treated with Protonix and GI cocktail and feels much improved. Able to tolerate p.o. Abdomen remains soft and nontender. EKG nonischemic and troponin negative. Laboratory evaluation unremarkable. Symptoms appear consistent with gastritis versus peptic ulcer disease. Encourage continued use of Protonix 20 mg twice daily and I will add on Carafate. Encourage close follow-up with PCP and given strict return precautions. All questions answered and agrees with plan as above. ED Course:   Initial assessment performed. The patients presenting problems have been discussed, and they are in agreement with the care plan formulated and outlined with them. I have encouraged them to ask questions as they arise throughout their visit. Discharge Note:  The patient has been re-evaluated and is ready for discharge. Reviewed available results with patient. Counseled patient on diagnosis and care plan. Patient has expressed understanding, and all questions have been answered. Patient agrees with plan and agrees to follow up as recommended, or to return to the ED if their symptoms worsen. Discharge instructions have been provided and explained to the patient, along with reasons to return to the ED. Disposition:  Discharge      DISCHARGE PLAN:  1. Current Discharge Medication List      CONTINUE these medications which have CHANGED    Details   sucralfate (CARAFATE) 100 mg/mL suspension Take 5 mL by mouth four (4) times daily as needed for GI Pain. Qty: 414 mL, Refills: 0  Start date: 1/15/2022           2. Follow-up Information     Follow up With Specialties Details Why Contact Info    Radha Spencer MD Internal Medicine Schedule an appointment as soon as possible for a visit   [address] [facility] [address]  [phone]      hospitals EMERGENCY DEPT Emergency Medicine Go to  As needed, If symptoms worsen [address]   [address] [address] [phone]        3. Return to ED if worse     Diagnosis     Clinical Impression:   1. Abdominal pain, epigastric    2. Peptic ulcer disease        Attestations:  I am the first and primary provider of record for this patient's ED encounter. I personally performed the services described above in this documentation. Sher Georges MD    Please note that this dictation was completed with IRI, the [employer] voice recognition software.   Quite often unanticipated grammatical, syntax, homophones, and other interpretive errors are inadvertently transcribed by the computer software. Please disregard these errors. Please excuse any errors that have escaped final proofreading. Thank you.

## 2022-01-16 LAB
ATRIAL RATE: 70 BPM
CALCULATED P AXIS, ECG09: 62 DEGREES
CALCULATED R AXIS, ECG10: 6 DEGREES
CALCULATED T AXIS, ECG11: 63 DEGREES
DIAGNOSIS, 93000: NORMAL
P-R INTERVAL, ECG05: 156 MS
Q-T INTERVAL, ECG07: 352 MS
QRS DURATION, ECG06: 74 MS
QTC CALCULATION (BEZET), ECG08: 380 MS
VENTRICULAR RATE, ECG03: 70 BPM

## 2022-01-24 ENCOUNTER — TELEPHONE (OUTPATIENT)
Dept: INTERNAL MEDICINE CLINIC | Age: 62
End: 2022-01-24

## 2022-01-24 RX ORDER — SUCRALFATE 1 G/1
1 TABLET ORAL 4 TIMES DAILY
Qty: 120 TABLET | Refills: 0 | Status: SHIPPED | OUTPATIENT
Start: 2022-01-24 | End: 2022-06-21

## 2022-01-24 NOTE — TELEPHONE ENCOUNTER
ER doctor prescribed Sucralfate. His pharmacist told him there was a tablet that he could get in a generic brand. He wants to see if if Dr. Paul Forbes would prescribe this to him. Can you call patient with an update? Thanks.

## 2022-01-24 NOTE — TELEPHONE ENCOUNTER
Called, spoke with Pt. Two pt identifiers confirmed. Pt informed Dr. Chay Mcdonald send Rx request to the pharmacy. Pt verbalized understanding of information discussed w/ no further questions at this time.

## 2022-01-30 ENCOUNTER — HOSPITAL ENCOUNTER (EMERGENCY)
Age: 62
Discharge: HOME | End: 2022-01-30
Attending: EMERGENCY MEDICINE

## 2022-01-30 VITALS
SYSTOLIC BLOOD PRESSURE: 153 MMHG | HEART RATE: 85 BPM | WEIGHT: 141.54 LBS | HEIGHT: 68 IN | OXYGEN SATURATION: 100 % | TEMPERATURE: 97.7 F | RESPIRATION RATE: 16 BRPM | DIASTOLIC BLOOD PRESSURE: 69 MMHG | BODY MASS INDEX: 21.45 KG/M2

## 2022-01-30 DIAGNOSIS — R21 RASH AND OTHER NONSPECIFIC SKIN ERUPTION: Primary | ICD-10-CM

## 2022-01-30 PROCEDURE — 99282 EMERGENCY DEPT VISIT SF MDM: CPT

## 2022-01-30 RX ORDER — HYDROCORTISONE 0.5 %
OINTMENT (GRAM) TOPICAL
Qty: 28.4 G | Refills: 0 | Status: SHIPPED | OUTPATIENT
Start: 2022-01-30

## 2022-01-30 RX ORDER — PREDNISONE 20 MG/1
20 TABLET ORAL DAILY
Qty: 3 TABLET | Refills: 0 | Status: SHIPPED | OUTPATIENT
Start: 2022-01-30 | End: 2022-02-02

## 2022-01-30 RX ORDER — HYDROXYZINE 50 MG/1
TABLET, FILM COATED ORAL
Qty: 15 TABLET | Refills: 0 | Status: SHIPPED | OUTPATIENT
Start: 2022-01-30 | End: 2022-06-21

## 2022-01-30 NOTE — ED PROVIDER NOTES
EMERGENCY DEPARTMENT HISTORY AND PHYSICAL EXAM      Date: 1/30/2022  Patient Name: Darcy Curtis    History of Presenting Illness     Chief Complaint   Patient presents with    Rash     a burning rash on his chest onset yesterday; pt some \"jock itch\" on it       History Provided By: Patient    HPI: Darcy Curtis, 57 y.o. male presents ambulatory to the Emergency Dept with c/o a burning redness to the upper chest since 1/29/22. Pt denied injury. No break in the skin. He denied h/o similar sx or chronic skin disorders/rashes. He states it doesn't itch but is more a burning discomfort. He attempted to place some cream on it (sounds as if it was an antifungal) without relief. He denied any new medications, detergents, soaps, lotions, or foods. No extension into his throat or face. He denied shortness of breath or N/V. He is not a smoker. Denied h/o diabetes. He rates the burning discomfort a 5/10 on the pain scale. Pt is o/w healthy without fever, chills, cough, congestion, ST, chest pain. There are no other complaints, changes, or physical findings at this time. PCP: Padmini Wolf MD    Current Outpatient Medications   Medication Sig Dispense Refill    hydrOXYzine HCL (ATARAX) 50 mg tablet Take 1 to 2 tabs every 6 hours as needed for itching/burning 15 Tablet 0    predniSONE (DELTASONE) 20 mg tablet Take 20 mg by mouth daily for 3 days. With Breakfast 3 Tablet 0    hydrocortisone (CORTIZONE) 0.5 % ointment Apply to affected area two to three times daily 28.4 g 0    sucralfate (CARAFATE) 1 gram tablet Take 1 Tablet by mouth four (4) times daily. 120 Tablet 0    sucralfate (CARAFATE) 100 mg/mL suspension Take 5 mL by mouth four (4) times daily as needed for GI Pain.  414 mL 0    albuterol (PROVENTIL HFA, VENTOLIN HFA, PROAIR HFA) 90 mcg/actuation inhaler INHALE 2 PUFFS BY MOUTH EVERY 6 HOURS AS NEEDED FOR WHEEZING 54 g 1    pantoprazole (PROTONIX) 20 mg tablet Take 1 Tablet by mouth two (2) times a day. 180 Tablet 1    sertraline (ZOLOFT) 50 mg tablet Take 1 Tablet by mouth daily. 30 Tablet 1    rosuvastatin (CRESTOR) 10 mg tablet TAKE 1 TABLET BY MOUTH EVERY NIGHT 90 Tablet 1    aspirin delayed-release 81 mg tablet Take  by mouth daily.  lisinopriL (PRINIVIL, ZESTRIL) 20 mg tablet Take 1 Tablet by mouth daily. 90 Tablet 1    Cetirizine (ZyrTEC) 10 mg cap Take 10 mg by mouth as needed.  fluticasone propionate (FLONASE) 50 mcg/actuation nasal spray 2 Sprays by Nasal route daily. Past History     Past Medical History:  Past Medical History:   Diagnosis Date    Anxiety disorder     Arthritis     Asthma     allergies environmental    Cancer ([facility] [address]     prostate CA    GERD (gastroesophageal reflux disease)     High cholesterol     HTN (hypertension) 2011    Other and unspecified symptoms and signs involving general sensations and perceptions     construction accident     Other ill-defined conditions(799.89)     anxiety    Other ill-defined conditions(799.89)     seasonal allergies    Psychiatric disorder     anxiety       Past Surgical History:  Past Surgical History:   Procedure Laterality Date    HX HIP REPLACEMENT Left 2020    HX PROSTATECTOMY  2015    HX SHOULDER ARTHROSCOPY Left     [place] ABDOMEN SURGERY PROC UNLISTED Right 1980    inguinal       Family History:  Family History   Problem Relation Age of Onset    Diabetes Mother     Hypertension Mother     Hypertension Sister     Kidney Disease Father     Alcohol abuse Brother     Cancer Brother         colon    Anesth Problems Neg Hx        Social History:  Social History     Tobacco Use    Smoking status: Former Smoker     Packs/day: 0.25     Years: 12.00     Pack years: 3.00     Types: Cigarettes     Quit date: 2015     Years since quittin.6    Smokeless tobacco: Never Used   Vaping Use    Vaping Use: Never used   Substance Use Topics    Alcohol use: No    Drug use:  No Allergies:  No Known Allergies      Review of Systems   Review of Systems   Constitutional: Negative for chills and fever. HENT: Negative for congestion, rhinorrhea and sore throat. Respiratory: Negative for cough and shortness of breath. Cardiovascular: Negative for chest pain and palpitations. Gastrointestinal: Negative for abdominal pain, diarrhea, nausea and vomiting. Endocrine: Negative for polydipsia, polyphagia and polyuria. Genitourinary: Negative for dysuria and hematuria. Musculoskeletal: Negative for neck pain and neck stiffness. Skin: Positive for color change and rash. Negative for wound. Allergic/Immunologic: Negative for food allergies and immunocompromised state. Neurological: Negative for dizziness and headaches. Hematological: Negative for adenopathy. Does not bruise/bleed easily. Psychiatric/Behavioral: Negative for agitation and confusion. All other systems reviewed and are negative. Physical Exam   Physical Exam  Vitals and nursing note reviewed. Constitutional:       General: He is not in acute distress. Appearance: Normal appearance. He is well-developed and normal weight. He is not ill-appearing, toxic-appearing or diaphoretic. HENT:      Head: Normocephalic and atraumatic. Nose: Nose normal. No congestion or rhinorrhea. Eyes:      General: No scleral icterus. Right eye: No discharge. Left eye: No discharge. Conjunctiva/sclera: Conjunctivae normal.   Neck:      Thyroid: No thyromegaly. Vascular: No JVD. Trachea: No tracheal deviation. Cardiovascular:      Rate and Rhythm: Normal rate and regular rhythm. Pulses: Normal pulses. Heart sounds: Normal heart sounds. Pulmonary:      Effort: Pulmonary effort is normal. No respiratory distress. Breath sounds: Normal breath sounds. No wheezing. Abdominal:      Palpations: Abdomen is soft. Tenderness: There is no abdominal tenderness. Musculoskeletal:         General: No deformity. Normal range of motion. Cervical back: Normal range of motion and neck supple. Lymphadenopathy:      Cervical: No cervical adenopathy. Skin:     General: Skin is warm and dry. Findings: Erythema and rash present. No bruising. Neurological:      General: No focal deficit present. Mental Status: He is alert and oriented to person, place, and time. Sensory: No sensory deficit. Motor: No weakness or abnormal muscle tone. Coordination: Coordination normal.   Psychiatric:         Mood and Affect: Mood normal.         Behavior: Behavior normal.         Judgment: Judgment normal.         Diagnostic Study Results     Labs -   No results found for this or any previous visit (from the past 12 hour(s)). Radiologic Studies -   No orders to display         Medical Decision Making   I am the first provider for this patient. I reviewed the vital signs, available nursing notes, past medical history, past surgical history, family history and social history. Vital Signs-Reviewed the patient's vital signs. Patient Vitals for the past 12 hrs:   Temp Pulse Resp BP SpO2   01/30/22 0933 -- -- -- (!) 153/69 --   01/30/22 0931 97.7 °F (36.5 °C) 85 16 94/74 100 %           Records Reviewed: Nursing Notes, Old Medical Records, Previous Radiology Studies and Previous Laboratory Studies    Provider Notes (Medical Decision Making): Allergic reaction, inflammation/irritation, contact dermatitis, cellulitis    ED Course:   Initial assessment performed. The patients presenting problems have been discussed, and they are in agreement with the care plan formulated and outlined with them. I have encouraged them to ask questions as they arise throughout their visit. DISCHARGE NOTE:  10:55 AM  The care plan has been outline with the patient and/or family, who verbally conveyed understanding and agreement. Available results have been reviewed.  Patient and/or family understand the follow up plan as outlined and discharge instructions. Should their condition deterioration at any time after discharge the patient agrees to return, follow up sooner than outlined or seek medical assistance at the closest Emergency Room as soon as possible. Questions have been answered. Discharge instructions and educational information regarding the patient's diagnosis as well a list of reasons why the patient would want to seek immediate medical attention, should their condition change, were reviewed directly with the patient/family          PLAN:  1. Discharge Medication List as of 1/30/2022 10:54 AM      START taking these medications    Details   hydrOXYzine HCL (ATARAX) 50 mg tablet Take 1 to 2 tabs every 6 hours as needed for itching/burning, Normal, Disp-15 Tablet, R-0      predniSONE (DELTASONE) 20 mg tablet Take 20 mg by mouth daily for 3 days. With Breakfast, Normal, Disp-3 Tablet, R-0      hydrocortisone (CORTIZONE) 0.5 % ointment Apply to affected area two to three times daily, Normal, Disp-28.4 g, R-0         CONTINUE these medications which have NOT CHANGED    Details   sucralfate (CARAFATE) 1 gram tablet Take 1 Tablet by mouth four (4) times daily. , Normal, Disp-120 Tablet, R-0      sucralfate (CARAFATE) 100 mg/mL suspension Take 5 mL by mouth four (4) times daily as needed for GI Pain., Normal, Disp-414 mL, R-0      albuterol (PROVENTIL HFA, VENTOLIN HFA, PROAIR HFA) 90 mcg/actuation inhaler INHALE 2 PUFFS BY MOUTH EVERY 6 HOURS AS NEEDED FOR WHEEZING, Normal, Disp-54 g, R-1      pantoprazole (PROTONIX) 20 mg tablet Take 1 Tablet by mouth two (2) times a day., Normal, Disp-180 Tablet, R-1      sertraline (ZOLOFT) 50 mg tablet Take 1 Tablet by mouth daily. , Normal, Disp-30 Tablet, R-1      rosuvastatin (CRESTOR) 10 mg tablet TAKE 1 TABLET BY MOUTH EVERY NIGHT, Normal, Disp-90 Tablet, R-1      aspirin delayed-release 81 mg tablet Take  by mouth daily. , Historical Med lisinopriL (PRINIVIL, ZESTRIL) 20 mg tablet Take 1 Tablet by mouth daily. , Normal, Disp-90 Tablet, R-1      Cetirizine (ZyrTEC) 10 mg cap Take 10 mg by mouth as needed., Historical Med      fluticasone propionate (FLONASE) 50 mcg/actuation nasal spray 2 Sprays by Nasal route daily. , Historical Med           2. Follow-up Information     Follow up With Specialties Details Why Contact Info    Mamta Goldsmith MD Internal Medicine   [id number] [facility]  [phone]      [facility] EMERGENCY DEPT Emergency Medicine  If symptoms worsen [address]  [address] [facility]  [phone]        Return to ED if worse     Diagnosis     Clinical Impression:   1.  Rash and other nonspecific skin eruption

## 2022-01-30 NOTE — DISCHARGE INSTRUCTIONS
Keep skin clean and dry, avoid moist heat. Follow up with primary care for recheck. Return to the Emergency Dept for any worsening redness, swelling, pain, or fever.

## 2022-03-18 PROBLEM — M48.02 CERVICAL STENOSIS OF SPINAL CANAL: Status: ACTIVE | Noted: 2017-10-30

## 2022-03-18 PROBLEM — I65.23 BILATERAL CAROTID ARTERY STENOSIS: Status: ACTIVE | Noted: 2021-08-09

## 2022-03-19 PROBLEM — G44.209 TENSION VASCULAR HEADACHE: Status: ACTIVE | Noted: 2021-08-09

## 2022-03-19 PROBLEM — I67.89 CEREBRAL MICROVASCULAR DISEASE: Status: ACTIVE | Noted: 2021-08-09

## 2022-03-19 PROBLEM — M16.9 DEGENERATIVE JOINT DISEASE (DJD) OF HIP: Status: ACTIVE | Noted: 2020-06-03

## 2022-03-19 PROBLEM — E78.00 PURE HYPERCHOLESTEROLEMIA: Status: ACTIVE | Noted: 2017-07-28

## 2022-04-18 DIAGNOSIS — E78.00 PURE HYPERCHOLESTEROLEMIA: ICD-10-CM

## 2022-04-18 RX ORDER — LISINOPRIL 20 MG/1
20 TABLET ORAL DAILY
Qty: 90 TABLET | Refills: 1 | Status: SHIPPED | OUTPATIENT
Start: 2022-04-18 | End: 2022-09-08

## 2022-04-18 RX ORDER — ROSUVASTATIN CALCIUM 10 MG/1
TABLET, COATED ORAL
Qty: 90 TABLET | Refills: 1 | Status: SHIPPED | OUTPATIENT
Start: 2022-04-18 | End: 2022-09-08

## 2022-04-19 DIAGNOSIS — R05.9 COUGH: ICD-10-CM

## 2022-04-19 RX ORDER — ALBUTEROL SULFATE 90 UG/1
AEROSOL, METERED RESPIRATORY (INHALATION)
Qty: 54 G | Refills: 1 | Status: SHIPPED | OUTPATIENT
Start: 2022-04-19 | End: 2022-11-04

## 2022-04-19 NOTE — TELEPHONE ENCOUNTER
Future Appointments:  Future Appointments   Date Time Provider Dedrick Pagei   6/21/2022 10:00 AM Mahi Motta MD [facility] BS AMB        Last Appointment With Me:  12/20/2021     Requested Prescriptions     Pending Prescriptions Disp Refills    albuterol (PROVENTIL HFA, VENTOLIN HFA, PROAIR HFA) 90 mcg/actuation inhaler 54 g 1     Sig: INHALE 2 PUFFS BY MOUTH EVERY 6 HOURS AS NEEDED FOR WHEEZING

## 2022-06-17 NOTE — PROGRESS NOTES
HISTORY OF PRESENT ILLNESS  Trey Tolbert is a 60 y.o. male. HPI    Last here 8/23/21. Pt is here for routine care. Discussed that he can stop ASA since he does not have personal h/o heart attack or stroke    As of August 1st will have medicare A and B as well as [employer] card     She was in the ED 1/15/22 for stomach pain     She was in the ED 1/30/22 for chest pain    BP today is   No home BP readings to review  Taking lisinopril 20mg,   Please recall hctz caused sodium to drop     Wt was 143 lbs lov   His weight is within normal ranges     Reviewed labs     Ordered labs    Reviewed carotid duplex bilateral 2/14/22: This study consisted of pulsed wave Doppler examination, color flow imaging, and duplex imaging of both right and left carotid systems in both vertebral arteries     Imaging of both right and left carotid systems showed mild mixed plaque in the bifurcations and proximal and distal internal carotid arteries bilaterally with stenosis in the range of 0-15% only and with no flow abnormalities identified. Both external carotid arteries and both common carotid arteries showed showed normal antegrade flow, and showed mild disease in the range of 0-15% only without associated flow abnormalities.     Both vertebral arteries showed normal antegrade flow.     Clinical correlation recommended.       Recall he has been having recurrent issues with stomach pain for the last year had been given Protonix has been referred to GI a year ago though he has not scheduled this yet   He c/o some stomach pain/discomfort overall improved in the past but still comes and goes please recall has been to the ER and has had multiple evaluations for this overall improved  Discussed taking protonix which she is only taking as needed he is not taking sucralfate  Also we will have him stop his daily aspirin as he has no history of heart disease or stroke and this may improve his symptoms as well  Recall CT abd pelv 8/31/21:  No significant or acute abnormalities demonstrated.     He followed with Dr. Gege Rae  for history of headaches in the past and dizziness  Last visit 8/09/21     He is now following with Dr Maame Mac (ortho) for L leg pain  Pt has completed PT  He had L arthroplasty surgery 6/3/20 performed by Dr. Susy Edwards  Last there 4/1/21   He has been released      Pt saw Dr Abelardo Ortega for preop clearance in the past  Last visit 2/13/20     Pt follows with Dr. Sydnee Stout ([facility]) for h/o prostate cancer, annually  Last visit 6/21 he is due for follow-up now and will schedule     He had been taking celexa 10 mg for anxiety--no longer taking this because it wasn't helping  Lov ordered zoloft 50mg- has not been taking this because didn't feel like he needed it he feels like his anxiety is improved overall he is no longer working  Could consider hydroxyzine for acute anxiety if needed down the road--of note he was given this previously and is not using it        Continues on crestor 10mg for cholesterol   Recall could not tolerate lipitor      Pt has albuterol to use prn for wheezing/breathing       ACP not on file. SDM is his wife.   Provided information in the past.      PREVENTIVE:    Colonoscopy: 8/17/11, Dr. Marina Caldwell, repeat 10 years - due reminded   PSA: 8/11, 10/14, 5/16 undetectable, 1/18, 1/19, uro follows 4/20 undetectable 9/20, 6/21 urology  Tdap: 9/22/20  Pneumovax: not yet needed  Lenetta Primrose: not yet needed  Shingrix: both doses complete  Flu shot: 12/20/21  A1c:1/19 5.5 3/20 5.7 6/20 11.7 9/20 5.4 12/21 5.5  Eye exam: Dr Sapp Prey  11/20, has cataracts they are following--overdue  Hep C screen: 7/14/15, negative   Lipids: 5/21 LDL 92  EKG: 10/17/17,   Covid: J&J + 1 Pfizer  Patient Active Problem List    Diagnosis Date Noted    Bilateral carotid artery stenosis 08/09/2021    Cerebral microvascular disease 08/09/2021    Tension vascular headache 08/09/2021    Degenerative joint disease (DJD) of hip 06/03/2020    Cervical stenosis of spinal canal 10/30/2017    Pure hypercholesterolemia 07/28/2017    Prostate cancer ([facility] [address] 01/13/2015    GERD (gastroesophageal reflux disease) 09/07/2011    HTN (hypertension) 09/07/2011    Anxiety 03/29/2011     Current Outpatient Medications   Medication Sig Dispense Refill    albuterol (PROVENTIL HFA, VENTOLIN HFA, PROAIR HFA) 90 mcg/actuation inhaler INHALE 2 PUFFS BY MOUTH EVERY 6 HOURS AS NEEDED FOR WHEEZING 54 g 1    lisinopriL (PRINIVIL, ZESTRIL) 20 mg tablet TAKE 1 TABLET BY MOUTH DAILY 90 Tablet 1    rosuvastatin (CRESTOR) 10 mg tablet TAKE 1 TABLET BY MOUTH EVERY NIGHT 90 Tablet 1    hydrocortisone (CORTIZONE) 0.5 % ointment Apply to affected area two to three times daily 28.4 g 0    pantoprazole (PROTONIX) 20 mg tablet Take 1 Tablet by mouth two (2) times a day. 180 Tablet 1    Cetirizine (ZyrTEC) 10 mg cap Take 10 mg by mouth as needed.  fluticasone propionate (FLONASE) 50 mcg/actuation nasal spray 2 Sprays by Nasal route daily.  sertraline (ZOLOFT) 50 mg tablet Take 1 Tablet by mouth daily.  (Patient not taking: Reported on 6/21/2022) 30 Tablet 1     Past Surgical History:   Procedure Laterality Date    HX HIP REPLACEMENT Left 06/2020    HX PROSTATECTOMY  8/2015    HX SHOULDER ARTHROSCOPY Left     [place] ABDOMEN SURGERY PROC UNLISTED Right 1980    inguinal      Lab Results   Component Value Date/Time    WBC 7.2 01/15/2022 08:41 AM    HGB 13.2 01/15/2022 08:41 AM    Hemoglobin (POC) 15.0 10/30/2017 02:13 PM    HCT 39.8 01/15/2022 08:41 AM    Hematocrit (POC) 44 10/30/2017 02:13 PM    PLATELET 421 62/30/8793 08:41 AM    MCV 90.9 01/15/2022 08:41 AM     Lab Results   Component Value Date/Time    Cholesterol, total 172 05/10/2021 12:00 AM    HDL Cholesterol 62 05/10/2021 12:00 AM    LDL, calculated 92 05/10/2021 12:00 AM    LDL, calculated 63 12/17/2019 08:48 AM    Triglyceride 102 05/10/2021 12:00 AM     Lab Results   Component Value Date/Time    GFR est non-AA >60 01/15/2022 08:41 AM GFRNA, POC >60 10/30/2017 02:13 PM    GFR est AA >60 01/15/2022 08:41 AM    GFRAA, POC >60 10/30/2017 02:13 PM    Creatinine 1.22 01/15/2022 08:41 AM    Creatinine (POC) 1.1 10/30/2017 02:13 PM    BUN 20 01/15/2022 08:41 AM    BUN (POC) 19 10/30/2017 02:13 PM    Sodium 136 01/15/2022 08:41 AM    Sodium (POC) 137 10/30/2017 02:13 PM    Potassium 4.0 01/15/2022 08:41 AM    Potassium (POC) 4.4 10/30/2017 02:13 PM    Chloride 104 01/15/2022 08:41 AM    Chloride (POC) 103 10/30/2017 02:13 PM    CO2 26 01/15/2022 08:41 AM        Review of Systems   Respiratory: Negative for shortness of breath. Cardiovascular: Negative for chest pain. Gastrointestinal:        Stomach pain/discomfort       Physical Exam  Constitutional:       General: He is not in acute distress. Appearance: Normal appearance. He is not ill-appearing, toxic-appearing or diaphoretic. HENT:      Head: Normocephalic and atraumatic. Right Ear: External ear normal.      Left Ear: External ear normal.   Eyes:      General:         Right eye: No discharge. Left eye: No discharge. Conjunctiva/sclera: Conjunctivae normal.      Pupils: Pupils are equal, round, and reactive to light. Cardiovascular:      Rate and Rhythm: Normal rate and regular rhythm. Heart sounds: No murmur heard. No friction rub. No gallop. Pulmonary:      Effort: No respiratory distress. Breath sounds: Normal breath sounds. No wheezing or rales. Chest:      Chest wall: No tenderness. Musculoskeletal:         General: Normal range of motion. Cervical back: Normal range of motion and neck supple. Skin:     General: Skin is warm and dry. Neurological:      Mental Status: He is alert and oriented to person, place, and time. Mental status is at baseline.       Coordination: Coordination normal.      Gait: Gait normal.   Psychiatric:         Mood and Affect: Mood normal.         Behavior: Behavior normal.         ASSESSMENT and PLAN ICD-10-CM ICD-9-CM    1. Primary hypertension    I10 401.9 LIPID PANEL   Initially elevated repeat improved continue lisinopril 20 mg daily encouraged home blood pressure monitoring   METABOLIC PANEL, COMPREHENSIVE      HEMOGLOBIN A1C WITH EAG   2. Prostate cancer ([facility] [address]  C61 185 LIPID PANEL   Due for follow-up with urology this month he will schedule he reports he has a follow-up in September   METABOLIC PANEL, COMPREHENSIVE      HEMOGLOBIN A1C WITH EAG   3. Gastroesophageal reflux disease without esophagitis  K21.9 530.81 LIPID PANEL      METABOLIC PANEL, COMPREHENSIVE   Patient with recurrent episodes of abdominal pain over the last year has had CT of the abdomen which is unremarkable    He has been referred to GI and plans on scheduling the appointment he just has not done this yet overall his symptoms are improved    He is taking his Protonix sporadically    Discussed taking it more religiously we will have him stop his aspirin as he does not need it currently and schedule a GI appointment   HEMOGLOBIN A1C WITH EAG   4. Bilateral carotid artery stenosis  I65.23 433.10 LIPID PANEL     054.88 METABOLIC PANEL, COMPREHENSIVE   Mild blockage monitor work on risk factor modification   HEMOGLOBIN A1C WITH EAG   5. Pure hypercholesterolemia  E78.00 272.0 LIPID PANEL      METABOLIC PANEL, COMPREHENSIVE   On Crestor check lipids adjust dose as needed   HEMOGLOBIN A1C WITH EAG   6. Anxiety  F41.9 300.00 LIPID PANEL   Previously on Celexa changed him to Zoloft last office visit he is not taking it because he does not think his anxiety is bothering him or it very much he can remain off of it and the symptoms recur    Was previously given hydroxyzine for as needed use he is not using this either   METABOLIC PANEL, COMPREHENSIVE      HEMOGLOBIN A1C WITH EAG   7.  IFG (impaired fasting glucose)  R73.01 790.21 LIPID PANEL   Check A1c diet controlled   METABOLIC PANEL, COMPREHENSIVE      HEMOGLOBIN A1C WITH EAG        Depression screen reviewed and negative     Scribed by Patricia Malone, as dictated by Dr. Florentin Thomas. Current diagnosis and concerns discussed with pt at length. Pt understands risks and benefits or current treatment plan and medications, and accepts the treatment and medication with any possible risks. Pt asks appropriate questions, which were answered. Pt was instructed to call with any concerns or problems. I have reviewed the note documented by the scribe. The services provided are my own.   The documentation is accurate

## 2022-06-21 ENCOUNTER — OFFICE VISIT (OUTPATIENT)
Dept: INTERNAL MEDICINE CLINIC | Age: 62
End: 2022-06-21

## 2022-06-21 VITALS
TEMPERATURE: 97.7 F | HEART RATE: 80 BPM | RESPIRATION RATE: 16 BRPM | BODY MASS INDEX: 21.37 KG/M2 | DIASTOLIC BLOOD PRESSURE: 72 MMHG | SYSTOLIC BLOOD PRESSURE: 134 MMHG | WEIGHT: 141 LBS | HEIGHT: 68 IN | OXYGEN SATURATION: 97 %

## 2022-06-21 DIAGNOSIS — I65.23 BILATERAL CAROTID ARTERY STENOSIS: ICD-10-CM

## 2022-06-21 DIAGNOSIS — K21.9 GASTROESOPHAGEAL REFLUX DISEASE WITHOUT ESOPHAGITIS: ICD-10-CM

## 2022-06-21 DIAGNOSIS — F41.9 ANXIETY: ICD-10-CM

## 2022-06-21 DIAGNOSIS — I10 PRIMARY HYPERTENSION: Primary | ICD-10-CM

## 2022-06-21 DIAGNOSIS — C61 PROSTATE CANCER (HCC): ICD-10-CM

## 2022-06-21 DIAGNOSIS — R73.01 IFG (IMPAIRED FASTING GLUCOSE): ICD-10-CM

## 2022-06-21 DIAGNOSIS — E78.00 PURE HYPERCHOLESTEROLEMIA: ICD-10-CM

## 2022-06-21 PROCEDURE — 99214 OFFICE O/P EST MOD 30 MIN: CPT | Performed by: INTERNAL MEDICINE

## 2022-09-20 ENCOUNTER — TELEPHONE (OUTPATIENT)
Dept: INTERNAL MEDICINE CLINIC | Age: 62
End: 2022-09-20

## 2022-09-20 ENCOUNTER — OFFICE VISIT (OUTPATIENT)
Dept: INTERNAL MEDICINE CLINIC | Age: 62
End: 2022-09-20
Payer: MEDICARE

## 2022-09-20 VITALS
HEART RATE: 76 BPM | BODY MASS INDEX: 20.61 KG/M2 | OXYGEN SATURATION: 97 % | HEIGHT: 68 IN | WEIGHT: 136 LBS | DIASTOLIC BLOOD PRESSURE: 72 MMHG | TEMPERATURE: 98.1 F | SYSTOLIC BLOOD PRESSURE: 125 MMHG | RESPIRATION RATE: 16 BRPM

## 2022-09-20 DIAGNOSIS — E78.00 PURE HYPERCHOLESTEROLEMIA: ICD-10-CM

## 2022-09-20 DIAGNOSIS — I10 PRIMARY HYPERTENSION: Primary | ICD-10-CM

## 2022-09-20 DIAGNOSIS — R21 RASH: ICD-10-CM

## 2022-09-20 DIAGNOSIS — K21.9 GASTROESOPHAGEAL REFLUX DISEASE WITHOUT ESOPHAGITIS: ICD-10-CM

## 2022-09-20 DIAGNOSIS — Z23 NEEDS FLU SHOT: ICD-10-CM

## 2022-09-20 DIAGNOSIS — C61 PROSTATE CANCER (HCC): ICD-10-CM

## 2022-09-20 DIAGNOSIS — R73.01 IFG (IMPAIRED FASTING GLUCOSE): ICD-10-CM

## 2022-09-20 DIAGNOSIS — F41.9 ANXIETY: ICD-10-CM

## 2022-09-20 PROCEDURE — 3017F COLORECTAL CA SCREEN DOC REV: CPT | Performed by: INTERNAL MEDICINE

## 2022-09-20 PROCEDURE — 90686 IIV4 VACC NO PRSV 0.5 ML IM: CPT | Performed by: INTERNAL MEDICINE

## 2022-09-20 PROCEDURE — G8510 SCR DEP NEG, NO PLAN REQD: HCPCS | Performed by: INTERNAL MEDICINE

## 2022-09-20 PROCEDURE — G8754 DIAS BP LESS 90: HCPCS | Performed by: INTERNAL MEDICINE

## 2022-09-20 PROCEDURE — G8420 CALC BMI NORM PARAMETERS: HCPCS | Performed by: INTERNAL MEDICINE

## 2022-09-20 PROCEDURE — 99214 OFFICE O/P EST MOD 30 MIN: CPT | Performed by: INTERNAL MEDICINE

## 2022-09-20 PROCEDURE — G8427 DOCREV CUR MEDS BY ELIG CLIN: HCPCS | Performed by: INTERNAL MEDICINE

## 2022-09-20 PROCEDURE — G8752 SYS BP LESS 140: HCPCS | Performed by: INTERNAL MEDICINE

## 2022-09-20 PROCEDURE — G0008 ADMIN INFLUENZA VIRUS VAC: HCPCS | Performed by: INTERNAL MEDICINE

## 2022-09-20 RX ORDER — TRIAMCINOLONE ACETONIDE 1 MG/G
CREAM TOPICAL 2 TIMES DAILY
Qty: 30 G | Refills: 0 | Status: SHIPPED | OUTPATIENT
Start: 2022-09-20 | End: 2022-10-04 | Stop reason: SDUPTHER

## 2022-09-20 NOTE — PROGRESS NOTES
HISTORY OF PRESENT ILLNESS  Mamie Floyd is a 60 y.o. male. HPI    Last here 6/21/22. Here for acute care. Pt presents w/ a pruritic rash on R leg, BL shoulders, BL inner arms, BLlegs x a couple weeks. He put alcohol on these but this did not help. On exam: 2 1 cm circular papules w/ scale on R leg  Discussed this is likely not an insect bite, more likely psorisis or ezcema. Rx'd triamcinolone acetonide cream. Referred to dermatology if sx's do not improve. Pt c/o a blood blister on L great toe. On exam: Ecchomysosis on base of the L great toe  Discussed this will take some time to heal.         Has a history of hypertension  BP today is 125/72  No home BP readings to review  Taking lisinopril 20mg  Please recall hctz caused sodium to drop     Wt was 143 lbs lov   His weight is within normal ranges     Reviewed labs      Ordered labs         He is now taking Protonix, this is helping  States he has better insurance now, will schedule colo and likely needs endoscopy at the same time    Recall he has been having recurrent issues with stomach pain for the last year had been given Protonix has been referred to GI a year ago though he has not scheduled this yet      No longer taking ASA 81 mg since pt has no personal hx of heart disease or stroke  Recall CT abd pelv 8/31/21:  No significant or acute abnormalities demonstrated.      He followed with Dr. Tari Field  for history of headaches in the past and dizziness  Last visit 8/09/21     He is now following with Dr Dania Winchester (ortho) for L leg pain  Pt has completed PT  He had L arthroplasty surgery 6/3/20 performed by Dr. Marylou Candelario   Last there 4/1/21   He has been released      Pt saw Dr Trenton Bianchi for preop clearance in the past  Last visit 2/13/20     Pt follows with Dr. Kimberly Ayala ([facility]) for h/o prostate cancer, annually  Last visit 7/22, no changes, will get report     He had been taking celexa 10 mg for anxiety--no longer taking this because it wasn't helping  No longer taking zoloft 50 mg, doing okay w/ meds  Could consider hydroxyzine for acute anxiety if needed down the road--of note he was given this previously and is not using it        Continues on crestor 10mg for cholesterol   Recall could not tolerate lipitor      Pt has albuterol to use prn for wheezing/breathing       ACP not on file. SDM is his wife.   Provided information in the past.      PREVENTIVE:    Colonoscopy: 8/17/11, Dr. Jeanie Henson, repeat 10 years - due reminded   PSA: uro follows 4/20 undetectable 9/20, 6/21 urology, 7/22 urology  Tdap: 9/22/20  Pneumovax: not yet needed  [id number]: not yet needed  Shingrix: both doses complete  Flu shot: 12/20/21, will get today 9/20/22  A1c:1/19 5.5 3/20 5.7 6/20 11.7 9/20 5.4 12/21 5.5  Eye exam: Dr Michaela Bullock  11/20, has cataracts they are following--overdue  Hep C screen: 7/14/15, negative   Lipids: 5/21 LDL 92  EKG: 10/17/17,   Covid: J&J + 1 Pfizer      Patient Active Problem List    Diagnosis Date Noted    Bilateral carotid artery stenosis 08/09/2021    Cerebral microvascular disease 08/09/2021    Tension vascular headache 08/09/2021    Degenerative joint disease (DJD) of hip 06/03/2020    Cervical stenosis of spinal canal 10/30/2017    Pure hypercholesterolemia 07/28/2017    Prostate cancer ([facility] [address] 01/13/2015    GERD (gastroesophageal reflux disease) 09/07/2011    HTN (hypertension) 09/07/2011    Anxiety 03/29/2011     Current Outpatient Medications   Medication Sig Dispense Refill    lisinopriL (PRINIVIL, ZESTRIL) 20 mg tablet TAKE 1 TABLET BY MOUTH DAILY 90 Tablet 0    rosuvastatin (CRESTOR) 10 mg tablet TAKE 1 TABLET BY MOUTH EVERY NIGHT 90 Tablet 1    albuterol (PROVENTIL HFA, VENTOLIN HFA, PROAIR HFA) 90 mcg/actuation inhaler INHALE 2 PUFFS BY MOUTH EVERY 6 HOURS AS NEEDED FOR WHEEZING 54 g 1    hydrocortisone (CORTIZONE) 0.5 % ointment Apply to affected area two to three times daily 28.4 g 0    pantoprazole (PROTONIX) 20 mg tablet Take 1 Tablet by mouth two (2) times a day. 180 Tablet 1    sertraline (ZOLOFT) 50 mg tablet Take 1 Tablet by mouth daily. (Patient not taking: Reported on 6/21/2022) 30 Tablet 1    Cetirizine (ZyrTEC) 10 mg cap Take 10 mg by mouth as needed. fluticasone propionate (FLONASE) 50 mcg/actuation nasal spray 2 Sprays by Nasal route daily. Past Surgical History:   Procedure Laterality Date    HX HIP REPLACEMENT Left 06/2020    HX PROSTATECTOMY  8/2015    HX SHOULDER ARTHROSCOPY Left     CA ABDOMEN SURGERY PROC UNLISTED Right 1980    inguinal      Lab Results   Component Value Date/Time    WBC 7.2 01/15/2022 08:41 AM    HGB 13.2 01/15/2022 08:41 AM    Hemoglobin (POC) 15.0 10/30/2017 02:13 PM    HCT 39.8 01/15/2022 08:41 AM    Hematocrit (POC) 44 10/30/2017 02:13 PM    PLATELET 132 89/89/5730 08:41 AM    MCV 90.9 01/15/2022 08:41 AM     Lab Results   Component Value Date/Time    Cholesterol, total 172 05/10/2021 12:00 AM    HDL Cholesterol 62 05/10/2021 12:00 AM    LDL, calculated 92 05/10/2021 12:00 AM    LDL, calculated 63 12/17/2019 08:48 AM    Triglyceride 102 05/10/2021 12:00 AM     Lab Results   Component Value Date/Time    GFR est non-AA >60 01/15/2022 08:41 AM    GFRNA, POC >60 10/30/2017 02:13 PM    GFR est AA >60 01/15/2022 08:41 AM    GFRAA, POC >60 10/30/2017 02:13 PM    Creatinine 1.22 01/15/2022 08:41 AM    Creatinine (POC) 1.1 10/30/2017 02:13 PM    BUN 20 01/15/2022 08:41 AM    BUN (POC) 19 10/30/2017 02:13 PM    Sodium 136 01/15/2022 08:41 AM    Sodium (POC) 137 10/30/2017 02:13 PM    Potassium 4.0 01/15/2022 08:41 AM    Potassium (POC) 4.4 10/30/2017 02:13 PM    Chloride 104 01/15/2022 08:41 AM    Chloride (POC) 103 10/30/2017 02:13 PM    CO2 26 01/15/2022 08:41 AM        Review of Systems   Respiratory:  Negative for shortness of breath. Cardiovascular:  Negative for chest pain. Skin:  Positive for rash (R leg). Physical Exam  Constitutional:       General: He is not in acute distress.      Appearance: Normal appearance. He is not ill-appearing, toxic-appearing or diaphoretic. HENT:      Head: Normocephalic and atraumatic. Right Ear: External ear normal.      Left Ear: External ear normal.   Eyes:      General:         Right eye: No discharge. Left eye: No discharge. Conjunctiva/sclera: Conjunctivae normal.      Pupils: Pupils are equal, round, and reactive to light. Cardiovascular:      Rate and Rhythm: Normal rate and regular rhythm. Heart sounds: No murmur heard. No friction rub. No gallop. Pulmonary:      Effort: No respiratory distress. Breath sounds: Normal breath sounds. No wheezing or rales. Chest:      Chest wall: No tenderness. Musculoskeletal:         General: Normal range of motion. Cervical back: Normal.   Skin:     General: Skin is warm and dry. Comments: 2 1 cm circular papules w/ scale on R leg  Ecchomysosis on base of the L great toe   Neurological:      General: No focal deficit present. Mental Status: He is oriented to person, place, and time. Gait: Gait normal.   Psychiatric:         Mood and Affect: Mood normal.         Behavior: Behavior normal.       ASSESSMENT and PLAN    ICD-10-CM ICD-9-CM    1. Primary hypertension  I10 401.9 LIPID PANEL      METABOLIC PANEL, COMPREHENSIVE   Adequately controlled lisinopril 20 mg daily check metabolic panel for K creatinine sodium levels   HEMOGLOBIN A1C WITH EAG      TSH 3RD GENERATION      CBC W/O DIFF      2. Anxiety  F41.9 300.00 LIPID PANEL      METABOLIC PANEL, COMPREHENSIVE   Long history of anxiety previously it was on Celexa ultimately he stopped it at 1 point was on Zoloft not taking this either doing okay without medication per his report   HEMOGLOBIN A1C WITH EAG      TSH 3RD GENERATION      CBC W/O DIFF      3.  [facility]  C61 185 LIPID PANEL      METABOLIC PANEL, COMPREHENSIVE      HEMOGLOBIN A1C WITH EAG   History of prostate cancer up-to-date with urology get notes for review TSH 3RD GENERATION      CBC W/O DIFF      4. Pure hypercholesterolemia  E78.00 272.0 LIPID PANEL      METABOLIC PANEL, COMPREHENSIVE   Controlled on Crestor check lipids adjust dose as needed   HEMOGLOBIN A1C WITH EAG      TSH 3RD GENERATION      CBC W/O DIFF      5. Rash  R21 782.1 REFERRAL TO DERMATOLOGY      LIPID PANEL      METABOLIC PANEL, COMPREHENSIVE   Appears to be an eczema-like rash we will give triamcinolone cream if not improving we will have him see dermatology   HEMOGLOBIN A1C WITH EAG      TSH 3RD GENERATION      CBC W/O DIFF      6. Gastroesophageal reflux disease without esophagitis  K21.9 530.81 REFERRAL TO GASTROENTEROLOGY      LIPID PANEL      METABOLIC PANEL, COMPREHENSIVE   On Protonix now also due for colonoscopy provide information for GI again he now has insurance and will schedule his colonoscopy likely needs endoscopy at same time discussed this   HEMOGLOBIN A1C WITH EAG      TSH 3RD GENERATION      CBC W/O DIFF      7. IFG (impaired fasting glucose)  R73.01 790.21 LIPID PANEL      METABOLIC PANEL, COMPREHENSIVE      HEMOGLOBIN A1C WITH EAG   Check A1c   TSH 3RD GENERATION      CBC W/O DIFF        Depression screen reviewed and negative. Scribed by Gayathri Clubs, as dictated by Dr. Marylene Pavlov. Current diagnosis and concerns discussed with pt at length. Pt understands risks and benefits or current treatment plan and medications, and accepts the treatment and medication with any possible risks. Pt asks appropriate questions, which were answered. Pt was instructed to call with any concerns or problems. I have reviewed the note documented by the scribe. The services provided are my own. The documentation is accurate.

## 2022-09-20 NOTE — PROGRESS NOTES
1. \"Have you been to the ER, urgent care clinic since your last visit? Hospitalized since your last visit? \" No    2. \"Have you seen or consulted any other health care providers outside of the [address] [place] [address] since your last visit? \" No     3. For patients aged 39-70: Has the patient had a colonoscopy / FIT/ Cologuard? Yes - Care Gap present. Most recent result on file      If the patient is female:    4. For patients aged 41-77: Has the patient had a mammogram within the past 2 years? NA - based on age or sex      11. For patients aged 21-65: Has the patient had a pap smear?  NA - based on age or sex

## 2022-09-20 NOTE — LETTER
9/22/2022 10:23 AM    Mr. Sifuentes   [address] [facility] [address]      Dear Care Provider    Please fax us the most recent office notes so that we may update the patient's records for continuity of care. Our fax number: [phone].     Patient:   Bear Shea  1960        Sincerely,      Sanchez Talamantes MD

## 2022-09-22 LAB
ALBUMIN SERPL-MCNC: 3.9 G/DL (ref 3.5–5)
ALBUMIN/GLOB SERPL: 1.1 {RATIO} (ref 1.1–2.2)
ALP SERPL-CCNC: 71 U/L (ref 45–117)
ALT SERPL-CCNC: 28 U/L (ref 12–78)
ANION GAP SERPL CALC-SCNC: 2 MMOL/L (ref 5–15)
AST SERPL-CCNC: 16 U/L (ref 15–37)
BILIRUB SERPL-MCNC: 0.4 MG/DL (ref 0.2–1)
BUN SERPL-MCNC: 13 MG/DL (ref 6–20)
BUN/CREAT SERPL: 13 (ref 12–20)
CALCIUM SERPL-MCNC: 9.2 MG/DL (ref 8.5–10.1)
CHLORIDE SERPL-SCNC: 106 MMOL/L (ref 97–108)
CHOLEST SERPL-MCNC: 160 MG/DL
CO2 SERPL-SCNC: 31 MMOL/L (ref 21–32)
CREAT SERPL-MCNC: 0.98 MG/DL (ref 0.7–1.3)
ERYTHROCYTE [DISTWIDTH] IN BLOOD BY AUTOMATED COUNT: 11.6 % (ref 11.5–14.5)
EST. AVERAGE GLUCOSE BLD GHB EST-MCNC: 111 MG/DL
GLOBULIN SER CALC-MCNC: 3.6 G/DL (ref 2–4)
GLUCOSE SERPL-MCNC: 101 MG/DL (ref 65–100)
HBA1C MFR BLD: 5.5 % (ref 4–5.6)
HCT VFR BLD AUTO: 40.6 % (ref 36.6–50.3)
HDLC SERPL-MCNC: 74 MG/DL
HDLC SERPL: 2.2 {RATIO} (ref 0–5)
HGB BLD-MCNC: 13.6 G/DL (ref 12.1–17)
LDLC SERPL CALC-MCNC: 72.6 MG/DL (ref 0–100)
MCH RBC QN AUTO: 31.3 PG (ref 26–34)
MCHC RBC AUTO-ENTMCNC: 33.5 G/DL (ref 30–36.5)
MCV RBC AUTO: 93.3 FL (ref 80–99)
NRBC # BLD: 0 K/UL (ref 0–0.01)
NRBC BLD-RTO: 0 PER 100 WBC
PLATELET # BLD AUTO: 322 K/UL (ref 150–400)
PMV BLD AUTO: 9.6 FL (ref 8.9–12.9)
POTASSIUM SERPL-SCNC: 4.3 MMOL/L (ref 3.5–5.1)
PROT SERPL-MCNC: 7.5 G/DL (ref 6.4–8.2)
RBC # BLD AUTO: 4.35 M/UL (ref 4.1–5.7)
SODIUM SERPL-SCNC: 139 MMOL/L (ref 136–145)
TRIGL SERPL-MCNC: 67 MG/DL (ref ?–150)
TSH SERPL DL<=0.05 MIU/L-ACNC: 0.86 UIU/ML (ref 0.36–3.74)
VLDLC SERPL CALC-MCNC: 13.4 MG/DL
WBC # BLD AUTO: 7.1 K/UL (ref 4.1–11.1)

## 2022-10-04 ENCOUNTER — TELEPHONE (OUTPATIENT)
Dept: INTERNAL MEDICINE CLINIC | Age: 62
End: 2022-10-04

## 2022-10-04 RX ORDER — PANTOPRAZOLE SODIUM 20 MG/1
20 TABLET, DELAYED RELEASE ORAL 2 TIMES DAILY
Qty: 180 TABLET | Refills: 1 | Status: SHIPPED | OUTPATIENT
Start: 2022-10-04

## 2022-10-04 RX ORDER — TRIAMCINOLONE ACETONIDE 1 MG/G
CREAM TOPICAL 2 TIMES DAILY
Qty: 30 G | Refills: 0 | Status: SHIPPED | OUTPATIENT
Start: 2022-10-04

## 2022-10-04 RX ORDER — SUCRALFATE 1 G/1
1 TABLET ORAL 4 TIMES DAILY
Qty: 360 TABLET | Refills: 1 | Status: SHIPPED | OUTPATIENT
Start: 2022-10-04

## 2022-10-04 NOTE — TELEPHONE ENCOUNTER
Future Appointments:  Future Appointments   Date Time Provider Dedrick Zimmerman   3/20/2023 11:00 AM Leeann Giraldo MD [facility] AMB        Last Appointment With Me:  9/20/2022     Requested Prescriptions     Pending Prescriptions Disp Refills    triamcinolone acetonide (KENALOG) 0.1 % topical cream 30 g 0     Sig: Apply  to affected area two (2) times a day. use thin layer Monday to Friday only    pantoprazole (PROTONIX) 20 mg tablet 180 Tablet 1     Sig: Take 1 Tablet by mouth two (2) times a day. sucralfate (CARAFATE) 1 gram tablet 360 Tablet 1     Sig: Take 1 Tablet by mouth four (4) times daily.

## 2022-10-19 ENCOUNTER — HOSPITAL ENCOUNTER (EMERGENCY)
Age: 62
Discharge: HOME | End: 2022-10-19
Attending: EMERGENCY MEDICINE
Payer: MEDICARE

## 2022-10-19 VITALS
WEIGHT: 141.54 LBS | TEMPERATURE: 98.6 F | SYSTOLIC BLOOD PRESSURE: 128 MMHG | HEIGHT: 68 IN | OXYGEN SATURATION: 97 % | DIASTOLIC BLOOD PRESSURE: 69 MMHG | HEART RATE: 78 BPM | BODY MASS INDEX: 21.45 KG/M2 | RESPIRATION RATE: 15 BRPM

## 2022-10-19 DIAGNOSIS — K21.9 GASTROESOPHAGEAL REFLUX DISEASE, UNSPECIFIED WHETHER ESOPHAGITIS PRESENT: ICD-10-CM

## 2022-10-19 DIAGNOSIS — R10.13 ACUTE EPIGASTRIC PAIN: Primary | ICD-10-CM

## 2022-10-19 LAB
ALBUMIN SERPL-MCNC: 3.7 G/DL (ref 3.5–5)
ALBUMIN/GLOB SERPL: 1 {RATIO} (ref 1.1–2.2)
ALP SERPL-CCNC: 78 U/L (ref 45–117)
ALT SERPL-CCNC: 33 U/L (ref 12–78)
ANION GAP SERPL CALC-SCNC: 6 MMOL/L (ref 5–15)
AST SERPL-CCNC: 20 U/L (ref 15–37)
BASOPHILS # BLD: 0.1 K/UL (ref 0–0.1)
BASOPHILS NFR BLD: 1 % (ref 0–1)
BILIRUB SERPL-MCNC: 0.5 MG/DL (ref 0.2–1)
BUN SERPL-MCNC: 12 MG/DL (ref 6–20)
BUN/CREAT SERPL: 10 (ref 12–20)
CALCIUM SERPL-MCNC: 9 MG/DL (ref 8.5–10.1)
CHLORIDE SERPL-SCNC: 103 MMOL/L (ref 97–108)
CO2 SERPL-SCNC: 27 MMOL/L (ref 21–32)
CREAT SERPL-MCNC: 1.15 MG/DL (ref 0.7–1.3)
DIFFERENTIAL METHOD BLD: ABNORMAL
EOSINOPHIL # BLD: 0.5 K/UL (ref 0–0.4)
EOSINOPHIL NFR BLD: 6 % (ref 0–7)
ERYTHROCYTE [DISTWIDTH] IN BLOOD BY AUTOMATED COUNT: 11.5 % (ref 11.5–14.5)
GLOBULIN SER CALC-MCNC: 3.8 G/DL (ref 2–4)
GLUCOSE SERPL-MCNC: 115 MG/DL (ref 65–100)
HCT VFR BLD AUTO: 39.6 % (ref 36.6–50.3)
HGB BLD-MCNC: 13.5 G/DL (ref 12.1–17)
IMM GRANULOCYTES # BLD AUTO: 0 K/UL (ref 0–0.04)
IMM GRANULOCYTES NFR BLD AUTO: 0 % (ref 0–0.5)
LIPASE SERPL-CCNC: 142 U/L (ref 73–393)
LYMPHOCYTES # BLD: 1.9 K/UL (ref 0.8–3.5)
LYMPHOCYTES NFR BLD: 22 % (ref 12–49)
MCH RBC QN AUTO: 30.5 PG (ref 26–34)
MCHC RBC AUTO-ENTMCNC: 34.1 G/DL (ref 30–36.5)
MCV RBC AUTO: 89.4 FL (ref 80–99)
MONOCYTES # BLD: 1 K/UL (ref 0–1)
MONOCYTES NFR BLD: 11 % (ref 5–13)
NEUTS SEG # BLD: 5.2 K/UL (ref 1.8–8)
NEUTS SEG NFR BLD: 60 % (ref 32–75)
NRBC # BLD: 0 K/UL (ref 0–0.01)
NRBC BLD-RTO: 0 PER 100 WBC
PLATELET # BLD AUTO: 295 K/UL (ref 150–400)
PMV BLD AUTO: 9.3 FL (ref 8.9–12.9)
POTASSIUM SERPL-SCNC: 4.3 MMOL/L (ref 3.5–5.1)
PROT SERPL-MCNC: 7.5 G/DL (ref 6.4–8.2)
RBC # BLD AUTO: 4.43 M/UL (ref 4.1–5.7)
SODIUM SERPL-SCNC: 136 MMOL/L (ref 136–145)
TROPONIN-HIGH SENSITIVITY: 5 NG/L (ref 0–76)
WBC # BLD AUTO: 8.7 K/UL (ref 4.1–11.1)

## 2022-10-19 PROCEDURE — 85025 COMPLETE CBC W/AUTO DIFF WBC: CPT

## 2022-10-19 PROCEDURE — 93005 ELECTROCARDIOGRAM TRACING: CPT

## 2022-10-19 PROCEDURE — 74011000250 HC RX REV CODE- 250: Performed by: EMERGENCY MEDICINE

## 2022-10-19 PROCEDURE — 84484 ASSAY OF TROPONIN QUANT: CPT

## 2022-10-19 PROCEDURE — 80053 COMPREHEN METABOLIC PANEL: CPT

## 2022-10-19 PROCEDURE — 74011250637 HC RX REV CODE- 250/637: Performed by: EMERGENCY MEDICINE

## 2022-10-19 PROCEDURE — 83690 ASSAY OF LIPASE: CPT

## 2022-10-19 PROCEDURE — 99284 EMERGENCY DEPT VISIT MOD MDM: CPT

## 2022-10-19 PROCEDURE — 36415 COLL VENOUS BLD VENIPUNCTURE: CPT

## 2022-10-19 RX ORDER — SUCRALFATE 1 G/1
1 TABLET ORAL 3 TIMES DAILY
Qty: 30 TABLET | Refills: 0 | Status: SHIPPED | OUTPATIENT
Start: 2022-10-19 | End: 2022-10-29

## 2022-10-19 RX ADMIN — ALUMINUM HYDROXIDE AND MAGNESIUM HYDROXIDE 40 ML: 200; 200 SUSPENSION ORAL at 21:06

## 2022-10-20 LAB
ATRIAL RATE: 77 BPM
CALCULATED P AXIS, ECG09: 75 DEGREES
CALCULATED R AXIS, ECG10: 23 DEGREES
CALCULATED T AXIS, ECG11: 65 DEGREES
DIAGNOSIS, 93000: NORMAL
P-R INTERVAL, ECG05: 158 MS
Q-T INTERVAL, ECG07: 338 MS
QRS DURATION, ECG06: 74 MS
QTC CALCULATION (BEZET), ECG08: 382 MS
VENTRICULAR RATE, ECG03: 77 BPM

## 2022-10-20 NOTE — DISCHARGE INSTRUCTIONS
It was a pleasure taking care of you in our Emergency Department today. We know that [facility] you are entrusting us with your health, comfort, and safety. Our physicians and nurses honor that trust, and truly appreciate the opportunity to care for you and your loved ones. We also value your feedback. If you receive a survey about your Emergency Department experience today, please fill it out. We care about our patients' feedback, and we listen to what you have to say.   Thank you!       --- Dr. Kobe Leon MD

## 2022-10-25 NOTE — ED PROVIDER NOTES
EMERGENCY DEPARTMENT HISTORY AND PHYSICAL EXAM       Please note that this dictation was completed with [employer], the computer voice recognition software. Quite often unanticipated grammatical, syntax, homophones, and other interpretive errors are inadvertently transcribed by the computer software. Please disregard these errors. Please excuse any errors that have escaped final proofreading. Date: 10/19/2022  Patient Name: Fara Lara  Patient Age and Sex: 60 y.o. male    History of Presenting Illness     Chief Complaint   Patient presents with    Epigastric Pain     A pain epigastric pain off and on two days ; no vomiting no diarrhea; he does belch ; he is on pantoprazole; his wife says he lays down too soon after eating. History Provided By: Patient     Chief Complaint: epigastric pain, belching      HPI: Fara Lara, is a 60 y.o. male who presents to the ED with intermittent episodes of a burning post-prandial epigastric pain associated with increased belching which is more common at night after dinner. Pain radiates up his esophagus, sometimes leaves him with a sour, acidic taste in his mouth. Patient reports history of gerd, was on pantoprazole but is not taking it regularly. No sob, cp, orthopnea, LE edema, cough or sob.  4    Pt denies any other alleviating or exacerbating factors. No other associated signs or symptoms. There are no other complaints, changes or physical findings at this time.      PCP: Leeann Giraldo MD    Past History   All documented elements of the [employer] reviewed and verified by me. -Tato Cheema MD    Past Medical History:  Past Medical History:   Diagnosis Date    Anxiety disorder     Arthritis     Asthma     allergies environmental    Cancer ([facility] [address]     prostate CA    GERD (gastroesophageal reflux disease)     High cholesterol     HTN (hypertension) 9/7/2011    Other and unspecified symptoms and signs involving general sensations and perceptions     construction accident     Other ill-defined conditions(799.89)     anxiety    Other ill-defined conditions(799.89)     seasonal allergies    Psychiatric disorder     anxiety       Past Surgical History:  Past Surgical History:   Procedure Laterality Date    HX HIP REPLACEMENT Left 2020    HX PROSTATECTOMY  2015    HX SHOULDER ARTHROSCOPY Left     IN ABDOMEN SURGERY PROC UNLISTED Right 1980    inguinal       Family History:   Family History   Problem Relation Age of Onset    Diabetes Mother     Hypertension Mother     Hypertension Sister     Kidney Disease Father     Alcohol abuse Brother     Cancer Brother         colon    Anesth Problems Neg Hx        Social History:  Social History     Tobacco Use    Smoking status: Former     Packs/day: 0.25     Years: 12.00     Pack years: 3.00     Types: Cigarettes     Quit date: 2015     Years since quittin.4    Smokeless tobacco: Never   Vaping Use    Vaping Use: Never used   Substance Use Topics    Alcohol use: No    Drug use: No       Current Medications:  No current facility-administered medications on file prior to encounter. Current Outpatient Medications on File Prior to Encounter   Medication Sig Dispense Refill    triamcinolone acetonide (KENALOG) 0.1 % topical cream Apply  to affected area two (2) times a day. use thin layer Monday to Friday only 30 g 0    pantoprazole (PROTONIX) 20 mg tablet Take 1 Tablet by mouth two (2) times a day. 180 Tablet 1    sucralfate (CARAFATE) 1 gram tablet Take 1 Tablet by mouth four (4) times daily.  360 Tablet 1    lisinopriL (PRINIVIL, ZESTRIL) 20 mg tablet TAKE 1 TABLET BY MOUTH DAILY 90 Tablet 0    rosuvastatin (CRESTOR) 10 mg tablet TAKE 1 TABLET BY MOUTH EVERY NIGHT 90 Tablet 1    albuterol (PROVENTIL HFA, VENTOLIN HFA, PROAIR HFA) 90 mcg/actuation inhaler INHALE 2 PUFFS BY MOUTH EVERY 6 HOURS AS NEEDED FOR WHEEZING 54 g 1    hydrocortisone (CORTIZONE) 0.5 % ointment Apply to affected area two to three times daily 28.4 g 0 Cetirizine (ZyrTEC) 10 mg cap Take 10 mg by mouth as needed. fluticasone propionate (FLONASE) 50 mcg/actuation nasal spray 2 Sprays by Nasal route daily. Allergies:  No Known Allergies    Review of Systems   All other systems reviewed and negative    Review of Systems   Constitutional:  Negative for fever. HENT:  Negative for congestion. Eyes:  Negative for visual disturbance. Respiratory:  Negative for shortness of breath. Cardiovascular:  Negative for chest pain and palpitations. Gastrointestinal:  Positive for abdominal pain. Negative for blood in stool, diarrhea, nausea and vomiting. Endocrine: Negative. Genitourinary:  Negative for dysuria and hematuria. Musculoskeletal:  Negative for joint swelling. Skin: Negative. Negative for rash. Neurological:  Negative for weakness and numbness. Psychiatric/Behavioral: Negative. Negative for confusion. All other systems reviewed and are negative. Physical Exam   Reviewed patients vital signs and nursing note    Physical Exam  Vitals and nursing note reviewed. Constitutional:       Appearance: He is not diaphoretic. HENT:      Head: Atraumatic. Nose: Nose normal.      Mouth/Throat:      Mouth: Mucous membranes are moist.   Eyes:      Extraocular Movements: Extraocular movements intact. Conjunctiva/sclera: Conjunctivae normal.      Pupils: Pupils are equal, round, and reactive to light. Cardiovascular:      Rate and Rhythm: Normal rate and regular rhythm. Pulses: Normal pulses. Heart sounds: Normal heart sounds. Pulmonary:      Effort: Pulmonary effort is normal.      Breath sounds: Normal breath sounds. Abdominal:      Palpations: Abdomen is soft. Tenderness: There is no abdominal tenderness. Musculoskeletal:         General: No tenderness. Normal range of motion. Cervical back: Normal range of motion. No rigidity. Skin:     General: Skin is warm and dry.       Capillary Refill: Capillary refill takes less than 2 seconds. Neurological:      General: No focal deficit present. Mental Status: He is alert. Psychiatric:         Mood and Affect: Mood normal.         Behavior: Behavior normal.       Diagnostic Study Results     Labs - I have personally reviewed and interpreted all laboratory results. Interpretation of available and pertinent results detailed below in MDM. Keturah Corets MD, MSc  Recent Results (from the past 300 hour(s))   EKG, 12 LEAD, INITIAL    Collection Time: 10/19/22  6:23 PM   Result Value Ref Range    Ventricular Rate 77 BPM    Atrial Rate 77 BPM    P-R Interval 158 ms    QRS Duration 74 ms    Q-T Interval 338 ms    QTC Calculation (Bezet) 382 ms    Calculated P Axis 75 degrees    Calculated R Axis 23 degrees    Calculated T Axis 65 degrees    Diagnosis       Normal sinus rhythm  Possible Left atrial enlargement  Left ventricular hypertrophy  Cannot rule out Septal infarct (cited on or before 19-OCT-2022)  When compared with ECG of 15-BREN-2022 09:51,  No significant change was found  Confirmed by Conner Anderson ([id number]) on 10/20/2022 2:29:04 PM     CBC WITH AUTOMATED DIFF    Collection Time: 10/19/22  6:25 PM   Result Value Ref Range    WBC 8.7 4.1 - 11.1 K/uL    RBC 4.43 4.10 - 5.70 M/uL    HGB 13.5 12.1 - 17.0 g/dL    HCT 39.6 36.6 - 50.3 %    MCV 89.4 80.0 - 99.0 FL    MCH 30.5 26.0 - 34.0 PG    MCHC 34.1 30.0 - 36.5 g/dL    RDW 11.5 11.5 - 14.5 %    PLATELET 654 292 - 102 K/uL    MPV 9.3 8.9 - 12.9 FL    NRBC 0.0 0  WBC    ABSOLUTE NRBC 0.00 0.00 - 0.01 K/uL    NEUTROPHILS 60 32 - 75 %    LYMPHOCYTES 22 12 - 49 %    MONOCYTES 11 5 - 13 %    EOSINOPHILS 6 0 - 7 %    BASOPHILS 1 0 - 1 %    IMMATURE GRANULOCYTES 0 0.0 - 0.5 %    ABS. NEUTROPHILS 5.2 1.8 - 8.0 K/UL    ABS. LYMPHOCYTES 1.9 0.8 - 3.5 K/UL    ABS. MONOCYTES 1.0 0.0 - 1.0 K/UL    ABS. EOSINOPHILS 0.5 (H) 0.0 - 0.4 K/UL    ABS. BASOPHILS 0.1 0.0 - 0.1 K/UL    ABS. IMM.  GRANS. 0.0 0.00 - 0.04 K/UL DF AUTOMATED     METABOLIC PANEL, COMPREHENSIVE    Collection Time: 10/19/22  6:25 PM   Result Value Ref Range    Sodium 136 136 - 145 mmol/L    Potassium 4.3 3.5 - 5.1 mmol/L    Chloride 103 97 - 108 mmol/L    CO2 27 21 - 32 mmol/L    Anion gap 6 5 - 15 mmol/L    Glucose 115 (H) 65 - 100 mg/dL    BUN 12 6 - 20 MG/DL    Creatinine 1.15 0.70 - 1.30 MG/DL    BUN/Creatinine ratio 10 (L) 12 - 20      eGFR >60 >60 ml/min/1.73m2    Calcium 9.0 8.5 - 10.1 MG/DL    Bilirubin, total 0.5 0.2 - 1.0 MG/DL    ALT (SGPT) 33 12 - 78 U/L    AST (SGOT) 20 15 - 37 U/L    Alk. phosphatase 78 45 - 117 U/L    Protein, total 7.5 6.4 - 8.2 g/dL    Albumin 3.7 3.5 - 5.0 g/dL    Globulin 3.8 2.0 - 4.0 g/dL    A-G Ratio 1.0 (L) 1.1 - 2.2     LIPASE    Collection Time: 10/19/22  6:25 PM   Result Value Ref Range    Lipase 142 73 - 393 U/L   TROPONIN-HIGH SENSITIVITY    Collection Time: 10/19/22  6:25 PM   Result Value Ref Range    Troponin-High Sensitivity 5 0 - 76 ng/L       Radiologic Studies - I have personally reviewed and interpreted (see MDM for brief interpreation of available results) all imaging studies and agree with radiology interpretation and report. - Christal Pedraza MD, MSc  No orders to display         Medical Decision Making   I am the first provider for this patient. Records Reviewed:   I reviewed our electronic medical record system for any past medical records that were available that may contribute to the patient's current condition, including their PMH, surgical history, social and family history. This includes most recent ED visits, any available discharge summaries and prior ECGs, which I have reviewed and interpreted personally. I have summarized most salient findings in my HPI and MDM. Christal Pedraza MD, MSc    I also reviewed the nursing notes and vital signs from today's visit. Nursing notes will be reviewed as they become available in realtime while the pt has been in the ED.    Christal Pedraza MD, MSc      Vital Signs-Reviewed the patient's vital signs. Billable EKG reviewed by ED Physician in the absence of a cardiologist: Yes  Rhythm: sinus; and regular . Rate (approx.): 77; QRS interval: nml; ST/T wave: nml; Other intervals normal. No new changes concerning for acute ischemia. This and prior available ECGs have been viewed and interpreted by me personally. Prema Meyers MD, Msc      Cardiac Monitor: The cardiac monitor revealed normal sinus rhythm. The cardiac monitor was ordered to monitor patient for signs of cardiac dysrhythmia, which they are at risk for based on their history or risk for cardiovascular disease and/or metabolic abnormalities. Cardiac monitor interpreted by me. Prema Meyers MD MSc    Pulse ox interpretation: 98% on ra with good pleth. Interpreted by me. Prema Meyers MD MSc        Provider Notes and Medical Decision Making:   Assessment: Patient presents with epigastric abdominal pain. Differential includes gastritis, pancreatitis, cholelithiasis, cholecystitis, hepatitis, muscular strain, renal pathology, gastroenteritis. Less likely ACS. Will obtain labs and possibly US. Will give analgesics and antiemetics PRN. ED Course: The patients presenting problems have been discussed, and they are in agreement with the care plan formulated and outlined with them. I have encouraged them to ask questions as they arise throughout their visit. Progress note:  Patient has been reassessed and reports feeling considerably better, has normal vital signs and feels comfortable going home. I think this is reasonable as no findings today suggest a life-threatening condition. DISPOSITION: DISCHARGE  The patient's results have been reviewed with patient and available family and/or caregiver.  They verbally convey their understanding and agreement of the patient's signs, symptoms, diagnosis, treatment and prognosis and additionally agree to follow up as recommended in the discharge instructions or to return to the Emergency Department should the patient's condition change prior to their follow-up appointment. The patient and available family and/or caregiver verbally agree with the care plan and all of their questions have been answered. The discharge instructions have also been provided to the them with educational information regarding the patient's diagnosis as well a list of reasons why the patient would want to return to the ER prior to their follow-up appointment should any concerns arise, the patient's condition change or symptoms worsen. Kobe Leon MD, Msc    PLAN:  Discharge Medication List as of 10/19/2022 10:51 PM        START taking these medications    Details   !! sucralfate (CARAFATE) 1 gram tablet Take 1 Tablet by mouth three (3) times daily for 10 days. , Normal, Disp-30 Tablet, R-0       !! - Potential duplicate medications found. Please discuss with provider. CONTINUE these medications which have NOT CHANGED    Details   triamcinolone acetonide (KENALOG) 0.1 % topical cream Apply  to affected area two (2) times a day. use thin layer Monday to Friday only, Normal, Disp-30 g, R-0      pantoprazole (PROTONIX) 20 mg tablet Take 1 Tablet by mouth two (2) times a day., Normal, Disp-180 Tablet, R-1      !! sucralfate (CARAFATE) 1 gram tablet Take 1 Tablet by mouth four (4) times daily. , Normal, Disp-360 Tablet, R-1      lisinopriL (PRINIVIL, ZESTRIL) 20 mg tablet TAKE 1 TABLET BY MOUTH DAILY, Normal, Disp-90 Tablet, R-0**Patient requests 90 days supply**      rosuvastatin (CRESTOR) 10 mg tablet TAKE 1 TABLET BY MOUTH EVERY NIGHT, Normal, Disp-90 Tablet, R-1**Patient requests 90 days supply**      albuterol (PROVENTIL HFA, VENTOLIN HFA, PROAIR HFA) 90 mcg/actuation inhaler INHALE 2 PUFFS BY MOUTH EVERY 6 HOURS AS NEEDED FOR WHEEZING, Normal, Disp-54 g, R-1      hydrocortisone (CORTIZONE) 0.5 % ointment Apply to affected area two to three times daily, Normal, Disp-28.4 g, R-0 Cetirizine (ZyrTEC) 10 mg cap Take 10 mg by mouth as needed., Historical Med      fluticasone propionate (FLONASE) 50 mcg/actuation nasal spray 2 Sprays by Nasal route daily. , Historical Med       !! - Potential duplicate medications found. Please discuss with provider. 2.     Follow-up Information       Follow up With Specialties Details Why Contact Info    Virginia Carlisle MD Internal Medicine Physician Schedule an appointment as soon as possible for a visit in 2 days follow up [address] [place] [address]  [phone]      your gastroenterologist  Call in 1 day update your doctor on your visit with us today. go to scheduled appointment unless your doctor recommends that you are seen earlier or if your pain worsens. [facility] EMERGENCY DEPT Emergency Medicine Go to  As needed, If symptoms worsen [address] [place] [address]  [phone]          3. Return to ED if worse       I, Rhys Camilo MD, am the attending of record for this patient encounter. Diagnosis     Final Clinical Impression:   1. Acute epigastric pain    2. Gastroesophageal reflux disease, unspecified whether esophagitis present        Attestation:  I personally performed the services described in this documentation on this date 10/19/2022 for patient Lian Nino.   Yomi Davidson MD

## 2022-11-01 ENCOUNTER — OFFICE VISIT (OUTPATIENT)
Dept: INTERNAL MEDICINE CLINIC | Age: 62
End: 2022-11-01
Payer: MEDICARE

## 2022-11-01 ENCOUNTER — VIRTUAL VISIT (OUTPATIENT)
Dept: INTERNAL MEDICINE CLINIC | Age: 62
End: 2022-11-01

## 2022-11-01 VITALS
SYSTOLIC BLOOD PRESSURE: 126 MMHG | RESPIRATION RATE: 16 BRPM | OXYGEN SATURATION: 98 % | DIASTOLIC BLOOD PRESSURE: 65 MMHG | TEMPERATURE: 98.8 F | HEART RATE: 95 BPM

## 2022-11-01 VITALS
RESPIRATION RATE: 16 BRPM | DIASTOLIC BLOOD PRESSURE: 65 MMHG | SYSTOLIC BLOOD PRESSURE: 126 MMHG | HEART RATE: 95 BPM | TEMPERATURE: 98.8 F | OXYGEN SATURATION: 98 %

## 2022-11-01 DIAGNOSIS — Z00.00 WELCOME TO MEDICARE PREVENTIVE VISIT: ICD-10-CM

## 2022-11-01 DIAGNOSIS — I10 PRIMARY HYPERTENSION: ICD-10-CM

## 2022-11-01 DIAGNOSIS — I51.7 LAE (LEFT ATRIAL ENLARGEMENT): ICD-10-CM

## 2022-11-01 DIAGNOSIS — C61 PROSTATE CANCER (HCC): ICD-10-CM

## 2022-11-01 DIAGNOSIS — J06.9 URI, ACUTE: Primary | ICD-10-CM

## 2022-11-01 PROCEDURE — G8510 SCR DEP NEG, NO PLAN REQD: HCPCS | Performed by: INTERNAL MEDICINE

## 2022-11-01 PROCEDURE — 99213 OFFICE O/P EST LOW 20 MIN: CPT | Performed by: INTERNAL MEDICINE

## 2022-11-01 PROCEDURE — G8752 SYS BP LESS 140: HCPCS | Performed by: INTERNAL MEDICINE

## 2022-11-01 PROCEDURE — 3017F COLORECTAL CA SCREEN DOC REV: CPT | Performed by: INTERNAL MEDICINE

## 2022-11-01 PROCEDURE — G8420 CALC BMI NORM PARAMETERS: HCPCS | Performed by: INTERNAL MEDICINE

## 2022-11-01 PROCEDURE — G8427 DOCREV CUR MEDS BY ELIG CLIN: HCPCS | Performed by: INTERNAL MEDICINE

## 2022-11-01 PROCEDURE — G0402 INITIAL PREVENTIVE EXAM: HCPCS | Performed by: INTERNAL MEDICINE

## 2022-11-01 PROCEDURE — G8754 DIAS BP LESS 90: HCPCS | Performed by: INTERNAL MEDICINE

## 2022-11-01 RX ORDER — PREDNISONE 20 MG/1
TABLET ORAL
Qty: 6 TABLET | Refills: 0 | Status: SHIPPED | OUTPATIENT
Start: 2022-11-01

## 2022-11-01 RX ORDER — AMOXICILLIN AND CLAVULANATE POTASSIUM 875; 125 MG/1; MG/1
1 TABLET, FILM COATED ORAL EVERY 12 HOURS
Qty: 14 TABLET | Refills: 0 | Status: SHIPPED | OUTPATIENT
Start: 2022-11-01 | End: 2022-11-08

## 2022-11-01 NOTE — PATIENT INSTRUCTIONS
Medicare Wellness Visit, Male    The best way to live healthy is to have a lifestyle where you eat a well-balanced diet, exercise regularly, limit alcohol use, and quit all forms of tobacco/nicotine, if applicable. Regular preventive services are another way to keep healthy. Preventive services (vaccines, screening tests, monitoring & exams) can help personalize your care plan, which helps you manage your own care. Screening tests can find health problems at the earliest stages, when they are easiest to treat. [facility] follows the current, evidence-based guidelines published by the [facility] ([facility]) when recommending preventive services for our patients. Because we follow these guidelines, sometimes recommendations change over time as research supports it. (For example, a prostate screening blood test is no longer routinely recommended for men with no symptoms). Of course, you and your doctor may decide to screen more often for some diseases, based on your risk and co-morbidities (chronic disease you are already diagnosed with). Preventive services for you include:  - Medicare offers their members a free annual wellness visit, which is time for you and your primary care provider to discuss and plan for your preventive service needs. Take advantage of this benefit every year!  -All adults over age 72 should receive the recommended pneumonia vaccines. Current USPSTF guidelines recommend a series of two vaccines for the best pneumonia protection.   -All adults should have a flu vaccine yearly and tetanus vaccine every 10 years.  -All adults age 48 and older should receive the shingles vaccines (series of two vaccines).        -All adults age 38-68 who are overweight should have a diabetes screening test once every three years.   -Other screening tests & preventive services for persons with diabetes include: an eye exam to screen for diabetic retinopathy, a kidney function test, a foot exam, and stricter control over your cholesterol.   -Cardiovascular screening for adults with routine risk involves an electrocardiogram (ECG) at intervals determined by the provider.   -Colorectal cancer screening should be done for adults age 54-65 with no increased risk factors for colorectal cancer. There are a number of acceptable methods of screening for this type of cancer. Each test has its own benefits and drawbacks. Discuss with your provider what is most appropriate for you during your annual wellness visit. The different tests include: colonoscopy (considered the best screening method), a fecal occult blood test, a fecal DNA test, and sigmoidoscopy.  -All adults born between [facility] should be screened once for Hepatitis C.  -An Abdominal Aortic Aneurysm (AAA) Screening is recommended for men age 73-68 who has ever smoked in their lifetime.      Here is a list of your current Health Maintenance items (your personalized list of preventive services) with a due date:  Health Maintenance Due   Topic Date Due    COVID-19 Vaccine (3 - Booster for Nany series) 06/16/2022    Colorectal Screening  10/01/2022

## 2022-11-01 NOTE — PROGRESS NOTES
HISTORY OF PRESENT ILLNESS  Yasmany Cardenas is a 60 y.o. male. HPI    Patient Active Problem List    Diagnosis Date Noted    Bilateral carotid artery stenosis 08/09/2021    Cerebral microvascular disease 08/09/2021    Tension vascular headache 08/09/2021    Degenerative joint disease (DJD) of hip 06/03/2020    Cervical stenosis of spinal canal 10/30/2017    Pure hypercholesterolemia 07/28/2017    Prostate cancer ([facility] [address] 01/13/2015    GERD (gastroesophageal reflux disease) 09/07/2011    HTN (hypertension) 09/07/2011    Anxiety 03/29/2011     Current Outpatient Medications   Medication Sig Dispense Refill    triamcinolone acetonide (KENALOG) 0.1 % topical cream Apply  to affected area two (2) times a day. use thin layer Monday to Friday only 30 g 0    pantoprazole (PROTONIX) 20 mg tablet Take 1 Tablet by mouth two (2) times a day. 180 Tablet 1    sucralfate (CARAFATE) 1 gram tablet Take 1 Tablet by mouth four (4) times daily. 360 Tablet 1    lisinopriL (PRINIVIL, ZESTRIL) 20 mg tablet TAKE 1 TABLET BY MOUTH DAILY 90 Tablet 0    rosuvastatin (CRESTOR) 10 mg tablet TAKE 1 TABLET BY MOUTH EVERY NIGHT 90 Tablet 1    albuterol (PROVENTIL HFA, VENTOLIN HFA, PROAIR HFA) 90 mcg/actuation inhaler INHALE 2 PUFFS BY MOUTH EVERY 6 HOURS AS NEEDED FOR WHEEZING 54 g 1    hydrocortisone (CORTIZONE) 0.5 % ointment Apply to affected area two to three times daily 28.4 g 0    Cetirizine (ZyrTEC) 10 mg cap Take 10 mg by mouth as needed. fluticasone propionate (FLONASE) 50 mcg/actuation nasal spray 2 Sprays by Nasal route daily.        Past Surgical History:   Procedure Laterality Date    HX HIP REPLACEMENT Left 06/2020    HX PROSTATECTOMY  8/2015    HX SHOULDER ARTHROSCOPY Left     OK ABDOMEN SURGERY PROC UNLISTED Right 1980    inguinal      Lab Results   Component Value Date/Time    WBC 8.7 10/19/2022 06:25 PM    HGB 13.5 10/19/2022 06:25 PM    Hemoglobin (POC) 15.0 10/30/2017 02:13 PM    HCT 39.6 10/19/2022 06:25 PM    Hematocrit (POC) 44 10/30/2017 02:13 PM    PLATELET 868 52/89/7720 06:25 PM    MCV 89.4 10/19/2022 06:25 PM     Lab Results   Component Value Date/Time    Cholesterol, total 160 09/20/2022 10:33 AM    HDL Cholesterol 74 09/20/2022 10:33 AM    LDL, calculated 72.6 09/20/2022 10:33 AM    Triglyceride 67 09/20/2022 10:33 AM    CHOL/HDL Ratio 2.2 09/20/2022 10:33 AM     Lab Results   Component Value Date/Time    GFR est non-AA >60 09/20/2022 10:33 AM    GFRNA, POC >60 10/30/2017 02:13 PM    GFR est AA >60 09/20/2022 10:33 AM    GFRAA, POC >60 10/30/2017 02:13 PM    Creatinine 1.15 10/19/2022 06:25 PM    Creatinine (POC) 1.1 10/30/2017 02:13 PM    BUN 12 10/19/2022 06:25 PM    BUN (POC) 19 10/30/2017 02:13 PM    Sodium 136 10/19/2022 06:25 PM    Sodium (POC) 137 10/30/2017 02:13 PM    Potassium 4.3 10/19/2022 06:25 PM    Potassium (POC) 4.4 10/30/2017 02:13 PM    Chloride 103 10/19/2022 06:25 PM    Chloride (POC) 103 10/30/2017 02:13 PM    CO2 27 10/19/2022 06:25 PM        Review of Systems   HENT:  Positive for sinus pain. Respiratory:  Positive for cough. Negative for shortness of breath. Cardiovascular:  Negative for chest pain. Physical Exam  Constitutional:       General: He is not in acute distress. Appearance: Normal appearance. He is not ill-appearing, toxic-appearing or diaphoretic. HENT:      Head: Normocephalic and atraumatic. Right Ear: External ear normal.      Left Ear: External ear normal.   Eyes:      General:         Right eye: No discharge. Left eye: No discharge. Conjunctiva/sclera: Conjunctivae normal.      Pupils: Pupils are equal, round, and reactive to light. Cardiovascular:      Rate and Rhythm: Normal rate and regular rhythm. Heart sounds: No murmur heard. No friction rub. No gallop. Pulmonary:      Effort: No respiratory distress. Breath sounds: Normal breath sounds. No wheezing or rales. Chest:      Chest wall: No tenderness.    Musculoskeletal: General: Normal range of motion. Cervical back: Normal.   Skin:     General: Skin is warm and dry. Neurological:      General: No focal deficit present. Mental Status: He is oriented to person, place, and time. Gait: Gait normal.   Psychiatric:         Mood and Affect: Mood normal.         Behavior: Behavior normal.       ASSESSMENT and PLAN    ICD-10-CM ICD-9-CM    1. URI, acute  J06.9 465.9       2. Welcome to Medicare preventive visit  Z00.00 V70.0       3. LAE (left atrial enlargement)  I51.7 429.3 ECHO ADULT COMPLETE      4. Prostate cancer ([facility] [address]  C61 185       5. Primary hypertension  I10 401.9         Depression screen reviewed and negative. Scribed by Vanesa Poe, as dictated by Dr. Malick Aguiar. Current diagnosis and concerns discussed with pt at length. Pt understands risks and benefits or current treatment plan and medications, and accepts the treatment and medication with any possible risks. Pt asks appropriate questions, which were answered. Pt was instructed to call with any concerns or problems. I have reviewed the note documented by the scribe. The services provided are my own. The documentation is accurate.
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
## 2022-11-01 NOTE — PROGRESS NOTES
HISTORY OF PRESENT ILLNESS  Ellena Kawasaki is a 60 y.o. male. HPI  Last here 9/20/22. Here for acute care. Pt c/o sinus pain, post nasal drip, slight cough x 2-3 weeks. Denies fever, change in appetite, CP, SOB, sore throat, ear pain. Notes he feels like his allergies have gotten worse. He has been taking Zyrtec, this helps somewhat. He has not taken any COVID tests. He got his flu shot 9/20/22. Denies any sick people around him. However, he drives a school bus. Rx'd prednisone and augmentin. Discussed he can get COVID booster at local pharmacy after he completes prednisone. Has a history of hypertension  BP today is 125/72  No home BP readings to review  Taking lisinopril 20mg  Please recall hctz caused sodium to drop     Wt lov 143 lbs   His weight is within normal ranges     Reviewed labs   Ordered labs  Pt presented to ED 10/29/22   Reviewed EKG 10/19/22: Normal sinus rhythm   Possible Left atrial enlargement   Left ventricular hypertrophy   Cannot rule out Septal infarct  Ordered echo. Recall he has been having recurrent issues with stomach pain for the last year   Has been referred to GI a year ago though he has not scheduled this yet  He is now taking Protonix, this is helping  Scheduled for colo 1/23      No longer taking ASA 81 mg since pt has no personal hx of heart disease or stroke  Recall CT abd pelv 8/31/21:  No significant or acute abnormalities demonstrated.      He followed with Dr. Dorrie Osler (neuro) for history of headaches in the past and dizziness  Last visit 8/09/21     He is now following with Dr Lety Elder (ortho) for L leg pain  Pt has completed PT  He had L arthroplasty surgery 6/3/20 performed by Dr. Hannon   Last there 4/1/21   He has been released      Pt saw Dr Tati Nava for preop clearance in the past  Last visit 2/13/20     Pt follows with Dr. Eddie Sandifer ([facility]) for h/o prostate cancer, annually  Last visit 7/22, no changes, will get report     No longer taking celexa 10 mg for anxiety-- felt it wasn't helpful   No longer taking zoloft 50 mg, doing okay w/o meds  Could consider hydroxyzine for acute anxiety if needed down the road--of note he was given this previously and is not using it     Continues on crestor 10mg for cholesterol   Recall could not tolerate lipitor      Pt has albuterol to use prn for wheezing/breathing           Pt lives w/ his wife  Has grab bar in the shower    Has not had any alcohol in a month   Denies trouble hearing  Denies any recent falls  He walks frequently    Pt is functionally independent   Does own laundry  Does own driving  Does own bills and meds     No memory concerns   Knows the month, date, year, location   Can recall 3/3 objects       ACP not on file. SDM is his wife.   Provided information in the past.  PREVENTIVE:    Colonoscopy: 8/17/11, Dr. Santos Bates, repeat 10 years, scheduled 1/23  PSA: , 6/21 urology, 7/22 urology  Aaa screen due age 71  Tdap: 9/22/20  Pneumovax: not yet needed  Prevnar 20 : not yet needed  Shingrix: both doses complete  Flu shot: 9/20/22  A1c:19/20 5.4 12/21 5.5 9/22 5.5  Eye exam: Dr Derrell Avelar 11/20, has cataracts they are following, scheduled 1/23  Hep C screen: 7/14/15, negative   Lipids: 9/22 71  Covid: J&J + 1 Pfizer due booster  EKG: 10/19/22 nsr, possible LAE   Ct lung cancer screen : not indicated d/t less than 20 packyear hx, quit 2015  Patient Active Problem List    Diagnosis Date Noted    Bilateral carotid artery stenosis 08/09/2021    Cerebral microvascular disease 08/09/2021    Tension vascular headache 08/09/2021    Degenerative joint disease (DJD) of hip 06/03/2020    Cervical stenosis of spinal canal 10/30/2017    Pure hypercholesterolemia 07/28/2017    Prostate cancer ([facility] [address] 01/13/2015    GERD (gastroesophageal reflux disease) 09/07/2011    HTN (hypertension) 09/07/2011    Anxiety 03/29/2011     Current Outpatient Medications   Medication Sig Dispense Refill    amoxicillin-clavulanate (AUGMENTIN) 875-125 mg per tablet Take 1 Tablet by mouth every twelve (12) hours for 7 days. 14 Tablet 0    predniSONE (DELTASONE) 20 mg tablet , 40mg po daily for 2 days, 20mg po daily for 2days then stop 6 Tablet 0    triamcinolone acetonide (KENALOG) 0.1 % topical cream Apply  to affected area two (2) times a day. use thin layer Monday to Friday only 30 g 0    pantoprazole (PROTONIX) 20 mg tablet Take 1 Tablet by mouth two (2) times a day. 180 Tablet 1    sucralfate (CARAFATE) 1 gram tablet Take 1 Tablet by mouth four (4) times daily. 360 Tablet 1    lisinopriL (PRINIVIL, ZESTRIL) 20 mg tablet TAKE 1 TABLET BY MOUTH DAILY 90 Tablet 0    rosuvastatin (CRESTOR) 10 mg tablet TAKE 1 TABLET BY MOUTH EVERY NIGHT 90 Tablet 1    albuterol (PROVENTIL HFA, VENTOLIN HFA, PROAIR HFA) 90 mcg/actuation inhaler INHALE 2 PUFFS BY MOUTH EVERY 6 HOURS AS NEEDED FOR WHEEZING 54 g 1    hydrocortisone (CORTIZONE) 0.5 % ointment Apply to affected area two to three times daily 28.4 g 0    Cetirizine (ZyrTEC) 10 mg cap Take 10 mg by mouth as needed. fluticasone propionate (FLONASE) 50 mcg/actuation nasal spray 2 Sprays by Nasal route daily.        Past Surgical History:   Procedure Laterality Date    HX HIP REPLACEMENT Left 06/2020    HX PROSTATECTOMY  8/2015    HX SHOULDER ARTHROSCOPY Left     [place] ABDOMEN SURGERY PROC UNLISTED Right 1980    inguinal      Lab Results   Component Value Date/Time    WBC 8.7 10/19/2022 06:25 PM    HGB 13.5 10/19/2022 06:25 PM    Hemoglobin (POC) 15.0 10/30/2017 02:13 PM    HCT 39.6 10/19/2022 06:25 PM    Hematocrit (POC) 44 10/30/2017 02:13 PM    PLATELET 837 01/58/4981 06:25 PM    MCV 89.4 10/19/2022 06:25 PM     Lab Results   Component Value Date/Time    Cholesterol, total 160 09/20/2022 10:33 AM    HDL Cholesterol 74 09/20/2022 10:33 AM    LDL, calculated 72.6 09/20/2022 10:33 AM    Triglyceride 67 09/20/2022 10:33 AM    CHOL/HDL Ratio 2.2 09/20/2022 10:33 AM     Lab Results   Component Value Date/Time    GFR est non-AA >60 09/20/2022 10:33 AM    GFRNA, POC >60 10/30/2017 02:13 PM    GFR est AA >60 09/20/2022 10:33 AM    GFRAA, POC >60 10/30/2017 02:13 PM    Creatinine 1.15 10/19/2022 06:25 PM    Creatinine (POC) 1.1 10/30/2017 02:13 PM    BUN 12 10/19/2022 06:25 PM    BUN (POC) 19 10/30/2017 02:13 PM    Sodium 136 10/19/2022 06:25 PM    Sodium (POC) 137 10/30/2017 02:13 PM    Potassium 4.3 10/19/2022 06:25 PM    Potassium (POC) 4.4 10/30/2017 02:13 PM    Chloride 103 10/19/2022 06:25 PM    Chloride (POC) 103 10/30/2017 02:13 PM    CO2 27 10/19/2022 06:25 PM        Review of Systems   Respiratory:  Negative for shortness of breath. Cardiovascular:  Negative for chest pain. Physical Exam  Constitutional:       General: He is not in acute distress. Appearance: Normal appearance. He is not ill-appearing, toxic-appearing or diaphoretic. HENT:      Head: Normocephalic and atraumatic. Right Ear: External ear normal.      Left Ear: External ear normal.   Eyes:      General:         Right eye: No discharge. Left eye: No discharge. Conjunctiva/sclera: Conjunctivae normal.      Pupils: Pupils are equal, round, and reactive to light. Cardiovascular:      Rate and Rhythm: Normal rate and regular rhythm. Heart sounds: No murmur heard. No friction rub. No gallop. Pulmonary:      Effort: No respiratory distress. Breath sounds: Normal breath sounds. No wheezing or rales. Chest:      Chest wall: No tenderness. Musculoskeletal:         General: Normal range of motion. Cervical back: Normal.   Skin:     General: Skin is warm and dry. Neurological:      General: No focal deficit present. Mental Status: He is oriented to person, place, and time.       Gait: Gait normal.   Psychiatric:         Mood and Affect: Mood normal.         Behavior: Behavior normal.       ASSESSMENT and PLAN    ICD-10-CM ICD-9-CM    1. URI, acute  J06.9 465.9    Patient with persistent sinus symptoms lasting for 2 to 3 weeks now we will go ahead and treat with Augmentin and short course of prednisone   2. Welcome to Medicare preventive visit  Z00.00 V70.0       3. Prostate cancer [facility])  C61 185    Up-to-date with urology   4. Primary hypertension  I10 401.9    Controlled on current regimen of lisinopril   5. LAE (left atrial enlargement)  I51.7 429.3    Reviewed EKG from ER possible left atrial enlargement Echo ordered     Depression screen reviewed and negative. Scribed by Chandra Madera, as dictated by Dr. Neto Chavez. Current diagnosis and concerns discussed with pt at length. Pt understands risks and benefits or current treatment plan and medications, and accepts the treatment and medication with any possible risks. Pt asks appropriate questions, which were answered. Pt was instructed to call with any concerns or problems. I have reviewed the note documented by the scribe. The services provided are my own. The documentation is accurate.

## 2022-11-01 NOTE — PATIENT INSTRUCTIONS
Medicare Wellness Visit, Male    The best way to live healthy is to have a lifestyle where you eat a well-balanced diet, exercise regularly, limit alcohol use, and quit all forms of tobacco/nicotine, if applicable. Regular preventive services are another way to keep healthy. Preventive services (vaccines, screening tests, monitoring & exams) can help personalize your care plan, which helps you manage your own care. Screening tests can find health problems at the earliest stages, when they are easiest to treat. [facility] follows the current, evidence-based guidelines published by the [facility] ([facility]) when recommending preventive services for our patients. Because we follow these guidelines, sometimes recommendations change over time as research supports it. (For example, a prostate screening blood test is no longer routinely recommended for men with no symptoms). Of course, you and your doctor may decide to screen more often for some diseases, based on your risk and co-morbidities (chronic disease you are already diagnosed with). Preventive services for you include:  - Medicare offers their members a free annual wellness visit, which is time for you and your primary care provider to discuss and plan for your preventive service needs. Take advantage of this benefit every year!  -All adults over age 72 should receive the recommended pneumonia vaccines. Current USPSTF guidelines recommend a series of two vaccines for the best pneumonia protection.   -All adults should have a flu vaccine yearly and tetanus vaccine every 10 years.  -All adults age 48 and older should receive the shingles vaccines (series of two vaccines).        -All adults age 38-68 who are overweight should have a diabetes screening test once every three years.   -Other screening tests & preventive services for persons with diabetes include: an eye exam to screen for diabetic retinopathy, a kidney function test, a foot exam, and stricter control over your cholesterol.   -Cardiovascular screening for adults with routine risk involves an electrocardiogram (ECG) at intervals determined by the provider.   -Colorectal cancer screening should be done for adults age 54-65 with no increased risk factors for colorectal cancer. There are a number of acceptable methods of screening for this type of cancer. Each test has its own benefits and drawbacks. Discuss with your provider what is most appropriate for you during your annual wellness visit. The different tests include: colonoscopy (considered the best screening method), a fecal occult blood test, a fecal DNA test, and sigmoidoscopy.  -All adults born between [facility] should be screened once for Hepatitis C.  -An Abdominal Aortic Aneurysm (AAA) Screening is recommended for men age 73-68 who has ever smoked in their lifetime.      Here is a list of your current Health Maintenance items (your personalized list of preventive services) with a due date:  Health Maintenance Due   Topic Date Due    COVID-19 Vaccine (3 - Booster for Nany series) 06/16/2022    Colorectal Screening  10/01/2022

## 2022-11-04 DIAGNOSIS — R05.9 COUGH: ICD-10-CM

## 2022-11-04 RX ORDER — ALBUTEROL SULFATE 90 UG/1
AEROSOL, METERED RESPIRATORY (INHALATION)
Qty: 54 G | Refills: 1 | Status: SHIPPED | OUTPATIENT
Start: 2022-11-04

## 2022-12-06 RX ORDER — LISINOPRIL 20 MG/1
20 TABLET ORAL DAILY
Qty: 90 TABLET | Refills: 0 | Status: SHIPPED | OUTPATIENT
Start: 2022-12-06

## 2022-12-07 DIAGNOSIS — R05.9 COUGH: ICD-10-CM

## 2022-12-07 RX ORDER — ALBUTEROL SULFATE 90 UG/1
AEROSOL, METERED RESPIRATORY (INHALATION)
Qty: 54 G | Refills: 1 | Status: SHIPPED | OUTPATIENT
Start: 2022-12-07

## 2023-01-10 ENCOUNTER — ANESTHESIA (OUTPATIENT)
Dept: ENDOSCOPY | Age: 63
End: 2023-01-10
Payer: MEDICARE

## 2023-01-10 ENCOUNTER — HOSPITAL ENCOUNTER (OUTPATIENT)
Age: 63
Setting detail: OUTPATIENT SURGERY
Discharge: HOME | End: 2023-01-10
Attending: INTERNAL MEDICINE | Admitting: INTERNAL MEDICINE
Payer: MEDICARE

## 2023-01-10 ENCOUNTER — ANESTHESIA EVENT (OUTPATIENT)
Dept: ENDOSCOPY | Age: 63
End: 2023-01-10
Payer: MEDICARE

## 2023-01-10 VITALS
HEIGHT: 68 IN | BODY MASS INDEX: 21.67 KG/M2 | OXYGEN SATURATION: 92 % | WEIGHT: 143 LBS | HEART RATE: 83 BPM | DIASTOLIC BLOOD PRESSURE: 70 MMHG | SYSTOLIC BLOOD PRESSURE: 117 MMHG | TEMPERATURE: 98 F | RESPIRATION RATE: 13 BRPM

## 2023-01-10 PROCEDURE — 76060000031 HC ANESTHESIA FIRST 0.5 HR: Performed by: INTERNAL MEDICINE

## 2023-01-10 PROCEDURE — 76040000019: Performed by: INTERNAL MEDICINE

## 2023-01-10 PROCEDURE — 74011250636 HC RX REV CODE- 250/636: Performed by: NURSE ANESTHETIST, CERTIFIED REGISTERED

## 2023-01-10 PROCEDURE — 74011250637 HC RX REV CODE- 250/637: Performed by: INTERNAL MEDICINE

## 2023-01-10 RX ORDER — SODIUM CHLORIDE 0.9 % (FLUSH) 0.9 %
5-40 SYRINGE (ML) INJECTION AS NEEDED
Status: DISCONTINUED | OUTPATIENT
Start: 2023-01-10 | End: 2023-01-10 | Stop reason: HOSPADM

## 2023-01-10 RX ORDER — NALOXONE HYDROCHLORIDE 0.4 MG/ML
0.4 INJECTION, SOLUTION INTRAMUSCULAR; INTRAVENOUS; SUBCUTANEOUS
Status: DISCONTINUED | OUTPATIENT
Start: 2023-01-10 | End: 2023-01-10 | Stop reason: HOSPADM

## 2023-01-10 RX ORDER — PROPOFOL 10 MG/ML
INJECTION, EMULSION INTRAVENOUS AS NEEDED
Status: DISCONTINUED | OUTPATIENT
Start: 2023-01-10 | End: 2023-01-10 | Stop reason: HOSPADM

## 2023-01-10 RX ORDER — SODIUM CHLORIDE 9 MG/ML
INJECTION, SOLUTION INTRAVENOUS
Status: DISCONTINUED | OUTPATIENT
Start: 2023-01-10 | End: 2023-01-10 | Stop reason: HOSPADM

## 2023-01-10 RX ORDER — MIDAZOLAM HYDROCHLORIDE 1 MG/ML
.25-5 INJECTION, SOLUTION INTRAMUSCULAR; INTRAVENOUS
Status: DISCONTINUED | OUTPATIENT
Start: 2023-01-10 | End: 2023-01-10 | Stop reason: HOSPADM

## 2023-01-10 RX ORDER — EPINEPHRINE 0.1 MG/ML
1 INJECTION INTRACARDIAC; INTRAVENOUS
Status: DISCONTINUED | OUTPATIENT
Start: 2023-01-10 | End: 2023-01-10 | Stop reason: HOSPADM

## 2023-01-10 RX ORDER — DEXTROMETHORPHAN/PSEUDOEPHED 2.5-7.5/.8
1.2 DROPS ORAL
Status: DISCONTINUED | OUTPATIENT
Start: 2023-01-10 | End: 2023-01-10 | Stop reason: HOSPADM

## 2023-01-10 RX ORDER — PHENYLEPHRINE HCL IN 0.9% NACL 0.4MG/10ML
SYRINGE (ML) INTRAVENOUS AS NEEDED
Status: DISCONTINUED | OUTPATIENT
Start: 2023-01-10 | End: 2023-01-10 | Stop reason: HOSPADM

## 2023-01-10 RX ORDER — ATROPINE SULFATE 0.1 MG/ML
0.5 INJECTION INTRAVENOUS
Status: DISCONTINUED | OUTPATIENT
Start: 2023-01-10 | End: 2023-01-10 | Stop reason: HOSPADM

## 2023-01-10 RX ORDER — SODIUM CHLORIDE 0.9 % (FLUSH) 0.9 %
5-40 SYRINGE (ML) INJECTION EVERY 8 HOURS
Status: DISCONTINUED | OUTPATIENT
Start: 2023-01-10 | End: 2023-01-10 | Stop reason: HOSPADM

## 2023-01-10 RX ORDER — FENTANYL CITRATE 50 UG/ML
12.5-2 INJECTION, SOLUTION INTRAMUSCULAR; INTRAVENOUS
Status: DISCONTINUED | OUTPATIENT
Start: 2023-01-10 | End: 2023-01-10 | Stop reason: HOSPADM

## 2023-01-10 RX ORDER — FLUMAZENIL 0.1 MG/ML
0.2 INJECTION INTRAVENOUS
Status: DISCONTINUED | OUTPATIENT
Start: 2023-01-10 | End: 2023-01-10 | Stop reason: HOSPADM

## 2023-01-10 RX ORDER — SODIUM CHLORIDE 9 MG/ML
75 INJECTION, SOLUTION INTRAVENOUS CONTINUOUS
Status: DISCONTINUED | OUTPATIENT
Start: 2023-01-10 | End: 2023-01-10 | Stop reason: HOSPADM

## 2023-01-10 RX ADMIN — PROPOFOL 50 MG: 10 INJECTION, EMULSION INTRAVENOUS at 14:06

## 2023-01-10 RX ADMIN — PROPOFOL 50 MG: 10 INJECTION, EMULSION INTRAVENOUS at 14:05

## 2023-01-10 RX ADMIN — PROPOFOL 50 MG: 10 INJECTION, EMULSION INTRAVENOUS at 14:08

## 2023-01-10 RX ADMIN — Medication 80 MCG: at 14:13

## 2023-01-10 RX ADMIN — SODIUM CHLORIDE: 900 INJECTION, SOLUTION INTRAVENOUS at 13:48

## 2023-01-10 NOTE — PROCEDURES
[address], [address] [place] [address]  [phone]               Colonoscopy Operative Report      Indications:  Average risk screening, negative colonoscopy more than 10 yrs back    :  Román Zheng MD    Staff: Endoscopy Technician-1: Scarlet Taylor  Endoscopy RN-1: Ger Koroma RN     Referring Provider: King Mariia MD    Sedation:  MAC anesthesia    Procedure Details:  After informed consent was obtained with all risks and benefits of procedure explained and preoperative exam completed, the patient was taken to the endoscopy suite and placed in the left lateral decubitus position. Upon sequential sedation as per above, a digital rectal exam was performed  And was normal.  The Olympus videocolonoscope  was inserted in the rectum and carefully advanced to the cecum, which was identified by the ileocecal valve and appendiceal orifice. The quality of preparation was good. The colonoscope was slowly withdrawn with careful evaluation between folds. Retroflexion in the rectum was performed and was normal..     Findings:   Rectum: normal  Sigmoid:     - Diverticulosis  Descending Colon: normal  Transverse Colon:     - Diverticulosis  Ascending Colon: normal  Cecum: normal  Terminal Ileum: not intubated    Interventions:  none    Specimen Removed: * No specimens in log *    EBL:  None    Complications:  None; patient tolerated the procedure well. Impression:  -Moderate sigmoid and transverse colon diverticulosis. -Otherwise normal exam without polyps. Recommendations:   -Resume normal medication(s). -Begin fiber supplement daily - consider metamucil (or any other over the counter fiber supplement) 1 tablespoon in 12-16 oz water and increase to 2 tablespoons over 4-6 weeks  -High fiber diet.  -For colon cancer screening in this average-risk patient, colonoscopy may be repeated in 10 years.  -Follow up with primary care physician.          Discharge Disposition:  Home in the company of a  when able to ambulate.     David Chen MD  1/10/2023  2:21 PM

## 2023-01-10 NOTE — DISCHARGE INSTRUCTIONS
[address]  [facility], [address]          Edward Eid  [id number]  1960    COLON DISCHARGE INSTRUCTIONS    DISCOMFORT:  Redness at IV site- apply warm compress to area; if redness or soreness persist- contact your physician  There may be a slight amount of blood passed from the rectum  Gaseous discomfort- walking, belching will help relieve any discomfort    DIET:   High Fiber diet. ACTIVITY:  You may resume your normal daily activities it is recommended that you spend the remainder of the day resting -  avoid any strenuous activity. You may not operate a vehicle for 12 hours  You may not engage in an occupation involving machinery or appliances for rest of today  You may not drink alcoholic beverages for at least 12 hours  Avoid making any critical decisions for at least 24 hour    CALL M.D. ANY SIGN OF:   Increasing pain, nausea, vomiting  Abdominal distension (swelling)  New increased bleeding (oral or rectal)  Fever (chills)  Pain in chest area  Bloody discharge from nose or mouth  Shortness of breath     Follow-up Instructions:   Call Dr. Cj Perez for any questions or problems. Telephone # [phone]  Biopsy results will be available in  5 to 7 days    Impression:  -Moderate sigmoid and transverse colon diverticulosis. -Otherwise normal exam without polyps. Recommendations:   -Resume normal medication(s). -Begin fiber supplement daily - consider metamucil (or any other over the counter fiber supplement) 1 tablespoon in 12-16 oz water and increase to 2 tablespoons over 4-6 weeks  -High fiber diet.  -For colon cancer screening in this average-risk patient, colonoscopy may be repeated in 10 years.  -Follow up with primary care physician. Cj Perez MD         Learning About Diverticulosis and Diverticulitis  What are diverticulosis and diverticulitis?      In diverticulosis and diverticulitis, pouches called diverticula form in the wall of the large intestine, or colon. In diverticulosis, the pouches do not cause any pain or other symptoms. In diverticulitis, the pouches get inflamed or infected and cause symptoms. Doctors aren't sure what causes these pouches in the colon. But they think that a low-fiber diet may play a role. Without fiber to add bulk to the stool, the colon has to work harder than normal to push the stool forward. The pressure from this may cause pouches to form in weak spots along the colon. Some people with diverticulosis get diverticulitis. But experts don't know why this happens. What are the symptoms? In diverticulosis, most people don't have symptoms. But pouches sometimes bleed. In diverticulitis, symptoms may last from a few hours to a week or more. They include:  Belly pain. This is usually in the lower left side. It is sometimes worse when you move. This is the most common symptom. Fever and chills. Bloating and gas. Diarrhea or constipation. Nausea and sometimes vomiting. Not feeling like eating. How can you prevent diverticulitis? You may be able to lower your chance of getting diverticulitis. You can do this by taking steps to prevent constipation. Eat fruits, vegetables, beans, and whole grains every day. These foods are high in fiber. Drink plenty of fluids. If you have kidney, heart, or liver disease and have to limit fluids, talk with your doctor before you increase the amount of fluids you drink. Get at least 30 minutes of exercise on most days of the week. Walking is a good choice. You also may want to do other activities, such as running, swimming, cycling, or playing tennis or team sports. Take a fiber supplement, such as Citrucel or Metamucil, every day if needed. Read and follow all instructions on the label. Schedule time each day for a bowel movement. Having a daily routine may help. Take your time and do not strain when having a bowel movement.   Some people avoid nuts, seeds, berries, and popcorn. They believe that these foods might get trapped in the diverticula and cause pain. But there is no proof that these foods cause diverticulitis or make it worse. How are these problems treated? The best way to treat diverticulosis is to avoid constipation. Treatment for diverticulitis includes antibiotics. It often includes a change in your diet. You may need only liquids at first. Your doctor may suggest pain medicines for pain or belly cramps. In some cases, surgery may be needed. Follow-up care is a key part of your treatment and safety. Be sure to make and go to all appointments, and call your doctor if you are having problems. It's also a good idea to know your test results and keep a list of the medicines you take. Where can you learn more? Go to http://www.gray.com/  Enter G593 in the search box to learn more about \"Learning About Diverticulosis and Diverticulitis. \"  Current as of: June 6, 2022               Content Version: 13.4  © 2006-2022 [employer]. Care instructions adapted under license by [employer] (which disclaims liability or warranty for this information). If you have questions about a medical condition or this instruction, always ask your healthcare professional. [address] any warranty or liability for your use of this information. High-Fiber Diet: Care Instructions  Overview     A high-fiber diet may help you relieve constipation and feel less bloated. Your doctor and dietitian will help you make a high-fiber eating plan based on your personal needs. The plan will include the things you like to eat. It will also make sure that you get 25 to 35 grams of fiber a day. Before you make changes to the way you eat, be sure to talk with your doctor or dietitian. Follow-up care is a key part of your treatment and safety.  Be sure to make and go to all appointments, and call your doctor if you are having problems. It's also a good idea to know your test results and keep a list of the medicines you take. How can you care for yourself at home? You can increase how much fiber you get if you eat more of certain foods. These foods include:  Whole-grain breads and cereals. Fruits, such as pears, apples, and peaches. Eat the skins and peels if you can. Vegetables, such as broccoli, cabbage, spinach, carrots, asparagus, and squash. Starchy vegetables. These include potatoes with skins, kidney beans, and lima beans. Take a fiber supplement every day if your doctor recommends it. Examples are Benefiber, Citrucel, FiberCon, and Metamucil. Ask your doctor how much to take. Drink plenty of fluids. If you have kidney, heart, or liver disease and have to limit fluids, talk with your doctor before you increase the amount of fluids you drink. Where can you learn more? Go to http://www.gray.com/  Enter B391 in the search box to learn more about \"High-Fiber Diet: Care Instructions. \"  Current as of: May 9, 2022               Content Version: 13.4  © 2006-2022 [employer]. Care instructions adapted under license by [employer] (which disclaims liability or warranty for this information). If you have questions about a medical condition or this instruction, always ask your healthcare professional. [place] [address] any warranty or liability for your use of this information. Learning About Foods That Are Good Sources of Fiber  What foods are high in fiber? The foods you eat contain nutrients, such as vitamins and minerals. Fiber is a nutrient. Your body needs the right amount to stay healthy and work as it should. You can use the list below to help you make choices about which foods to eat.   Here are some examples of foods that are good sources of fiber.  Fruits  Apple  Apricot  Avocado  Banana  Blackberries  Cherries  Melon  Pear  Raspberries  Grains  Amaranth  Barley  Bran cereal  Farro  Oat bran  Oatmeal  Quinoa  Rice (brown or wild)  Whole-grain bread  Whole-grain English muffin  Protein foods  Almonds  Beans (black, kidney, navy, amos)  Deonte seeds  Garbanzo beans  Lentils  Pumpkin seeds  Split peas  Sunflower seeds  Vegetables  Artichoke  Broccoli  Paton sprouts  Cabbage  Carrots  Cauliflower  Eggplant  Green peas  Kale  Pumpkin  Sweet potato  White potato  Work with your doctor to find out how much of this nutrient you need. Depending on your health, you may need more or less of it in your diet. Where can you learn more? Go to http://www.gray.com/  Enter F355 in the search box to learn more about \"Learning About Foods That Are Good Sources of Fiber. \"  Current as of: May 9, 2022               Content Version: 13.4  © 2006-2022 [employer]. Care instructions adapted under license by [employer] (which disclaims liability or warranty for this information). If you have questions about a medical condition or this instruction, always ask your healthcare professional. [place] [address] any warranty or liability for your use of this information. Learning About Coronavirus [phone])  Coronavirus [phone]): Overview  What is coronavirus (COVID-19)? The coronavirus disease (COVID-19) is caused by a virus. It is an illness that was first found in [place], [place], in December 2019. It has since spread worldwide. The virus can cause fever, cough, and trouble breathing. In severe cases, it can cause pneumonia and make it hard to breathe without help. It can cause death. Coronaviruses are a large group of viruses. They cause the common cold. They also cause more serious illnesses like Middle East respiratory syndrome (MERS) and severe acute respiratory syndrome (SARS).  COVID-19 is caused by a novel coronavirus. That means it's a new type that has not been seen in people before. This virus spreads person-to-person through droplets from coughing and sneezing. It can also spread when you are close to someone who is infected. And it can spread when you touch something that has the virus on it, such as a doorknob or a tabletop. What can you do to protect yourself from coronavirus (COVID-19)? The best way to protect yourself from getting sick is to: Avoid areas where there is an outbreak. Avoid contact with people who may be infected. Wash your hands often with soap or alcohol-based hand sanitizers. Avoid crowds and try to stay at least 6 feet away from other people. Wash your hands often, especially after you cough or sneeze. Use soap and water, and scrub for at least 20 seconds. If soap and water aren't available, use an alcohol-based hand . Avoid touching your mouth, nose, and eyes. What can you do to avoid spreading the virus to others? To help avoid spreading the virus to others:  Cover your mouth with a tissue when you cough or sneeze. Then throw the tissue in the trash. Use a disinfectant to clean things that you touch often. Stay home if you are sick or have been exposed to the virus. Don't go to school, work, or public areas. And don't use public transportation. If you are sick:  Leave your home only if you need to get medical care. But call the doctor's office first so they know you're coming. And wear a face mask, if you have one. If you have a face mask, wear it whenever you're around other people. It can help stop the spread of the virus when you cough or sneeze. Clean and disinfect your home every day. Use household  and disinfectant wipes or sprays. Take special care to clean things that you grab with your hands. These include doorknobs, remote controls, phones, and handles on your refrigerator and microwave.  And don't forget countertops, tabletops, bathrooms, and computer keyboards. When to call for help  Call 911 anytime you think you may need emergency care. For example, call if:  You have severe trouble breathing. (You can't talk at all.)  You have constant chest pain or pressure. You are severely dizzy or lightheaded. You are confused or can't think clearly. Your face and lips have a blue color. You pass out (lose consciousness) or are very hard to wake up. Call your doctor now if you develop symptoms such as:  Shortness of breath. Fever. Cough. If you need to get care, call ahead to the doctor's office for instructions before you go. Make sure you wear a face mask, if you have one, to prevent exposing other people to the virus. Where can you get the latest information? The following health organizations are tracking and studying this virus. Their websites contain the most up-to-date information. Gume Nino also learn what to do if you think you may have been exposed to the virus. U.S. Centers for Disease Control and Prevention (CDC): The CDC provides updated news about the disease and travel advice. The website also tells you how to prevent the spread of infection. www.cdc.gov  World Health Organization [facility]): WHO offers information about the virus outbreaks. WHO also has travel advice. www.who.int  Current as of: April 1, 2020               Content Version: 12.4  © 4823-3235 Healthwise[employer]orporated. Care instructions adapted under license by your healthcare professional. If you have questions about a medical condition or this instruction, always ask your healthcare professional. [address] any warranty or liability for your use of this information.

## 2023-01-10 NOTE — ANESTHESIA POSTPROCEDURE EVALUATION
Procedure(s):  COLONOSCOPY. MAC    Anesthesia Post Evaluation        Patient location during evaluation: PACU  Patient participation: complete - patient participated  Level of consciousness: awake and alert  Pain management: adequate  Airway patency: patent  Anesthetic complications: no  Cardiovascular status: acceptable  Respiratory status: acceptable  Hydration status: acceptable  Comments: I have seen and evaluated the patient and is ready for discharge. Kimberly Pink MD    Post anesthesia nausea and vomiting:  none      INITIAL Post-op Vital signs:   Vitals Value Taken Time   /86 01/10/23 1440   Temp 36.7 °C (98 °F) 01/10/23 1425   Pulse 79 01/10/23 1440   Resp 16 01/10/23 1440   SpO2 98 % 01/10/23 1440   Vitals shown include unvalidated device data.

## 2023-01-10 NOTE — ANESTHESIA PREPROCEDURE EVALUATION
Anesthetic History   No history of anesthetic complications            Review of Systems / Medical History  Patient summary reviewed, nursing notes reviewed and pertinent labs reviewed    Pulmonary            Asthma : well controlled       Neuro/Psych         Psychiatric history     Cardiovascular    Hypertension              Exercise tolerance: >4 METS     GI/Hepatic/Renal     GERD: well controlled           Endo/Other        Arthritis     Other Findings              Physical Exam    Airway  Mallampati: II  TM Distance: 4 - 6 cm  Neck ROM: normal range of motion   Mouth opening: Normal     Cardiovascular  Regular rate and rhythm,  S1 and S2 normal,  no murmur, click, rub, or gallop             Dental  No notable dental hx       Pulmonary  Breath sounds clear to auscultation               Abdominal  GI exam deferred       Other Findings            Anesthetic Plan    ASA: 2  Anesthesia type: MAC    Monitoring Plan: BIS      Induction: Intravenous  Anesthetic plan and risks discussed with: Patient

## 2023-01-10 NOTE — H&P
[address]  [address]  [phone]        History and Physical     NAME: Lian Nino   :  1960   MRN:  [id number]         HPI:  Lian Nino is a 60 y.o. male here for screening colonoscopy. Last colonoscopy more than 10 yrs back - reported normal.     Past Surgical History:   Procedure Laterality Date    HX HIP REPLACEMENT Left 2020    HX PROSTATECTOMY  2015    HX SHOULDER ARTHROSCOPY Left     OK ABDOMEN SURGERY PROC UNLISTED Right 1980    inguinal     Past Medical History:   Diagnosis Date    Anxiety disorder     Arthritis     Asthma     allergies environmental    Cancer ([facility] [address]     prostate CA    GERD (gastroesophageal reflux disease)     High cholesterol     HTN (hypertension) 2011    Other and unspecified symptoms and signs involving general sensations and perceptions     construction accident     Other ill-defined conditions(799.89)     anxiety    Other ill-defined conditions(799.89)     seasonal allergies    Psychiatric disorder     anxiety     Social History     Tobacco Use    Smoking status: Former     Packs/day: 0.25     Years: 12.00     Pack years: 3.00     Types: Cigarettes     Quit date: 2015     Years since quittin.6    Smokeless tobacco: Never   Vaping Use    Vaping Use: Never used   Substance Use Topics    Alcohol use: No    Drug use: No     No current facility-administered medications on file prior to encounter. Current Outpatient Medications on File Prior to Encounter   Medication Sig Dispense Refill    albuterol (PROVENTIL HFA, VENTOLIN HFA, PROAIR HFA) 90 mcg/actuation inhaler INHALE 2 PUFFS BY MOUTH EVERY 6 HOURS AS NEEDED FOR WHEEZING 54 g 1    lisinopriL (PRINIVIL, ZESTRIL) 20 mg tablet TAKE 1 TABLET BY MOUTH DAILY 90 Tablet 0    triamcinolone acetonide (KENALOG) 0.1 % topical cream Apply  to affected area two (2) times a day.  use thin layer Monday to Friday only 30 g 0    pantoprazole (PROTONIX) 20 mg tablet Take 1 Tablet by mouth two (2) times a day. 180 Tablet 1    sucralfate (CARAFATE) 1 gram tablet Take 1 Tablet by mouth four (4) times daily. 360 Tablet 1    rosuvastatin (CRESTOR) 10 mg tablet TAKE 1 TABLET BY MOUTH EVERY NIGHT 90 Tablet 1    hydrocortisone (CORTIZONE) 0.5 % ointment Apply to affected area two to three times daily 28.4 g 0    Cetirizine (ZyrTEC) 10 mg cap Take 10 mg by mouth as needed. fluticasone propionate (FLONASE) 50 mcg/actuation nasal spray 2 Sprays by Nasal route daily.       predniSONE (DELTASONE) 20 mg tablet , 40mg po daily for 2 days, 20mg po daily for 2days then stop 6 Tablet 0     No Known Allergies  Family History   Problem Relation Age of Onset    Diabetes Mother     Hypertension Mother     Hypertension Sister     Kidney Disease Father     Alcohol abuse Brother     Cancer Brother         colon    Anesth Problems Neg Hx      Current Facility-Administered Medications   Medication Dose Route Frequency    0.9% sodium chloride infusion  75 mL/hr IntraVENous CONTINUOUS    sodium chloride (NS) flush 5-40 mL  5-40 mL IntraVENous Q8H    sodium chloride (NS) flush 5-40 mL  5-40 mL IntraVENous PRN    midazolam (VERSED) injection 0.25-5 mg  0.25-5 mg IntraVENous Multiple    fentaNYL citrate (PF) injection 12.5-200 mcg  12.5-200 mcg IntraVENous Multiple    naloxone (NARCAN) injection 0.4 mg  0.4 mg IntraVENous Multiple    flumazeniL (ROMAZICON) 0.1 mg/mL injection 0.2 mg  0.2 mg IntraVENous Multiple    simethicone (MYLICON) 84ZJ/7.5RZ oral drops 80 mg  1.2 mL Oral Multiple    atropine injection 0.5 mg  0.5 mg IntraVENous ONCE PRN    EPINEPHrine (ADRENALIN) 0.1 mg/mL syringe 1 mg  1 mg Endoscopically ONCE PRN     Facility-Administered Medications Ordered in Other Encounters   Medication Dose Route Frequency    0.9% sodium chloride infusion   IntraVENous CONTINUOUS       PHYSICAL EXAM:    BP (!) 150/91 (BP 1 Location: Left upper arm, BP Patient Position: At rest;Supine)   Pulse 89 Resp 13   Ht 5' 8\" (1.727 m)   Wt 64.9 kg (143 lb)   SpO2 97%   BMI 21.74 kg/m²      General: WD, WN. Alert, cooperative, no acute distress    HEENT: NC, Atraumatic. PERRLA, EOMI. Anicteric sclerae. Lungs:  CTA Bilaterally. No Wheezing/Rhonchi/Rales. Heart:  Regular  rhythm,  No murmur, No Rubs, No Gallops  Abdomen: Soft, Non distended, Non tender. +Bowel sounds, no HSM  Extremities: No c/c/e  Neurologic:  CN 2-12 gi, Alert and oriented X 3. No acute neurological distress   Psych:   Good insight. Not anxious nor agitated.        Assessment:   Average risk screening, last colonoscopy more than 10 yrs back    Plan:   Endoscopic procedure: Colonoscopy  Anesthesia plan: Monitored Anesthesia Care

## 2023-01-20 ENCOUNTER — OFFICE VISIT (OUTPATIENT)
Dept: INTERNAL MEDICINE CLINIC | Age: 63
End: 2023-01-20
Payer: MEDICARE

## 2023-01-20 VITALS
HEIGHT: 68 IN | DIASTOLIC BLOOD PRESSURE: 75 MMHG | WEIGHT: 150.8 LBS | TEMPERATURE: 97.4 F | OXYGEN SATURATION: 97 % | HEART RATE: 58 BPM | BODY MASS INDEX: 22.85 KG/M2 | SYSTOLIC BLOOD PRESSURE: 137 MMHG | RESPIRATION RATE: 16 BRPM

## 2023-01-20 DIAGNOSIS — J01.90 ACUTE SINUSITIS, RECURRENCE NOT SPECIFIED, UNSPECIFIED LOCATION: Primary | ICD-10-CM

## 2023-01-20 RX ORDER — AMOXICILLIN AND CLAVULANATE POTASSIUM 875; 125 MG/1; MG/1
1 TABLET, FILM COATED ORAL EVERY 12 HOURS
Qty: 20 TABLET | Refills: 0 | Status: SHIPPED | OUTPATIENT
Start: 2023-01-20 | End: 2023-01-30

## 2023-01-20 NOTE — PROGRESS NOTES
PROGRESS NOTE  Name: Jagruti Thompson   : 1960       ASSESSMENT/ PLAN:     Diagnoses and all orders for this visit:    Acute sinusitis, recurrence not specified, unspecified location  -     amoxicillin-clavulanate (AUGMENTIN) 875-125 mg per tablet; Take 1 Tablet by mouth every twelve (12) hours for 10 days. , Normal, Disp-20 Tablet, R-0    Elev BP: May be due to pain. Follow-up and Dispositions    Return if symptoms worsen or fail to improve. I have reviewed the patient's medications and risks/side effects/benefits were discussed. Diagnosis(-es) explained to patient and questions answered. Literature provided where appropriate. SUBJECTIVE  Mr. Jagruti Thompson is a patient of Real Martinez MD and presents today acutely for the following:     Chief Complaint   Patient presents with    Migraine       He has migrating head pain, sometimes in forehead, sometimes parietal area. \"It make me feel bad. \" Has a little imbalance. He has a little nausea. The pain has been present for several days, waxes and wanes. He has sinus pressure. He has taking zyrtec, which \"made me feel all off. \" He has taken advil which helped some. Tylenol also has helped a little. OBJECTIVE  Visit Vitals  Ht 5' 8\" (1.727 m)   BMI 21.74 kg/m²     Gen: Pleasant 60 y.o.  male in NAD. HEENT: PERRLA. EOMI. OP moist and pink. Neck: Supple. No LAD. HEART: RRR, No M/G/R.   LUNGS: CTAB No W/R. ABDOMEN: S, NT, ND, BS+. EXTREMITIES: Warm.

## 2023-01-20 NOTE — PROGRESS NOTES
1. \"Have you been to the ER, urgent care clinic since your last visit? Hospitalized since your last visit? \" No    2. \"Have you seen or consulted any other health care providers outside of the [address] [place] [address] since your last visit? \" No     3. For patients aged 39-70: Has the patient had a colonoscopy / FIT/ Cologuard?  Yes - no Care Gap present

## 2023-03-02 ENCOUNTER — TELEPHONE (OUTPATIENT)
Dept: INTERNAL MEDICINE CLINIC | Age: 63
End: 2023-03-02

## 2023-03-02 NOTE — TELEPHONE ENCOUNTER
Received message from nurse that patient will need a pre op appointment before his cataract surgery on 3/16/23. Left message for patient to return call to office to schedule an appointment.

## 2023-03-03 NOTE — PROGRESS NOTES
HISTORY OF PRESENT ILLNESS  Roberta Duenas is a 60 y.o. male. HPI  Last here 11/1/22. Here for routine care. He is having cataract surgery 3/16/23 for R, and 3/30/23 for L. Denies allergy to latex, anesthesia  Pt denies cp, sob, palpitations, orthopnea, claudication, PND, and new swelling in legs  Pt can sleep laying flat  Pt can walk up a set of stairs  Pt can walk around the mall   Pt can vacuum and do laundry     Functional mets >>4    Recall last EKG 10/19/22     Has a history of hypertension  BP today is 104/55   No home BP readings to review  Taking lisinopril 20mg  Please recall hctz caused sodium to drop     Wt today is 141 lbs, down 2 lbs since lov   His weight is within normal ranges     Reviewed labs   Ordered labs   I previously ordered an echo, which was cancelled. He states this has been r/s'd. Encouraged him to complete this when he gets a chance     Recall he has been having recurrent issues with stomach pain for the last year   Has been referred to GI a year ago though he has not scheduled this yet  He is only taking Protonix PRN  Had colo 1/10/23 w/ Dr. Froilan Reis (GI), diverticula, 10 year repeat      No longer taking ASA 81 mg since pt has no personal hx of heart disease or stroke  Recall CT abd pelv 8/31/21:  No significant or acute abnormalities demonstrated.      He followed with Dr. Kathe Sutherland (neuro) for history of headaches in the past and dizziness  Last visit 8/09/21     He is now following with Dr Aranza Byrnes (ortho) for L leg pain  Pt has completed PT  He had L arthroplasty surgery 6/3/20 performed by Dr. Phuong Noguera   Last there 4/1/21   He has been released      Pt saw Dr Xi Roberts for preop clearance in the past  Last visit 2/13/20     Pt follows with Dr. Noel Mari ([facility]) for h/o prostate cancer, annually  Last visit 7/22, no changes  Will f/U 3/28/23      No longer taking celexa 10 mg for anxiety-- felt it wasn't helpful   No longer taking zoloft 50 mg, doing okay w/o meds 3/23   Could consider hydroxyzine for acute anxiety if needed down the road--of note he was given this previously and is not using it      Continues on crestor 10mg for cholesterol   Recall could not tolerate lipitor      Pt has albuterol to use prn for wheezing/breathing  Has not needed this frequently      Pt lives w/ his wife     ACP not on file. SDM is his wife.   Provided information in the past.    Reviewed colo 1/10/23: Dr. Ravinder Nieves, diverticula, 10 year repeat    PREVENTIVE:    Colonoscopy: 1/10/23 Dr. Ravinder Nieves 10 year repeat   PSA: 6/21 urology, 7/22 urology  Aaa screen due age 71  Tdap: 9/22/20  Pneumovax: not yet needed  Prevnar 20 : not yet needed  Shingrix: both doses complete  Flu shot: 9/20/22  A1c: 9/20 5.4 12/21 5.5 9/22 5.5  Eye exam: Dr Niru Cabrera 1/23, scheduled for BL cataract surgeries 3/23   Hep C screen: 7/14/15, negative   Lipids: 9/22 71  Covid: J&J + 1 Pfizer, due booster  EKG: 10/19/22 nsr, possible LAE  Ct lung cancer screen : not indicated d/t less than 20 packyear hx, quit 2015    Patient Active Problem List    Diagnosis Date Noted    Bilateral carotid artery stenosis 08/09/2021    Cerebral microvascular disease 08/09/2021    Tension vascular headache 08/09/2021    Degenerative joint disease (DJD) of hip 06/03/2020    Cervical stenosis of spinal canal 10/30/2017    Pure hypercholesterolemia 07/28/2017    Prostate cancer ([facility] [address] 01/13/2015    GERD (gastroesophageal reflux disease) 09/07/2011    HTN (hypertension) 09/07/2011    Anxiety 03/29/2011     Current Outpatient Medications   Medication Sig Dispense Refill    albuterol (PROVENTIL HFA, VENTOLIN HFA, PROAIR HFA) 90 mcg/actuation inhaler INHALE 2 PUFFS BY MOUTH EVERY 6 HOURS AS NEEDED FOR WHEEZING 54 g 1    lisinopriL (PRINIVIL, ZESTRIL) 20 mg tablet TAKE 1 TABLET BY MOUTH DAILY 90 Tablet 0    predniSONE (DELTASONE) 20 mg tablet , 40mg po daily for 2 days, 20mg po daily for 2days then stop 6 Tablet 0    triamcinolone acetonide (KENALOG) 0.1 % topical cream Apply  to affected area two (2) times a day. use thin layer Monday to Friday only 30 g 0    pantoprazole (PROTONIX) 20 mg tablet Take 1 Tablet by mouth two (2) times a day. 180 Tablet 1    sucralfate (CARAFATE) 1 gram tablet Take 1 Tablet by mouth four (4) times daily. 360 Tablet 1    rosuvastatin (CRESTOR) 10 mg tablet TAKE 1 TABLET BY MOUTH EVERY NIGHT 90 Tablet 1    hydrocortisone (CORTIZONE) 0.5 % ointment Apply to affected area two to three times daily 28.4 g 0    Cetirizine (ZyrTEC) 10 mg cap Take 10 mg by mouth as needed. fluticasone propionate (FLONASE) 50 mcg/actuation nasal spray 2 Sprays by Nasal route daily.        Past Surgical History:   Procedure Laterality Date    COLONOSCOPY N/A 1/10/2023    COLONOSCOPY performed by Jered Cevallos MD at [address] [place] [address],[address] Left 06/2020    HX PROSTATECTOMY  8/2015    HX SHOULDER ARTHROSCOPY Left     [place] UNLISTED PROCEDURE ABDOMEN PERITONEUM & OMENTUM Right 1980    inguinal      Lab Results   Component Value Date/Time    WBC 8.7 10/19/2022 06:25 PM    HGB 13.5 10/19/2022 06:25 PM    Hemoglobin (POC) 15.0 10/30/2017 02:13 PM    HCT 39.6 10/19/2022 06:25 PM    Hematocrit (POC) 44 10/30/2017 02:13 PM    PLATELET 535 91/68/9097 06:25 PM    MCV 89.4 10/19/2022 06:25 PM     Lab Results   Component Value Date/Time    Cholesterol, total 160 09/20/2022 10:33 AM    HDL Cholesterol 74 09/20/2022 10:33 AM    LDL, calculated 72.6 09/20/2022 10:33 AM    Triglyceride 67 09/20/2022 10:33 AM    CHOL/HDL Ratio 2.2 09/20/2022 10:33 AM     Lab Results   Component Value Date/Time    GFR est non-AA >60 09/20/2022 10:33 AM    GFRNA, POC >60 10/30/2017 02:13 PM    GFR est AA >60 09/20/2022 10:33 AM    GFRAA, POC >60 10/30/2017 02:13 PM    Creatinine 1.15 10/19/2022 06:25 PM    Creatinine (POC) 1.1 10/30/2017 02:13 PM    BUN 12 10/19/2022 06:25 PM    BUN (POC) 19 10/30/2017 02:13 PM    Sodium 136 10/19/2022 06:25 PM    Sodium (POC) 137 10/30/2017 02:13 PM    Potassium 4.3 10/19/2022 06:25 PM    Potassium (POC) 4.4 10/30/2017 02:13 PM    Chloride 103 10/19/2022 06:25 PM    Chloride (POC) 103 10/30/2017 02:13 PM    CO2 27 10/19/2022 06:25 PM        Review of Systems   Respiratory:  Negative for shortness of breath. Cardiovascular:  Negative for chest pain. Physical Exam  Constitutional:       General: He is not in acute distress. Appearance: Normal appearance. He is not ill-appearing, toxic-appearing or diaphoretic. HENT:      Head: Normocephalic and atraumatic. Right Ear: External ear normal.      Left Ear: External ear normal.   Eyes:      General:         Right eye: No discharge. Left eye: No discharge. Conjunctiva/sclera: Conjunctivae normal.      Pupils: Pupils are equal, round, and reactive to light. Neck:      Vascular: No carotid bruit. Cardiovascular:      Rate and Rhythm: Normal rate and regular rhythm. Heart sounds: No murmur heard. No friction rub. No gallop. Pulmonary:      Effort: No respiratory distress. Breath sounds: Normal breath sounds. No wheezing or rales. Chest:      Chest wall: No tenderness. Abdominal:      Palpations: Abdomen is soft. Tenderness: There is no abdominal tenderness. Musculoskeletal:         General: Normal range of motion. Cervical back: Normal.      Right lower leg: No edema. Left lower leg: No edema. Skin:     General: Skin is warm and dry. Neurological:      General: No focal deficit present. Mental Status: He is oriented to person, place, and time. Gait: Gait normal.   Psychiatric:         Mood and Affect: Mood normal.         Behavior: Behavior normal.       ASSESSMENT and PLAN    ICD-10-CM ICD-9-CM    1.  Preop cardiovascular exam  H80.028 Q18.84 METABOLIC PANEL, BASIC   Patient is here for preop for cataract surgery    He is low risk for a low risk surgery with good functional METS    Had an EKG in October no acute ischemia    No additional cardiac work-up needed prior to follow-up he is stable to proceed to surgery   2. Pure hypercholesterolemia  E78.00 272.0 rosuvastatin (CRESTOR) 10 mg tablet   Controlled on Crestor continue   METABOLIC PANEL, BASIC      3. Cough  R05.9 786.2 albuterol (PROVENTIL HFA, VENTOLIN HFA, PROAIR HFA) 90 mcg/actuation inhaler   Uses albuterol as needed works well   METABOLIC PANEL, BASIC      4. Prostate cancer ([facility] [address]  J85 912 METABOLIC PANEL, BASIC   Follows with urology routinely up-to-date has follow-up later this month   5. Primary hypertension  N16 484.0 METABOLIC PANEL, BASIC   Controlled on lisinopril 20 mg daily check metabolic panel for K creatinine sodium levels   6. Gastroesophageal reflux disease without esophagitis  K21.9 530.81    Has been having significant difficulty with reflux he was using Protonix ultimately symptoms have improved not requiring medication   7. Anxiety  F41.9 300.00    Controlled without medication previously was on Celexa doing okay   Depression screen reviewed and negative. Scribed by Archana Kimble, as dictated by Dr. Estefany Carrington. Current diagnosis and concerns discussed with pt at length. Pt understands risks and benefits or current treatment plan and medications, and accepts the treatment and medication with any possible risks. Pt asks appropriate questions, which were answered. Pt was instructed to call with any concerns or problems. I have reviewed the note documented by the scribe. The services provided are my own. The documentation is accurate.

## 2023-03-06 ENCOUNTER — OFFICE VISIT (OUTPATIENT)
Dept: INTERNAL MEDICINE CLINIC | Age: 63
End: 2023-03-06
Payer: MEDICARE

## 2023-03-06 VITALS
SYSTOLIC BLOOD PRESSURE: 104 MMHG | WEIGHT: 141 LBS | HEART RATE: 89 BPM | OXYGEN SATURATION: 99 % | TEMPERATURE: 99 F | BODY MASS INDEX: 21.37 KG/M2 | DIASTOLIC BLOOD PRESSURE: 55 MMHG | RESPIRATION RATE: 16 BRPM | HEIGHT: 68 IN

## 2023-03-06 DIAGNOSIS — F41.9 ANXIETY: ICD-10-CM

## 2023-03-06 DIAGNOSIS — C61 PROSTATE CANCER (HCC): ICD-10-CM

## 2023-03-06 DIAGNOSIS — K21.9 GASTROESOPHAGEAL REFLUX DISEASE WITHOUT ESOPHAGITIS: ICD-10-CM

## 2023-03-06 DIAGNOSIS — R05.9 COUGH: ICD-10-CM

## 2023-03-06 DIAGNOSIS — I10 PRIMARY HYPERTENSION: ICD-10-CM

## 2023-03-06 DIAGNOSIS — E78.00 PURE HYPERCHOLESTEROLEMIA: ICD-10-CM

## 2023-03-06 DIAGNOSIS — Z01.810 PREOP CARDIOVASCULAR EXAM: Primary | ICD-10-CM

## 2023-03-06 PROCEDURE — G8427 DOCREV CUR MEDS BY ELIG CLIN: HCPCS | Performed by: INTERNAL MEDICINE

## 2023-03-06 PROCEDURE — G8420 CALC BMI NORM PARAMETERS: HCPCS | Performed by: INTERNAL MEDICINE

## 2023-03-06 PROCEDURE — G8510 SCR DEP NEG, NO PLAN REQD: HCPCS | Performed by: INTERNAL MEDICINE

## 2023-03-06 PROCEDURE — 99214 OFFICE O/P EST MOD 30 MIN: CPT | Performed by: INTERNAL MEDICINE

## 2023-03-06 PROCEDURE — 3017F COLORECTAL CA SCREEN DOC REV: CPT | Performed by: INTERNAL MEDICINE

## 2023-03-06 RX ORDER — DIAPER,BRIEF,INFANT-TODD,DISP
EACH MISCELLANEOUS
Qty: 28.4 G | Refills: 0 | Status: CANCELLED | OUTPATIENT
Start: 2023-03-06

## 2023-03-06 RX ORDER — LISINOPRIL 20 MG/1
20 TABLET ORAL DAILY
Qty: 90 TABLET | Refills: 0 | Status: SHIPPED | OUTPATIENT
Start: 2023-03-06

## 2023-03-06 RX ORDER — ALBUTEROL SULFATE 90 UG/1
AEROSOL, METERED RESPIRATORY (INHALATION)
Qty: 54 G | Refills: 1 | Status: SHIPPED | OUTPATIENT
Start: 2023-03-06

## 2023-03-06 RX ORDER — ROSUVASTATIN CALCIUM 10 MG/1
10 TABLET, COATED ORAL
Qty: 90 TABLET | Refills: 1 | Status: SHIPPED | OUTPATIENT
Start: 2023-03-06

## 2023-03-06 NOTE — PROGRESS NOTES
1. \"Have you been to the ER, urgent care clinic since your last visit? Hospitalized since your last visit? \" No    2. \"Have you seen or consulted any other health care providers outside of the [address] [place] [address] since your last visit? \" No     3. For patients aged 39-70: Has the patient had a colonoscopy / FIT/ Cologuard? Yes - Care Gap present.  Most recent result on file

## 2023-03-07 ENCOUNTER — TELEPHONE (OUTPATIENT)
Dept: INTERNAL MEDICINE CLINIC | Age: 63
End: 2023-03-07

## 2023-03-07 DIAGNOSIS — R79.89 CREATININE ELEVATION: Primary | ICD-10-CM

## 2023-03-07 LAB
ANION GAP SERPL CALC-SCNC: 0 MMOL/L (ref 5–15)
BUN SERPL-MCNC: 18 MG/DL (ref 6–20)
BUN/CREAT SERPL: 13 (ref 12–20)
CALCIUM SERPL-MCNC: 9.6 MG/DL (ref 8.5–10.1)
CHLORIDE SERPL-SCNC: 105 MMOL/L (ref 97–108)
CO2 SERPL-SCNC: 31 MMOL/L (ref 21–32)
CREAT SERPL-MCNC: 1.36 MG/DL (ref 0.7–1.3)
GLUCOSE SERPL-MCNC: 85 MG/DL (ref 65–100)
POTASSIUM SERPL-SCNC: 4.8 MMOL/L (ref 3.5–5.1)
SODIUM SERPL-SCNC: 136 MMOL/L (ref 136–145)

## 2023-03-07 NOTE — TELEPHONE ENCOUNTER
Called, Spoke to [facility]  Received two pt identifiers  [facility]OR[facility] per Dr. Kehinde Bianchi called in for lisinopril 10mg once daily   Advised to discontinue the lisinopril 20mg  Order accepted  VOR[facility] per Dr. Kehinde Bianchi labs ordered as well.   closing

## 2023-03-07 NOTE — PROGRESS NOTES
Please call patient back about results  Mild bump in creatinine suspect this is related to blood pressure running on the lower side  Decrease lisinopril to 10 mg daily  Repeat metabolic panel in 3 to 4 weeks

## 2023-03-07 NOTE — PROGRESS NOTES
Called, spoke to pt  Received two pt identifiers   Pt informed per Dr. Tj Daniel Mild bump in creatinine suspect this is related to blood pressure running on the lower side  Pt informed per Dr. Tj Daniel Decrease lisinopril to 10 mg daily(Verbal called into Pharm)  Pt informed per Dr. Tj Daniel Repeat metabolic panel in 3 to 4 weeks---ordered  Pt verbalizes understanding of the instructions and has no further questions at this time.

## 2023-03-09 NOTE — PROGRESS NOTES
MD BILLINGS note reflects that she is having hip surgery ~we have not received any messages that they will not except the AWA ` should not be issue    Send letter See HPI; hx lumbar fusion continues to have back pain followed by Dr Michael Valentine Right shoulder site clear, limited ROM, pain on ROM, no palpable pain

## 2023-03-14 ENCOUNTER — TELEPHONE (OUTPATIENT)
Dept: INTERNAL MEDICINE CLINIC | Age: 63
End: 2023-03-14

## 2023-03-14 NOTE — TELEPHONE ENCOUNTER
Patient states he needs to get a call back to update on Surgical Clearance Documentation being sent to [facility] For his upcoming Cat. Surg on this Thursday, 3/16/23 that they advised patient has not been received & possible Cancellation of Surgery if Not received Asap. Please call patient to update.  Thank you

## 2023-03-14 NOTE — TELEPHONE ENCOUNTER
Called, spoke to pt  Received two pt identifiers  Apologized for pt in the delay. Advised pt faxed form with confirmation today  Pt verbalizes understanding of the instructions and has no further questions at this time.

## 2023-04-05 ENCOUNTER — LAB ONLY (OUTPATIENT)
Dept: INTERNAL MEDICINE CLINIC | Age: 63
End: 2023-04-05

## 2023-04-05 ENCOUNTER — OFFICE VISIT (OUTPATIENT)
Dept: NEUROLOGY | Age: 63
End: 2023-04-05
Payer: MEDICARE

## 2023-04-05 LAB
ANION GAP SERPL CALC-SCNC: 3 MMOL/L (ref 5–15)
BUN SERPL-MCNC: 21 MG/DL (ref 6–20)
BUN/CREAT SERPL: 19 (ref 12–20)
CALCIUM SERPL-MCNC: 10 MG/DL (ref 8.5–10.1)
CHLORIDE SERPL-SCNC: 104 MMOL/L (ref 97–108)
CO2 SERPL-SCNC: 27 MMOL/L (ref 21–32)
CREAT SERPL-MCNC: 1.08 MG/DL (ref 0.7–1.3)
GLUCOSE SERPL-MCNC: 97 MG/DL (ref 65–100)
POTASSIUM SERPL-SCNC: 4.5 MMOL/L (ref 3.5–5.1)
SODIUM SERPL-SCNC: 134 MMOL/L (ref 136–145)

## 2023-04-05 PROCEDURE — G8427 DOCREV CUR MEDS BY ELIG CLIN: HCPCS | Performed by: NURSE PRACTITIONER

## 2023-04-05 PROCEDURE — G8432 DEP SCR NOT DOC, RNG: HCPCS | Performed by: NURSE PRACTITIONER

## 2023-04-05 PROCEDURE — 3017F COLORECTAL CA SCREEN DOC REV: CPT | Performed by: NURSE PRACTITIONER

## 2023-04-05 PROCEDURE — G8420 CALC BMI NORM PARAMETERS: HCPCS | Performed by: NURSE PRACTITIONER

## 2023-04-05 PROCEDURE — 99214 OFFICE O/P EST MOD 30 MIN: CPT | Performed by: NURSE PRACTITIONER

## 2023-04-05 PROCEDURE — 3075F SYST BP GE 130 - 139MM HG: CPT | Performed by: NURSE PRACTITIONER

## 2023-04-05 PROCEDURE — 3078F DIAST BP <80 MM HG: CPT | Performed by: NURSE PRACTITIONER

## 2023-04-05 RX ORDER — OFLOXACIN 3 MG/ML
SOLUTION/ DROPS OPHTHALMIC
Start: 2023-03-11

## 2023-04-05 RX ORDER — LISINOPRIL 10 MG/1
10 TABLET ORAL DAILY
Start: 2023-03-07

## 2023-04-05 RX ORDER — PREDNISOLONE ACETATE 10 MG/ML
SUSPENSION/ DROPS OPHTHALMIC
Start: 2023-03-08

## 2023-04-05 NOTE — PROGRESS NOTES
Chief Complaint   Patient presents with    Follow-up     Bilateral carotid artery stenosis & Tension vascular headache      1. Have you been to the ER, urgent care clinic since your last visit? Yes  Hospitalized since your last visit? 2. Have you seen or consulted any other health care providers outside of the [address] [place] [address] since your last visit? Yes cataracts 3/23 Include any pap smears or colon screening.   Yes 1/2023

## 2023-04-05 NOTE — LETTER
4/5/2023    Patient: Antonio Kimbrough   YOB: 1960   Date of Visit: 4/5/2023     Rayne Johns MD  [address]  [address] [facility]  [address]  [facility]  [address] [facility] [address]    Dear Rayne Johns MD,      Thank you for referring Mr. Cosme Garcia to [address] [place] [address] for evaluation. My notes for this consultation are attached. If you have questions, please do not hesitate to call me. I look forward to following your patient along with you.       Sincerely,    Maximilian Cifuentes NP

## 2023-04-05 NOTE — PROGRESS NOTES
[facility]  [facility] [address] [place] [address]  Yoli Lopez  Tel: [phone]  Fax: [phone]      Date:  23     Name:  Deanna Valdez  :  1960  MRN:  [id number]     PCP:  Katherine Palacios MD    Chief Complaint   Patient presents with    Follow-up     Bilateral carotid artery stenosis & Tension vascular headache        HISTORY OF PRESENT ILLNESS:  Patient presents today for regular follow up. He is here today to review testing results, overall patient is feeling much better. Before he was having more frequent headaches, he notes that now they occur rarely. It if happens it only lasts about 5 minutes. Sometimes he will take a Tylenol or Aleve but most the time it goes away on its own  He is doing really well, he takes medications like he is supposed to. Carotid Dopplers have been ordered as well as brain MRI, see results below. Dr Martha Gama: Regular follow-ups. Recent cataract surgery. Quit smoking. Walks regularly. Social ETOH. No marijuana. 2022:Imaging of both right and left carotid systems showed mild mixed plaque in the bifurcations and proximal and distal internal carotid arteries bilaterally with stenosis in the range of 0-15% only and with no flow abnormalities identified. Both external carotid arteries and both common carotid arteries showed showed normal antegrade flow, and showed mild disease in the range of 0-15% only without associated flow abnormalities. Recap from last visit  with Dr Gustavo Barbour: Patient with increased headaches several months ago associated with several problems including his disability and chronic right leg pain, his daughters recent illness and death, and his mother's illness and progressive degenerative brain condition of Parkinson's disease.   We reviewed his MRI scan personally on the PACS system ourselves, I agree with report and dictation images shows microvascular disease but no other structural lesion since his headaches are better we will just continue aspirin therapy and check a carotid Doppler to make sure there is no significant extracranial obstructive cerebrovascular disease. Patient will also get a sed rate and PAULETTE to rule out any type of arteritis, and will try to avoid touching his teeth and bruxism, try to get more physical activity and mental activity to try to break up the stress and tension. He does not like medication and does not want take any other medication. REVIEW OF SYSTEMS:     Review of Systems   Eyes: Negative. Musculoskeletal: Negative. Negative for falls. Neurological:  Positive for headaches. Negative for dizziness, tingling and sensory change. Psychiatric/Behavioral:  The patient does not have insomnia. All other systems reviewed and are negative. Current Outpatient Medications   Medication Sig    ofloxacin (OCUFLOX) 0.3 % ophthalmic solution INSTILL 1 DROP IN LEFT EYE FOUR TIMES DAILY    prednisoLONE acetate (PRED FORTE) 1 % ophthalmic suspension Left eye TID & right eye QID    lisinopriL (PRINIVIL, ZESTRIL) 10 mg tablet Take 1 Tablet by mouth daily. rosuvastatin (CRESTOR) 10 mg tablet Take 1 Tablet by mouth nightly. albuterol (PROVENTIL HFA, VENTOLIN HFA, PROAIR HFA) 90 mcg/actuation inhaler INHALE 2 PUFFS BY MOUTH EVERY 6 HOURS AS NEEDED FOR WHEEZING    Cetirizine (ZyrTEC) 10 mg cap Take 10 mg by mouth as needed. fluticasone propionate (FLONASE) 50 mcg/actuation nasal spray 2 Sprays by Nasal route daily. triamcinolone acetonide (KENALOG) 0.1 % topical cream Apply  to affected area two (2) times a day. use thin layer Monday to Friday only    pantoprazole (PROTONIX) 20 mg tablet Take 1 Tablet by mouth two (2) times a day.  (Patient not taking: Reported on 3/6/2023)    hydrocortisone (CORTIZONE) 0.5 % ointment Apply to affected area two to three times daily (Patient not taking: Reported on 4/5/2023)     No current facility-administered medications for this visit. No Known Allergies  Past Medical History:   Diagnosis Date    Anxiety disorder     Arthritis     Asthma     allergies environmental    Cancer ([facility] [address]     prostate CA    Carotid arterial disease ([facility] [address]     GERD (gastroesophageal reflux disease)     Headache     High cholesterol     HTN (hypertension) 2011    Other and unspecified symptoms and signs involving general sensations and perceptions     construction accident     Other ill-defined conditions(799.89)     anxiety    Other ill-defined conditions(799.89)     seasonal allergies    Psychiatric disorder     anxiety     Past Surgical History:   Procedure Laterality Date    COLONOSCOPY N/A 01/10/2023    COLONOSCOPY performed by Vira Gomes MD at [facility] ENDOSCOPY    HX CATARACT REMOVAL  2023    HX HIP REPLACEMENT Left 2020    HX PROSTATECTOMY  2015    HX SHOULDER ARTHROSCOPY Left     [place] UNLISTED PROCEDURE ABDOMEN PERITONEUM & OMENTUM Right     inguinal     Social History     Socioeconomic History    Marital status:      Spouse name: Not on file    Number of children: Not on file    Years of education: Not on file    Highest education level: Not on file   Occupational History    Not on file   Tobacco Use    Smoking status: Former     Packs/day: 0.25     Years: 12.00     Pack years: 3.00     Types: Cigarettes     Quit date: 2015     Years since quittin.8    Smokeless tobacco: Never   Vaping Use    Vaping Use: Never used   Substance and Sexual Activity    Alcohol use:  Yes     Alcohol/week: 1.0 standard drink     Types: 1 Cans of beer per week     Comment: ocassionally    Drug use: No    Sexual activity: Yes     Partners: Female   Other Topics Concern    Not on file   Social History Narrative    Not on file     Social Determinants of Health     Financial Resource Strain: Medium Risk    Difficulty of Paying Living Expenses: Somewhat hard   Food Insecurity: Food Insecurity Present    Worried About [facility] in the Last Year: Sometimes true    Ran Out of Food in the Last Year: Sometimes true   Transportation Needs: Not on file   Physical Activity: Not on file   Stress: Not on file   Social Connections: Not on file   Intimate Partner Violence: Not on file   Housing Stability: Not on file     Family History   Problem Relation Age of Onset    Diabetes Mother     Hypertension Mother     Kidney Disease Father     Hypertension Sister     Alcohol abuse Brother     Cancer Brother         colon    Anesth Problems Neg Hx      MRI BRAIN WO CONT     Narrative  EXAM: MRI BRAIN WO CONT     INDICATION: new dsymmetria     COMPARISON: MRI brain April 21, 2011. CONTRAST: None. TECHNIQUE:  Multiplanar multisequence acquisition without contrast of the brain. FINDINGS:  Numerous small T2/FLAIR hyperintensities in the subcortical and periventricular  white matter, these are slightly increased compared to April 2011. The ventricles are normal in size and position. There is no acute infarct,  hemorrhage, extra-axial fluid collection, or mass effect. There is no cerebellar  tonsillar herniation. Expected arterial flow-voids are present. The paranasal sinuses, mastoid air cells, and middle ears are clear. The orbital  contents are within normal limits. No significant osseous or scalp lesions are  identified. Impression  No acute abnormalities. Punctate subcortical and paratracheal white matter  lesions most compatible with sequela of chronic small vessel ischemic disease,  although slightly greater extent than typically expected for patient's stated  age. PHYSICAL EXAMINATION:    Visit Vitals  /70 (BP 1 Location: Left upper arm, BP Patient Position: Sitting, BP Cuff Size: Adult)   Pulse 79   Temp 98.3 °F (36.8 °C) (Temporal)   Resp 16   Ht 5' 8.5\" (1.74 m)   Wt 144 lb 12.8 oz (65.7 kg)   SpO2 98%   BMI 21.70 kg/m²       General:  Well defined, nourished, and well groomed individual in no acute distress.     Neck: Supple, nontender, normal range of motion. Musculoskeletal:  Extremities revealed no edema and had full range of motion of joints. Psych:  Good mood and bright affect. NEUROLOGICAL EXAMINATION:     Mental Status:   Alert and oriented to person, place, and time with recent and remote memory intact. Attention span and concentration are normal. Clear speech. Fund of knowledge preserved. Cranial Nerves:   PERRLA. Visual fields were full  EOM: no evidence of nystagmus  Facial sensation:  normal and symmetric  Facial motor: normal and symmetric, no facial droop noted. Hearing intact  SCM strength intact  Tongue: midline without fasciculations    Motor Examination: Normal tone. 5/5 muscle strength in bilateral upper extremities. No cogwheel rigidity. No muscle wasting, no twitching or fasciculation noted. Sensory exam:  Normal throughout to light touch in BUE. Coordination:   Finger to nose and rapid arm movement testing was normal.  No resting or intention tremor. Negative Romberg, negative pronator drift. Gait and Station:  Steady gait, slight difficulty with tandem walking. Normal arm swing. Reflexes:  DTRs 2+ in bilateral biceps, brachioradialis, patella. ASSESSMENT AND PLAN      ICD-10-CM ICD-9-CM    1. Cerebral microvascular disease  I67.89 437.8       2. Tension vascular headache  G44.209 307.81       3. Bilateral carotid artery stenosis  I65.23 433.10      433.30         1. Cerebral microvascular disease: Brain MRI as well as carotid Dopplers were reviewed with patient. Discussed the importance of maintaining good control of his blood pressure as well as cholesterol. He is closely monitored via his primary care. Patient is to continue monitor signs symptoms and notify us of any major changes. Consider addition of 81 mg aspirin to help from a stroke prevention standpoint, will have primary care discussed this with patient.   2. Tension vascular headache: Patient notes his headaches have improved, if any changes or worsening headaches, patient is contact the office, if he has worst headache of his life advised patient to present to the emergency room. 3. Bilateral carotid artery stenosis: Healthy lifestyle encouraged, minimal stenosis was noted. He is to continue good control of his blood pressure as well as cholesterol, consider addition 81 mg aspirin, will defer to primary care to help manage this. Patient and/or family verbalized understand of all instructions and all questions/concerns were addressed. Safety/side effects of medications discussed. Patient remains a complex patient secondary to polypharmacy, significant comorbid conditions, and use of high-risk medications which complicate the decision making process related to patient's neurologic diagnosis. We will see the patient on an as needed basis.        Aries Newell, PANCHITOP-BC

## 2023-04-19 ENCOUNTER — HOSPITAL ENCOUNTER (OUTPATIENT)
Dept: NON INVASIVE DIAGNOSTICS | Age: 63
Discharge: HOME | End: 2023-04-19
Attending: INTERNAL MEDICINE
Payer: MEDICARE

## 2023-04-19 VITALS
DIASTOLIC BLOOD PRESSURE: 70 MMHG | SYSTOLIC BLOOD PRESSURE: 130 MMHG | HEIGHT: 68 IN | WEIGHT: 144 LBS | BODY MASS INDEX: 21.82 KG/M2

## 2023-04-19 DIAGNOSIS — I51.7 LAE (LEFT ATRIAL ENLARGEMENT): ICD-10-CM

## 2023-04-19 PROCEDURE — 93306 TTE W/DOPPLER COMPLETE: CPT

## 2023-04-20 LAB
ECHO AO ASC DIAM: 2.6 CM
ECHO AO ASCENDING AORTA INDEX: 1.46 CM/M2
ECHO AO ROOT DIAM: 2.1 CM
ECHO AO ROOT INDEX: 1.18 CM/M2
ECHO AV AREA PEAK VELOCITY: 3.1 CM2
ECHO AV AREA/BSA PEAK VELOCITY: 1.7 CM2/M2
ECHO AV PEAK GRADIENT: 5 MMHG
ECHO AV PEAK VELOCITY: 1.2 M/S
ECHO LA DIAMETER INDEX: 1.35 CM/M2
ECHO LA DIAMETER: 2.4 CM
ECHO LA TO AORTIC ROOT RATIO: 1.14
ECHO LA VOL 2C: 42 ML (ref 18–58)
ECHO LA VOL 4C: 38 ML (ref 18–58)
ECHO LA VOL BP: 40 ML (ref 18–58)
ECHO LA VOL/BSA BIPLANE: 22 ML/M2 (ref 16–34)
ECHO LA VOLUME AREA LENGTH: 45 ML
ECHO LA VOLUME INDEX A2C: 24 ML/M2 (ref 16–34)
ECHO LA VOLUME INDEX A4C: 21 ML/M2 (ref 16–34)
ECHO LA VOLUME INDEX AREA LENGTH: 25 ML/M2 (ref 16–34)
ECHO LV FRACTIONAL SHORTENING: 40 % (ref 28–44)
ECHO LV INTERNAL DIMENSION DIASTOLE INDEX: 2.92 CM/M2
ECHO LV INTERNAL DIMENSION DIASTOLIC: 5.2 CM (ref 4.2–5.9)
ECHO LV INTERNAL DIMENSION SYSTOLIC INDEX: 1.74 CM/M2
ECHO LV INTERNAL DIMENSION SYSTOLIC: 3.1 CM
ECHO LV IVSD: 0.6 CM (ref 0.6–1)
ECHO LV MASS 2D: 112.1 G (ref 88–224)
ECHO LV MASS INDEX 2D: 63 G/M2 (ref 49–115)
ECHO LV POSTERIOR WALL DIASTOLIC: 0.7 CM (ref 0.6–1)
ECHO LV RELATIVE WALL THICKNESS RATIO: 0.27
ECHO LVOT AREA: 3.1 CM2
ECHO LVOT DIAM: 2 CM
ECHO LVOT MEAN GRADIENT: 2 MMHG
ECHO LVOT PEAK GRADIENT: 5 MMHG
ECHO LVOT PEAK GRADIENT: 5 MMHG
ECHO LVOT PEAK VELOCITY: 1.2 M/S
ECHO LVOT PEAK VELOCITY: 1.2 M/S
ECHO LVOT STROKE VOLUME INDEX: 42.3 ML/M2
ECHO LVOT SV: 75.4 ML
ECHO LVOT VTI: 24 CM
ECHO MV A VELOCITY: 0.94 M/S
ECHO MV AREA PHT: 4.2 CM2
ECHO MV E DECELERATION TIME (DT): 182.4 MS
ECHO MV E VELOCITY: 0.95 M/S
ECHO MV E/A RATIO: 1.01
ECHO MV PRESSURE HALF TIME (PHT): 52.9 MS
ECHO PV MAX VELOCITY: 0.8 M/S
ECHO PV PEAK GRADIENT: 2 MMHG

## 2023-04-20 PROCEDURE — 93306 TTE W/DOPPLER COMPLETE: CPT | Performed by: SPECIALIST

## 2023-04-27 NOTE — PROGRESS NOTES
Khai Aguirre is a 58 y.o. male who presents with concern of dizziness. Onset 3-4 weeks. Reports nasal congestion, PND, headaches. No body aches, no fever. Drives school bus. On flonase and zyrtec. Using albuterol prn. Past Medical History:   Diagnosis Date    Anxiety disorder     Arthritis     Asthma     allergies environmental    Cancer ([facility] [address]     prostate CA    Carotid arterial disease (HCC)     GERD (gastroesophageal reflux disease)     Headache     High cholesterol     HTN (hypertension) 2011    Other and unspecified symptoms and signs involving general sensations and perceptions     construction accident     Other ill-defined conditions(799.89)     anxiety    Other ill-defined conditions(799.89)     seasonal allergies    Psychiatric disorder     anxiety       Family History   Problem Relation Age of Onset    Diabetes Mother     Hypertension Mother     Kidney Disease Father     Hypertension Sister     Alcohol abuse Brother     Cancer Brother         colon    Anesth Problems Neg Hx        Social History     Socioeconomic History    Marital status:      Spouse name: Not on file    Number of children: Not on file    Years of education: Not on file    Highest education level: Not on file   Occupational History    Not on file   Tobacco Use    Smoking status: Former     Packs/day: 0.25     Years: 12.00     Pack years: 3.00     Types: Cigarettes     Quit date: 2015     Years since quittin.9    Smokeless tobacco: Never   Vaping Use    Vaping Use: Never used   Substance and Sexual Activity    Alcohol use:  Yes     Alcohol/week: 1.0 standard drink     Types: 1 Cans of beer per week     Comment: ocassionally    Drug use: No    Sexual activity: Yes     Partners: Female   Other Topics Concern    Not on file   Social History Narrative    Not on file     Social Determinants of Health     Financial Resource Strain: Medium Risk    Difficulty of Paying Living Expenses: Somewhat hard   Food Insecurity: Food Insecurity Present    Worried About Running Out of Food in the Last Year: Sometimes true    Ran Out of Food in the Last Year: Sometimes true   Transportation Needs: Not on file   Physical Activity: Not on file   Stress: Not on file   Social Connections: Not on file   Intimate Partner Violence: Not on file   Housing Stability: Not on file       Current Outpatient Medications on File Prior to Visit   Medication Sig Dispense Refill    ofloxacin (OCUFLOX) 0.3 % ophthalmic solution INSTILL 1 DROP IN LEFT EYE FOUR TIMES DAILY      prednisoLONE acetate (PRED FORTE) 1 % ophthalmic suspension Left eye TID & right eye QID      lisinopriL (PRINIVIL, ZESTRIL) 10 mg tablet Take 1 Tablet by mouth daily. rosuvastatin (CRESTOR) 10 mg tablet Take 1 Tablet by mouth nightly. 90 Tablet 1    albuterol (PROVENTIL HFA, VENTOLIN HFA, PROAIR HFA) 90 mcg/actuation inhaler INHALE 2 PUFFS BY MOUTH EVERY 6 HOURS AS NEEDED FOR WHEEZING 54 g 1    Cetirizine (ZyrTEC) 10 mg cap Take 10 mg by mouth as needed. fluticasone propionate (FLONASE) 50 mcg/actuation nasal spray 2 Sprays by Nasal route daily. triamcinolone acetonide (KENALOG) 0.1 % topical cream Apply  to affected area two (2) times a day. use thin layer Monday to Friday only (Patient not taking: Reported on 4/28/2023) 30 g 0    pantoprazole (PROTONIX) 20 mg tablet Take 1 Tablet by mouth two (2) times a day. (Patient not taking: Reported on 3/6/2023) 180 Tablet 1    hydrocortisone (CORTIZONE) 0.5 % ointment Apply to affected area two to three times daily (Patient not taking: Reported on 4/5/2023) 28.4 g 0     No current facility-administered medications on file prior to visit. Review of Systems  Pertinent items are noted in HPI.     Objective:     Visit Vitals  /64   Pulse 86   Temp 97.4 °F (36.3 °C)   Resp 16   Ht 5' 8\" (1.727 m)   Wt 148 lb (67.1 kg)   SpO2 97%   BMI 22.50 kg/m²     Gen: mildly ill appearing male  HEENT:   PERRL,normal conjunctiva. External ear and canals normal, TMs no opacification or erythema,  swollen turbinates. OP no erythema, no exudates, MMM  Neck:  Supple. Thyroid normal size, nontender, without nodules. No masses or LAD  Resp:  No wheezing, no rhonchi, no rales. CV:  RRR, normal S1S2, no murmur. GI: soft, nontender, without masses. No hepatosplenomegaly. Extrem:  +2 pulses, no edema, warm distally      Assessment/Plan:       ICD-10-CM ICD-9-CM    1. Acute non-recurrent maxillary sinusitis  J01.00 461.0 azithromycin (ZITHROMAX) 250 mg tablet      2. Vestibular dizziness  H81.90 386. 9       Flonase  increase to 2 sprays each nostril once a day. Stay on zyrtec 10mg daily. In the evening. Start antibiotic today    Follow-up and Dispositions    Return if symptoms worsen or fail to improve.          Conchis Lund MD

## 2023-04-28 ENCOUNTER — OFFICE VISIT (OUTPATIENT)
Dept: INTERNAL MEDICINE CLINIC | Age: 63
End: 2023-04-28

## 2023-04-28 VITALS
TEMPERATURE: 97.4 F | OXYGEN SATURATION: 97 % | HEIGHT: 68 IN | RESPIRATION RATE: 16 BRPM | BODY MASS INDEX: 22.43 KG/M2 | DIASTOLIC BLOOD PRESSURE: 64 MMHG | SYSTOLIC BLOOD PRESSURE: 105 MMHG | WEIGHT: 148 LBS | HEART RATE: 86 BPM

## 2023-04-28 DIAGNOSIS — H81.90 VESTIBULAR DIZZINESS: ICD-10-CM

## 2023-04-28 DIAGNOSIS — J01.00 ACUTE NON-RECURRENT MAXILLARY SINUSITIS: Primary | ICD-10-CM

## 2023-04-28 RX ORDER — AZITHROMYCIN 250 MG/1
250 TABLET, FILM COATED ORAL SEE ADMIN INSTRUCTIONS
Qty: 6 TABLET | Refills: 0 | Status: SHIPPED | OUTPATIENT
Start: 2023-04-28 | End: 2023-05-03

## 2023-04-28 NOTE — PROGRESS NOTES
1. \"Have you been to the ER, urgent care clinic since your last visit? Hospitalized since your last visit? \" No    2. \"Have you seen or consulted any other health care providers outside of the [address] [place] [address] since your last visit? \" No     3. For patients aged 39-70: Has the patient had a colonoscopy / FIT/ Cologuard? Yes - Care Gap present.  Most recent result on file

## 2023-06-09 ENCOUNTER — TELEPHONE (OUTPATIENT)
Age: 63
End: 2023-06-09

## 2023-06-09 NOTE — TELEPHONE ENCOUNTER
Future Appointments:  Future Appointments   Date Time Provider Yung Hunteri   9/5/2023 11:15 AM Alvin Robert MD [facility] BS AMB        Last Appointment With Me:  3/6/2023     Requested Prescriptions     Pending Prescriptions Disp Refills    albuterol sulfate HFA (PROVENTIL;VENTOLIN;PROAIR) 108 (90 Base) MCG/ACT inhaler 18 g 1     Sig: INHALE 2 PUFFS BY MOUTH EVERY 6 HOURS AS NEEDED FOR WHEEZING    rosuvastatin (CRESTOR) 10 MG tablet 90 tablet 1     Sig: Take 1 tablet by mouth daily    lisinopril (PRINIVIL;ZESTRIL) 10 MG tablet 90 tablet 1     Sig: Take 1 tablet by mouth daily

## 2023-06-09 NOTE — TELEPHONE ENCOUNTER
Refill requests:    albuterol sulfate HFA (PROVENTIL;VENTOLIN;PROAIR) 108 (90 Base) MCG/ACT inhaler    lisinopril (PRINIVIL;ZESTRIL) 10 MG tablet       (It was 20mg but he said it it should be 10mg)    rosuvastatin (CRESTOR) 10 MG tablet     Please send them to [facility]

## 2023-06-10 RX ORDER — LISINOPRIL 10 MG/1
10 TABLET ORAL DAILY
Qty: 90 TABLET | Refills: 1 | Status: SHIPPED | OUTPATIENT
Start: 2023-06-10

## 2023-06-10 RX ORDER — ROSUVASTATIN CALCIUM 10 MG/1
10 TABLET, COATED ORAL DAILY
Qty: 90 TABLET | Refills: 1 | Status: SHIPPED | OUTPATIENT
Start: 2023-06-10

## 2023-06-10 RX ORDER — ALBUTEROL SULFATE 90 UG/1
AEROSOL, METERED RESPIRATORY (INHALATION)
Qty: 18 G | Refills: 1 | Status: SHIPPED | OUTPATIENT
Start: 2023-06-10

## 2023-08-28 ASSESSMENT — ENCOUNTER SYMPTOMS: SHORTNESS OF BREATH: 0

## 2023-09-05 ENCOUNTER — OFFICE VISIT (OUTPATIENT)
Age: 63
End: 2023-09-05
Payer: COMMERCIAL

## 2023-09-05 VITALS
RESPIRATION RATE: 16 BRPM | HEART RATE: 87 BPM | HEIGHT: 68 IN | TEMPERATURE: 98.3 F | SYSTOLIC BLOOD PRESSURE: 117 MMHG | OXYGEN SATURATION: 96 % | WEIGHT: 150 LBS | DIASTOLIC BLOOD PRESSURE: 72 MMHG | BODY MASS INDEX: 22.73 KG/M2

## 2023-09-05 DIAGNOSIS — J06.9 UPPER RESPIRATORY TRACT INFECTION, UNSPECIFIED TYPE: ICD-10-CM

## 2023-09-05 DIAGNOSIS — Z00.00 MEDICARE ANNUAL WELLNESS VISIT, SUBSEQUENT: ICD-10-CM

## 2023-09-05 DIAGNOSIS — C61 PROSTATE CANCER (HCC): ICD-10-CM

## 2023-09-05 DIAGNOSIS — E78.00 PURE HYPERCHOLESTEROLEMIA: ICD-10-CM

## 2023-09-05 DIAGNOSIS — R73.01 IFG (IMPAIRED FASTING GLUCOSE): ICD-10-CM

## 2023-09-05 DIAGNOSIS — Z23 NEEDS FLU SHOT: ICD-10-CM

## 2023-09-05 DIAGNOSIS — F41.9 ANXIETY: ICD-10-CM

## 2023-09-05 DIAGNOSIS — I10 PRIMARY HYPERTENSION: Primary | ICD-10-CM

## 2023-09-05 DIAGNOSIS — K21.9 GASTROESOPHAGEAL REFLUX DISEASE WITHOUT ESOPHAGITIS: ICD-10-CM

## 2023-09-05 LAB
ERYTHROCYTE [DISTWIDTH] IN BLOOD BY AUTOMATED COUNT: 11.4 % (ref 11.5–14.5)
HCT VFR BLD AUTO: 39.4 % (ref 36.6–50.3)
HGB BLD-MCNC: 13.2 G/DL (ref 12.1–17)
MCH RBC QN AUTO: 30.6 PG (ref 26–34)
MCHC RBC AUTO-ENTMCNC: 33.5 G/DL (ref 30–36.5)
MCV RBC AUTO: 91.2 FL (ref 80–99)
NRBC # BLD: 0 K/UL (ref 0–0.01)
NRBC BLD-RTO: 0 PER 100 WBC
PLATELET # BLD AUTO: 242 K/UL (ref 150–400)
PMV BLD AUTO: 9.4 FL (ref 8.9–12.9)
RBC # BLD AUTO: 4.32 M/UL (ref 4.1–5.7)
WBC # BLD AUTO: 5.1 K/UL (ref 4.1–11.1)

## 2023-09-05 PROCEDURE — 3074F SYST BP LT 130 MM HG: CPT | Performed by: INTERNAL MEDICINE

## 2023-09-05 PROCEDURE — G0439 PPPS, SUBSEQ VISIT: HCPCS | Performed by: INTERNAL MEDICINE

## 2023-09-05 PROCEDURE — 90674 CCIIV4 VAC NO PRSV 0.5 ML IM: CPT | Performed by: INTERNAL MEDICINE

## 2023-09-05 PROCEDURE — 90471 IMMUNIZATION ADMIN: CPT | Performed by: INTERNAL MEDICINE

## 2023-09-05 PROCEDURE — 99214 OFFICE O/P EST MOD 30 MIN: CPT | Performed by: INTERNAL MEDICINE

## 2023-09-05 PROCEDURE — 3078F DIAST BP <80 MM HG: CPT | Performed by: INTERNAL MEDICINE

## 2023-09-05 RX ORDER — ROSUVASTATIN CALCIUM 10 MG/1
10 TABLET, COATED ORAL DAILY
Qty: 90 TABLET | Refills: 1 | Status: SHIPPED | OUTPATIENT
Start: 2023-09-05

## 2023-09-05 RX ORDER — LISINOPRIL 10 MG/1
10 TABLET ORAL DAILY
Qty: 90 TABLET | Refills: 1 | Status: SHIPPED | OUTPATIENT
Start: 2023-09-05

## 2023-09-05 RX ORDER — ALBUTEROL SULFATE 90 UG/1
AEROSOL, METERED RESPIRATORY (INHALATION)
Qty: 18 G | Refills: 1 | Status: SHIPPED | OUTPATIENT
Start: 2023-09-05

## 2023-09-05 SDOH — ECONOMIC STABILITY: FOOD INSECURITY: WITHIN THE PAST 12 MONTHS, YOU WORRIED THAT YOUR FOOD WOULD RUN OUT BEFORE YOU GOT MONEY TO BUY MORE.: NEVER TRUE

## 2023-09-05 SDOH — ECONOMIC STABILITY: INCOME INSECURITY: HOW HARD IS IT FOR YOU TO PAY FOR THE VERY BASICS LIKE FOOD, HOUSING, MEDICAL CARE, AND HEATING?: NOT HARD AT ALL

## 2023-09-05 SDOH — ECONOMIC STABILITY: HOUSING INSECURITY
IN THE LAST 12 MONTHS, WAS THERE A TIME WHEN YOU DID NOT HAVE A STEADY PLACE TO SLEEP OR SLEPT IN A SHELTER (INCLUDING NOW)?: NO

## 2023-09-05 SDOH — ECONOMIC STABILITY: FOOD INSECURITY: WITHIN THE PAST 12 MONTHS, THE FOOD YOU BOUGHT JUST DIDN'T LAST AND YOU DIDN'T HAVE MONEY TO GET MORE.: NEVER TRUE

## 2023-09-05 ASSESSMENT — LIFESTYLE VARIABLES
HOW OFTEN DO YOU HAVE A DRINK CONTAINING ALCOHOL: 2-4 TIMES A MONTH
HOW MANY STANDARD DRINKS CONTAINING ALCOHOL DO YOU HAVE ON A TYPICAL DAY: 1 OR 2

## 2023-09-05 ASSESSMENT — PATIENT HEALTH QUESTIONNAIRE - PHQ9
SUM OF ALL RESPONSES TO PHQ QUESTIONS 1-9: 0
1. LITTLE INTEREST OR PLEASURE IN DOING THINGS: 0
SUM OF ALL RESPONSES TO PHQ QUESTIONS 1-9: 0
2. FEELING DOWN, DEPRESSED OR HOPELESS: 0
SUM OF ALL RESPONSES TO PHQ QUESTIONS 1-9: 0
SUM OF ALL RESPONSES TO PHQ QUESTIONS 1-9: 0
SUM OF ALL RESPONSES TO PHQ9 QUESTIONS 1 & 2: 0

## 2023-09-05 NOTE — PROGRESS NOTES
1. \"Have you been to the ER, urgent care clinic since your last visit? Hospitalized since your last visit? \" No    2. \"Have you seen or consulted any other health care providers outside of the [address] [place] [address] since your last visit? \" No     3. For patients aged 43-73: Has the patient had a colonoscopy / FIT/ Cologuard? Yes - Care Gap present.  Most recent result on file
Medicare Annual Wellness Visit    Minor Dexter is here for Hypertension and Medicare AWV    Assessment & Plan   Primary hypertension  -     Comprehensive Metabolic Panel; Future  -     CBC; Future  -     Hemoglobin A1C; Future  -     HIV 1/2 Ag/Ab, 4TH Generation,W Rflx Confirm; Future  -     Lipid Panel; Future  -     TSH; Future  Prostate cancer ([address])  Pure hypercholesterolemia  -     Comprehensive Metabolic Panel; Future  -     CBC; Future  -     Hemoglobin A1C; Future  -     HIV 1/2 Ag/Ab, 4TH Generation,W Rflx Confirm; Future  -     Lipid Panel; Future  -     TSH; Future  Gastroesophageal reflux disease without esophagitis  Anxiety  IFG (impaired fasting glucose)  -     Comprehensive Metabolic Panel; Future  -     CBC; Future  -     Hemoglobin A1C; Future  -     HIV 1/2 Ag/Ab, 4TH Generation,W Rflx Confirm; Future  -     Lipid Panel; Future  -     TSH; Future  Medicare annual wellness visit, subsequent  Upper respiratory tract infection, unspecified type  Needs flu shot  -     Influenza, FLUCELVAX, (age 10 mo+), IM, Preservative Free, 0.5 mL    Recommendations for Preventive Services Due: see orders and patient instructions/AVS.  Recommended screening schedule for the next 5-10 years is provided to the patient in written form: see Patient Instructions/AVS.     Return in 6 months (on 3/5/2024). Subjective       Patient's complete Health Risk Assessment and screening values have been reviewed and are found in Flowsheets. The following problems were reviewed today and where indicated follow up appointments were made and/or referrals ordered. No Positive Risk Factors identified today.                       Dentist Screen:  Have you seen the dentist within the past year?: (!) No      Advanced Directives:  Do you have a Living Will?: (!) No      Advised to see dentist  Provided copy advanced directive     Objective   Vitals:    09/05/23 1043   BP: 117/72   Site: Left Upper Arm   Position: Sitting   Pulse: 87
notify us of any major changes. Consider addition of 81 mg aspirin to help from a stroke prevention standpoint, will have primary care discussed this with patient. 2. Tension vascular headache: Patient notes his headaches have improved, if any changes or worsening headaches, patient is contact the office, if he has worst headache of his life advised patient to present to the emergency room. 3. Bilateral carotid artery stenosis: Healthy lifestyle encouraged, minimal stenosis was noted. He is to continue good control of his blood pressure as well as cholesterol, consider addition 81 mg aspirin, will defer to primary care to help manage this. Patient and/or family verbalized understand of all instructions and all questions/concerns were addressed. Safety/side effects of medications discussed. Patient remains a complex patient secondary to polypharmacy, significant comorbid conditions, and use of high-risk medications which complicate the decision making process related to patient's neurologic diagnosis. We will see the patient on an as needed basis. He is following with Dr Tiffanie Barahona (ortho) for L leg pain  Pt has completed PT  He had L arthroplasty surgery 6/3/20 performed by Dr. Chalo Brown   Last there 4/1/21   He has been released      Pt saw Dr Amparo Estrada for preop clearance in the past  Last visit 2/13/20     Pt follows with Dr. Douglas Justin ([facility]) for h/o prostate cancer, annually  Last visit 3/23, no changes, f/u 1 yr, PSA 0     No longer taking celexa 10 mg for anxiety-- felt it wasn't helpful   No longer taking zoloft 50 mg, doing okay w/o meds 9/23   Could consider hydroxyzine for acute anxiety if needed down the road--of note he was given this previously and is not using it      Continues on crestor 10mg for cholesterol   Recall could not tolerate lipitor      Pt has albuterol to use prn for wheezing/breathing  Has not needed this frequently      Pt lives w/ his wife     ACP not on file.  SDM is his

## 2023-09-06 LAB
ALBUMIN SERPL-MCNC: 3.8 G/DL (ref 3.5–5)
ALBUMIN/GLOB SERPL: 1.2 (ref 1.1–2.2)
ALP SERPL-CCNC: 66 U/L (ref 45–117)
ALT SERPL-CCNC: 32 U/L (ref 12–78)
ANION GAP SERPL CALC-SCNC: 3 MMOL/L (ref 5–15)
AST SERPL-CCNC: 22 U/L (ref 15–37)
BILIRUB SERPL-MCNC: 0.3 MG/DL (ref 0.2–1)
BUN SERPL-MCNC: 12 MG/DL (ref 6–20)
BUN/CREAT SERPL: 11 (ref 12–20)
CALCIUM SERPL-MCNC: 9.2 MG/DL (ref 8.5–10.1)
CHLORIDE SERPL-SCNC: 106 MMOL/L (ref 97–108)
CHOLEST SERPL-MCNC: 142 MG/DL
CO2 SERPL-SCNC: 31 MMOL/L (ref 21–32)
CREAT SERPL-MCNC: 1.09 MG/DL (ref 0.7–1.3)
EST. AVERAGE GLUCOSE BLD GHB EST-MCNC: 105 MG/DL
GLOBULIN SER CALC-MCNC: 3.2 G/DL (ref 2–4)
GLUCOSE SERPL-MCNC: 79 MG/DL (ref 65–100)
HBA1C MFR BLD: 5.3 % (ref 4–5.6)
HDLC SERPL-MCNC: 74 MG/DL
HDLC SERPL: 1.9 (ref 0–5)
HIV 1+2 AB+HIV1 P24 AG SERPL QL IA: NONREACTIVE
HIV 1/2 RESULT COMMENT: NORMAL
LDLC SERPL CALC-MCNC: 49.4 MG/DL (ref 0–100)
POTASSIUM SERPL-SCNC: 4.5 MMOL/L (ref 3.5–5.1)
PROT SERPL-MCNC: 7 G/DL (ref 6.4–8.2)
SODIUM SERPL-SCNC: 140 MMOL/L (ref 136–145)
TRIGL SERPL-MCNC: 93 MG/DL
TSH SERPL DL<=0.05 MIU/L-ACNC: 0.9 UIU/ML (ref 0.36–3.74)
VLDLC SERPL CALC-MCNC: 18.6 MG/DL

## 2023-10-31 ENCOUNTER — HOSPITAL ENCOUNTER (OUTPATIENT)
Facility: HOSPITAL | Age: 63
Discharge: HOME | End: 2023-11-03
Payer: MEDICARE

## 2023-10-31 ENCOUNTER — OFFICE VISIT (OUTPATIENT)
Age: 63
End: 2023-10-31
Payer: MEDICARE

## 2023-10-31 VITALS
TEMPERATURE: 97.8 F | HEIGHT: 68 IN | OXYGEN SATURATION: 98 % | SYSTOLIC BLOOD PRESSURE: 131 MMHG | WEIGHT: 148 LBS | HEART RATE: 85 BPM | BODY MASS INDEX: 22.43 KG/M2 | RESPIRATION RATE: 14 BRPM | DIASTOLIC BLOOD PRESSURE: 69 MMHG

## 2023-10-31 DIAGNOSIS — J40 BRONCHITIS: Primary | ICD-10-CM

## 2023-10-31 DIAGNOSIS — J40 BRONCHITIS: ICD-10-CM

## 2023-10-31 DIAGNOSIS — I10 PRIMARY HYPERTENSION: ICD-10-CM

## 2023-10-31 PROCEDURE — G8420 CALC BMI NORM PARAMETERS: HCPCS | Performed by: INTERNAL MEDICINE

## 2023-10-31 PROCEDURE — 99213 OFFICE O/P EST LOW 20 MIN: CPT | Performed by: INTERNAL MEDICINE

## 2023-10-31 PROCEDURE — 3078F DIAST BP <80 MM HG: CPT | Performed by: INTERNAL MEDICINE

## 2023-10-31 PROCEDURE — 1036F TOBACCO NON-USER: CPT | Performed by: INTERNAL MEDICINE

## 2023-10-31 PROCEDURE — 3075F SYST BP GE 130 - 139MM HG: CPT | Performed by: INTERNAL MEDICINE

## 2023-10-31 PROCEDURE — 71046 X-RAY EXAM CHEST 2 VIEWS: CPT

## 2023-10-31 PROCEDURE — G8482 FLU IMMUNIZE ORDER/ADMIN: HCPCS | Performed by: INTERNAL MEDICINE

## 2023-10-31 PROCEDURE — 3017F COLORECTAL CA SCREEN DOC REV: CPT | Performed by: INTERNAL MEDICINE

## 2023-10-31 PROCEDURE — G8427 DOCREV CUR MEDS BY ELIG CLIN: HCPCS | Performed by: INTERNAL MEDICINE

## 2023-10-31 RX ORDER — PREDNISONE 20 MG/1
20 TABLET ORAL DAILY
Qty: 10 TABLET | Refills: 0 | Status: CANCELLED | OUTPATIENT
Start: 2023-10-31 | End: 2023-11-10

## 2023-10-31 RX ORDER — DOXYCYCLINE HYCLATE 100 MG
100 TABLET ORAL 2 TIMES DAILY
Qty: 14 TABLET | Refills: 0 | Status: SHIPPED | OUTPATIENT
Start: 2023-10-31 | End: 2023-11-07

## 2023-10-31 SDOH — ECONOMIC STABILITY: FOOD INSECURITY: WITHIN THE PAST 12 MONTHS, YOU WORRIED THAT YOUR FOOD WOULD RUN OUT BEFORE YOU GOT MONEY TO BUY MORE.: NEVER TRUE

## 2023-10-31 SDOH — ECONOMIC STABILITY: FOOD INSECURITY: WITHIN THE PAST 12 MONTHS, THE FOOD YOU BOUGHT JUST DIDN'T LAST AND YOU DIDN'T HAVE MONEY TO GET MORE.: NEVER TRUE

## 2023-10-31 SDOH — ECONOMIC STABILITY: INCOME INSECURITY: HOW HARD IS IT FOR YOU TO PAY FOR THE VERY BASICS LIKE FOOD, HOUSING, MEDICAL CARE, AND HEATING?: NOT HARD AT ALL

## 2023-10-31 ASSESSMENT — PATIENT HEALTH QUESTIONNAIRE - PHQ9
SUM OF ALL RESPONSES TO PHQ QUESTIONS 1-9: 0
1. LITTLE INTEREST OR PLEASURE IN DOING THINGS: 0
SUM OF ALL RESPONSES TO PHQ QUESTIONS 1-9: 0
2. FEELING DOWN, DEPRESSED OR HOPELESS: 0
SUM OF ALL RESPONSES TO PHQ QUESTIONS 1-9: 0
SUM OF ALL RESPONSES TO PHQ9 QUESTIONS 1 & 2: 0
SUM OF ALL RESPONSES TO PHQ QUESTIONS 1-9: 0

## 2023-10-31 ASSESSMENT — ENCOUNTER SYMPTOMS
SHORTNESS OF BREATH: 0
COUGH: 1

## 2023-10-31 NOTE — PROGRESS NOTES
HISTORY OF PRESENT ILLNESS  Celia Cheung is a 58 y.o. male. Cough  Pertinent negatives include no chest pain or shortness of breath. Last here 9/5/23. He presents for acute care     States he is feeling sick x 1 week   Initially he thought it was allergies  Reports chest congestion, chest rattling, postnasal drip, sinus pressure, productive cough which causes chest soreness  Denies sore throat, ear pain, fever, chills   Has tried robitussin but it didn't help  Has not taken a COVID test - he will test today  Ordered chest XR to complete if symptoms do not improve    BP today is 131/69    Wt today is 148 lbs    Patient Active Problem List   Diagnosis    Bilateral carotid artery stenosis    Cervical stenosis of spinal canal    HTN (hypertension)    GERD (gastroesophageal reflux disease)    Tension vascular headache    Degenerative joint disease (DJD) of hip    Anxiety    Cerebral microvascular disease    Pure hypercholesterolemia    Prostate cancer (HCC)     Current Outpatient Medications   Medication Sig Dispense Refill    lisinopril (PRINIVIL;ZESTRIL) 10 MG tablet Take 1 tablet by mouth daily 90 tablet 1    rosuvastatin (CRESTOR) 10 MG tablet Take 1 tablet by mouth daily 90 tablet 1    albuterol sulfate HFA (PROVENTIL;VENTOLIN;PROAIR) 108 (90 Base) MCG/ACT inhaler INHALE 2 PUFFS BY MOUTH EVERY 6 HOURS AS NEEDED FOR WHEEZING 18 g 1    Cetirizine HCl (ZYRTEC ALLERGY) 10 MG CAPS Take by mouth as needed      fluticasone (FLONASE) 50 MCG/ACT nasal spray 2 sprays by Nasal route daily      hydrocortisone 0.5 % ointment Apply to affected area two to three times daily      pantoprazole (PROTONIX) 20 MG tablet Take by mouth 2 times daily      triamcinolone (KENALOG) 0.1 % cream Apply topically 2 times daily (Patient not taking: Reported on 9/5/2023)       No current facility-administered medications for this visit.      Past Surgical History:   Procedure Laterality Date    CATARACT REMOVAL  03/2023    COLONOSCOPY N/A

## 2023-10-31 NOTE — PROGRESS NOTES
1. \"Have you been to the ER, urgent care clinic since your last visit? Hospitalized since your last visit? \" No    2. \"Have you seen or consulted any other health care providers outside of the [address] [place] [address] since your last visit? \" No     3. For patients aged 43-73: Has the patient had a colonoscopy / FIT/ Cologuard? Yes - Care Gap present.  Most recent result on file

## 2023-11-01 ENCOUNTER — TELEPHONE (OUTPATIENT)
Age: 63
End: 2023-11-01

## 2023-11-01 RX ORDER — PREDNISONE 20 MG/1
TABLET ORAL
Qty: 12 TABLET | Refills: 0 | Status: SHIPPED | OUTPATIENT
Start: 2023-11-01

## 2023-11-01 RX ORDER — METHYLPREDNISOLONE 4 MG/1
TABLET ORAL
Status: CANCELLED | OUTPATIENT
Start: 2023-11-01 | End: 2023-11-07

## 2023-11-01 NOTE — TELEPHONE ENCOUNTER
Pt states that his COVID test was negative and he would like his chest x ray results.     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

## 2023-11-03 RX ORDER — PANTOPRAZOLE SODIUM 20 MG/1
20 TABLET, DELAYED RELEASE ORAL 2 TIMES DAILY
Qty: 180 TABLET | Refills: 1 | Status: SHIPPED | OUTPATIENT
Start: 2023-11-03

## 2023-11-03 RX ORDER — LISINOPRIL 10 MG/1
10 TABLET ORAL DAILY
Qty: 90 TABLET | Refills: 1 | Status: SHIPPED | OUTPATIENT
Start: 2023-11-03

## 2023-11-03 RX ORDER — ALBUTEROL SULFATE 90 UG/1
AEROSOL, METERED RESPIRATORY (INHALATION)
Qty: 18 G | Refills: 1 | Status: SHIPPED | OUTPATIENT
Start: 2023-11-03

## 2023-11-03 RX ORDER — ROSUVASTATIN CALCIUM 10 MG/1
10 TABLET, COATED ORAL DAILY
Qty: 90 TABLET | Refills: 1 | Status: SHIPPED | OUTPATIENT
Start: 2023-11-03

## 2023-11-03 NOTE — TELEPHONE ENCOUNTER
Future Appointments:  Future Appointments   Date Time Provider [address]   3/6/2024 10:15 AM Maureen Koenig MD [facility] BS AMB        Last Appointment With Me:  10/31/2023     Requested Prescriptions     Pending Prescriptions Disp Refills    albuterol sulfate HFA (PROVENTIL;VENTOLIN;PROAIR) 108 (90 Base) MCG/ACT inhaler 18 g 1     Sig: INHALE 2 PUFFS BY MOUTH EVERY 6 HOURS AS NEEDED FOR WHEEZING    lisinopril (PRINIVIL;ZESTRIL) 10 MG tablet 90 tablet 1     Sig: Take 1 tablet by mouth daily    rosuvastatin (CRESTOR) 10 MG tablet 90 tablet 1     Sig: Take 1 tablet by mouth daily    pantoprazole (PROTONIX) 20 MG tablet 180 tablet 1     Sig: Take 1 tablet by mouth 2 times daily

## 2023-11-06 ENCOUNTER — HOSPITAL ENCOUNTER (EMERGENCY)
Facility: HOSPITAL | Age: 63
Discharge: HOME | End: 2023-11-06
Payer: MEDICARE

## 2023-11-06 VITALS
WEIGHT: 153 LBS | HEART RATE: 98 BPM | RESPIRATION RATE: 18 BRPM | BODY MASS INDEX: 23.19 KG/M2 | HEIGHT: 68 IN | SYSTOLIC BLOOD PRESSURE: 150 MMHG | OXYGEN SATURATION: 94 % | DIASTOLIC BLOOD PRESSURE: 82 MMHG | TEMPERATURE: 97.7 F

## 2023-11-06 DIAGNOSIS — M54.9 ACUTE UPPER BACK PAIN: ICD-10-CM

## 2023-11-06 DIAGNOSIS — M54.50 ACUTE BILATERAL LOW BACK PAIN WITHOUT SCIATICA: Primary | ICD-10-CM

## 2023-11-06 DIAGNOSIS — I10 ESSENTIAL HYPERTENSION: ICD-10-CM

## 2023-11-06 PROCEDURE — 99283 EMERGENCY DEPT VISIT LOW MDM: CPT

## 2023-11-06 RX ORDER — ACETAMINOPHEN 325 MG/1
650 TABLET ORAL EVERY 6 HOURS PRN
Qty: 20 TABLET | Refills: 0 | Status: SHIPPED | OUTPATIENT
Start: 2023-11-06

## 2023-11-06 RX ORDER — CYCLOBENZAPRINE HCL 10 MG
10 TABLET ORAL 3 TIMES DAILY PRN
Qty: 21 TABLET | Refills: 0 | Status: SHIPPED | OUTPATIENT
Start: 2023-11-06 | End: 2023-11-16

## 2023-11-06 ASSESSMENT — ENCOUNTER SYMPTOMS: BACK PAIN: 1

## 2023-11-06 ASSESSMENT — LIFESTYLE VARIABLES
HOW MANY STANDARD DRINKS CONTAINING ALCOHOL DO YOU HAVE ON A TYPICAL DAY: 1 OR 2
HOW OFTEN DO YOU HAVE A DRINK CONTAINING ALCOHOL: 4 OR MORE TIMES A WEEK

## 2023-11-07 NOTE — ED NOTES
Naida CEDILLO reviewed discharge instructions with the patient. The patient verbalized understanding. All questions and concerns were addressed. The patient declined a wheelchair and is discharged ambulatory in the care of family members with instructions and prescriptions in hand. Pt is alert and oriented x 4. Respirations are clear and unlabored.        Angelo Nelson RN  11/06/23 1232

## 2023-12-05 ENCOUNTER — OFFICE VISIT (OUTPATIENT)
Age: 63
End: 2023-12-05
Payer: MEDICARE

## 2023-12-05 VITALS
SYSTOLIC BLOOD PRESSURE: 117 MMHG | WEIGHT: 158 LBS | HEART RATE: 84 BPM | BODY MASS INDEX: 23.95 KG/M2 | OXYGEN SATURATION: 98 % | TEMPERATURE: 98 F | DIASTOLIC BLOOD PRESSURE: 70 MMHG | RESPIRATION RATE: 18 BRPM | HEIGHT: 68 IN

## 2023-12-05 DIAGNOSIS — E78.00 PURE HYPERCHOLESTEROLEMIA: ICD-10-CM

## 2023-12-05 DIAGNOSIS — R05.2 SUBACUTE COUGH: ICD-10-CM

## 2023-12-05 DIAGNOSIS — I10 PRIMARY HYPERTENSION: Primary | ICD-10-CM

## 2023-12-05 DIAGNOSIS — H81.13 BENIGN PAROXYSMAL POSITIONAL VERTIGO DUE TO BILATERAL VESTIBULAR DISORDER: ICD-10-CM

## 2023-12-05 DIAGNOSIS — C61 PROSTATE CANCER (HCC): ICD-10-CM

## 2023-12-05 DIAGNOSIS — K21.9 GASTROESOPHAGEAL REFLUX DISEASE WITHOUT ESOPHAGITIS: ICD-10-CM

## 2023-12-05 DIAGNOSIS — R42 DIZZINESS: ICD-10-CM

## 2023-12-05 DIAGNOSIS — F51.01 PRIMARY INSOMNIA: ICD-10-CM

## 2023-12-05 DIAGNOSIS — F41.9 ANXIETY: ICD-10-CM

## 2023-12-05 LAB
ALBUMIN SERPL-MCNC: 4 G/DL (ref 3.5–5)
ALBUMIN/GLOB SERPL: 1.1 (ref 1.1–2.2)
ALP SERPL-CCNC: 86 U/L (ref 45–117)
ALT SERPL-CCNC: 42 U/L (ref 12–78)
ANION GAP SERPL CALC-SCNC: 4 MMOL/L (ref 5–15)
AST SERPL-CCNC: 25 U/L (ref 15–37)
BILIRUB SERPL-MCNC: 0.4 MG/DL (ref 0.2–1)
BUN SERPL-MCNC: 17 MG/DL (ref 6–20)
BUN/CREAT SERPL: 17 (ref 12–20)
CALCIUM SERPL-MCNC: 9.4 MG/DL (ref 8.5–10.1)
CHLORIDE SERPL-SCNC: 104 MMOL/L (ref 97–108)
CO2 SERPL-SCNC: 28 MMOL/L (ref 21–32)
CREAT SERPL-MCNC: 1 MG/DL (ref 0.7–1.3)
ERYTHROCYTE [DISTWIDTH] IN BLOOD BY AUTOMATED COUNT: 11.5 % (ref 11.5–14.5)
GLOBULIN SER CALC-MCNC: 3.7 G/DL (ref 2–4)
GLUCOSE SERPL-MCNC: 100 MG/DL (ref 65–100)
HCT VFR BLD AUTO: 42.6 % (ref 36.6–50.3)
HGB BLD-MCNC: 14.3 G/DL (ref 12.1–17)
MCH RBC QN AUTO: 30.1 PG (ref 26–34)
MCHC RBC AUTO-ENTMCNC: 33.6 G/DL (ref 30–36.5)
MCV RBC AUTO: 89.7 FL (ref 80–99)
NRBC # BLD: 0 K/UL (ref 0–0.01)
NRBC BLD-RTO: 0 PER 100 WBC
PLATELET # BLD AUTO: 334 K/UL (ref 150–400)
PMV BLD AUTO: 9.4 FL (ref 8.9–12.9)
POTASSIUM SERPL-SCNC: 4.6 MMOL/L (ref 3.5–5.1)
PROT SERPL-MCNC: 7.7 G/DL (ref 6.4–8.2)
RBC # BLD AUTO: 4.75 M/UL (ref 4.1–5.7)
SODIUM SERPL-SCNC: 136 MMOL/L (ref 136–145)
TSH SERPL DL<=0.05 MIU/L-ACNC: 1.03 UIU/ML (ref 0.36–3.74)
WBC # BLD AUTO: 6.9 K/UL (ref 4.1–11.1)

## 2023-12-05 PROCEDURE — 3017F COLORECTAL CA SCREEN DOC REV: CPT | Performed by: INTERNAL MEDICINE

## 2023-12-05 PROCEDURE — G8420 CALC BMI NORM PARAMETERS: HCPCS | Performed by: INTERNAL MEDICINE

## 2023-12-05 PROCEDURE — G8482 FLU IMMUNIZE ORDER/ADMIN: HCPCS | Performed by: INTERNAL MEDICINE

## 2023-12-05 PROCEDURE — 99214 OFFICE O/P EST MOD 30 MIN: CPT | Performed by: INTERNAL MEDICINE

## 2023-12-05 PROCEDURE — 3074F SYST BP LT 130 MM HG: CPT | Performed by: INTERNAL MEDICINE

## 2023-12-05 PROCEDURE — 1036F TOBACCO NON-USER: CPT | Performed by: INTERNAL MEDICINE

## 2023-12-05 PROCEDURE — 3078F DIAST BP <80 MM HG: CPT | Performed by: INTERNAL MEDICINE

## 2023-12-05 PROCEDURE — G8427 DOCREV CUR MEDS BY ELIG CLIN: HCPCS | Performed by: INTERNAL MEDICINE

## 2023-12-05 RX ORDER — ESCITALOPRAM OXALATE 5 MG/1
5 TABLET ORAL DAILY
Qty: 90 TABLET | Refills: 1 | Status: SHIPPED | OUTPATIENT
Start: 2023-12-05

## 2023-12-05 RX ORDER — LISINOPRIL 10 MG/1
10 TABLET ORAL DAILY
Qty: 90 TABLET | Refills: 1 | Status: SHIPPED | OUTPATIENT
Start: 2023-12-05

## 2023-12-05 RX ORDER — PANTOPRAZOLE SODIUM 20 MG/1
20 TABLET, DELAYED RELEASE ORAL 2 TIMES DAILY
Qty: 180 TABLET | Refills: 1 | Status: SHIPPED | OUTPATIENT
Start: 2023-12-05

## 2023-12-05 RX ORDER — ALBUTEROL SULFATE 90 UG/1
AEROSOL, METERED RESPIRATORY (INHALATION)
Qty: 18 G | Refills: 1 | Status: SHIPPED | OUTPATIENT
Start: 2023-12-05

## 2023-12-05 RX ORDER — ROSUVASTATIN CALCIUM 10 MG/1
10 TABLET, COATED ORAL DAILY
Qty: 90 TABLET | Refills: 1 | Status: SHIPPED | OUTPATIENT
Start: 2023-12-05

## 2023-12-05 RX ORDER — MECLIZINE HYDROCHLORIDE 25 MG/1
25 TABLET ORAL 3 TIMES DAILY PRN
Qty: 30 TABLET | Refills: 0 | Status: SHIPPED | OUTPATIENT
Start: 2023-12-05 | End: 2023-12-15

## 2023-12-05 SDOH — ECONOMIC STABILITY: INCOME INSECURITY: HOW HARD IS IT FOR YOU TO PAY FOR THE VERY BASICS LIKE FOOD, HOUSING, MEDICAL CARE, AND HEATING?: NOT HARD AT ALL

## 2023-12-05 SDOH — ECONOMIC STABILITY: FOOD INSECURITY: WITHIN THE PAST 12 MONTHS, THE FOOD YOU BOUGHT JUST DIDN'T LAST AND YOU DIDN'T HAVE MONEY TO GET MORE.: NEVER TRUE

## 2023-12-05 SDOH — ECONOMIC STABILITY: FOOD INSECURITY: WITHIN THE PAST 12 MONTHS, YOU WORRIED THAT YOUR FOOD WOULD RUN OUT BEFORE YOU GOT MONEY TO BUY MORE.: NEVER TRUE

## 2023-12-05 ASSESSMENT — ENCOUNTER SYMPTOMS: SHORTNESS OF BREATH: 0

## 2023-12-05 ASSESSMENT — PATIENT HEALTH QUESTIONNAIRE - PHQ9
SUM OF ALL RESPONSES TO PHQ QUESTIONS 1-9: 0
SUM OF ALL RESPONSES TO PHQ QUESTIONS 1-9: 0
1. LITTLE INTEREST OR PLEASURE IN DOING THINGS: 0
2. FEELING DOWN, DEPRESSED OR HOPELESS: 0
SUM OF ALL RESPONSES TO PHQ QUESTIONS 1-9: 0
SUM OF ALL RESPONSES TO PHQ9 QUESTIONS 1 & 2: 0
SUM OF ALL RESPONSES TO PHQ QUESTIONS 1-9: 0

## 2023-12-05 NOTE — PATIENT INSTRUCTIONS
Melatonin over the counter for sleep       Lexapro everyday for anxiety       Meclizine as needed for dizzy/spinning    Ent if dizzy/spinning not improving

## 2024-02-28 NOTE — PROGRESS NOTES
HISTORY OF PRESENT ILLNESS  Mando Sarah is a 63 y.o. male.  HPI    Last here 12/5/23. He presents for acute/routine care.       Has a history of hypertension  BP today is  125/78  BP at home 130s/80s  Dizziness is more spinning rather than orthostatic  Taking lisinopril 10mg (decreased from 20mg due to lower blood pressure and bump in creatinine)  Please recall hctz caused sodium to drop     Wt today is 149 lbs,  down 10 lbs since lov however a year ago he was the same weight 148 his weight fluctuates quite a bit between around 1 48-1 58  His weight is within normal ranges     Reviewed labs   Ordered labs         As meclizine to use prn for vertigo  Discusses seeing ENT if this does not improve     Lov He also reports eye related swelling and altered vision in general which has been going on for a while he saw ophthalmology for this  He saw Dr Patten 11/23 for this and got a laser treatment  Positive as needed       Recall he has been having recurrent issues with stomach pain for the last year   Has been referred to GI a year ago though he has not scheduled this yet  He is only taking Protonix PRN currently not bothering him     No longer taking ASA 81 mg since pt has no personal hx of heart disease or stroke  Recall CT abd pelv 8/31/21:  No significant or acute abnormalities demonstrated.      He followed with Dr. Israel Flanagan (neuro) for history of headaches in the past and dizziness previously dizziness is a recurrent issue  Last visit w/NP Belen 4/5/23, no med changes     He is following with Dr Feng (ortho) for L leg pain  Pt has completed PT       Pt saw Dr Ozuna for preop clearance in the past  Last visit 2/13/20     Pt follows with Dr. Fletcher (uro) for h/o prostate cancer, annually  Last visit 3/23, no changes, PSA 0, f/u scheduled 3/24            States he has been feeling more anxious and jittery last office visit  He is on lexapro 5 mg- works well 3/24 symptoms much improved  No longer taking

## 2024-03-06 ENCOUNTER — OFFICE VISIT (OUTPATIENT)
Age: 64
End: 2024-03-06
Payer: MEDICARE

## 2024-03-06 VITALS
HEIGHT: 68 IN | DIASTOLIC BLOOD PRESSURE: 78 MMHG | SYSTOLIC BLOOD PRESSURE: 125 MMHG | WEIGHT: 149 LBS | BODY MASS INDEX: 22.58 KG/M2 | HEART RATE: 81 BPM | TEMPERATURE: 99 F | OXYGEN SATURATION: 99 % | RESPIRATION RATE: 18 BRPM

## 2024-03-06 DIAGNOSIS — Z13.6 ENCOUNTER FOR ABDOMINAL AORTIC ANEURYSM (AAA) SCREENING: ICD-10-CM

## 2024-03-06 DIAGNOSIS — R73.01 IFG (IMPAIRED FASTING GLUCOSE): ICD-10-CM

## 2024-03-06 DIAGNOSIS — F41.9 ANXIETY: ICD-10-CM

## 2024-03-06 DIAGNOSIS — K21.9 GASTROESOPHAGEAL REFLUX DISEASE WITHOUT ESOPHAGITIS: ICD-10-CM

## 2024-03-06 DIAGNOSIS — I10 PRIMARY HYPERTENSION: Primary | ICD-10-CM

## 2024-03-06 DIAGNOSIS — Z23 NEED FOR PROPHYLACTIC VACCINATION AGAINST STREPTOCOCCUS PNEUMONIAE (PNEUMOCOCCUS): ICD-10-CM

## 2024-03-06 DIAGNOSIS — C61 PROSTATE CANCER (HCC): ICD-10-CM

## 2024-03-06 DIAGNOSIS — E78.00 PURE HYPERCHOLESTEROLEMIA: ICD-10-CM

## 2024-03-06 PROCEDURE — 3074F SYST BP LT 130 MM HG: CPT | Performed by: INTERNAL MEDICINE

## 2024-03-06 PROCEDURE — 3078F DIAST BP <80 MM HG: CPT | Performed by: INTERNAL MEDICINE

## 2024-03-06 PROCEDURE — 99214 OFFICE O/P EST MOD 30 MIN: CPT | Performed by: INTERNAL MEDICINE

## 2024-03-06 PROCEDURE — 90677 PCV20 VACCINE IM: CPT | Performed by: INTERNAL MEDICINE

## 2024-03-06 PROCEDURE — PBSHW PNEUMOCOCCAL, PCV20, PREVNAR 20, (AGE 6W+), IM, PF: Performed by: INTERNAL MEDICINE

## 2024-03-06 PROCEDURE — G8420 CALC BMI NORM PARAMETERS: HCPCS | Performed by: INTERNAL MEDICINE

## 2024-03-06 PROCEDURE — 1036F TOBACCO NON-USER: CPT | Performed by: INTERNAL MEDICINE

## 2024-03-06 PROCEDURE — G8427 DOCREV CUR MEDS BY ELIG CLIN: HCPCS | Performed by: INTERNAL MEDICINE

## 2024-03-06 PROCEDURE — G8482 FLU IMMUNIZE ORDER/ADMIN: HCPCS | Performed by: INTERNAL MEDICINE

## 2024-03-06 PROCEDURE — 3017F COLORECTAL CA SCREEN DOC REV: CPT | Performed by: INTERNAL MEDICINE

## 2024-03-06 RX ORDER — ROSUVASTATIN CALCIUM 10 MG/1
10 TABLET, COATED ORAL DAILY
Qty: 90 TABLET | Refills: 1 | Status: SHIPPED | OUTPATIENT
Start: 2024-03-06

## 2024-03-06 RX ORDER — LISINOPRIL 10 MG/1
10 TABLET ORAL DAILY
Qty: 90 TABLET | Refills: 1 | Status: SHIPPED | OUTPATIENT
Start: 2024-03-06

## 2024-03-06 RX ORDER — PANTOPRAZOLE SODIUM 20 MG/1
20 TABLET, DELAYED RELEASE ORAL 2 TIMES DAILY
Qty: 180 TABLET | Refills: 1 | Status: SHIPPED | OUTPATIENT
Start: 2024-03-06

## 2024-03-06 ASSESSMENT — PATIENT HEALTH QUESTIONNAIRE - PHQ9
1. LITTLE INTEREST OR PLEASURE IN DOING THINGS: 0
SUM OF ALL RESPONSES TO PHQ QUESTIONS 1-9: 0
SUM OF ALL RESPONSES TO PHQ9 QUESTIONS 1 & 2: 0
SUM OF ALL RESPONSES TO PHQ QUESTIONS 1-9: 0
SUM OF ALL RESPONSES TO PHQ QUESTIONS 1-9: 0
2. FEELING DOWN, DEPRESSED OR HOPELESS: 0
SUM OF ALL RESPONSES TO PHQ QUESTIONS 1-9: 0

## 2024-03-19 ENCOUNTER — TELEPHONE (OUTPATIENT)
Age: 64
End: 2024-03-19

## 2024-03-19 DIAGNOSIS — I10 PRIMARY HYPERTENSION: ICD-10-CM

## 2024-03-19 DIAGNOSIS — I51.7 CARDIOMEGALY: ICD-10-CM

## 2024-03-19 DIAGNOSIS — I65.23 BILATERAL CAROTID ARTERY STENOSIS: Primary | ICD-10-CM

## 2024-03-19 DIAGNOSIS — C61 PROSTATE CANCER (HCC): ICD-10-CM

## 2024-03-19 NOTE — TELEPHONE ENCOUNTER
Lis MAZARIEGOS sched states that the US order triggered an ABN.    Medicare denied due to diag code.  Would need new diag .    When in please call Lis or the patient so pt can try to schedule this again.

## 2024-05-27 NOTE — LETTER
NOTIFICATION RETURN TO WORK / SCHOOL 
 
3/19/2019 9:03 AM 
 
Mr. Dayanara Pinedo [facility] [address] [facility] [id number] To Whom It May Concern: 
 
Dayanara Pinedo is currently under the care of [facility]. He will return to work/school on: 3/21/19 If there are questions or concerns please have the patient contact our office.  
 
 
 
Sincerely, 
 
 
Karen Cuenca MD 
 
                                
 
 no abrasions, no jaundice, no lesions, no pruritis, and no rashes.

## 2024-05-30 RX ORDER — ALBUTEROL SULFATE 90 UG/1
AEROSOL, METERED RESPIRATORY (INHALATION)
Qty: 6.7 G | Refills: 1 | Status: SHIPPED | OUTPATIENT
Start: 2024-05-30

## 2024-07-05 ENCOUNTER — TELEMEDICINE (OUTPATIENT)
Age: 64
End: 2024-07-05
Payer: MEDICARE

## 2024-07-05 VITALS
HEIGHT: 68 IN | DIASTOLIC BLOOD PRESSURE: 72 MMHG | WEIGHT: 149 LBS | SYSTOLIC BLOOD PRESSURE: 130 MMHG | OXYGEN SATURATION: 97 % | HEART RATE: 85 BPM | BODY MASS INDEX: 22.58 KG/M2 | RESPIRATION RATE: 15 BRPM

## 2024-07-05 DIAGNOSIS — F41.9 ANXIETY: ICD-10-CM

## 2024-07-05 DIAGNOSIS — K21.9 GASTROESOPHAGEAL REFLUX DISEASE WITHOUT ESOPHAGITIS: ICD-10-CM

## 2024-07-05 DIAGNOSIS — Z00.00 MEDICARE ANNUAL WELLNESS VISIT, SUBSEQUENT: ICD-10-CM

## 2024-07-05 DIAGNOSIS — K21.00 GASTROESOPHAGEAL REFLUX DISEASE WITH ESOPHAGITIS WITHOUT HEMORRHAGE: ICD-10-CM

## 2024-07-05 DIAGNOSIS — C61 PROSTATE CANCER (HCC): ICD-10-CM

## 2024-07-05 DIAGNOSIS — R10.13 EPIGASTRIC PAIN: ICD-10-CM

## 2024-07-05 DIAGNOSIS — E78.00 PURE HYPERCHOLESTEROLEMIA: ICD-10-CM

## 2024-07-05 DIAGNOSIS — I10 PRIMARY HYPERTENSION: Primary | ICD-10-CM

## 2024-07-05 DIAGNOSIS — R73.01 IFG (IMPAIRED FASTING GLUCOSE): ICD-10-CM

## 2024-07-05 DIAGNOSIS — K59.00 CONSTIPATION, UNSPECIFIED CONSTIPATION TYPE: ICD-10-CM

## 2024-07-05 LAB
ALBUMIN SERPL-MCNC: 3.7 G/DL (ref 3.5–5)
ALBUMIN/GLOB SERPL: 1.1 (ref 1.1–2.2)
ALP SERPL-CCNC: 62 U/L (ref 45–117)
ALT SERPL-CCNC: 32 U/L (ref 12–78)
ANION GAP SERPL CALC-SCNC: 6 MMOL/L (ref 5–15)
AST SERPL-CCNC: 24 U/L (ref 15–37)
BILIRUB SERPL-MCNC: 0.5 MG/DL (ref 0.2–1)
BUN SERPL-MCNC: 13 MG/DL (ref 6–20)
BUN/CREAT SERPL: 12 (ref 12–20)
CALCIUM SERPL-MCNC: 9.6 MG/DL (ref 8.5–10.1)
CHLORIDE SERPL-SCNC: 106 MMOL/L (ref 97–108)
CHOLEST SERPL-MCNC: 241 MG/DL
CO2 SERPL-SCNC: 27 MMOL/L (ref 21–32)
CREAT SERPL-MCNC: 1.09 MG/DL (ref 0.7–1.3)
ERYTHROCYTE [DISTWIDTH] IN BLOOD BY AUTOMATED COUNT: 11.5 % (ref 11.5–14.5)
EST. AVERAGE GLUCOSE BLD GHB EST-MCNC: 97 MG/DL
GLOBULIN SER CALC-MCNC: 3.3 G/DL (ref 2–4)
GLUCOSE SERPL-MCNC: 97 MG/DL (ref 65–100)
HBA1C MFR BLD: 5 % (ref 4–5.6)
HCT VFR BLD AUTO: 40.6 % (ref 36.6–50.3)
HDLC SERPL-MCNC: 79 MG/DL
HDLC SERPL: 3.1 (ref 0–5)
HGB BLD-MCNC: 13.7 G/DL (ref 12.1–17)
LDLC SERPL CALC-MCNC: 127.4 MG/DL (ref 0–100)
MCH RBC QN AUTO: 30.4 PG (ref 26–34)
MCHC RBC AUTO-ENTMCNC: 33.7 G/DL (ref 30–36.5)
MCV RBC AUTO: 90 FL (ref 80–99)
NRBC # BLD: 0 K/UL (ref 0–0.01)
NRBC BLD-RTO: 0 PER 100 WBC
PLATELET # BLD AUTO: 277 K/UL (ref 150–400)
PMV BLD AUTO: 9.7 FL (ref 8.9–12.9)
POTASSIUM SERPL-SCNC: 4.4 MMOL/L (ref 3.5–5.1)
PROT SERPL-MCNC: 7 G/DL (ref 6.4–8.2)
RBC # BLD AUTO: 4.51 M/UL (ref 4.1–5.7)
SODIUM SERPL-SCNC: 139 MMOL/L (ref 136–145)
TRIGL SERPL-MCNC: 173 MG/DL
TSH SERPL DL<=0.05 MIU/L-ACNC: 0.91 UIU/ML (ref 0.36–3.74)
VLDLC SERPL CALC-MCNC: 34.6 MG/DL
WBC # BLD AUTO: 5.2 K/UL (ref 4.1–11.1)

## 2024-07-05 PROCEDURE — 1123F ACP DISCUSS/DSCN MKR DOCD: CPT | Performed by: INTERNAL MEDICINE

## 2024-07-05 PROCEDURE — 3075F SYST BP GE 130 - 139MM HG: CPT | Performed by: INTERNAL MEDICINE

## 2024-07-05 PROCEDURE — G8427 DOCREV CUR MEDS BY ELIG CLIN: HCPCS | Performed by: INTERNAL MEDICINE

## 2024-07-05 PROCEDURE — G0439 PPPS, SUBSEQ VISIT: HCPCS | Performed by: INTERNAL MEDICINE

## 2024-07-05 PROCEDURE — 3078F DIAST BP <80 MM HG: CPT | Performed by: INTERNAL MEDICINE

## 2024-07-05 PROCEDURE — 3017F COLORECTAL CA SCREEN DOC REV: CPT | Performed by: INTERNAL MEDICINE

## 2024-07-05 PROCEDURE — 99214 OFFICE O/P EST MOD 30 MIN: CPT | Performed by: INTERNAL MEDICINE

## 2024-07-05 RX ORDER — PANTOPRAZOLE SODIUM 20 MG/1
20 TABLET, DELAYED RELEASE ORAL 2 TIMES DAILY
Qty: 180 TABLET | Refills: 1 | Status: SHIPPED | OUTPATIENT
Start: 2024-07-05

## 2024-07-05 RX ORDER — ROSUVASTATIN CALCIUM 10 MG/1
10 TABLET, COATED ORAL DAILY
Qty: 90 TABLET | Refills: 1 | Status: SHIPPED | OUTPATIENT
Start: 2024-07-05

## 2024-07-05 RX ORDER — LISINOPRIL 10 MG/1
10 TABLET ORAL DAILY
Qty: 90 TABLET | Refills: 1 | Status: SHIPPED | OUTPATIENT
Start: 2024-07-05

## 2024-07-05 RX ORDER — ALBUTEROL SULFATE 90 UG/1
2 AEROSOL, METERED RESPIRATORY (INHALATION) EVERY 6 HOURS PRN
Qty: 6.7 G | Refills: 1 | Status: SHIPPED | OUTPATIENT
Start: 2024-07-05

## 2024-07-05 ASSESSMENT — LIFESTYLE VARIABLES
HOW OFTEN DURING THE LAST YEAR HAVE YOU FOUND THAT YOU WERE NOT ABLE TO STOP DRINKING ONCE YOU HAD STARTED: NEVER
HOW OFTEN DURING THE LAST YEAR HAVE YOU NEEDED AN ALCOHOLIC DRINK FIRST THING IN THE MORNING TO GET YOURSELF GOING AFTER A NIGHT OF HEAVY DRINKING: NEVER
HAVE YOU OR SOMEONE ELSE BEEN INJURED AS A RESULT OF YOUR DRINKING: NO
HOW OFTEN DURING THE LAST YEAR HAVE YOU HAD A FEELING OF GUILT OR REMORSE AFTER DRINKING: NEVER
HAS A RELATIVE, FRIEND, DOCTOR, OR ANOTHER HEALTH PROFESSIONAL EXPRESSED CONCERN ABOUT YOUR DRINKING OR SUGGESTED YOU CUT DOWN: NO
HOW OFTEN DURING THE LAST YEAR HAVE YOU BEEN UNABLE TO REMEMBER WHAT HAPPENED THE NIGHT BEFORE BECAUSE YOU HAD BEEN DRINKING: NEVER
HOW OFTEN DO YOU HAVE A DRINK CONTAINING ALCOHOL: 4 OR MORE TIMES A WEEK
HOW OFTEN DURING THE LAST YEAR HAVE YOU FAILED TO DO WHAT WAS NORMALLY EXPECTED FROM YOU BECAUSE OF DRINKING: NEVER
HOW MANY STANDARD DRINKS CONTAINING ALCOHOL DO YOU HAVE ON A TYPICAL DAY: 3 OR 4

## 2024-07-05 ASSESSMENT — PATIENT HEALTH QUESTIONNAIRE - PHQ9
SUM OF ALL RESPONSES TO PHQ QUESTIONS 1-9: 0
1. LITTLE INTEREST OR PLEASURE IN DOING THINGS: NOT AT ALL
2. FEELING DOWN, DEPRESSED OR HOPELESS: NOT AT ALL
SUM OF ALL RESPONSES TO PHQ QUESTIONS 1-9: 0
SUM OF ALL RESPONSES TO PHQ9 QUESTIONS 1 & 2: 0

## 2024-07-05 ASSESSMENT — ENCOUNTER SYMPTOMS: SHORTNESS OF BREATH: 0

## 2024-07-05 NOTE — TELEPHONE ENCOUNTER
Caller requests Refill of:  Gastro medication for acid reflux     [facility]     [id number] [address] [place] [phone] - F [phone]     Visit / Appointment History:  Future Appointment at [facility]:  Visit date not found   Last Appointment With PCP:  7/5/2024       Caller confirmed instructions and dosages as correct.    Caller was advised that Meds will be refilled as soon as possible, however there can be a 48-72 business hour turn around on refill requests.

## 2024-07-05 NOTE — TELEPHONE ENCOUNTER
Future Appointments:  Future Appointments   Date Time Provider Department Center   7/7/2025 10:00 AM Zhane Flanagan MD [facility] BS AMB        Last Appointment With Me:  7/5/2024     Requested Prescriptions     Pending Prescriptions Disp Refills    pantoprazole (PROTONIX) 20 MG tablet 180 tablet 1     Sig: Take 1 tablet by mouth 2 times daily

## 2024-07-05 NOTE — PROGRESS NOTES
Medicare Annual Wellness Visit    Mando Sarah is here for Medicare AWV    Assessment & Plan   Medicare annual wellness visit, subsequent     Recommendations for Preventive Services Due: see orders and patient instructions/AVS.  Recommended screening schedule for the next 5-10 years is provided to the patient in written form: see Patient Instructions/AVS.     Return for Medicare Annual Wellness Visit in 1 year.     Subjective       Patient's complete Health Risk Assessment and screening values have been reviewed and are found in Flowsheets. The following problems were reviewed today and where indicated follow up appointments were made and/or referrals ordered.    Positive Risk Factor Screenings with Interventions:         Alcohol Screening:  Alcohol Use: Heavy Drinker (7/5/2024)    AUDIT-C     Frequency of Alcohol Consumption: 4 or more times a week     Average Number of Drinks: 3 or 4     Frequency of Binge Drinking: Never      AUDIT-C Score: 5   AUDIT Total Score: 5    Interpretation of AUDIT-C score:   3-7 indicates potential alcohol risk.    8 or more is associated with harmful or hazardous drinking.   13 or more in women, and 15 or more in men, is likely to indicate alcohol dependence.  Interventions:  Discussed guidelines--1 per night              Activity, Diet, and Weight:  On average, how many days per week do you engage in moderate to strenuous exercise (like a brisk walk)?: 0 days  On average, how many minutes do you engage in exercise at this level?: 0 min    Do you eat balanced/healthy meals regularly?: Yes    Body mass index is 22.66 kg/m².      Inactivity Interventions:  Active in general             Advanced Directives:  Do you have a Living Will?: (!) No  Intervention:  has NO advanced directive - information provided                 Objective      Patient-Reported Vitals  No data recorded            No Known Allergies  Prior to Visit Medications    Medication Sig Taking? Authorizing Provider 
\"Have you been to the ER, urgent care clinic since your last visit?  Hospitalized since your last visit?\"    NO    “Have you seen or consulted any other health care providers outside of [facility] since your last visit?”    NO            Click Here for Release of Records Request  
encounter. The patient (or guardian if applicable) is aware that this is a billable service, which includes applicable co-pays. This Virtual Visit was conducted with patient's (and/or legal guardian's) consent. Patient identification was verified, and a caregiver was present when appropriate.   The patient was located at Home: [address] [facility] [address]  Provider was located at Home (Appt Dept State[place]  Confirm you are appropriately licensed, registered, or certified to deliver care in the state where the patient is located as indicated above. If you are not or unsure, please re-schedule the visit: Yes, I confirm.      --Otilia Nogueira on 7/5/2024 at 10:36 AM  An electronic signature was used to authenticate this note.

## 2024-07-12 ENCOUNTER — HOSPITAL ENCOUNTER (OUTPATIENT)
Facility: HOSPITAL | Age: 64
End: 2024-07-12
Attending: INTERNAL MEDICINE
Payer: MEDICARE

## 2024-07-12 DIAGNOSIS — R10.13 EPIGASTRIC PAIN: ICD-10-CM

## 2024-07-12 PROCEDURE — 76700 US EXAM ABDOM COMPLETE: CPT

## 2024-10-07 RX ORDER — ALBUTEROL SULFATE 90 UG/1
2 INHALANT RESPIRATORY (INHALATION) EVERY 6 HOURS PRN
Qty: 6.7 G | Refills: 1 | Status: SHIPPED | OUTPATIENT
Start: 2024-10-07 | End: 2024-10-08

## 2024-10-08 RX ORDER — ALBUTEROL SULFATE 90 UG/1
2 INHALANT RESPIRATORY (INHALATION) EVERY 6 HOURS PRN
Qty: 18 G | Refills: 3 | Status: SHIPPED | OUTPATIENT
Start: 2024-10-08

## 2025-01-08 ENCOUNTER — TELEPHONE (OUTPATIENT)
Age: 65
End: 2025-01-08

## 2025-01-08 NOTE — TELEPHONE ENCOUNTER
----- Message from Janusz SAMS sent at 1/8/2025  9:11 AM EST -----  Regarding: [facility] Appointment Request  [facility] Appointment Request    Patient needs appointment for [facility] Appointment Type: Existing Condition Follow Up.    Patient Requested Dates(s): February 04, 2025  Patient Requested Time: either 9:00 or 10:00  Provider Name: Zhane Flanagan MD    Reason for Appointment Request: Established Patient - Available appointments did not meet patient need  --------------------------------------------------------------------------------------------------------------------------    Relationship to Patient: Self     Call Back Information: OK to leave message on voicemail  Preferred Call Back Number: Phone [phone]    Patient wants to move his appointment on 02/04/2025 at 8:30 to a later time maybe around 9:00 or 10:00

## 2025-01-28 NOTE — PROGRESS NOTES
HISTORY OF PRESENT ILLNESS  Mando Sarah is a 64 y.o. male.  HPI  Last here vv 7/5/24. He presents for routine care.    He notes he has been having an itchy back x 2 weeks. He has been using cortisone cream which helps some but does not resolve the itch. Discussed moisturizing, avoiding hot showers, frequent showers, he has been showering 2x/day. No rash on exam     Has a history of hypertension  BP today is 122/67  BP at home not checked   Taking lisinopril 10mg   Please recall hctz caused sodium to drop           Wt today is 140 lbs, down 9 lbs since lov   Down ~ 13 lbs in the last year no actually when we look further back previously his weight was 141 and he had gained weight most likely gained weight on SSRI  He states his wife's nephew is at the house and he is tired, so he is missing meals, he also was on lexapro last year, he was 141 lbs in 3/23  Ultimately his weight is in the normal range for him, he most likely gained weight while on lexapro, will monitor this    His weight is within normal ranges     Reviewed labs   Ordered labs      Lov was having epigastric pain, this is resolved   Reviewed US abd 7/12/24  IMPRESSION:  Normal abdominal ultrasound examination.     Has meclizine to use prn for vertigo  Discusses seeing ENT if this does not improve  Previously reported dizziness is more spinning rather than orthostatic           Recall he has been having recurrent issues with stomach pain for the last year, no complaints today   Has been referred to GI previously though he has not scheduled this yet  He is only taking Protonix PRN - hasn't taken it recently       No longer taking ASA 81 mg since pt has no personal hx of heart disease or stroke  Recall CT abd pelv 8/31/21:  No significant or acute abnormalities demonstrated.      He followed with Dr. Israel Flanagan (neuro) for history of headaches in the past and dizziness previously dizziness is a recurrent issue  Last visit w/NP Belen 4/5/23, no med

## 2025-02-04 ENCOUNTER — OFFICE VISIT (OUTPATIENT)
Age: 65
End: 2025-02-04
Payer: MEDICARE

## 2025-02-04 VITALS
BODY MASS INDEX: 21.29 KG/M2 | WEIGHT: 140.5 LBS | HEIGHT: 68 IN | OXYGEN SATURATION: 98 % | SYSTOLIC BLOOD PRESSURE: 122 MMHG | RESPIRATION RATE: 18 BRPM | DIASTOLIC BLOOD PRESSURE: 67 MMHG | TEMPERATURE: 97.5 F | HEART RATE: 86 BPM

## 2025-02-04 DIAGNOSIS — R63.4 WEIGHT LOSS: ICD-10-CM

## 2025-02-04 DIAGNOSIS — R73.01 IFG (IMPAIRED FASTING GLUCOSE): ICD-10-CM

## 2025-02-04 DIAGNOSIS — E78.00 PURE HYPERCHOLESTEROLEMIA: ICD-10-CM

## 2025-02-04 DIAGNOSIS — I10 PRIMARY HYPERTENSION: Primary | ICD-10-CM

## 2025-02-04 DIAGNOSIS — K21.9 GASTROESOPHAGEAL REFLUX DISEASE WITHOUT ESOPHAGITIS: ICD-10-CM

## 2025-02-04 DIAGNOSIS — L29.9 ITCH: ICD-10-CM

## 2025-02-04 DIAGNOSIS — C61 PROSTATE CANCER (HCC): ICD-10-CM

## 2025-02-04 PROCEDURE — G8427 DOCREV CUR MEDS BY ELIG CLIN: HCPCS | Performed by: INTERNAL MEDICINE

## 2025-02-04 PROCEDURE — 99214 OFFICE O/P EST MOD 30 MIN: CPT | Performed by: INTERNAL MEDICINE

## 2025-02-04 PROCEDURE — 3078F DIAST BP <80 MM HG: CPT | Performed by: INTERNAL MEDICINE

## 2025-02-04 PROCEDURE — 1036F TOBACCO NON-USER: CPT | Performed by: INTERNAL MEDICINE

## 2025-02-04 PROCEDURE — 1123F ACP DISCUSS/DSCN MKR DOCD: CPT | Performed by: INTERNAL MEDICINE

## 2025-02-04 PROCEDURE — G8420 CALC BMI NORM PARAMETERS: HCPCS | Performed by: INTERNAL MEDICINE

## 2025-02-04 PROCEDURE — 3017F COLORECTAL CA SCREEN DOC REV: CPT | Performed by: INTERNAL MEDICINE

## 2025-02-04 PROCEDURE — 3074F SYST BP LT 130 MM HG: CPT | Performed by: INTERNAL MEDICINE

## 2025-02-04 RX ORDER — LISINOPRIL 10 MG/1
10 TABLET ORAL DAILY
Qty: 90 TABLET | Refills: 1 | Status: SHIPPED | OUTPATIENT
Start: 2025-02-04

## 2025-02-04 RX ORDER — ALBUTEROL SULFATE 90 UG/1
2 INHALANT RESPIRATORY (INHALATION) EVERY 6 HOURS PRN
Qty: 18 G | Refills: 3 | Status: SHIPPED | OUTPATIENT
Start: 2025-02-04

## 2025-02-04 RX ORDER — ROSUVASTATIN CALCIUM 10 MG/1
10 TABLET, COATED ORAL DAILY
Qty: 90 TABLET | Refills: 1 | Status: SHIPPED | OUTPATIENT
Start: 2025-02-04

## 2025-02-04 SDOH — ECONOMIC STABILITY: FOOD INSECURITY: WITHIN THE PAST 12 MONTHS, THE FOOD YOU BOUGHT JUST DIDN'T LAST AND YOU DIDN'T HAVE MONEY TO GET MORE.: NEVER TRUE

## 2025-02-04 SDOH — ECONOMIC STABILITY: FOOD INSECURITY: WITHIN THE PAST 12 MONTHS, YOU WORRIED THAT YOUR FOOD WOULD RUN OUT BEFORE YOU GOT MONEY TO BUY MORE.: NEVER TRUE

## 2025-02-04 ASSESSMENT — PATIENT HEALTH QUESTIONNAIRE - PHQ9
SUM OF ALL RESPONSES TO PHQ QUESTIONS 1-9: 0
SUM OF ALL RESPONSES TO PHQ9 QUESTIONS 1 & 2: 0
1. LITTLE INTEREST OR PLEASURE IN DOING THINGS: NOT AT ALL
2. FEELING DOWN, DEPRESSED OR HOPELESS: NOT AT ALL

## 2025-02-05 LAB
ALBUMIN SERPL-MCNC: 3.9 G/DL (ref 3.5–5)
ALBUMIN/GLOB SERPL: 1.1 (ref 1.1–2.2)
ALP SERPL-CCNC: 74 U/L (ref 45–117)
ALT SERPL-CCNC: 34 U/L (ref 12–78)
ANION GAP SERPL CALC-SCNC: 6 MMOL/L (ref 2–12)
AST SERPL-CCNC: 23 U/L (ref 15–37)
BILIRUB SERPL-MCNC: 0.7 MG/DL (ref 0.2–1)
BUN SERPL-MCNC: 12 MG/DL (ref 6–20)
BUN/CREAT SERPL: 14 (ref 12–20)
CALCIUM SERPL-MCNC: 9.5 MG/DL (ref 8.5–10.1)
CHLORIDE SERPL-SCNC: 104 MMOL/L (ref 97–108)
CHOLEST SERPL-MCNC: 201 MG/DL
CO2 SERPL-SCNC: 27 MMOL/L (ref 21–32)
CREAT SERPL-MCNC: 0.87 MG/DL (ref 0.7–1.3)
ERYTHROCYTE [DISTWIDTH] IN BLOOD BY AUTOMATED COUNT: 12 % (ref 11.5–14.5)
EST. AVERAGE GLUCOSE BLD GHB EST-MCNC: 100 MG/DL
GLOBULIN SER CALC-MCNC: 3.4 G/DL (ref 2–4)
GLUCOSE SERPL-MCNC: 84 MG/DL (ref 65–100)
HBA1C MFR BLD: 5.1 % (ref 4–5.6)
HCT VFR BLD AUTO: 40.6 % (ref 36.6–50.3)
HDLC SERPL-MCNC: 89 MG/DL
HDLC SERPL: 2.3 (ref 0–5)
HGB BLD-MCNC: 13.5 G/DL (ref 12.1–17)
LDLC SERPL CALC-MCNC: 98 MG/DL (ref 0–100)
MCH RBC QN AUTO: 30.1 PG (ref 26–34)
MCHC RBC AUTO-ENTMCNC: 33.3 G/DL (ref 30–36.5)
MCV RBC AUTO: 90.4 FL (ref 80–99)
NRBC # BLD: 0 K/UL (ref 0–0.01)
NRBC BLD-RTO: 0 PER 100 WBC
PLATELET # BLD AUTO: 322 K/UL (ref 150–400)
PMV BLD AUTO: 9.4 FL (ref 8.9–12.9)
POTASSIUM SERPL-SCNC: 4.5 MMOL/L (ref 3.5–5.1)
PROT SERPL-MCNC: 7.3 G/DL (ref 6.4–8.2)
RBC # BLD AUTO: 4.49 M/UL (ref 4.1–5.7)
SODIUM SERPL-SCNC: 137 MMOL/L (ref 136–145)
TRIGL SERPL-MCNC: 70 MG/DL
TSH SERPL DL<=0.05 MIU/L-ACNC: 1.08 UIU/ML (ref 0.36–3.74)
VLDLC SERPL CALC-MCNC: 14 MG/DL
WBC # BLD AUTO: 6.5 K/UL (ref 4.1–11.1)

## 2025-03-11 RX ORDER — PANTOPRAZOLE SODIUM 20 MG/1
20 TABLET, DELAYED RELEASE ORAL 2 TIMES DAILY
Qty: 180 TABLET | Refills: 1 | OUTPATIENT
Start: 2025-03-11

## 2025-03-13 ENCOUNTER — APPOINTMENT (OUTPATIENT)
Facility: HOSPITAL | Age: 65
End: 2025-03-13
Payer: MEDICARE

## 2025-03-13 ENCOUNTER — HOSPITAL ENCOUNTER (EMERGENCY)
Facility: HOSPITAL | Age: 65
Discharge: HOME | End: 2025-03-13
Payer: MEDICARE

## 2025-03-13 VITALS
OXYGEN SATURATION: 98 % | HEART RATE: 66 BPM | DIASTOLIC BLOOD PRESSURE: 77 MMHG | TEMPERATURE: 98.4 F | BODY MASS INDEX: 21.72 KG/M2 | RESPIRATION RATE: 13 BRPM | SYSTOLIC BLOOD PRESSURE: 153 MMHG | WEIGHT: 143.3 LBS | HEIGHT: 68 IN

## 2025-03-13 DIAGNOSIS — K59.00 CONSTIPATION, UNSPECIFIED CONSTIPATION TYPE: Primary | ICD-10-CM

## 2025-03-13 LAB
ALBUMIN SERPL-MCNC: 3.7 G/DL (ref 3.5–5)
ALBUMIN/GLOB SERPL: 0.8 (ref 1.1–2.2)
ALP SERPL-CCNC: 63 U/L (ref 45–117)
ALT SERPL-CCNC: 45 U/L (ref 12–78)
ANION GAP SERPL CALC-SCNC: 2 MMOL/L (ref 2–12)
APPEARANCE UR: CLEAR
AST SERPL-CCNC: 33 U/L (ref 15–37)
BACTERIA URNS QL MICRO: NEGATIVE /HPF
BASOPHILS # BLD: 0.04 K/UL (ref 0–0.1)
BASOPHILS NFR BLD: 0.6 % (ref 0–1)
BILIRUB SERPL-MCNC: 0.5 MG/DL (ref 0.2–1)
BILIRUB UR QL: NEGATIVE
BUN SERPL-MCNC: 14 MG/DL (ref 6–20)
BUN/CREAT SERPL: 14 (ref 12–20)
CALCIUM SERPL-MCNC: 9.2 MG/DL (ref 8.5–10.1)
CHLORIDE SERPL-SCNC: 104 MMOL/L (ref 97–108)
CO2 SERPL-SCNC: 27 MMOL/L (ref 21–32)
COLOR UR: NORMAL
CREAT SERPL-MCNC: 0.98 MG/DL (ref 0.7–1.3)
DIFFERENTIAL METHOD BLD: NORMAL
EOSINOPHIL # BLD: 0.11 K/UL (ref 0–0.4)
EOSINOPHIL NFR BLD: 1.7 % (ref 0–7)
EPITH CASTS URNS QL MICRO: NORMAL /LPF
ERYTHROCYTE [DISTWIDTH] IN BLOOD BY AUTOMATED COUNT: 11.9 % (ref 11.5–14.5)
GLOBULIN SER CALC-MCNC: 4.6 G/DL (ref 2–4)
GLUCOSE SERPL-MCNC: 90 MG/DL (ref 65–100)
GLUCOSE UR STRIP.AUTO-MCNC: NEGATIVE MG/DL
HCT VFR BLD AUTO: 42.3 % (ref 36.6–50.3)
HGB BLD-MCNC: 14.3 G/DL (ref 12.1–17)
HGB UR QL STRIP: NEGATIVE
HYALINE CASTS URNS QL MICRO: NORMAL /LPF (ref 0–2)
IMM GRANULOCYTES # BLD AUTO: 0.02 K/UL (ref 0–0.04)
IMM GRANULOCYTES NFR BLD AUTO: 0.3 % (ref 0–0.5)
KETONES UR QL STRIP.AUTO: NEGATIVE MG/DL
LEUKOCYTE ESTERASE UR QL STRIP.AUTO: NEGATIVE
LIPASE SERPL-CCNC: 37 U/L (ref 13–75)
LYMPHOCYTES # BLD: 2.2 K/UL (ref 0.8–3.5)
LYMPHOCYTES NFR BLD: 33.1 % (ref 12–49)
MCH RBC QN AUTO: 30.6 PG (ref 26–34)
MCHC RBC AUTO-ENTMCNC: 33.8 G/DL (ref 30–36.5)
MCV RBC AUTO: 90.6 FL (ref 80–99)
MONOCYTES # BLD: 0.57 K/UL (ref 0–1)
MONOCYTES NFR BLD: 8.6 % (ref 5–13)
NEUTS SEG # BLD: 3.71 K/UL (ref 1.8–8)
NEUTS SEG NFR BLD: 55.7 % (ref 32–75)
NITRITE UR QL STRIP.AUTO: NEGATIVE
NRBC # BLD: 0 K/UL (ref 0–0.01)
NRBC BLD-RTO: 0 PER 100 WBC
PH UR STRIP: 6 (ref 5–8)
PLATELET # BLD AUTO: 306 K/UL (ref 150–400)
PMV BLD AUTO: 9.3 FL (ref 8.9–12.9)
POTASSIUM SERPL-SCNC: 4.4 MMOL/L (ref 3.5–5.1)
PROT SERPL-MCNC: 8.3 G/DL (ref 6.4–8.2)
PROT UR STRIP-MCNC: NEGATIVE MG/DL
RBC # BLD AUTO: 4.67 M/UL (ref 4.1–5.7)
RBC #/AREA URNS HPF: NORMAL /HPF (ref 0–5)
SODIUM SERPL-SCNC: 133 MMOL/L (ref 136–145)
SP GR UR REFRACTOMETRY: 1.01
URINE CULTURE IF INDICATED: NORMAL
UROBILINOGEN UR QL STRIP.AUTO: 0.2 EU/DL (ref 0.2–1)
WBC # BLD AUTO: 6.7 K/UL (ref 4.1–11.1)
WBC URNS QL MICRO: NORMAL /HPF (ref 0–4)

## 2025-03-13 PROCEDURE — 6360000004 HC RX CONTRAST MEDICATION: Performed by: RADIOLOGY

## 2025-03-13 PROCEDURE — 36415 COLL VENOUS BLD VENIPUNCTURE: CPT

## 2025-03-13 PROCEDURE — 83690 ASSAY OF LIPASE: CPT

## 2025-03-13 PROCEDURE — 81001 URINALYSIS AUTO W/SCOPE: CPT

## 2025-03-13 PROCEDURE — 80053 COMPREHEN METABOLIC PANEL: CPT

## 2025-03-13 PROCEDURE — 85025 COMPLETE CBC W/AUTO DIFF WBC: CPT

## 2025-03-13 PROCEDURE — 99285 EMERGENCY DEPT VISIT HI MDM: CPT

## 2025-03-13 PROCEDURE — 74177 CT ABD & PELVIS W/CONTRAST: CPT

## 2025-03-13 RX ORDER — POLYETHYLENE GLYCOL 3350 17 G/17G
17 POWDER, FOR SOLUTION ORAL DAILY PRN
Qty: 765 G | Refills: 0 | Status: SHIPPED | OUTPATIENT
Start: 2025-03-13 | End: 2025-04-27

## 2025-03-13 RX ORDER — IOPAMIDOL 755 MG/ML
100 INJECTION, SOLUTION INTRAVASCULAR
Status: COMPLETED | OUTPATIENT
Start: 2025-03-13 | End: 2025-03-13

## 2025-03-13 RX ADMIN — IOPAMIDOL 100 ML: 755 INJECTION, SOLUTION INTRAVENOUS at 19:20

## 2025-03-13 ASSESSMENT — PAIN DESCRIPTION - PAIN TYPE: TYPE: ACUTE PAIN

## 2025-03-13 ASSESSMENT — PAIN - FUNCTIONAL ASSESSMENT: PAIN_FUNCTIONAL_ASSESSMENT: 0-10

## 2025-03-13 ASSESSMENT — PAIN DESCRIPTION - ORIENTATION: ORIENTATION: LOWER

## 2025-03-13 ASSESSMENT — PAIN SCALES - GENERAL: PAINLEVEL_OUTOF10: 4

## 2025-03-13 ASSESSMENT — PAIN DESCRIPTION - LOCATION: LOCATION: ABDOMEN

## 2025-03-13 ASSESSMENT — PAIN DESCRIPTION - DESCRIPTORS: DESCRIPTORS: CRAMPING

## 2025-03-13 NOTE — ED NOTES
RN assumed care of pt at this time, per triage note:     Ambulatory with c/o low abdominal pain x few days. States he was constipated and took doculax which helped, however pains returned and he hasn't had a BM in 2-3 days.

## 2025-03-13 NOTE — ED NOTES
Bedside and Verbal shift change report given to NGUYEN Laureano (oncoming nurse) by NGUYEN Urena (offgoing nurse). Report included the following information Nurse Handoff Report, Index, ED Encounter Summary, ED SBAR, MAR, Recent Results, Med Rec Status, and Neuro Assessment.     Pt off the floor to CT

## 2025-03-14 NOTE — ED PROVIDER NOTES
Detail Level: Detailed Depth Of Biopsy: dermis Was A Bandage Applied: Yes Basophils % 0.6 0.0 - 1.0 %    Immature Granulocytes % 0.3 0.0 - 0.5 %    Neutrophils Absolute 3.71 1.80 - 8.00 K/UL    Lymphocytes Absolute 2.20 0.80 - 3.50 K/UL    Monocytes Absolute 0.57 0.00 - 1.00 K/UL    Eosinophils Absolute 0.11 0.00 - 0.40 K/UL    Basophils Absolute 0.04 0.00 - 0.10 K/UL    Immature Granulocytes Absolute 0.02 0.00 - 0.04 K/UL    Differential Type AUTOMATED     Comprehensive Metabolic Panel    Collection Time: 03/13/25  5:25 PM   Result Value Ref Range    Sodium 133 (L) 136 - 145 mmol/L    Potassium 4.4 3.5 - 5.1 mmol/L    Chloride 104 97 - 108 mmol/L    CO2 27 21 - 32 mmol/L    Anion Gap 2 2 - 12 mmol/L    Glucose 90 65 - 100 mg/dL    BUN 14 6 - 20 MG/DL    Creatinine 0.98 0.70 - 1.30 MG/DL    BUN/Creatinine Ratio 14 12 - 20      Est, Glom Filt Rate 86 >60 ml/min/1.73m2    Calcium 9.2 8.5 - 10.1 MG/DL    Total Bilirubin 0.5 0.2 - 1.0 MG/DL    ALT 45 12 - 78 U/L    AST 33 15 - 37 U/L    Alk Phosphatase 63 45 - 117 U/L    Total Protein 8.3 (H) 6.4 - 8.2 g/dL    Albumin 3.7 3.5 - 5.0 g/dL    Globulin 4.6 (H) 2.0 - 4.0 g/dL    Albumin/Globulin Ratio 0.8 (L) 1.1 - 2.2     Lipase    Collection Time: 03/13/25  5:25 PM   Result Value Ref Range    Lipase 37 13 - 75 U/L   Urinalysis with Reflex to Culture “IF” dysuria, frequency, or urgency.    Collection Time: 03/13/25  6:24 PM    Specimen: Urine   Result Value Ref Range    Color, UA YELLOW/STRAW      Appearance CLEAR CLEAR      Specific Gravity, UA 1.010      pH, Urine 6.0 5.0 - 8.0      Protein, UA Negative NEG mg/dL    Glucose, Ur Negative NEG mg/dL    Ketones, Urine Negative NEG mg/dL    Bilirubin, Urine Negative NEG      Blood, Urine Negative NEG      Urobilinogen, Urine 0.2 0.2 - 1.0 EU/dL    Nitrite, Urine Negative NEG      Leukocyte Esterase, Urine Negative NEG      WBC, UA 0-4 0 - 4 /hpf    RBC, UA 0-5 0 - 5 /hpf    Epithelial Cells, UA FEW FEW /lpf    BACTERIA, URINE Negative NEG /hpf    Urine Culture if Indicated CULTURE NOT INDICATED BY  Size Of Lesion In Cm: 0.3 Biopsy Type: H and E Biopsy Method: Personna blade Anesthesia Type: 1% lidocaine with epinephrine Anesthesia Volume In Cc (Will Not Render If 0): 0.5 Additional Anesthesia Volume In Cc (Will Not Render If 0): 0 Hemostasis: Drysol Wound Care: Petrolatum Dressing: bandage Destruction After The Procedure: No Type Of Destruction Used: Curettage Curettage Text: The wound bed was treated with curettage after the biopsy was performed. Cryotherapy Text: The wound bed was treated with cryotherapy after the biopsy was performed. Electrodesiccation Text: The wound bed was treated with electrodesiccation after the biopsy was performed. Electrodesiccation And Curettage Text: The wound bed was treated with electrodesiccation and curettage after the biopsy was performed. Silver Nitrate Text: The wound bed was treated with silver nitrate after the biopsy was performed. Lab: 6 Lab Facility: 3 Consent: Verbal consent was obtained after risks were reviewed including but not limited to scarring, infection, bleeding, pain scabbing, incomplete removal, nerve damage, allergy/reaction to anesthesia/cleanser/other, and possible need for further treatment including but not limited to additional/repeat biopsy, definitive treatment, and/or other procedure. Post-Care Instructions: I reviewed with the patient in detail post-care instructions. Patient is to keep the biopsy site dry overnight, and then apply bacitracin twice daily until healed. Patient may apply hydrogen peroxide soaks to remove any crusting. Notification Instructions: Patient will be notified of biopsy results. However, patient instructed to call the office if not contacted within 2 weeks. Billing Type: Third-Party Bill Information: Selecting Yes will display possible errors in your note based on the variables you have selected. This validation is only offered as a suggestion for you. PLEASE NOTE THAT THE VALIDATION TEXT WILL BE REMOVED WHEN YOU FINALIZE YOUR NOTE. IF YOU WANT TO FAX A PRELIMINARY NOTE YOU WILL NEED TO TOGGLE THIS TO 'NO' IF YOU DO NOT WANT IT IN YOUR FAXED NOTE.

## 2025-03-14 NOTE — DISCHARGE INSTRUCTIONS
You can also get magnesium citrate to drink - it kicks in quickly though, so be sure you'll be at home!

## 2025-03-14 NOTE — ED NOTES
Patient provided soaps suds enema, tolerated well. Successful BM, patient with some relief. Provider notified

## 2025-03-21 NOTE — TELEPHONE ENCOUNTER
Caller requests Refill of: Albuterol  \"Every thing else\"      Please send to:    [address], [address] [place] [address] [phone] Ara Gonzalez [phone]  [address] [facility] [address]  Phone: [phone] Fax: [phone]         Visit / Appointment History:  Future Appointment at [facility]:  3/6/2024       Last Appointment With PCP:  10/31/2023       Caller confirmed instructions and dosages as correct. Caller was advised that Meds will be refilled as soon as possible, however there can be a 48-72 business hour turn around on refill requests. Last appointment: 12/10/24  Next appointment: 5/16/25  Previous refill encounter(s): 9/25/23 Glycolax, 11/15/23 Loperamide    Requested Prescriptions     Pending Prescriptions Disp Refills    loperamide (SM ANTI-DIARRHEAL) 2 MG tablet 12 tablet 0     Sig: Take 1 tablet by mouth 4 times daily as needed for Diarrhea    polyethylene glycol (GLYCOLAX) 17 GM/SCOOP powder 510 g 0     Sig: Take 17 g by mouth daily as needed (constipation)         For Pharmacy Admin Tracking Only    Program: Medication Refill  CPA in place:    Recommendation Provided To:   Intervention Detail: New Rx: 2, reason: Patient Preference  Intervention Accepted By:   Gap Closed?:    Time Spent (min): 5

## 2025-05-10 ENCOUNTER — HOSPITAL ENCOUNTER (EMERGENCY)
Facility: HOSPITAL | Age: 65
Discharge: HOME | End: 2025-05-10
Attending: STUDENT IN AN ORGANIZED HEALTH CARE EDUCATION/TRAINING PROGRAM
Payer: MEDICARE

## 2025-05-10 VITALS
HEIGHT: 68 IN | TEMPERATURE: 98.1 F | WEIGHT: 139.55 LBS | HEART RATE: 82 BPM | BODY MASS INDEX: 21.15 KG/M2 | DIASTOLIC BLOOD PRESSURE: 66 MMHG | OXYGEN SATURATION: 95 % | RESPIRATION RATE: 16 BRPM | SYSTOLIC BLOOD PRESSURE: 143 MMHG

## 2025-05-10 DIAGNOSIS — K04.7 DENTAL INFECTION: ICD-10-CM

## 2025-05-10 DIAGNOSIS — K02.9 PAIN DUE TO DENTAL CARIES: Primary | ICD-10-CM

## 2025-05-10 PROCEDURE — 6370000000 HC RX 637 (ALT 250 FOR IP): Performed by: STUDENT IN AN ORGANIZED HEALTH CARE EDUCATION/TRAINING PROGRAM

## 2025-05-10 PROCEDURE — 99283 EMERGENCY DEPT VISIT LOW MDM: CPT

## 2025-05-10 RX ORDER — OXYCODONE AND ACETAMINOPHEN 5; 325 MG/1; MG/1
1 TABLET ORAL ONCE
Refills: 0 | Status: COMPLETED | OUTPATIENT
Start: 2025-05-10 | End: 2025-05-10

## 2025-05-10 RX ORDER — OXYCODONE HYDROCHLORIDE 5 MG/1
5 TABLET ORAL EVERY 6 HOURS PRN
Qty: 6 TABLET | Refills: 0 | Status: SHIPPED | OUTPATIENT
Start: 2025-05-10 | End: 2025-05-13

## 2025-05-10 RX ORDER — CHLORHEXIDINE GLUCONATE ORAL RINSE 1.2 MG/ML
15 SOLUTION DENTAL 2 TIMES DAILY
Qty: 420 ML | Refills: 0 | Status: SHIPPED | OUTPATIENT
Start: 2025-05-10 | End: 2025-05-24

## 2025-05-10 RX ORDER — AMOXICILLIN 500 MG/1
500 CAPSULE ORAL 2 TIMES DAILY
Qty: 20 CAPSULE | Refills: 0 | Status: SHIPPED | OUTPATIENT
Start: 2025-05-10 | End: 2025-05-20

## 2025-05-10 RX ADMIN — DIPHENHYDRAMINE HYDROCHLORIDE: 25 SOLUTION ORAL at 07:17

## 2025-05-10 RX ADMIN — OXYCODONE AND ACETAMINOPHEN 1 TABLET: 325; 5 TABLET ORAL at 07:16

## 2025-05-10 ASSESSMENT — PAIN DESCRIPTION - DESCRIPTORS: DESCRIPTORS: SHARP

## 2025-05-10 ASSESSMENT — PAIN SCALES - GENERAL: PAINLEVEL_OUTOF10: 6

## 2025-05-10 ASSESSMENT — PAIN DESCRIPTION - LOCATION: LOCATION: MOUTH

## 2025-05-10 ASSESSMENT — PAIN DESCRIPTION - ORIENTATION: ORIENTATION: LEFT

## 2025-05-10 ASSESSMENT — PAIN DESCRIPTION - PAIN TYPE: TYPE: ACUTE PAIN

## 2025-05-10 ASSESSMENT — PAIN - FUNCTIONAL ASSESSMENT: PAIN_FUNCTIONAL_ASSESSMENT: PREVENTS OR INTERFERES SOME ACTIVE ACTIVITIES AND ADLS

## 2025-05-10 NOTE — ED NOTES
0705: Assumed care of patient    0717: Patient has been instructed that they have been given percocet which contains opioids, benzodiazepines, or other sedating drugs. Patient is aware that they will need to refrain from driving or operating heavy machinery after taking this medication. Patient also instructed that they need to avoid drinking alcohol and using other products containing opioids, benzodiazepines, or other sedating drugs. Patient verbalized understanding. Pt reports he has a ride home

## 2025-05-10 NOTE — ED NOTES
Discharge instructions reviewed with pt by provider. pt verbalized understanding of discharge instructions. Copy of discharge paperwork given. Patient condition stable, respiratory status within normal limits, neuro status intact. Ambulatory out of er, accompanied by family

## 2025-05-10 NOTE — DISCHARGE INSTRUCTIONS
Emergency Dental Care     [facility]  [address] [place], [place] [address]  Open M, W, F: 8AM - 5PM and T, Th: 8AM-6PM  Phone: [phone], press 4  $70 for Emergency Care  $60 for first routine care, then pay by sliding scale based upon income.    [facility]'s [address] [address]  Phone: [phone]    [facility]  [address] [place] [address]  Open Monday - Friday 8AM - 4:30 PM  Phone: [phone]    [facility]  [facility], [facility], [address] [place] [address], 1st Floor  First Come First Service starting at 8:30 AM M-F  Phone: [phone], press 2  Fee: $150 per tooth (x-ray & extractions only)  Pediatrics Phone:: [phone], 8-5 M-F    [facility]  [facility], [facility], [address] [place] [address], 2nd Floor, [address]  First Come First Service starting at 8:30 AM - 3 PM M - F    [facility]  [address] [place] [address]  Phone: [phone] or [phone]  Emergency Hours: 9:30AM - 11AM (extractions)  Simple tooth extraction $ per tooth. #75 for x-ray    [facility] Residents only, over the age of 18  Phone: [phone]. Leave message saying you need an appointment to register.  Hours: Tuesday Evenings
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
## 2025-06-12 NOTE — TELEPHONE ENCOUNTER
Pt laid flat for 2 minutes for pericare, pt went into resp distress, placed on NRB 15L. Pt tachypneic and ST, resp therapist called.    Pt presents into clinic this morning. Pt states that he recently went to pt first over Thanksgiving Holiday with an anxiety attack. Pt inquiring if he is able to get an increase on his celexa. Pt informed Dr. Shanika Baltazar will be notified. Pt verbalized understanding of information discussed w/ no further questions at this time.

## 2025-07-23 RX ORDER — ALBUTEROL SULFATE 90 UG/1
2 INHALANT RESPIRATORY (INHALATION) EVERY 6 HOURS PRN
Qty: 18 G | Refills: 3 | Status: SHIPPED | OUTPATIENT
Start: 2025-07-23

## 2025-08-12 ENCOUNTER — OFFICE VISIT (OUTPATIENT)
Age: 65
End: 2025-08-12
Payer: MEDICARE

## 2025-08-12 VITALS
HEIGHT: 68 IN | HEART RATE: 88 BPM | BODY MASS INDEX: 20.7 KG/M2 | WEIGHT: 136.6 LBS | OXYGEN SATURATION: 98 % | RESPIRATION RATE: 16 BRPM | TEMPERATURE: 97.9 F | DIASTOLIC BLOOD PRESSURE: 72 MMHG | SYSTOLIC BLOOD PRESSURE: 118 MMHG

## 2025-08-12 DIAGNOSIS — I10 PRIMARY HYPERTENSION: Primary | ICD-10-CM

## 2025-08-12 DIAGNOSIS — T78.3XXD ANGIOEDEMA, SUBSEQUENT ENCOUNTER: ICD-10-CM

## 2025-08-12 DIAGNOSIS — F41.9 ANXIETY: ICD-10-CM

## 2025-08-12 DIAGNOSIS — E78.00 PURE HYPERCHOLESTEROLEMIA: ICD-10-CM

## 2025-08-12 DIAGNOSIS — E87.1 HYPONATREMIA: ICD-10-CM

## 2025-08-12 DIAGNOSIS — Z85.46 H/O PROSTATE CANCER: ICD-10-CM

## 2025-08-12 DIAGNOSIS — K21.9 GASTROESOPHAGEAL REFLUX DISEASE WITHOUT ESOPHAGITIS: ICD-10-CM

## 2025-08-12 DIAGNOSIS — Z00.00 MEDICARE ANNUAL WELLNESS VISIT, SUBSEQUENT: ICD-10-CM

## 2025-08-12 PROCEDURE — 1160F RVW MEDS BY RX/DR IN RCRD: CPT | Performed by: INTERNAL MEDICINE

## 2025-08-12 PROCEDURE — 1123F ACP DISCUSS/DSCN MKR DOCD: CPT | Performed by: INTERNAL MEDICINE

## 2025-08-12 PROCEDURE — 3017F COLORECTAL CA SCREEN DOC REV: CPT | Performed by: INTERNAL MEDICINE

## 2025-08-12 PROCEDURE — G0439 PPPS, SUBSEQ VISIT: HCPCS | Performed by: INTERNAL MEDICINE

## 2025-08-12 PROCEDURE — 1036F TOBACCO NON-USER: CPT | Performed by: INTERNAL MEDICINE

## 2025-08-12 PROCEDURE — 99214 OFFICE O/P EST MOD 30 MIN: CPT | Performed by: INTERNAL MEDICINE

## 2025-08-12 PROCEDURE — 1126F AMNT PAIN NOTED NONE PRSNT: CPT | Performed by: INTERNAL MEDICINE

## 2025-08-12 PROCEDURE — G8427 DOCREV CUR MEDS BY ELIG CLIN: HCPCS | Performed by: INTERNAL MEDICINE

## 2025-08-12 PROCEDURE — 1159F MED LIST DOCD IN RCRD: CPT | Performed by: INTERNAL MEDICINE

## 2025-08-12 PROCEDURE — 3078F DIAST BP <80 MM HG: CPT | Performed by: INTERNAL MEDICINE

## 2025-08-12 PROCEDURE — 3074F SYST BP LT 130 MM HG: CPT | Performed by: INTERNAL MEDICINE

## 2025-08-12 PROCEDURE — G8420 CALC BMI NORM PARAMETERS: HCPCS | Performed by: INTERNAL MEDICINE

## 2025-08-12 RX ORDER — LIFITEGRAST 50 MG/ML
SOLUTION/ DROPS OPHTHALMIC
COMMUNITY

## 2025-08-12 RX ORDER — KETOROLAC TROMETHAMINE 5 MG/ML
SOLUTION OPHTHALMIC
COMMUNITY

## 2025-08-12 RX ORDER — ESCITALOPRAM OXALATE 5 MG/1
5 TABLET ORAL DAILY
Qty: 90 TABLET | Refills: 0 | Status: SHIPPED | OUTPATIENT
Start: 2025-08-12

## 2025-08-12 RX ORDER — PREDNISOLONE ACETATE 10 MG/ML
SUSPENSION/ DROPS OPHTHALMIC
COMMUNITY

## 2025-08-12 RX ORDER — AMLODIPINE BESYLATE 2.5 MG/1
2.5 TABLET ORAL DAILY
Qty: 90 TABLET | Refills: 1 | Status: SHIPPED | OUTPATIENT
Start: 2025-08-12

## 2025-08-12 ASSESSMENT — PATIENT HEALTH QUESTIONNAIRE - PHQ9
SUM OF ALL RESPONSES TO PHQ QUESTIONS 1-9: 0
1. LITTLE INTEREST OR PLEASURE IN DOING THINGS: NOT AT ALL
2. FEELING DOWN, DEPRESSED OR HOPELESS: NOT AT ALL
SUM OF ALL RESPONSES TO PHQ QUESTIONS 1-9: 0

## 2025-08-13 ENCOUNTER — RESULTS FOLLOW-UP (OUTPATIENT)
Age: 65
End: 2025-08-13

## 2025-08-13 LAB
ANION GAP SERPL CALC-SCNC: 12 MMOL/L (ref 2–14)
BUN SERPL-MCNC: 19 MG/DL (ref 8–23)
BUN/CREAT SERPL: 17 (ref 12–20)
CALCIUM SERPL-MCNC: 9.5 MG/DL (ref 8.8–10.2)
CHLORIDE SERPL-SCNC: 101 MMOL/L (ref 98–107)
CHOLEST SERPL-MCNC: 172 MG/DL (ref 0–200)
CO2 SERPL-SCNC: 24 MMOL/L (ref 20–29)
CREAT SERPL-MCNC: 1.12 MG/DL (ref 0.7–1.2)
GLUCOSE SERPL-MCNC: 93 MG/DL (ref 65–100)
HDLC SERPL-MCNC: 90 MG/DL (ref 40–60)
HDLC SERPL: 1.9 (ref 0–5)
LDLC SERPL CALC-MCNC: 67 MG/DL
POTASSIUM SERPL-SCNC: 4.8 MMOL/L (ref 3.5–5.1)
SODIUM SERPL-SCNC: 137 MMOL/L (ref 136–145)
TRIGL SERPL-MCNC: 76 MG/DL (ref 0–150)
VLDLC SERPL CALC-MCNC: 15 MG/DL

## 2025-08-13 RX ORDER — ALBUTEROL SULFATE 90 UG/1
2 INHALANT RESPIRATORY (INHALATION) EVERY 6 HOURS PRN
Qty: 18 G | Refills: 3 | Status: SHIPPED | OUTPATIENT
Start: 2025-08-13

## 2025-08-22 RX ORDER — ROSUVASTATIN CALCIUM 10 MG/1
10 TABLET, COATED ORAL DAILY
Qty: 90 TABLET | Refills: 1 | Status: SHIPPED | OUTPATIENT
Start: 2025-08-22

## 2025-08-27 ENCOUNTER — CLINICAL SUPPORT (OUTPATIENT)
Age: 65
End: 2025-08-27

## 2025-08-27 VITALS — SYSTOLIC BLOOD PRESSURE: 136 MMHG | DIASTOLIC BLOOD PRESSURE: 78 MMHG

## 2025-08-27 DIAGNOSIS — Z01.30 BP CHECK: Primary | ICD-10-CM

## (undated) DEVICE — SUTURE ETHBND EXCEL SZ 5 L30IN NONABSORBABLE GRN L40MM V-37 MB66G

## (undated) DEVICE — STERILE POLYISOPRENE POWDER-FREE SURGICAL GLOVES: Brand: PROTEXIS

## (undated) DEVICE — 450 ML BOTTLE OF 0.05% CHLORHEXIDINE GLUCONATE IN 99.95% STERILE WATER FOR IRRIGATION, USP AND APPLICATOR.: Brand: IRRISEPT ANTIMICROBIAL WOUND LAVAGE

## (undated) DEVICE — HANDLE LT SNAP ON ULT DURABLE LENS FOR TRUMPF ALC DISPOSABLE

## (undated) DEVICE — KIT TRK HIP PROC VIZADISC

## (undated) DEVICE — 6619 2 PTNT ISO SYS INCISE AREA&LT;(&GT;&&LT;)&GT;P: Brand: STERI-DRAPE™ IOBAN™ 2

## (undated) DEVICE — REM POLYHESIVE ADULT PATIENT RETURN ELECTRODE: Brand: VALLEYLAB

## (undated) DEVICE — INSULATED BLADE ELECTRODE: Brand: EDGE

## (undated) DEVICE — SYSTEM SKIN CLSR 22CM DERMBND PRINEO

## (undated) DEVICE — KIT POS FOAM HANA TBL

## (undated) DEVICE — 3M™ IOBAN™ 2 ANTIMICROBIAL INCISE DRAPE 6650EZ: Brand: IOBAN™ 2

## (undated) DEVICE — SYR 50ML LR LCK 1ML GRAD NSAF --

## (undated) DEVICE — SUTURE VCRL SZ 1 L18IN ABSRB VLT CT-1 L36MM 1/2 CIR J741D

## (undated) DEVICE — SPONGE LAP 18X18IN STRL -- 5/PK

## (undated) DEVICE — SOLUTION IRRIG 1000ML H2O STRL BLT

## (undated) DEVICE — HOOD: Brand: FLYTE

## (undated) DEVICE — DRAPE MICSCP W20XL64IN CLR LENS WECK FOR ZEISS OPMI

## (undated) DEVICE — TUBING IRRIG L77IN DIA0.241IN L BOR FOR CYSTO W/ NVENT

## (undated) DEVICE — DRAIN SURG 10FR SIL RND HUBLESS W/ 1/8IN TRCR BLAK

## (undated) DEVICE — DEVICE TRNSF SPIK STL 2008S] MICROTEK MEDICAL INC]

## (undated) DEVICE — Z DISCONTINUED USE 2717541 SUTURE STRATAFIX SZ 3-0 L30CM NONABSORBABLE UD L26MM FS 3/8

## (undated) DEVICE — GOWN,SIRUS,NONRNF,SETINSLV,XL,20/CS: Brand: MEDLINE

## (undated) DEVICE — INFECTION CONTROL KIT SYS

## (undated) DEVICE — GOWN,SIRUS,NONRNF,SETINSLV,2XL,18/CS: Brand: MEDLINE

## (undated) DEVICE — LAMINECTOMY RICHMOND-LF: Brand: MEDLINE INDUSTRIES, INC.

## (undated) DEVICE — YANKAUER,SMOOTH HANDLE,HIGH CAPACITY: Brand: MEDLINE INDUSTRIES, INC.

## (undated) DEVICE — GAUZE SPONGES,12 PLY: Brand: CURITY

## (undated) DEVICE — MEDI-VAC NON-CONDUCTIVE SUCTION TUBING: Brand: CARDINAL HEALTH

## (undated) DEVICE — BIPOLAR FORCEPS CORD,BANANA LEADS: Brand: VALLEYLAB

## (undated) DEVICE — SOLUTION IV 1000ML 0.9% SOD CHL

## (undated) DEVICE — OSCILLATING TIP SAW CARTRIDGE: Brand: PRECISION FALCON

## (undated) DEVICE — HALTER TRACTION HD W/ TRI COTTON LINING FOAM LTX

## (undated) DEVICE — DRSG PATCH ANTIMIC 1INX4.0MM -- CONVERT TO ITEM 356053

## (undated) DEVICE — 3M™ TEGADERM™ TRANSPARENT FILM DRESSING FRAME STYLE, 1626W, 4 IN X 4-3/4 IN (10 CM X 12 CM), 50/CT 4CT/CASE: Brand: 3M™ TEGADERM™

## (undated) DEVICE — NDL SPNE QNCKE 18GX3.5IN LF --

## (undated) DEVICE — KENDALL SCD EXPRESS SLEEVES, KNEE LENGTH, MEDIUM: Brand: KENDALL SCD

## (undated) DEVICE — FLOSEAL HEMOSTATIC MATRIX, 10 ML: Brand: FLOSEAL

## (undated) DEVICE — 1200 GUARD II KIT W/5MM TUBE W/O VAC TUBE: Brand: GUARDIAN

## (undated) DEVICE — SUTURE VCRL SZ 2-0 L18IN ABSRB UD CT-1 L36MM 1/2 CIR J839D

## (undated) DEVICE — SLIM BODY SKIN STAPLER: Brand: APPOSE ULC

## (undated) DEVICE — DRAPE,LAPAROTOMY,PCH,STERILE: Brand: MEDLINE

## (undated) DEVICE — Z CONVERTED USE 2107985 COVER FLROSCP W36XL28IN 4 SIDE ADH

## (undated) DEVICE — STERILE POLYISOPRENE POWDER-FREE SURGICAL GLOVES WITH EMOLLIENT COATING: Brand: PROTEXIS

## (undated) DEVICE — HANDPIECE SET WITH COAXIAL HIGH FLOW TIP AND SUCTION TUBE: Brand: INTERPULSE

## (undated) DEVICE — GARMENT,MEDLINE,DVT,INT,CALF,MED, GEN2: Brand: MEDLINE

## (undated) DEVICE — DRAPE,U/ SHT,SPLIT,PLAS,STERIL: Brand: MEDLINE

## (undated) DEVICE — DRILL BIT 16MM

## (undated) DEVICE — SMOKE EVACUATION PENCIL: Brand: VALLEYLAB

## (undated) DEVICE — GAUZE,SPONGE,4"X4",16PLY,STRL,LF,10/TRAY: Brand: MEDLINE

## (undated) DEVICE — 3M™ STERI-DRAPE™ INSTRUMENT POUCH 1018: Brand: STERI-DRAPE™

## (undated) DEVICE — SYRINGE MED 20ML STD CLR PLAS LUERLOCK TIP N CTRL DISP

## (undated) DEVICE — PACK,BASIC,SIRUS,V: Brand: MEDLINE

## (undated) DEVICE — TOWEL SURG W17XL27IN STD BLU COT NONFENESTRATED PREWASHED

## (undated) DEVICE — SOLUTION IRRIG 3000ML 0.9% SOD CHL FLX CONT 0797208] ICU MEDICAL INC]

## (undated) DEVICE — OPTIFOAM GENTLE SA, POSTOP, 4X8: Brand: MEDLINE

## (undated) DEVICE — COVER,TABLE,60X90,STERILE: Brand: MEDLINE

## (undated) DEVICE — SUTURE VCRL UD BR CT 3-0 18IN CT1 J838D

## (undated) DEVICE — KIT DRP FOR RIO ROBOTIC ARM ASST SYS

## (undated) DEVICE — PREP SKN CHLRAPRP APL 26ML STR --

## (undated) DEVICE — COVER,MAYO STAND,STERILE: Brand: MEDLINE

## (undated) DEVICE — OPTIFOAM GENTLE SA, POSTOP, 4X10: Brand: MEDLINE

## (undated) DEVICE — FLOSEAL MATRIX IS INDICATED IN SURGICAL PROCEDURES (OTHER THAN IN OPHTHALMIC) AS AN ADJUNCT TO HEMOSTASIS WHEN CONTROL OF BLEEDING BY LIGATURE OR CONVENTIONALPROCEDURES IS INEFFECTIVE OR IMPRACTICAL.: Brand: FLOSEAL HEMOSTATIC MATRIX

## (undated) DEVICE — PIN BNE FIX 4X140MM STRL -- 1EA=2PK MAKO

## (undated) DEVICE — SUTURE PERMAHAND SZ 2-0 L30IN NONABSORBABLE BLK SILK W/O A305H

## (undated) DEVICE — FLOSEAL MATRIX IS INDICATED IN SURGICAL PROCEDURES (OTHER THAN IN OPHTHALMIC) AS AN ADJUNCT TO HEMOSTASIS WHEN CONTROL OF BLEEDING BY LIGATURE OR CONVENTIONAL PROCEDURES IS INEFFECTIVE OR IMPRACTICAL.: Brand: FLOSEAL HEMOSTATIC MATRIX

## (undated) DEVICE — ELECTRODE BLDE L4IN NONINSULATED EDGE

## (undated) DEVICE — PREP CHLORAPREP 10.5 ML ORG --

## (undated) DEVICE — MARKER RAD KNEE TIB CKPT STEREOTAXIC IMAG LESION LOC

## (undated) DEVICE — SURGICAL PROCEDURE PACK BASIN MAJ SET CUST NO CAUT

## (undated) DEVICE — CASPAR DISTR PIN12MMSTER: Brand: AESCULAP

## (undated) DEVICE — TOOL 14MH30 LEGEND 14CM 3MM: Brand: MIDAS REX ™

## (undated) DEVICE — SUTURE ETHLN SZ 2-0 L18IN NONABSORBABLE BLK L19MM PS-2 PRIM 593H

## (undated) DEVICE — 4-PORT MANIFOLD: Brand: NEPTUNE 2

## (undated) DEVICE — BONE WAX WHITE: Brand: BONE WAX WHITE

## (undated) DEVICE — MAGNETIC INSTRUMENT PAD 10" X 16"; MEDIUM; DISPOSABLE: Brand: CARDINAL HEALTH